# Patient Record
Sex: FEMALE | Race: BLACK OR AFRICAN AMERICAN | HISPANIC OR LATINO | Employment: OTHER | URBAN - METROPOLITAN AREA
[De-identification: names, ages, dates, MRNs, and addresses within clinical notes are randomized per-mention and may not be internally consistent; named-entity substitution may affect disease eponyms.]

---

## 2017-01-10 ENCOUNTER — TELEPHONE (OUTPATIENT)
Dept: OPHTHALMOLOGY | Facility: CLINIC | Age: 77
End: 2017-01-10

## 2017-01-10 ENCOUNTER — PATIENT OUTREACH (OUTPATIENT)
Dept: OTHER | Facility: OTHER | Age: 77
End: 2017-01-10
Payer: MEDICARE

## 2017-01-10 RX ORDER — PREDNISOLONE ACETATE 10 MG/ML
1 SUSPENSION/ DROPS OPHTHALMIC 3 TIMES DAILY
Qty: 5 ML | Refills: 1 | Status: SHIPPED | OUTPATIENT
Start: 2017-01-10 | End: 2017-02-09

## 2017-01-10 RX ORDER — OFLOXACIN 3 MG/ML
1 SOLUTION/ DROPS OPHTHALMIC 3 TIMES DAILY
Qty: 5 ML | Refills: 0 | Status: SHIPPED | OUTPATIENT
Start: 2017-01-10 | End: 2017-06-26 | Stop reason: SDUPTHER

## 2017-01-10 NOTE — PROGRESS NOTES
Last 5 Patient Entered Readings                                                               Current 30 Day Average: 127/73     Recent Readings 1/6/2017 12/30/2016 12/24/2016 12/23/2016 12/22/2016    Systolic BP (mmHg) 126 139 104 125 135    Diastolic BP (mmHg) 76 79 65 69 83        Spoke with Ms Breen, Ms Guerra's daughter, she is doing well. Will take more readings - wasn't sure if they were coming through to us or not. Going to visit her tonight and will take a reading tonight.     Follow up with Ms. Marga Guerra completed. Patient is maintaining a low sodium diet and continuing her exercise regime. Patient did not have any further questions or concerns. I will follow up in a few weeks to see how she is doing and progressing.

## 2017-01-10 NOTE — TELEPHONE ENCOUNTER
----- Message from Kari Ladd sent at 1/9/2017  1:04 PM CST -----  Contact: Linda(daughter)  Ms. Mckeon call because her mom needs more drops before her surgery. She can be reached at [phone]

## 2017-01-11 ENCOUNTER — TELEPHONE (OUTPATIENT)
Dept: OPHTHALMOLOGY | Facility: CLINIC | Age: 77
End: 2017-01-11

## 2017-01-12 ENCOUNTER — SURGERY (OUTPATIENT)
Age: 77
End: 2017-01-12

## 2017-01-12 ENCOUNTER — ANESTHESIA EVENT (OUTPATIENT)
Dept: SURGERY | Facility: HOSPITAL | Age: 77
End: 2017-01-12
Payer: MEDICARE

## 2017-01-12 ENCOUNTER — ANESTHESIA (OUTPATIENT)
Dept: SURGERY | Facility: HOSPITAL | Age: 77
End: 2017-01-12
Payer: MEDICARE

## 2017-01-12 PROCEDURE — D9220A PRA ANESTHESIA: Mod: ANES,,, | Performed by: ANESTHESIOLOGY

## 2017-01-12 PROCEDURE — 63600175 PHARM REV CODE 636 W HCPCS: Performed by: NURSE ANESTHETIST, CERTIFIED REGISTERED

## 2017-01-12 PROCEDURE — D9220A PRA ANESTHESIA: Mod: CRNA,,, | Performed by: NURSE ANESTHETIST, CERTIFIED REGISTERED

## 2017-01-12 PROCEDURE — 25000003 PHARM REV CODE 250: Performed by: NURSE ANESTHETIST, CERTIFIED REGISTERED

## 2017-01-12 RX ORDER — SODIUM CHLORIDE 9 MG/ML
INJECTION, SOLUTION INTRAVENOUS CONTINUOUS PRN
Status: DISCONTINUED | OUTPATIENT
Start: 2017-01-12 | End: 2017-01-12

## 2017-01-12 RX ORDER — MIDAZOLAM HYDROCHLORIDE 1 MG/ML
INJECTION, SOLUTION INTRAMUSCULAR; INTRAVENOUS
Status: DISCONTINUED | OUTPATIENT
Start: 2017-01-12 | End: 2017-01-12

## 2017-01-12 RX ADMIN — LIDOCAINE HYDROCHLORIDE 1 ML: 40 INJECTION, SOLUTION RETROBULBAR; TOPICAL at 08:01

## 2017-01-12 RX ADMIN — MIDAZOLAM HYDROCHLORIDE 1 MG: 1 INJECTION, SOLUTION INTRAMUSCULAR; INTRAVENOUS at 08:01

## 2017-01-12 RX ADMIN — MOXIFLOXACIN HYDROCHLORIDE 1 DROP: 5 SOLUTION/ DROPS OPHTHALMIC at 08:01

## 2017-01-12 RX ADMIN — PREDNISOLONE ACETATE 1 DROP: 10 SUSPENSION/ DROPS OPHTHALMIC at 08:01

## 2017-01-12 RX ADMIN — SODIUM CHONDROITIN SULFATE / SODIUM HYALURONATE 1.05 ML: 0.55-0.5 INJECTION INTRAOCULAR at 08:01

## 2017-01-12 RX ADMIN — BALANCED SALT SOLUTION 500 ML: 6.4; .75; .48; .3; 3.9; 1.7 SOLUTION OPHTHALMIC at 08:01

## 2017-01-12 RX ADMIN — SODIUM CHLORIDE: 0.9 INJECTION, SOLUTION INTRAVENOUS at 08:01

## 2017-01-12 RX ADMIN — PHENYLEPHRINE AND KETOROLAC 4 ML: 10.16; 2.88 INJECTION, SOLUTION, CONCENTRATE INTRAOCULAR at 08:01

## 2017-01-12 RX ADMIN — LIDOCAINE HYDROCHLORIDE 1 ML: 10 INJECTION, SOLUTION EPIDURAL; INFILTRATION; INTRACAUDAL; PERINEURAL at 08:01

## 2017-01-12 NOTE — ANESTHESIA PREPROCEDURE EVALUATION
01/12/2017  Marga Guerra is a 76 y.o., female.    [facility] Anesthesia Evaluation    I have reviewed the Patient Summary Reports.    I have reviewed the Nursing Notes.   I have reviewed the Medications.     Review of Systems  Anesthesia Hx:  No problems with previous Anesthesia    Social:  No Alcohol Use, Non-Smoker    Hematology/Oncology:  Hematology Normal   Oncology Normal     EENT/Dental:EENT/Dental Normal   Cardiovascular:   Hypertension Denies MI.  Denies CAD.    ROCK    Pulmonary:   COPD, moderate Hx lung CA   Renal/:   Chronic Renal Disease    Hepatic/GI:  Hepatic/GI Normal    Musculoskeletal:   Arthritis     Neurological:   CVA, no residual symptoms    Endocrine:   Diabetes, type 2 Hypothyroidism    Psych:   Psychiatric History          Physical Exam  General:  Well nourished    Airway/Jaw/Neck:  Airway Findings: Mouth Opening: Normal General Airway Assessment: Adult  Mallampati: II      Dental:  Dental Findings: Edentulous   Chest/Lungs:  Chest/Lungs Clear    Heart/Vascular:  Heart Findings: Normal            Anesthesia Plan  Type of Anesthesia, risks & benefits discussed:  Anesthesia Type:  general  Patient's Preference: GA  Intra-op Monitoring Plan:   Intra-op Monitoring Plan Comments:   Post Op Pain Control Plan:   Post Op Pain Control Plan Comments:   Induction:   IV  Beta Blocker:  Patient is not currently on a Beta-Blocker (No further documentation required).       Informed Consent: Patient understands risks and agrees with Anesthesia plan.  Questions answered. Anesthesia consent signed with patient.  ASA Score: 3     Day of Surgery Review of History & Physical:    H&P update referred to the surgeon.         Ready For Surgery From Anesthesia Perspective.

## 2017-01-12 NOTE — TRANSFER OF CARE
"Anesthesia Transfer of Care Note    Patient: aMrga Guerra    Procedure(s) Performed: Procedure(s) (LRB):  PHACOEMULSIFICATION-ASPIRATION-CATARACT (Right)  INSERTION-INTRAOCULAR LENS (IOL) (Right)    Patient location: PACU    Anesthesia Type: MAC    Transport from OR: Transported from OR on 2-3 L/min O2 by NC with adequate spontaneous ventilation    Post pain: adequate analgesia    Post assessment: no apparent anesthetic complications    Post vital signs: stable    Level of consciousness: awake    Nausea/Vomiting: no nausea/vomiting    Complications: none          Last vitals:   Visit Vitals    BP (!) 158/83 (BP Location: Left arm, Patient Position: Lying, BP Method: Automatic)    Pulse 78    Temp 36.9 °C (98.4 °F) (Oral)    Resp 18    Ht 5' 2" (1.575 m)    Wt 54.4 kg (120 lb)    SpO2 99%    Breastfeeding No    BMI 21.95 kg/m2     "

## 2017-01-12 NOTE — ANESTHESIA RELEASE NOTE
"Anesthesia Release from PACU Note    Patient: Marga Guerra    Procedure(s) Performed: Procedure(s) (LRB):  PHACOEMULSIFICATION-ASPIRATION-CATARACT (Right)  INSERTION-INTRAOCULAR LENS (IOL) (Right)    Anesthesia type: general    Post pain: Adequate analgesia    Post assessment: no apparent anesthetic complications    Last Vitals:   Visit Vitals    BP (!) 153/80    Pulse 75    Temp 36.9 °C (98.4 °F) (Oral)    Resp 18    Ht 5' 2" (1.575 m)    Wt 54.4 kg (120 lb)    SpO2 99%    Breastfeeding No    BMI 21.95 kg/m2       Post vital signs: stable    Level of consciousness: awake    Nausea/Vomiting: no nausea/no vomiting    Complications: none    Airway Patency: patent    Respiratory: unassisted    Cardiovascular: stable and blood pressure at baseline    Hydration: euvolemic  "

## 2017-01-12 NOTE — ANESTHESIA POSTPROCEDURE EVALUATION
"Anesthesia Post Evaluation    Patient: Marga Guerra    Procedure(s) Performed: Procedure(s) (LRB):  PHACOEMULSIFICATION-ASPIRATION-CATARACT (Right)  INSERTION-INTRAOCULAR LENS (IOL) (Right)    Final Anesthesia Type: general  Patient location during evaluation: PACU  Patient participation: Yes- Able to Participate  Level of consciousness: awake and alert and oriented  Post-procedure vital signs: reviewed and stable  Pain management: adequate  Airway patency: patent  PONV status at discharge: No PONV  Anesthetic complications: no      Cardiovascular status: hemodynamically stable  Respiratory status: unassisted and spontaneous ventilation  Hydration status: euvolemic  Follow-up not needed.        Visit Vitals    BP (!) 153/80    Pulse 75    Temp 36.9 °C (98.4 °F) (Oral)    Resp 18    Ht 5' 2" (1.575 m)    Wt 54.4 kg (120 lb)    SpO2 99%    Breastfeeding No    BMI 21.95 kg/m2       Pain/Desirae Score: Pain Assessment Performed: Yes (1/12/2017  9:12 AM)  Presence of Pain: denies (1/12/2017  9:12 AM)  Desirae Score: 10 (1/12/2017  9:12 AM)      "

## 2017-01-13 ENCOUNTER — OFFICE VISIT (OUTPATIENT)
Dept: OPHTHALMOLOGY | Facility: CLINIC | Age: 77
End: 2017-01-13
Payer: MEDICARE

## 2017-01-13 DIAGNOSIS — Z96.1 PSEUDOPHAKIA OF RIGHT EYE: Primary | ICD-10-CM

## 2017-01-13 PROCEDURE — 99999 PR PBB SHADOW E&M-EST. PATIENT-LVL II: CPT | Mod: PBBFAC,,, | Performed by: OPHTHALMOLOGY

## 2017-01-13 PROCEDURE — 99024 POSTOP FOLLOW-UP VISIT: CPT | Mod: S$GLB,,, | Performed by: OPHTHALMOLOGY

## 2017-01-13 NOTE — PROGRESS NOTES
HPI     Patient here for 1 day post op OD Phaco/IoL  s/p phaco w/IOL OS 10/6/16    Pt states OD feels fine. No pain. No discomfort.    Eye Drops:Pred BID OD                    Ketoralac BID OD           Last edited by Arin Shukla MA on 1/13/2017  1:40 PM.         Assessment /Plan     For exam results, see Encounter Report.    Pseudophakia of right eye    POD #1 s/p phaco/IOL OD  - doing well, no issues  - continue the following drops:    Vigamox TID x 1 wk then stop  Pred forte TID x  4 wks  Ketorolac TID until runs out    -f/up 1wk, sooner PRN. -- dilate OU at that time    1 mo with dr. ingram

## 2017-01-23 ENCOUNTER — OFFICE VISIT (OUTPATIENT)
Dept: OPHTHALMOLOGY | Facility: CLINIC | Age: 77
End: 2017-01-23
Payer: MEDICARE

## 2017-01-23 ENCOUNTER — PATIENT OUTREACH (OUTPATIENT)
Dept: OTHER | Facility: OTHER | Age: 77
End: 2017-01-23
Payer: MEDICARE

## 2017-01-23 DIAGNOSIS — H35.89 RETINAL MACULAR ATROPHY: ICD-10-CM

## 2017-01-23 DIAGNOSIS — E11.22 CONTROLLED TYPE 2 DIABETES MELLITUS WITH STAGE 4 CHRONIC KIDNEY DISEASE, WITHOUT LONG-TERM CURRENT USE OF INSULIN: ICD-10-CM

## 2017-01-23 DIAGNOSIS — Z98.41 STATUS POST CATARACT EXTRACTION AND INSERTION OF INTRAOCULAR LENS, RIGHT: Primary | ICD-10-CM

## 2017-01-23 DIAGNOSIS — N18.4 CONTROLLED TYPE 2 DIABETES MELLITUS WITH STAGE 4 CHRONIC KIDNEY DISEASE, WITHOUT LONG-TERM CURRENT USE OF INSULIN: ICD-10-CM

## 2017-01-23 DIAGNOSIS — Z96.1 STATUS POST CATARACT EXTRACTION AND INSERTION OF INTRAOCULAR LENS, RIGHT: Primary | ICD-10-CM

## 2017-01-23 DIAGNOSIS — Z98.42 STATUS POST CATARACT EXTRACTION AND INSERTION OF INTRAOCULAR LENS, LEFT: ICD-10-CM

## 2017-01-23 DIAGNOSIS — E03.9 HYPOTHYROIDISM, UNSPECIFIED TYPE: Primary | ICD-10-CM

## 2017-01-23 DIAGNOSIS — Z96.1 STATUS POST CATARACT EXTRACTION AND INSERTION OF INTRAOCULAR LENS, LEFT: ICD-10-CM

## 2017-01-23 PROCEDURE — 99499 UNLISTED E&M SERVICE: CPT | Mod: S$GLB,,, | Performed by: OPHTHALMOLOGY

## 2017-01-23 PROCEDURE — 99024 POSTOP FOLLOW-UP VISIT: CPT | Mod: S$GLB,,, | Performed by: OPHTHALMOLOGY

## 2017-01-23 PROCEDURE — 99999 PR PBB SHADOW E&M-EST. PATIENT-LVL III: CPT | Mod: PBBFAC,,, | Performed by: OPHTHALMOLOGY

## 2017-01-23 RX ORDER — LEVOTHYROXINE SODIUM 75 UG/1
75 TABLET ORAL
Qty: 30 TABLET | Refills: 11 | Status: SHIPPED | OUTPATIENT
Start: 2017-01-23 | End: 2018-01-10 | Stop reason: SDUPTHER

## 2017-01-23 RX ORDER — LEVOTHYROXINE SODIUM 50 UG/1
TABLET ORAL
Refills: 0 | COMMUNITY
Start: 2016-12-21 | End: 2017-01-24 | Stop reason: DRUGHIGH

## 2017-01-23 NOTE — PROGRESS NOTES
HPI     Referred by Dr Ingram     S/p phaco IOL OD 1/12/17  s/p phaco w/IOL OS 10/6/16   AMD     Pt states OD feels fine. Intermittent jose pain. No flashes or floaters.    Eye Drops:Pred BID OS                    Ketoralac BID OD           Last edited by Nataliia Meadows MD on 1/23/2017 11:12 AM.         Assessment /Plan     For exam results, see Encounter Report.    Status post cataract extraction and insertion of intraocular lens, right    Status post cataract extraction and insertion of intraocular lens, left    Controlled type 2 diabetes mellitus with stage 4 chronic kidney disease, without long-term current use of insulin    Retinal macular atrophy          S/p phaco IOL OD 1/12/17  s/p phaco w/IOL OS 10/6/16     PO week #1 s/p phaco/IOL OS -    - doing well, no issues  - okay to d/c abx gtt  - continue PF/ketoroloc TID for total of 1 month    Prior to cat sx - VA CV / HM with white CAT OU, now VA CF range, with clear view to retina and Diffuse retinal atrophy OU    Pt's caregiver interested in retinal eval -- discussed poor prognosis / limited options for Va rehab    Monocular precautions - full time polycarb lenses to be worn at all times.    F/up 3 mo with dr. ingram - re-establish care

## 2017-01-23 NOTE — PROGRESS NOTES
Per daughter, patient is doing well. She is recovering from cataract surgery.     Last 5 Patient Entered Readings                                                               Current 30 Day Average: 121/75     Recent Readings 1/18/2017 1/13/2017 1/12/2017 1/11/2017 1/6/2017    Systolic BP (mmHg) 122 131 140 91 126    Diastolic BP (mmHg) 83 74 85 64 76    Pulse 100 95 81 96 87      BP at goal  Continue monitoring

## 2017-01-24 ENCOUNTER — TELEPHONE (OUTPATIENT)
Dept: ENDOCRINOLOGY | Facility: CLINIC | Age: 77
End: 2017-01-24

## 2017-02-07 ENCOUNTER — PATIENT OUTREACH (OUTPATIENT)
Dept: OTHER | Facility: OTHER | Age: 77
End: 2017-02-07
Payer: MEDICARE

## 2017-02-07 NOTE — PROGRESS NOTES
Last 5 Patient Entered Readings                                                               Current 30 Day Average: 121/78     Recent Readings 2/6/2017 2/6/2017 1/30/2017 1/18/2017 1/13/2017    Systolic BP (mmHg) 108 118 131 122 131    Diastolic BP (mmHg) 61 104 84 83 74    Pulse 82 98 94 100 95        I spoke with Ms Mckeon, Ms Guerra's daughter. She states Ms Guerra is doing well and had no questions or concerns.  Will continue to monitor regularly.    Follow up with Ms. Cameronlazaro Guerra completed. Patient is maintaining a low sodium diet and continuing her exercise regime. Patient did not have any further questions or concerns. I will follow up in a few weeks to see how she is doing and progressing.

## 2017-02-15 ENCOUNTER — INITIAL CONSULT (OUTPATIENT)
Dept: OPHTHALMOLOGY | Facility: CLINIC | Age: 77
End: 2017-02-15
Payer: MEDICARE

## 2017-02-15 DIAGNOSIS — H35.3134 NONEXUDATIVE AGE-RELATED MACULAR DEGENERATION, BILATERAL, ADVANCED ATROPHIC WITH SUBFOVEAL INVOLVEMENT: ICD-10-CM

## 2017-02-15 DIAGNOSIS — H35.50 RETINAL DYSTROPHY: Primary | ICD-10-CM

## 2017-02-15 PROCEDURE — 99999 PR PBB SHADOW E&M-EST. PATIENT-LVL II: CPT | Mod: PBBFAC,,, | Performed by: OPHTHALMOLOGY

## 2017-02-15 PROCEDURE — 92225 PR SPECIAL EYE EXAM, INITIAL: CPT | Mod: 50,S$GLB,, | Performed by: OPHTHALMOLOGY

## 2017-02-15 PROCEDURE — 92134 CPTRZ OPH DX IMG PST SGM RTA: CPT | Mod: S$GLB,,, | Performed by: OPHTHALMOLOGY

## 2017-02-15 PROCEDURE — 92014 COMPRE OPH EXAM EST PT 1/>: CPT | Mod: 24,S$GLB,, | Performed by: OPHTHALMOLOGY

## 2017-02-15 NOTE — LETTER
February 15, 2017      Nataliia Meadows MD  [address] [place] [facility] [address]           [facility] - Ophthalmology  [address]  [place] [address]  Phone: [phone]  Fax: [phone]          Patient: Marga Guerra   MR Number: [id number]   YOB: 1940   Date of Visit: 2/15/2017       Dear Dr. Nataliia Meadows:    Thank you for referring Marga Guerra to me for evaluation. Attached you will find relevant portions of my assessment and plan of care.    If you have questions, please do not hesitate to call me. I look forward to following Marga Guerra along with you.    Sincerely,    Sriram Robbins MD    Enclosure  CC:  No Recipients    If you would like to receive this communication electronically, please contact [email or web address] or [phone] to request more information on [facility] access.    For providers and/or their staff who would like to refer a patient to [facility], please contact us through our one-stop-shop provider referral line, [facility] at [phone].    If you feel you have received this communication in error or would no longer like to receive these types of communications, please e-mail [email or web address]

## 2017-02-15 NOTE — MR AVS SNAPSHOT
[facility] - Ophthalmology  [address]  [facility] [address]  Phone: [phone]  Fax: [phone]                  Marga Guerra   2/15/2017 9:45 AM   Initial consult    Description:  Female : 1940   Provider:  Sriram Robbins MD   Department:  [facility] - Ophthalmology           Reason for Visit     Macular Degeneration           Diagnoses this Visit        Comments    Retinal dystrophy    -  Primary            To Do List           Future Appointments        Provider Department Dept Phone    3/13/2017 9:30 AM Julian Gutierres MD [facility] - Ophthalmology [phone]      Goals (5 Years of Data)              17    Blood Pressure <= 140/90   148/80  135/77      Notes - Note created  8/15/2016  4:10 PM by Keturah Johnson    It is important to consistently maintain a controlled blood pressure.      Exercise at least 150 minutes per week.           Take at least one BP reading per week at various times of the day       On track      [facility]     [facility] On Call Nurse Care Line -  Assistance  Registered nurses in the [facility] On Call Center provide clinical advisement, health education, appointment booking, and other advisory services.  Call for this free service at [phone].             Medications           Message regarding Medications     Verify the changes and/or additions to your medication regime listed below are the same as discussed with your clinician today.  If any of these changes or additions are incorrect, please notify your healthcare provider.             Verify that the below list of medications is an accurate representation of the medications you are currently taking.  If none reported, the list may be blank. If incorrect, please contact your healthcare provider. Carry this list with you in case of emergency.           Current Medications     albuterol 90 mcg/actuation inhaler Inhale 2 puffs into the lungs every 6 (six) hours as  needed for Wheezing.    amlodipine (NORVASC) 5 MG tablet Take 1 tablet (5 mg total) by mouth once daily.    atorvastatin (LIPITOR) 40 MG tablet TAKE 1 TABLET BY MOUTH DAILY    calcitRIOL (ROCALTROL) 0.5 MCG Cap Take 1 capsule (0.5 mcg total) by mouth once daily.    CALCIUM 600 600 mg (1,500 mg) Tab TK 4 TS PO TID WITH MEALS.    levothyroxine (SYNTHROID) 75 MCG tablet Take 1 tablet (75 mcg total) by mouth before breakfast.    ramipril (ALTACE) 10 MG capsule Take 1 capsule (10 mg total) by mouth every morning.    ranitidine (ZANTAC) 150 MG tablet TAKE 1 TABLET BY MOUTH TWICE DAILY           Clinical Reference Information           Allergies as of 2/15/2017     Iodine And Iodide Containing Products    Prolia [Denosumab]      Immunizations Administered on Date of Encounter - 2/15/2017     None      Orders Placed During Today's Visit      Normal Orders This Visit    Posterior Segment OCT Retina-Both eyes     Future Labs/Procedures Expected by Expires    FTA ANTIBODIES, IGG AND IGM  2/15/2017 4/16/2018    RPR  2/15/2017 4/16/2018      Language Assistance Services     ATTENTION: Language assistance services are available, free of charge. Please call [phone].      ATENCIÓN: Si tomi strong, tiene a del rosario disposición servicios gratuitos de asistencia lingüística. Llame al [phone].     EILEEN Ý: N?u b?n nói Ti?ng Vi?t, có các d?ch v? h? tr? ngôn ng? mi?n phí dành cho b?n. G?i s? [phone].         [facility] complies with applicable Federal civil rights laws and does not discriminate on the basis of race, color, national origin, age, disability, or sex.

## 2017-02-15 NOTE — PROGRESS NOTES
HPI     DLS 1/23/17---Dr Meadows     Pt is here today with daughter, Linda.  Pt stating decrease VA OU for   about 10 years ---had phaco surgery in last few months but vision is the   same ------denies flashes, floaters or eye pain   Hx AMD  Pt told about 10 yrs ago that she has AMD.  Took eye vitamins for a while   but not now.  No change Va OU after CE.  Sees better at night.       S/p phaco IOL OD 1/12/17  S/p phaco w/IOL OS 10/6/16   No FH blindness      Eye Drops:Pred BID OS                    Ketoralac BID OD           Last edited by Sriram Robbins MD on 2/15/2017  1:41 PM.     [place] SDOCT:   OD: good quality, atrophic NS thinning   OS: good quality, atrophic NS thinning      Assessment /Plan     For exam results, see Encounter Report.    Retinal dystrophy  -     FTA ANTIBODIES, IGG AND IGM; Future; Expected date: 2/15/17  -     RPR; Future; Expected date: 2/15/17  -     Posterior Segment OCT Retina-Both eyes    Nonexudative age-related macular degeneration, bilateral, advanced atrophic with subfoveal involvement      Large area of geographic atrophy OU with areas pigment hypertophy  Optic disc cupping ou.  Glaucoma suspect    Nerves also with pallor and retinal vessels attenuated  Suspect a ridge/cone dystrophy or toxicity or autoimmunity could be possible  Check ERG    RTC with me after above

## 2017-03-07 ENCOUNTER — PATIENT OUTREACH (OUTPATIENT)
Dept: OTHER | Facility: OTHER | Age: 77
End: 2017-03-07
Payer: MEDICARE

## 2017-03-07 NOTE — PROGRESS NOTES
Last 5 Patient Entered Readings                                                               Current 30 Day Average: 137/79     Recent Readings 3/3/2017 2/27/2017 2/9/2017 2/9/2017 2/6/2017    Systolic BP (mmHg) 150 144 148 135 108    Diastolic BP (mmHg) 78 81 80 77 61    Pulse 96 79 80 79 82        Spoke with Ms Mckeon about her mother. States the cuff had stopped working - brought it to the [facility] and got a new one. Just started taking readings again. Ms Guerra is feeling well. No questions or concerns.    Follow up with Ms. Marga Guerra completed. Patient is maintaining a low sodium diet and continuing her exercise regime. Patient did not have any further questions or concerns. I will follow up in a few weeks to see how she is doing and progressing.

## 2017-03-10 RX ORDER — RAMIPRIL 10 MG/1
CAPSULE ORAL
Qty: 30 CAPSULE | Refills: 6 | Status: SHIPPED | OUTPATIENT
Start: 2017-03-10 | End: 2017-09-25 | Stop reason: SDUPTHER

## 2017-03-13 ENCOUNTER — INITIAL CONSULT (OUTPATIENT)
Dept: OPHTHALMOLOGY | Facility: CLINIC | Age: 77
End: 2017-03-13
Payer: MEDICARE

## 2017-03-13 DIAGNOSIS — Z98.49 STATUS POST CATARACT EXTRACTION AND INSERTION OF INTRAOCULAR LENS, UNSPECIFIED LATERALITY: ICD-10-CM

## 2017-03-13 DIAGNOSIS — H40.10X0 OPEN-ANGLE GLAUCOMA, UNSPECIFIED GLAUCOMA STAGE, UNSPECIFIED LATERALITY, UNSPECIFIED OPEN-ANGLE GLAUCOMA TYPE: Primary | ICD-10-CM

## 2017-03-13 DIAGNOSIS — Z96.1 STATUS POST CATARACT EXTRACTION AND INSERTION OF INTRAOCULAR LENS, UNSPECIFIED LATERALITY: ICD-10-CM

## 2017-03-13 DIAGNOSIS — H40.003 GLAUCOMA SUSPECT, BILATERAL: Primary | ICD-10-CM

## 2017-03-13 DIAGNOSIS — H35.50 RETINAL DYSTROPHY: ICD-10-CM

## 2017-03-13 DIAGNOSIS — H35.3190 AGE-RELATED MACULAR DEGENERATION WITH CENTRAL GEOGRAPHIC ATROPHY: ICD-10-CM

## 2017-03-13 DIAGNOSIS — H54.10 BLINDNESS AND LOW VISION: ICD-10-CM

## 2017-03-13 PROCEDURE — 92020 GONIOSCOPY: CPT | Mod: S$GLB,,, | Performed by: OPHTHALMOLOGY

## 2017-03-13 PROCEDURE — 99999 PR PBB SHADOW E&M-EST. PATIENT-LVL II: CPT | Mod: PBBFAC,,, | Performed by: OPHTHALMOLOGY

## 2017-03-13 PROCEDURE — 92014 COMPRE OPH EXAM EST PT 1/>: CPT | Mod: 24,S$GLB,, | Performed by: OPHTHALMOLOGY

## 2017-03-13 PROCEDURE — 92250 FUNDUS PHOTOGRAPHY W/I&R: CPT | Mod: S$GLB,,, | Performed by: OPHTHALMOLOGY

## 2017-03-13 PROCEDURE — 76514 ECHO EXAM OF EYE THICKNESS: CPT | Mod: S$GLB,,, | Performed by: OPHTHALMOLOGY

## 2017-03-13 NOTE — PROGRESS NOTES
HPI     Glaucoma    Additional comments: oag domenica- Dr. 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 (History)            Assessment /Plan     For exam results, see Encounter Report.    Glaucoma suspect, bilateral  -     Color Fundus Photography - OU - Both Eyes  -     Posterior Segment OCT Optic Nerve- Both eyes; Future    Status post cataract extraction and insertion of intraocular lens, unspecified laterality    Blindness and low vision    Age-related macular degeneration with central geographic atrophy    Retinal dystrophy  -     Color Fundus Photography - OU - Both Eyes  -     Posterior Segment OCT Optic Nerve- Both eyes; Future  -     Electroretinography (ERG) - OU - Both Eyes; Future        Patient with Daughter  Vinny immigrant      Small Cell Lung CA  Thyroid CA    POAG suspect  Low    CCT  498 // 467    Low teens    Both eyes  Observe    PC IOL  Quiet    Retina dystrophy  GARPE Large  Blindness        Plan  RTC 1-2 weeks with ERG and OCT RNFL / Autofluorescence   RTC sooner prn with good understanding

## 2017-03-13 NOTE — LETTER
March 13, 2017      Sriram Robbins MD  [address]  [facility] [address]           [facility] - Ophthalmology  [address]  [facility] [address]  Phone: [phone]  Fax: [phone]          Patient: Marga Guerra   MR Number: [id number]   YOB: 1940   Date of Visit: 3/13/2017       Dear Dr. Sriram Robbins:    Thank you for referring Marga Guerra to me for evaluation. Attached you will find relevant portions of my assessment and plan of care.    If you have questions, please do not hesitate to call me. I look forward to following Marga Guerra along with you.    Sincerely,    Julian Gutierres MD    Enclosure  CC:  No Recipients    If you would like to receive this communication electronically, please contact [email or web address] or [phone] to request more information on [facility] access.    For providers and/or their staff who would like to refer a patient to [facility], please contact us through our one-stop-shop provider referral line, [facility], at [phone].    If you feel you have received this communication in error or would no longer like to receive these types of communications, please e-mail [email or web address]

## 2017-03-13 NOTE — MR AVS SNAPSHOT
[facility] - Ophthalmology  [address]  [facility] [address]  Phone: [phone]  Fax: [phone]                  Marga Guerra   3/13/2017 9:30 AM   Initial consult    Description:  Female : 1940   Provider:  Julian Gutierres MD   Department:  [facility] - Ophthalmology           Reason for Visit     Glaucoma           Diagnoses this Visit        Comments    Glaucoma suspect, bilateral    -  Primary     Status post cataract extraction and insertion of intraocular lens, unspecified laterality         Blindness and low vision         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      Exercise at least 150 minutes per week.           Take at least one BP reading per week at various times of the day   On track          Follow-Up and Disposition     Return in about 2 weeks (around 3/27/2017), or if symptoms worsen or fail to improve, for OCT RNFL / Autoflourence & ERG.      [facility] On [facility]     [facility] On Call Nurse Care Line -  Assistance  Registered nurses in the [facility] On Call Center provide clinical advisement, health education, appointment booking, and other advisory services.  Call for this free service at [phone].             Medications           Message regarding Medications     Verify the changes and/or additions to your medication regime listed below are the same as discussed with your clinician today.  If any of these changes or additions are incorrect, please notify your healthcare provider.              Verify that the below list of medications is an accurate representation of the medications you are currently taking.  If none reported, the list may be blank. If incorrect, please contact your healthcare provider. Carry this list with you in case of emergency.           Current Medications     albuterol 90 mcg/actuation inhaler Inhale 2 puffs into the lungs every 6 (six) hours as needed for Wheezing.    amlodipine (NORVASC) 5 MG tablet Take 1 tablet (5 mg total) by mouth once daily.    atorvastatin (LIPITOR) 40 MG tablet TAKE 1 TABLET BY MOUTH DAILY    calcitRIOL (ROCALTROL) 0.5 MCG Cap Take 1 capsule (0.5 mcg total) by mouth once daily.    CALCIUM 600 600 mg (1,500 mg) Tab TK 4 TS PO TID WITH MEALS.    levothyroxine (SYNTHROID) 75 MCG tablet Take 1 tablet (75 mcg total) by mouth before breakfast.    ramipril (ALTACE) 10 MG capsule TAKE ONE CAPSULE BY MOUTH EVERY MORNING    ranitidine (ZANTAC) 150 MG tablet TAKE 1 TABLET BY MOUTH TWICE DAILY           Clinical Reference Information           Allergies as of 3/13/2017     Iodine And Iodide Containing Products    Prolia [Denosumab]      Immunizations Administered on Date of Encounter - 3/13/2017     None      Orders Placed During Today's Visit      Normal Orders This Visit    Color Fundus Photography - OU - Both Eyes     Future Labs/Procedures Expected by Expires    Electroretinography (ERG) - OU - Both Eyes  As directed 3/13/2018    Posterior Segment OCT Optic Nerve- Both eyes  As directed 3/13/2018      Language Assistance Services     ATTENTION: Language assistance services are available, free of charge. Please call [phone].      ATENCIÓN: Si habla español, tiene a del rosario disposición servicios gratuitos de asistencia lingüística. Llame al [phone].     CHÚ Ý: N?u b?n nói Ti?ng Vi?t, có các d?ch v? h? tr? ngôn ng? mi?n phí dành cho b?n. G?i s? [phone].         [facility] complies with applicable Federal civil rights laws and does not  discriminate on the basis of race, color, national origin, age, disability, or sex.

## 2017-03-20 ENCOUNTER — CLINICAL SUPPORT (OUTPATIENT)
Dept: OPHTHALMOLOGY | Facility: CLINIC | Age: 77
End: 2017-03-20
Payer: MEDICARE

## 2017-03-20 DIAGNOSIS — H35.50 RETINAL DYSTROPHY: ICD-10-CM

## 2017-03-20 DIAGNOSIS — H40.003 GLAUCOMA SUSPECT, BILATERAL: ICD-10-CM

## 2017-03-20 PROCEDURE — 92275 ELECTRORETINOGRAPHY (ERG) - OU - BOTH EYES: CPT | Mod: S$GLB,,, | Performed by: OPHTHALMOLOGY

## 2017-03-20 PROCEDURE — 92133 CPTRZD OPH DX IMG PST SGM ON: CPT | Mod: S$GLB,,, | Performed by: OPHTHALMOLOGY

## 2017-03-20 PROCEDURE — 99211 OFF/OP EST MAY X REQ PHY/QHP: CPT | Mod: 24,25,S$GLB, | Performed by: OPHTHALMOLOGY

## 2017-03-20 PROCEDURE — 99999 PR PBB SHADOW E&M-EST. PATIENT-LVL II: CPT | Mod: PBBFAC,,,

## 2017-03-21 NOTE — PROGRESS NOTES
HPI     76 yr old presents for an ERG per the request of Dr. Robbins. Pt   stating decrease [facility] OU for   about 10 years ---had phaco surgery in last few months but vision is the   Same.  Dr. Robbins suspects a ridge/cone dystrophy or toxicity or   autoimmunity could be possible  Ordered  ERG for Diagnosis confirmation.          Last edited by Ana Juarez on 3/20/2017  9:30 AM.         Assessment /Plan     For exam results, see Encounter Report.    Retinal dystrophy  -     Electroretinography (ERG) - OU - Both Eyes  -     Posterior Segment OCT Optic Nerve- Both eyes    Glaucoma suspect, bilateral  -     Posterior Segment OCT Optic Nerve- Both eyes        2017    Re: Marga Guerra   [facility] #:  [id number]   :  1940    Dear Dr. Robbins:  We had the pleasure performing an ERG on Marga Guerra on 2017.    The ERG test was performed using standard ISCEV protocol and DTL electrodes for dark and light adaptation ERG recording conditions.  The test was of good quality with motion artifact with excellent electrode impedance.  The box plots provide for normal controls comparison.  Please note that the dark adapted 10.0 test condition is a new addition to the ISCEV protocol and we do not have normal controls for comparison. For the ERG, each eye was dilated to 7 mm.    ERG Results:  The scotopic ridge only condition (0.01) demonstrates normal a & b-wave amplitudes and latency in Right eye and depressed b-wave amplitudes in the Left eye.    Under dark adapted bright flash test conditions (3.0 & 10.0) the ERG demonstrates the presence of normal a- and b-waveform amplitudes and latency in the Right eye and depressed waveform amplitudes in the Left eye.  Oscillatory potentials are present under dark adapted test conditions in the Right eye.    Under light adapted test conditions (3.0), the ERG demonstrates depressed a- & b-wave amplitudes and delayed latency more so for the Left  than the Right eye.   The 30 Hz flicker testing demonstrates the presence light driven waveforms with depressed amplitudes and delayed latency more so in the Left than Right eye.    The ERG demonstrates the presence of scotopic & photopic waveforms.   Cone mediated activity is depressed.    Fundus photos & OCT were obtained.  There are bilateral RPE abnormalities in macula with optic cupping suggestive of glaucoma in each eye.  The OCT demonstrates disrupted photoreceptor integrity & symmetric, homogeneous hypo-autofluorescence of the macula in each eye.     These recordings should be used and correlated with your clinical findings.    It has been a pleasure providing this service for your patient. I am happy to go over these ERG findings with you.    Sincerely,    Julian Gutierres

## 2017-04-04 ENCOUNTER — PATIENT OUTREACH (OUTPATIENT)
Dept: OTHER | Facility: OTHER | Age: 77
End: 2017-04-04
Payer: MEDICARE

## 2017-04-04 NOTE — PROGRESS NOTES
Last 5 Patient Entered Readings                                                               Current 30 Day Average: 117/77     Recent Readings 4/3/2017 4/3/2017 3/27/2017 3/27/2017 3/16/2017    Systolic BP (mmHg) 167 131 85 90 115    Diastolic BP (mmHg) 84 105 66 63 75    Pulse 72 78 94 96 100        Spoke with Ms Mckeon, Ms Guerra's daughter. She states Ms Guerra is feeling well. No questions or concerns.     Follow up with MsJame Marga Guerra completed. Patient is maintaining a low sodium diet and continuing her exercise regime. Patient did not have any further questions or concerns. I will follow up in a few weeks to see how she is doing and progressing.

## 2017-04-17 ENCOUNTER — OFFICE VISIT (OUTPATIENT)
Dept: INTERNAL MEDICINE | Facility: CLINIC | Age: 77
End: 2017-04-17
Payer: MEDICARE

## 2017-04-17 VITALS
DIASTOLIC BLOOD PRESSURE: 72 MMHG | WEIGHT: 117.5 LBS | HEART RATE: 82 BPM | SYSTOLIC BLOOD PRESSURE: 106 MMHG | HEIGHT: 62 IN | BODY MASS INDEX: 21.62 KG/M2

## 2017-04-17 DIAGNOSIS — E55.9 VITAMIN D DEFICIENCY: ICD-10-CM

## 2017-04-17 DIAGNOSIS — J43.9 PULMONARY EMPHYSEMA, UNSPECIFIED EMPHYSEMA TYPE: ICD-10-CM

## 2017-04-17 DIAGNOSIS — Z85.118 HISTORY OF LUNG CANCER: ICD-10-CM

## 2017-04-17 DIAGNOSIS — C73 THYROID CANCER: ICD-10-CM

## 2017-04-17 DIAGNOSIS — E89.0 POST-SURGICAL HYPOTHYROIDISM: ICD-10-CM

## 2017-04-17 DIAGNOSIS — I77.9 BILATERAL CAROTID ARTERY DISEASE: ICD-10-CM

## 2017-04-17 DIAGNOSIS — E11.21 CONTROLLED TYPE 2 DIABETES MELLITUS WITH DIABETIC NEPHROPATHY, WITHOUT LONG-TERM CURRENT USE OF INSULIN: ICD-10-CM

## 2017-04-17 DIAGNOSIS — I10 ESSENTIAL HYPERTENSION: ICD-10-CM

## 2017-04-17 DIAGNOSIS — M81.0 SENILE OSTEOPOROSIS: ICD-10-CM

## 2017-04-17 DIAGNOSIS — Z23 IMMUNIZATION DUE: Primary | ICD-10-CM

## 2017-04-17 DIAGNOSIS — I70.0 AORTIC ATHEROSCLEROSIS: ICD-10-CM

## 2017-04-17 DIAGNOSIS — E89.2 POSTSURGICAL HYPOPARATHYROIDISM: ICD-10-CM

## 2017-04-17 DIAGNOSIS — H35.3190 AGE-RELATED MACULAR DEGENERATION WITH CENTRAL GEOGRAPHIC ATROPHY: ICD-10-CM

## 2017-04-17 DIAGNOSIS — E11.51 TYPE 2 DIABETES MELLITUS WITH DIABETIC PERIPHERAL ANGIOPATHY WITHOUT GANGRENE, WITHOUT LONG-TERM CURRENT USE OF INSULIN: ICD-10-CM

## 2017-04-17 DIAGNOSIS — E78.2 MIXED HYPERLIPIDEMIA: ICD-10-CM

## 2017-04-17 DIAGNOSIS — N18.4 CKD (CHRONIC KIDNEY DISEASE), STAGE IV: ICD-10-CM

## 2017-04-17 DIAGNOSIS — Z86.73 HISTORY OF STROKE: ICD-10-CM

## 2017-04-17 DIAGNOSIS — I35.0 AORTIC VALVE STENOSIS, UNSPECIFIED ETIOLOGY: ICD-10-CM

## 2017-04-17 DIAGNOSIS — Z00.00 ENCOUNTER FOR PREVENTIVE HEALTH EXAMINATION: ICD-10-CM

## 2017-04-17 PROCEDURE — 99499 UNLISTED E&M SERVICE: CPT | Mod: S$GLB,,, | Performed by: NURSE PRACTITIONER

## 2017-04-17 PROCEDURE — 99999 PR PBB SHADOW E&M-EST. PATIENT-LVL III: CPT | Mod: PBBFAC,,, | Performed by: NURSE PRACTITIONER

## 2017-04-17 PROCEDURE — 3074F SYST BP LT 130 MM HG: CPT | Mod: S$GLB,,, | Performed by: NURSE PRACTITIONER

## 2017-04-17 PROCEDURE — G0439 PPPS, SUBSEQ VISIT: HCPCS | Mod: 25,S$GLB,, | Performed by: NURSE PRACTITIONER

## 2017-04-17 PROCEDURE — 3078F DIAST BP <80 MM HG: CPT | Mod: S$GLB,,, | Performed by: NURSE PRACTITIONER

## 2017-04-17 PROCEDURE — G0009 ADMIN PNEUMOCOCCAL VACCINE: HCPCS | Mod: S$GLB,,, | Performed by: NURSE PRACTITIONER

## 2017-04-17 PROCEDURE — 90732 PPSV23 VACC 2 YRS+ SUBQ/IM: CPT | Mod: S$GLB,,, | Performed by: NURSE PRACTITIONER

## 2017-04-17 RX ORDER — ALBUTEROL SULFATE 90 UG/1
2 AEROSOL, METERED RESPIRATORY (INHALATION) EVERY 6 HOURS PRN
Qty: 18 G | Refills: 6 | Status: SHIPPED | OUTPATIENT
Start: 2017-04-17 | End: 2019-12-06 | Stop reason: SDUPTHER

## 2017-04-17 NOTE — PROGRESS NOTES
"Marga Guerra presented for a  Medicare AWV and comprehensive Health Risk Assessment today. The following components were reviewed and updated:    · Medical history  · Family History  · Social history  · Allergies and Current Medications  · Health Risk Assessment  · Health Maintenance  · Care Team     ** See Completed Assessments for Annual Wellness Visit within the encounter summary.**       The following assessments were completed:  · Living Situation  · CAGE  · Depression Screening  · Timed Get Up and Go  · Whisper Test  · Cognitive Function Screening  · Nutrition Screening  · ADL Screening  · PAQ Screening    Vitals:    04/17/17 1058 04/17/17 1059   BP:  106/72   BP Location:  Left arm   Pulse:  82   Weight: 53.3 kg (117 lb 8.1 oz)    Height: 5' 2" (1.575 m)      Body mass index is 21.49 kg/(m^2).  Physical Exam   Constitutional: She is oriented to person, place, and time. She appears well-developed and well-nourished.   HENT:   Head: Normocephalic.   Cardiovascular: Normal rate and regular rhythm.    Murmur heard.  Pulses:       Dorsalis pedis pulses are 1+ on the right side, and 1+ on the left side.        Posterior tibial pulses are 0 on the right side, and 0 on the left side.   Pulmonary/Chest: Effort normal and breath sounds normal.   Abdominal: Soft. Bowel sounds are normal. She exhibits no mass.   Musculoskeletal: She exhibits no edema.        Right foot: There is decreased range of motion. There is no deformity.        Left foot: There is decreased range of motion. There is no deformity.   Feet:   Right Foot:   Protective Sensation: 7 sites tested. 7 sites sensed.   Skin Integrity: Negative for ulcer, blister, skin breakdown, erythema, warmth, callus or dry skin.   Left Foot:   Protective Sensation: 7 sites tested. 7 sites sensed.   Skin Integrity: Negative for ulcer, blister, skin breakdown, erythema, warmth, callus or dry skin.   Neurological: She is alert and oriented to person, place, and time. She " exhibits normal muscle tone.   Skin: Skin is warm and dry.   Psychiatric: She has a normal mood and affect.   Nursing note and vitals reviewed.        Diagnoses and health risks identified today and associated recommendations/orders:      1. Encounter for preventive health examiniation  Here for Health Risk Assessment. Follow up in one year.    2. Immunization due  - Pneumococcal Polysaccharide Vaccine (23 Valent) (SQ/IM)    3. Essential hypertension  Chronic, stable on current medications. Followed by PCP.    4. Controlled type 2 diabetes mellitus with diabetic nephropathy, without long-term current use of insulin  Chronic, stable with diet control. Followed by Endocrinology, PCP.    5. CKD (chronic kidney disease), stage IV  Chronic, stable on current medications. Followed by Nephrology, PCP    6. Type 2 diabetes mellitus with diabetic peripheral angiopathy without gangrene, without long-term current use of insulin  Chronic, stable with diet control. Followed by Endocrinology, PCP.    7. Bilateral carotid artery disease  Chronic, stable on current medications. Noted ultrasound 11/30/12. Followed by PCP.    8. Aortic sclerosis  Chronic, stable. Followed by Cardiology.    9. Aortic atherosclerosis  Chronic, stable on current medications. Noted CT abdomen/pelvis 12/18/13. Followed by PCP.    10. Aortic valve stenosis, unspecified etiology  Chronic, stable.  Followed by Cardiology    11. Pulmonary emphysema, unspecified emphysema type  Chronic, stable on current medications. Followed by PCP.    12. Mixed hyperlipidemia  Chronic, stable on current medications. Followed by PCP.    13. History of stroke  Stable on current medications. Followed by PCP.    14. History of lung cancer  Stable.  Followed by Oncology.    15. Post-surgical hypothyroidism  Chronic, stable on current medication. Followed by Endocrinology.    16. Postsurgical hypoparathyroidism  Stable on current medication. Followed by Endocrinology.    17.  Thyroid cancer  Chronic, stable. Followed by Endocrinology.    18. Senile osteoporosis  Chronic, stable on current medications. Followed by Endocrinology, PCP.    19. Vitamin D deficiency  Chronic, stable on current medication. Followed by Endocrinology, PCP.    20. 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 Followed by Ophthalmology.      Provided Marga with a 5-10 year written screening schedule and personal prevention plan. Recommendations were developed using the USPSTF age appropriate recommendations. Education, counseling, and referrals were provided as needed. After Visit Summary printed and given to patient which includes a list of additional screenings\tests needed.    Return in 8 weeks (on 6/12/2017).with PCP.    Nataliia Lay NP

## 2017-04-17 NOTE — MR AVS SNAPSHOT
[facility]  [address]  [place] [address]  Phone: [phone]  Fax: [phone]                  Marga Guerra   2017 11:00 AM   Office Visit    Description:  Female : 1940   Provider:  HRA, NOM 3   Department:  [facility]           Diagnoses this Visit        Comments    Immunization due    -  Primary     Encounter for preventive health examination                To Do List           Future Appointments        Provider Department Dept Phone    2017 8:30 AM Stephanie Prescott MD [facility] [phone]      Goals (5 Years of Data)              Today    17    Blood Pressure <= 140/90   106/72  106/72      Notes - Note created  8/15/2016  4:10 PM by Keturah Johnson    It is important to consistently maintain a controlled blood pressure.      Exercise at least 150 minutes per week.           Take at least one BP reading per week at various times of the day       On track      [facility]     [facility] On [facility] Nurse Care Line -  Assistance  Unless otherwise directed by your provider, please contact [facility] On-[facility], our nurse care line that is available for  assistance.     Registered nurses in the [facility] On Call Center provide: appointment scheduling, clinical advisement, health education, and other advisory services.  Call: [phone] (toll free)               Medications           Message regarding Medications     Verify the changes and/or additions to your medication regime listed below are the same as discussed with your clinician today.  If any of these changes or additions are incorrect, please notify your healthcare provider.             Verify that the below list of medications is an accurate representation of the medications you are currently taking.  If none reported, the list may be blank. If incorrect, please contact your healthcare provider. Carry this list with you in case of  "emergency.           Current Medications     albuterol 90 mcg/actuation inhaler Inhale 2 puffs into the lungs every 6 (six) hours as needed for Wheezing.    amlodipine (NORVASC) 5 MG tablet Take 1 tablet (5 mg total) by mouth once daily.    atorvastatin (LIPITOR) 40 MG tablet TAKE 1 TABLET BY MOUTH DAILY    calcitRIOL (ROCALTROL) 0.5 MCG Cap Take 1 capsule (0.5 mcg total) by mouth once daily.    CALCIUM 600 600 mg (1,500 mg) Tab TK 4 TS PO TID WITH MEALS.    levothyroxine (SYNTHROID) 75 MCG tablet Take 1 tablet (75 mcg total) by mouth before breakfast.    ramipril (ALTACE) 10 MG capsule TAKE ONE CAPSULE BY MOUTH EVERY MORNING    ranitidine (ZANTAC) 150 MG tablet TAKE 1 TABLET BY MOUTH TWICE DAILY           Clinical Reference Information           Your Vitals Were     BP Pulse Height Weight BMI    106/72 (BP Location: Left arm) 82 5' 2" (1.575 m) 53.3 kg (117 lb 8.1 oz) 21.49 kg/m2      Blood Pressure          Most Recent Value    BP  106/72      Allergies as of 4/17/2017     Iodine And Iodide Containing Products    Prolia [Denosumab]      Immunizations Administered on Date of Encounter - 4/17/2017     Name Date Dose VIS Date Route    Pneumococcal Polysaccharide - 23 Valent 4/17/2017 0.5 mL 4/24/2015 Intramuscular      Orders Placed During Today's Visit      Normal Orders This Visit    Pneumococcal Polysaccharide Vaccine (23 Valent) (SQ/IM)       Instructions      Counseling and Referral of Other Preventative  (Italic type indicates deductible and co-insurance are waived)    Patient Name: Marga Guerra  Today's Date: 4/17/2017      SERVICE LIMITATIONS RECOMMENDATION    Vaccines    · Pneumococcal (once after 65)    · Influenza (annually)    · Hepatitis B (if medium/high risk)    · Prevnar 13      Hepatitis B medium/high risk factors:       - End-stage renal disease       - Hemophiliacs who received Factor VII or         IX concentrates       - Clients of institutions for the mentally             retarded       - " Persons who live in the same house as          a HepB carrier       - Homosexual men       - Illicit injectable drug abusers     Pneumococcal: Done, today     Influenza: due fall, 2017     Hepatitis B: N/A     Prevnar 13: Done, no repeat necessary    Mammogram (biennial age 50-74)  Annually (age 40 or over)  Last done 2016, recommend to repeat every 1  years    Pap (up to age 70 and after 70 if unknown history or abnormal study last 10 years)    N/A     The USPSTF recommends against screening for cervical cancer in women older than age 65 years who have had adequate prior screening and are not otherwise at high risk for cervical cancer.      Colorectal cancer screening (to age 75)    · Fecal occult blood test (annual)  · Flexible sigmoidoscopy (5y)  · Screening colonoscopy (10y)  · Barium enema   N/A    Diabetes self-management training (no USPSTF recommendations)  Requires referral by treating physician for patient with diabetes or renal disease. 10 hours of initial DSMT sessions of no less than 30 minutes each in a continuous 12-month period. 2 hours of follow-up DSMT in subsequent years.  N/A    Bone mass measurements (age 65 & older, biennial)  Requires diagnosis related to osteoporosis or estrogen deficiency. Biennial benefit unless patient has history of long-term glucocorticoid  Per Endocrinology    Glaucoma screening (no USPSTF recommendation)  Diabetes mellitus, family history   , age 50 or over    American, age 65 or over  Done this year, repeat every year    Medical nutrition therapy for diabetes or renal disease (no recommended schedule)  Requires referral by treating physician for patient with diabetes or renal disease or kidney transplant within the past 3 years.  Can be provided in same year as diabetes self-management training (DSMT), and CMS recommends medical nutrition therapy take place after DSMT. Up to 3 hours for initial year and 2 hours in subsequent years.  N/A     Cardiovascular screening blood tests (every 5 years)  · Fasting lipid panel  Order as a panel if possible  Last done 3/2016, recommend to repeat every 1-5  years    Diabetes screening tests (at least every 3 years, Medicare covers annually or at 6-month intervals for prediabetic patients)  · Fasting blood sugar (FBS) or glucose tolerance test (GTT)  Patient must be diagnosed with one of the following:       - Hypertension       - Dyslipidemia       - Obesity (BMI 30kg/m2)       - Previous elevated impaired FBS or GTT       ... or any two of the following:       - Overweight (BMI 25 but <30)       - Family history of diabetes       - Age 65 or older       - History of gestational diabetes or birth of baby weighing more than 9 pounds  Last done 8/2016, recommend to repeat every 1-3  years    Abdominal aortic aneurysm screening (once)  · Sonogram   Limited to patients who meet one of the following criteria:       - Men who are 65-75 years old and have smoked more than 100 cigarette in their lifetime       - Anyone with a family history of abdominal aortic aneurysm       - Anyone recommended for screening by the USPSTF  N/A    HIV screening (annually for increased risk patients)  · HIV-1 and HIV-2 by EIA, or KAUSHIK, rapid antibody test or oral mucosa transudate  Patients must be at increased risk for HIV infection per USPSTF guidelines or pregnant. Tests covered annually for patient at increased risk or as requested by the patient. Pregnant patients may receive up to 3 tests during pregnancy.  Risks discussed, screening is not recommended    Smoking cessation counseling (up to 8 sessions per year)  Patients must be asymptomatic of tobacco-related conditions to receive as a preventative service.  Non-smoker    Subsequent annual wellness visit  At least 12 months since last AWV  Return in one year     The following information is provided to all patients.  This information is to help you find resources for any of the  problems found today that may be affecting your health:                Living healthy guide: www.[facility].[place].[facility]      Understanding Diabetes: www.diabetes.org      Eating healthy: www.cdc.gov/healthyweight      CDC home safety checklist: www.cdc.gov/steadi/patient.html      Agency on Aging: www.goea.[place].[facility]      Alcoholics anonymous (AA): www.aa.org      Physical Activity: www.mike.nih.gov/ot3izin      Tobacco use: www.quitwithusla.org          Language Assistance Services     ATTENTION: Language assistance services are available, free of charge. Please call [phone].      ATENCIÓN: Si habla español, tiene a del rosario disposición servicios gratuitos de asistencia lingüística. Llame al [phone].     CHÚ Ý: N?u b?n nói Ti?ng Vi?t, có các d?ch v? h? tr? ngôn ng? mi?n phí dành cho b?n. G?i s? [phone].         [facility] complies with applicable Federal civil rights laws and does not discriminate on the basis of race, color, national origin, age, disability, or sex.

## 2017-04-17 NOTE — PATIENT INSTRUCTIONS
Counseling and Referral of Other Preventative  (Italic type indicates deductible and co-insurance are waived)    Patient Name: Marga Guerra  Today's Date: 4/17/2017      SERVICE LIMITATIONS RECOMMENDATION    Vaccines    · Pneumococcal (once after 65)    · Influenza (annually)    · Hepatitis B (if medium/high risk)    · Prevnar 13      Hepatitis B medium/high risk factors:       - End-stage renal disease       - Hemophiliacs who received Factor VII or         IX concentrates       - Clients of institutions for the mentally             retarded       - Persons who live in the same house as          a HepB carrier       - Homosexual men       - Illicit injectable drug abusers     Pneumococcal: Done, today     Influenza: due fall, 2017     Hepatitis B: N/A     Prevnar 13: Done, no repeat necessary    Mammogram (biennial age 50-74)  Annually (age 40 or over)  Last done 2016, recommend to repeat every 1  years    Pap (up to age 70 and after 70 if unknown history or abnormal study last 10 years)    N/A     The USPSTF recommends against screening for cervical cancer in women older than age 65 years who have had adequate prior screening and are not otherwise at high risk for cervical cancer.      Colorectal cancer screening (to age 75)    · Fecal occult blood test (annual)  · Flexible sigmoidoscopy (5y)  · Screening colonoscopy (10y)  · Barium enema   N/A    Diabetes self-management training (no USPSTF recommendations)  Requires referral by treating physician for patient with diabetes or renal disease. 10 hours of initial DSMT sessions of no less than 30 minutes each in a continuous 12-month period. 2 hours of follow-up DSMT in subsequent years.  N/A    Bone mass measurements (age 65 & older, biennial)  Requires diagnosis related to osteoporosis or estrogen deficiency. Biennial benefit unless patient has history of long-term glucocorticoid  Per Endocrinology    Glaucoma screening (no USPSTF recommendation)  Diabetes  mellitus, family history   , age 50 or over    American, age 65 or over  Done this year, repeat every year    Medical nutrition therapy for diabetes or renal disease (no recommended schedule)  Requires referral by treating physician for patient with diabetes or renal disease or kidney transplant within the past 3 years.  Can be provided in same year as diabetes self-management training (DSMT), and CMS recommends medical nutrition therapy take place after DSMT. Up to 3 hours for initial year and 2 hours in subsequent years.  N/A    Cardiovascular screening blood tests (every 5 years)  · Fasting lipid panel  Order as a panel if possible  Last done 3/2016, recommend to repeat every 1-5  years    Diabetes screening tests (at least every 3 years, Medicare covers annually or at 6-month intervals for prediabetic patients)  · Fasting blood sugar (FBS) or glucose tolerance test (GTT)  Patient must be diagnosed with one of the following:       - Hypertension       - Dyslipidemia       - Obesity (BMI 30kg/m2)       - Previous elevated impaired FBS or GTT       ... or any two of the following:       - Overweight (BMI 25 but <30)       - Family history of diabetes       - Age 65 or older       - History of gestational diabetes or birth of baby weighing more than 9 pounds  Last done 8/2016, recommend to repeat every 1-3  years    Abdominal aortic aneurysm screening (once)  · Sonogram   Limited to patients who meet one of the following criteria:       - Men who are 65-75 years old and have smoked more than 100 cigarette in their lifetime       - Anyone with a family history of abdominal aortic aneurysm       - Anyone recommended for screening by the USPSTF  N/A    HIV screening (annually for increased risk patients)  · HIV-1 and HIV-2 by EIA, or KAUSHIK, rapid antibody test or oral mucosa transudate  Patients must be at increased risk for HIV infection per USPSTF guidelines or pregnant. Tests covered annually  for patient at increased risk or as requested by the patient. Pregnant patients may receive up to 3 tests during pregnancy.  Risks discussed, screening is not recommended    Smoking cessation counseling (up to 8 sessions per year)  Patients must be asymptomatic of tobacco-related conditions to receive as a preventative service.  Non-smoker    Subsequent annual wellness visit  At least 12 months since last AWV  Return in one year     The following information is provided to all patients.  This information is to help you find resources for any of the problems found today that may be affecting your health:                Living healthy guide: www.[facility].[place].[facility]      Understanding Diabetes: www.diabetes.org      Eating healthy: www.cdc.gov/healthyweight      [facility] home safety checklist: www.cdc.gov/steadi/patient.html      Agency on Aging: www.goea.[place].gov      Alcoholics anonymous (AA): www.aa.org      Physical Activity: www.mike.nih.gov/uz0kvdn      Tobacco use: www.quitwithusla.org

## 2017-04-21 ENCOUNTER — PATIENT OUTREACH (OUTPATIENT)
Dept: OTHER | Facility: OTHER | Age: 77
End: 2017-04-21
Payer: MEDICARE

## 2017-04-21 NOTE — PROGRESS NOTES
Marga Guerra is doing well, per her daughter. No questions or concerns.    Last 5 Patient Entered Readings                                                               Current 30 Day Average: 131/77     Recent Readings 4/17/2017 4/7/2017 4/3/2017 4/3/2017 3/27/2017    Systolic BP (mmHg) 145 143 167 131 85    Diastolic BP (mmHg) 69 82 84 105 66    Pulse 85 85 72 78 94          BP at goal  Continue monitoring    Hypertension Medications             amlodipine (NORVASC) 5 MG tablet Take 1 tablet (5 mg total) by mouth once daily.    ramipril (ALTACE) 10 MG capsule TAKE ONE CAPSULE BY MOUTH EVERY MORNING

## 2017-05-02 ENCOUNTER — PATIENT OUTREACH (OUTPATIENT)
Dept: OTHER | Facility: OTHER | Age: 77
End: 2017-05-02
Payer: MEDICARE

## 2017-05-02 NOTE — PROGRESS NOTES
Last 5 Patient Entered Readings                                                               Current 30 Day Average: 148/81     Recent Readings 5/1/2017 4/24/2017 4/17/2017 4/7/2017 4/3/2017    Systolic BP (mmHg) 167 139 145 143 167    Diastolic BP (mmHg) 91 71 69 82 84    Pulse 90 90 85 85 72        Per Ms Linda, Ms Guerra is doing well. Thinks higher reading on 5/1 was due to wine - they were celebrating her birthday and Ms Guerra doesn't usually drink.  No questions or concerns.    Follow up with MsJame Marga Guerra completed. Patient is maintaining a low sodium diet and continuing her exercise regime. Patient did not have any further questions or concerns. I will follow up in a few weeks to see how she is doing and progressing.

## 2017-05-30 ENCOUNTER — PATIENT OUTREACH (OUTPATIENT)
Dept: OTHER | Facility: OTHER | Age: 77
End: 2017-05-30
Payer: MEDICARE

## 2017-05-30 NOTE — PROGRESS NOTES
Last 5 Patient Entered Readings                                                               Current 30 Day Average: 142/84     Recent Readings 5/29/2017 5/22/2017 5/22/2017 5/15/2017 5/9/2017    Systolic BP (mmHg) 124 128 137 147 142    Diastolic BP (mmHg) 75 77 102 83 84    Pulse 88 80 85 85 83        Ms Mckeon, Ms Guerra's daughter, states she's feeling well. No new changes or updates. No questions or concerns.    Follow up with MsJame Marga Guerra completed. Patient is maintaining a low sodium diet and continuing her exercise regime. Patient did not have any further questions or concerns. I will follow up in a few weeks to see how she is doing and progressing.

## 2017-06-12 ENCOUNTER — OFFICE VISIT (OUTPATIENT)
Dept: INTERNAL MEDICINE | Facility: CLINIC | Age: 77
End: 2017-06-12
Payer: MEDICARE

## 2017-06-12 VITALS
BODY MASS INDEX: 19.21 KG/M2 | WEIGHT: 115.31 LBS | HEART RATE: 78 BPM | HEIGHT: 65 IN | DIASTOLIC BLOOD PRESSURE: 62 MMHG | SYSTOLIC BLOOD PRESSURE: 124 MMHG

## 2017-06-12 DIAGNOSIS — E78.2 MIXED HYPERLIPIDEMIA: ICD-10-CM

## 2017-06-12 DIAGNOSIS — N18.9 CHRONIC KIDNEY DISEASE, UNSPECIFIED STAGE: ICD-10-CM

## 2017-06-12 DIAGNOSIS — Z85.850 HX OF THYROID CANCER: ICD-10-CM

## 2017-06-12 DIAGNOSIS — C34.90 NON-SMALL CELL LUNG CANCER, UNSPECIFIED LATERALITY: ICD-10-CM

## 2017-06-12 DIAGNOSIS — I10 ESSENTIAL HYPERTENSION: Primary | ICD-10-CM

## 2017-06-12 DIAGNOSIS — E11.21 CONTROLLED TYPE 2 DIABETES MELLITUS WITH DIABETIC NEPHROPATHY, WITHOUT LONG-TERM CURRENT USE OF INSULIN: ICD-10-CM

## 2017-06-12 DIAGNOSIS — C79.51 SECONDARY CANCER OF BONE: ICD-10-CM

## 2017-06-12 DIAGNOSIS — I35.0 AORTIC VALVE STENOSIS, UNSPECIFIED ETIOLOGY: ICD-10-CM

## 2017-06-12 DIAGNOSIS — Z12.31 OTHER SCREENING MAMMOGRAM: ICD-10-CM

## 2017-06-12 DIAGNOSIS — R73.02 GLUCOSE INTOLERANCE (IMPAIRED GLUCOSE TOLERANCE): ICD-10-CM

## 2017-06-12 DIAGNOSIS — E20.9 HYPOPARATHYROIDISM, UNSPECIFIED HYPOPARATHYROIDISM TYPE: ICD-10-CM

## 2017-06-12 PROCEDURE — 1159F MED LIST DOCD IN RCRD: CPT | Mod: S$GLB,,, | Performed by: INTERNAL MEDICINE

## 2017-06-12 PROCEDURE — 99999 PR PBB SHADOW E&M-EST. PATIENT-LVL V: CPT | Mod: PBBFAC,,, | Performed by: INTERNAL MEDICINE

## 2017-06-12 PROCEDURE — 99499 UNLISTED E&M SERVICE: CPT | Mod: S$GLB,,, | Performed by: INTERNAL MEDICINE

## 2017-06-12 PROCEDURE — 99214 OFFICE O/P EST MOD 30 MIN: CPT | Mod: S$GLB,,, | Performed by: INTERNAL MEDICINE

## 2017-06-12 PROCEDURE — 1126F AMNT PAIN NOTED NONE PRSNT: CPT | Mod: S$GLB,,, | Performed by: INTERNAL MEDICINE

## 2017-06-12 RX ORDER — ATORVASTATIN CALCIUM 40 MG/1
40 TABLET, FILM COATED ORAL DAILY
Qty: 30 TABLET | Refills: 6 | Status: SHIPPED | OUTPATIENT
Start: 2017-06-12 | End: 2018-05-27 | Stop reason: SDUPTHER

## 2017-06-12 NOTE — PROGRESS NOTES
"Subjective:       Patient ID: Marga Guerra is a 77 y.o. female.    Chief Complaint: Follow-up   this is a 77-year-old who presents today for follow-up patient is brought in by her daughter she reports that patient has been doing fairly well she continues to stay active around the house she continues to follow with hematology oncology and endocrinology for her thyroid and lung cancer and will schedule her follow-ups in the near future..  She has had stable chronic kidney disease but has not followed up with nephrology in a while she would like to do some blood work and update her labs .     HPI  Review of Systems   Constitutional: Negative for fever.   Respiratory: Negative for cough, shortness of breath and wheezing.    Cardiovascular: Negative for chest pain and palpitations.   Gastrointestinal: Negative for abdominal pain and constipation.   Neurological: Negative for dizziness.       Objective:     Blood pressure 124/62, pulse 78, height 5' 5" (1.651 m), weight 52.3 kg (115 lb 4.8 oz).    Physical Exam   Constitutional: No distress.   HENT:   Head: Normocephalic.   Mouth/Throat: Oropharynx is clear and moist.   Surgical scar    Eyes: No scleral icterus.   Neck: Neck supple.   Cardiovascular: Normal rate and regular rhythm.  Exam reveals no gallop and no friction rub.    Murmur heard.  Pulmonary/Chest: Effort normal and breath sounds normal. No respiratory distress.   Breast : normal no masses or tenderness    Abdominal: Soft. Bowel sounds are normal. She exhibits no mass. There is no tenderness.   Musculoskeletal: She exhibits no edema.   Neurological: She is alert.   Skin: No erythema.   Psychiatric: She has a normal mood and affect.   Vitals reviewed.      Assessment:       1. Essential hypertension    2. Glucose intolerance (impaired glucose tolerance)    3. Chronic kidney disease, unspecified stage    4. Hypoparathyroidism, unspecified hypoparathyroidism type    5. Hx of thyroid cancer    6. Non-small " cell lung cancer, unspecified laterality    7. Secondary cancer of bone    8. Other screening mammogram        Plan:       Marga was seen today for follow-up.    Diagnoses and all orders for this visit:    Essential hypertension  Blood pressure has been acceptable she is on the digital hypertension program  -     Basic metabolic panel; Future  -     Hemoglobin A1c; Future  -     Lipid panel; Future  -     Hepatic function panel; Future  -     CBC auto differential; Future    Glucose intolerance (impaired glucose tolerance)  Continue dietary measures-     Hemoglobin A1c; Future    Chronic kidney disease, unspecified stage  Chronic kidney disease well and has been stable recommend nephrology follow-up  -     Ambulatory consult to Nephrology    Hypoparathyroidism, unspecified hypoparathyroidism type  Hx of thyroid cancer  History of she remains on calcitriol and levothyroxin they will schedule her six-month endocrine appointment for reevaluation  -     Ambulatory referral to Endocrinology    Non-small cell lung cancer, unspecified laterality  Secondary cancer of bone  History of she has had stability over time will follow up with hematology oncology as planned  -     Ambulatory consult to Hematology / Oncology    Other screening mammogram  They decline mammogram at this time will place order a can schedule if she changes her mind    Hyperlipidemia continue   -     atorvastatin (LIPITOR) 40 MG tablet; Take 1 tablet (40 mg total) by mouth once daily.  gerd refill   -     ranitidine (ZANTAC) 150 MG tablet; Take 1 tablet (150 mg total) by mouth 2 (two) times daily.    Follow-up 6 months sooner if concern

## 2017-06-13 ENCOUNTER — TELEPHONE (OUTPATIENT)
Dept: INTERNAL MEDICINE | Facility: CLINIC | Age: 77
End: 2017-06-13

## 2017-06-13 NOTE — TELEPHONE ENCOUNTER
----- Message from Stephanie Prescott MD sent at 6/13/2017  7:53 AM CDT -----  Call and notify daughter her bloood sugar average remains normal range  Kidney function abnormal  but overall labs stable   supspecialty follows ups as planned

## 2017-06-26 RX ORDER — OFLOXACIN 3 MG/ML
SOLUTION/ DROPS OPHTHALMIC
Qty: 5 ML | Refills: 0 | Status: SHIPPED | OUTPATIENT
Start: 2017-06-26 | End: 2018-02-01

## 2017-06-27 ENCOUNTER — PATIENT OUTREACH (OUTPATIENT)
Dept: OTHER | Facility: OTHER | Age: 77
End: 2017-06-27

## 2017-06-27 NOTE — PROGRESS NOTES
Last 5 Patient Entered Readings                                                               Current 30 Day Average: 131/78     Recent Readings 6/26/2017 6/26/2017 6/5/2017 5/29/2017 5/22/2017    Systolic BP (mmHg) 132 152 114 124 128    Diastolic BP (mmHg) 83 112 77 75 77    Pulse 91 85 90 88 80        Ms Mckeon states that Ms Guerra is doing well. No questions or concerns.    Follow up with Ms. Marga Guerra completed. Patient is maintaining a low sodium diet and continuing her exercise regime. Patient did not have any further questions or concerns. I will follow up in a few weeks to see how she is doing and progressing.

## 2017-07-21 ENCOUNTER — PATIENT MESSAGE (OUTPATIENT)
Dept: ENDOCRINOLOGY | Facility: CLINIC | Age: 77
End: 2017-07-21

## 2017-07-28 ENCOUNTER — PATIENT OUTREACH (OUTPATIENT)
Dept: OTHER | Facility: OTHER | Age: 77
End: 2017-07-28

## 2017-07-28 NOTE — PROGRESS NOTES
Last 5 Patient Entered Redings Current 30 Day Average: 127/71     Recent Readings 7/27/2017 7/27/2017 7/24/2017 7/24/2017 7/17/2017    Systolic BP (mmHg) 176 165 138 186 122    Diastolic BP (mmHg) 97 110 80 108 59    Pulse 82 91 101 71 83        At work, unable to speak. Will send [employer] message to check in.

## 2017-08-22 ENCOUNTER — PATIENT OUTREACH (OUTPATIENT)
Dept: OTHER | Facility: OTHER | Age: 77
End: 2017-08-22

## 2017-08-22 NOTE — PROGRESS NOTES
Marga Guerra continues to monitor BP readings. Daughter Linda attributes higher reading on 8/21 to dietary indiscretions. Tolerating medications without problems. No questions or concerns.    Last 5 Patient Entered Redings Current 30 Day Average: 137/80     Recent Readings 8/21/2017 8/14/2017 7/31/2017 7/27/2017 7/27/2017    Systolic BP (mmHg) 153 113 107 176 165    Diastolic BP (mmHg) 85 74 64 97 110    Pulse 79 81 101 82 91          BP at goal  Continue monitoring    Hypertension Medications             amlodipine (NORVASC) 5 MG tablet Take 1 tablet (5 mg total) by mouth once daily.    ramipril (ALTACE) 10 MG capsule TAKE ONE CAPSULE BY MOUTH EVERY MORNING

## 2017-08-31 NOTE — PROGRESS NOTES
Last 5 Patient Entered Redings Current 30 Day Average: 135/79     Recent Readings 8/28/2017 8/21/2017 8/14/2017 7/31/2017 7/27/2017    Systolic BP (mmHg) 140 153 113 107 176    Diastolic BP (mmHg) 77 85 74 64 97    Pulse 82 79 81 101 82          Hypertension Digital Medicine Program (HDMP): Health  Follow Up    Spoke with Ms. Mckeon, Ms Guerra's daughter. She reports Ms Guerra is doing well and they are pleased with her BP readings.    Lifestyle Modifications:    1. Low sodium diet: yes    Follow up with Ms. Marga Guerra completed. No further questions or concerns. I will follow up in a few weeks to assess progress.

## 2017-09-25 RX ORDER — RAMIPRIL 10 MG/1
CAPSULE ORAL
Qty: 90 CAPSULE | Refills: 3 | Status: SHIPPED | OUTPATIENT
Start: 2017-09-25 | End: 2018-02-01

## 2017-09-28 ENCOUNTER — PATIENT OUTREACH (OUTPATIENT)
Dept: OTHER | Facility: OTHER | Age: 77
End: 2017-09-28

## 2017-09-28 NOTE — PROGRESS NOTES
Last 5 Patient Entered Redings Current 30 Day Average: 132/77     Recent Readings 9/25/2017 9/19/2017 8/28/2017 8/21/2017 8/14/2017    Systolic BP (mmHg) 143 120 140 153 113    Diastolic BP (mmHg) 78 76 77 85 74    Pulse 85 101 82 79 81        Hypertension Digital Medicine Program (HDMP): Health  Follow Up    Spoke with Ms Mckeon, Ms Gresham's daughter. She states that Ms Gresham is doing well and feeling good.    Lifestyle Modifications:    1.Low sodium diet: yes - continuing to monitor sodium intake.     2.Physical activity: no     Follow up with Ms. Marga Guerra completed. No further questions or concerns. I will follow up in a few weeks to assess progress.

## 2017-10-26 ENCOUNTER — PATIENT OUTREACH (OUTPATIENT)
Dept: OTHER | Facility: OTHER | Age: 77
End: 2017-10-26

## 2017-10-26 NOTE — PROGRESS NOTES
Last 5 Patient Entered Readings Current 30 Day Average: 123/73     Recent Readings 10/23/2017 10/9/2017 10/2/2017 9/25/2017 9/19/2017    Systolic BP (mmHg) 102 112 154 143 120    Diastolic BP (mmHg) 62 75 82 78 76    Pulse 86 91 81 85 101        Hypertension Digital Medicine Program (HDMP): Health  Follow Up    Ms Mckeon reports that her mother is doing very well.     Lifestyle Modifications:    1.Low sodium diet: yes    2.Physical activity: no     3.Hypotension/Hypertension symptoms: no   Frequency/Alleviating factors/Precipitating factors, etc.     4.Patient has been compliant with the medication regimen.     Follow up with Ms. Marga Guerra completed. No further questions or concerns. I will follow up in a few weeks to assess progress.

## 2017-11-08 ENCOUNTER — OFFICE VISIT (OUTPATIENT)
Dept: ENDOCRINOLOGY | Facility: CLINIC | Age: 77
End: 2017-11-08
Payer: MEDICARE

## 2017-11-08 VITALS
BODY MASS INDEX: 22.85 KG/M2 | DIASTOLIC BLOOD PRESSURE: 84 MMHG | SYSTOLIC BLOOD PRESSURE: 140 MMHG | HEART RATE: 72 BPM | HEIGHT: 60 IN | RESPIRATION RATE: 16 BRPM | WEIGHT: 116.38 LBS

## 2017-11-08 DIAGNOSIS — C73 THYROID CANCER: Primary | ICD-10-CM

## 2017-11-08 DIAGNOSIS — E11.21 CONTROLLED TYPE 2 DIABETES MELLITUS WITH DIABETIC NEPHROPATHY, WITHOUT LONG-TERM CURRENT USE OF INSULIN: ICD-10-CM

## 2017-11-08 DIAGNOSIS — E89.0 POST-SURGICAL HYPOTHYROIDISM: ICD-10-CM

## 2017-11-08 DIAGNOSIS — E55.9 VITAMIN D DEFICIENCY: ICD-10-CM

## 2017-11-08 DIAGNOSIS — M81.0 SENILE OSTEOPOROSIS: ICD-10-CM

## 2017-11-08 DIAGNOSIS — E89.2 POSTSURGICAL HYPOPARATHYROIDISM: ICD-10-CM

## 2017-11-08 PROCEDURE — 99214 OFFICE O/P EST MOD 30 MIN: CPT | Mod: S$GLB,,, | Performed by: INTERNAL MEDICINE

## 2017-11-08 PROCEDURE — 99999 PR PBB SHADOW E&M-EST. PATIENT-LVL IV: CPT | Mod: PBBFAC,,, | Performed by: INTERNAL MEDICINE

## 2017-11-08 RX ORDER — VIT C/E/ZN/COPPR/LUTEIN/ZEAXAN 250MG-90MG
2000 CAPSULE ORAL DAILY
Qty: 180 CAPSULE | Refills: 3 | Status: SHIPPED | OUTPATIENT
Start: 2017-11-08 | End: 2021-01-01

## 2017-11-08 NOTE — PROGRESS NOTES
Subjective:      Chief Complaint: No chief complaint on file.      HPI: Marga Guerra is a 77 y.o. female who is here for a follow-up evaluation for Thyroid cancer surveillance, post-surgical hypothyroidism, osteoporosis, diet-controlled type 2 diabetes    Being following by oncology for non-small cell lung cancer - on surveillance. Finished 2 cycles of chemotherapy as well as radiation.  PET 9/2016: Posterior right rib activity unchanged. No evidence of local recurrence or distant metastatic disease.     With regards to her thyroid cancer:  H/o Total thyroidectomy with bilateral paratracheal dissection and left modified neck dissection of levels II-V, 3/12/2012  There was gross extrathyroidal tumor with infiltration of the left RLN requiring sacrifice    Thyroid Synoptic Report:  Procedure: total thyroidectomy, left neck dissections, central compartment dissection  Specimen integrity: single intact thyroid specimen  Specimen size: r lobe: 5 x 4 x 3 cm; isthmus: 4 x 3 x 3 cm ; l lobe: 6 x 4 x 3 cm  Tumor focality: multifocal; l lobe: 5 cm; isthmus: 2 nodule: 4.5 and 1.2 cm; r lobe: 3 nodules: 2.0 cm, 1.2 cm, 0.7 cm  Tumor laterality: right lobe, left lobe, and isthmus  Tumor size: 5 cm let lobe, greatest dimension  Histological type: papillary, classical and follicular variants  Margins: tumor focally extends to margin at isthmus  Tumor capsule: partially encapsulated  Tumor capsule invasion: present, widely invasive  Lymph-vascular invasion: present, focal  Lymph nodes: 9+/47  Extrathyroid extension: present  Stage: CON (pT4 pN1b MX)    S/P 100 mCi I-131- 9/2012  10/2012 WBS   1. Thyroid tissue remnant in the neck.    2. No evidence of distant metastases.      2/4/15 U/S:   Status post thyroidectomy.  No sonographic evidence of malignancy in this patient with detectable thyroglobulinemia        Ref. Range 1/6/2015 14:42 5/13/2015 09:07 6/17/2015 09:50 8/9/2016 12:04 11/2/2016 10:19   Thyroglobulin Antibody  Screen Latest Ref Range: <4.0 IU/mL <1.8 <1.8 <1.8 <1.8 2.2   Thyroglobulin, Tumor Marker Latest Units: ng/mL 7.0 (H) 8.5 (H) 11 (H) 32 (H) 13 (H)        Post-surgical hypothyroidism  on lt4 75 mcg qd     Ref. Range 7/18/2016 14:04 8/9/2016 12:04 9/23/2016 07:10 11/2/2016 10:19 12/19/2016 09:28   TSH Latest Ref Range: 0.400 - 4.000 uIU/mL 11.787 (H) 12.225 (H) 0.258 (L) 0.043 (L) 0.053 (L)   Free T4 Latest Ref Range: 0.71 - 1.51 ng/dL 1.28 1.10 1.60 (H) 1.69 (H) 1.51     U/S Head and Neck, 2015: No evidence of residual structural disease, no pathological LNs    Reports palpitations after exercise. Has gained back some weight after completing chemotherapy. Remains active around the house. No constipation, agitation, tremors, jitters, anxiety.    Post-surgical hypoparathyroidism  calcitriol 0.5 mcg qd  calcium - 4 pills 3 x day   Not currently supplementing with D3.     No numbness and tingling lips finger or legs   No cramping    no difficulty or breathing  has not started smoking again     BMD1 2/4/15 -   Osteoporosis of the spine and hip. Total hip BMD unchanged since 2012 and lumbar spine BMD increased 7%.  +L-spine compression fracture s/p surgery - years ago  H/o wrist fracture - many years ago     prolia started 9/2/16 - After treatment, patient developed severe lip swelling, which lasted the entire day. No shortness of breath, rash, wheezing, low blood pressure.     No recent falls  Overall, feels well.    Reviewed past medical, family, social history and updated as appropriate.    Review of Systems   Respiratory: Negative for shortness of breath.    Cardiovascular: Positive for palpitations. Negative for chest pain.   Gastrointestinal: Negative for abdominal pain.   Endocrine: Negative for cold intolerance, heat intolerance, polydipsia and polyuria.     Objective:     Vitals:    11/08/17 0907   BP: (!) 140/84   Pulse: 72   Resp: 16       Physical Exam   Constitutional: She appears well-developed.   Neck: No  tracheal deviation present. No thyromegaly (No palpable residual thyroid tissue) present.   Cardiovascular: Normal rate.    Pulmonary/Chest: Effort normal. No respiratory distress.   Abdominal: Soft.   Musculoskeletal: She exhibits no edema.   No digital clubbing or extremity cyanosis   Lymphadenopathy:     She has no cervical adenopathy.   Skin: Skin is warm and dry.   Psychiatric: She has a normal mood and affect. Her behavior is normal. Judgment and thought content normal.   Nursing note and vitals reviewed.      Wt Readings from Last 10 Encounters:   06/12/17 0835 52.3 kg (115 lb 4.8 oz)   04/17/17 1058 53.3 kg (117 lb 8.1 oz)   01/12/17 0814 54.4 kg (120 lb)   11/02/16 0851 53.3 kg (117 lb 8.1 oz)   10/06/16 0955 57.2 kg (126 lb)   09/20/16 0905 55.3 kg (122 lb)   09/20/16 0744 54.9 kg (121 lb 0.5 oz)   09/19/16 1310 55.7 kg (122 lb 12.7 oz)   09/15/16 1536 55.6 kg (122 lb 9.2 oz)   09/02/16 1606 54.9 kg (121 lb)   ]    Lab Results   Component Value Date    HGBA1C 6.0 (H) 06/12/2017     Lab Results   Component Value Date    CHOL 249 (H) 06/12/2017     (H) 06/12/2017    LDLCALC 115.6 06/12/2017    TRIG 112 06/12/2017    CHOLHDL 44.6 06/12/2017     Lab Results   Component Value Date     06/12/2017    K 4.8 06/12/2017     06/12/2017    CO2 27 06/12/2017    GLU 90 06/12/2017    BUN 39 (H) 06/12/2017    CREATININE 2.7 (H) 06/12/2017    CALCIUM 9.8 06/12/2017    PROT 7.4 06/12/2017    ALBUMIN 3.7 06/12/2017    BILITOT 1.0 06/12/2017    ALKPHOS 58 06/12/2017    AST 14 06/12/2017    ALT 6 (L) 06/12/2017    ANIONGAP 11 06/12/2017    ESTGFRAFRICA 18.6 (A) 06/12/2017    EGFRNONAA 16.2 (A) 06/12/2017    TSH 0.053 (L) 12/19/2016        Assessment/Plan:     Thyroid cancer  Reviewed general care    Will check TFTs, TG today. TG positivity with positive antibodies last time would suggest residual disease. However, repeat PET is negative.    Will get thyroid U/S to assess for structural disease. Last U/S in  2015    If TG level is trending up, will consider WBS to see if retreatment with I-131 would be indicated. Also need to consider concurrent lung cancer and the prognosis of that prior to considering treatment of thyroid cancer, which typically would carry a much better prognosis.    Taking into account her age and history of osteoporosis, will back off a bit on TSH suppression - goal 0.1-0.5 range. Recheck TSH today. If below goal, will decrease to 6.5 pills/week.    Post-surgical hypothyroidism  See above.    Goal TSH 0.1-0.5 taking into account RAHUL risk, her age, history of vascular disease, concurrent lung cancer and osteoporosis history.    Senile osteoporosis  Continue vitamin D (calcitriol) and calcium supplementation.     Had a reaction to prolia, so this has been discontinued. Poor candidate for bisphosphonates due to renal function. Not a candidate for forteo due to history of malignancy and radiation. Could consider SERM, but active malignancy with risk of VTE would have to be weighed against modest benefit on increasing BMD.    Vitamin D deficiency  On calcitriol for post-operative hypoparathyroidism.  Although it's unclear if it would be beneficial, will start D3 replacement as well at 2000 IU/day.    Type 2 diabetes mellitus with renal manifestations, controlled  Now diet controlled. Periodically monitor A1c    Postsurgical hypoparathyroidism  Continue calcitriol and calcium supplement.  Recheck renal function panel.    RTC in 6 months    I discussed the case with Dr. Higginbotham and he is in agreement with the plan.

## 2017-11-08 NOTE — ASSESSMENT & PLAN NOTE
Reviewed general care    Will check TFTs, TG today. TG positivity with positive antibodies last time would suggest residual disease. However, repeat PET is negative.    Will get thyroid U/S to assess for structural disease. Last U/S in 2015    If TG level is trending up, will consider WBS to see if retreatment with I-131 would be indicated. Also need to consider concurrent lung cancer and the prognosis of that prior to considering treatment of thyroid cancer, which typically would carry a much better prognosis.    Taking into account her age and history of osteoporosis, will back off a bit on TSH suppression - goal 0.1-0.5 range. Recheck TSH today. If below goal, will decrease to 6.5 pills/week.

## 2017-11-08 NOTE — ASSESSMENT & PLAN NOTE
See above.    Goal TSH 0.1-0.5 taking into account RAHUL risk, her age, history of vascular disease, concurrent lung cancer and osteoporosis history.

## 2017-11-08 NOTE — ASSESSMENT & PLAN NOTE
Continue vitamin D (calcitriol) and calcium supplementation.     Had a reaction to prolia, so this has been discontinued. Poor candidate for bisphosphonates due to renal function. Not a candidate for forteo due to history of malignancy and radiation. Could consider SERM, but active malignancy with risk of VTE would have to be weighed against modest benefit on increasing BMD.

## 2017-11-08 NOTE — ASSESSMENT & PLAN NOTE
On calcitriol for post-operative hypoparathyroidism.  Although it's unclear if it would be beneficial, will start D3 replacement as well at 2000 IU/day.

## 2017-11-10 ENCOUNTER — TELEPHONE (OUTPATIENT)
Dept: ENDOCRINOLOGY | Facility: CLINIC | Age: 77
End: 2017-11-10

## 2017-11-10 DIAGNOSIS — N18.4 CKD (CHRONIC KIDNEY DISEASE), STAGE IV: Primary | ICD-10-CM

## 2017-11-10 DIAGNOSIS — C73 THYROID CANCER: ICD-10-CM

## 2017-11-10 NOTE — TELEPHONE ENCOUNTER
Spoke with daughter, Linda, regarding lab results. TG elevation with +Ab indicates likelihood of residual disease. U/S scheduled in December. Will consider whole body scan if no obvious structural disease in the neck. Will decrease LT4 to 6.5 pills/week, recheck TFTs in 2 months.    Please schedule nephrology appointment (with Dr. Hou if possible).    Repeat lab appointment in 2 months for TSH, FT4, and TG.

## 2017-11-13 ENCOUNTER — TELEPHONE (OUTPATIENT)
Dept: NEPHROLOGY | Facility: CLINIC | Age: 77
End: 2017-11-13

## 2017-11-13 NOTE — TELEPHONE ENCOUNTER
----- Message from Kathia Aviles sent at 11/13/2017  8:50 AM CST -----  Contact: Harman Quesada  Referral in systems    Pt was seen in 2014  By Dr Hou    ckd3      NOw   ckd 4      Trying to schedule as new consult   Been a long for ep    What do you want to do?          Now     Spoke to pt's daughter and scheduled appt

## 2017-11-30 ENCOUNTER — PATIENT OUTREACH (OUTPATIENT)
Dept: OTHER | Facility: OTHER | Age: 77
End: 2017-11-30

## 2017-11-30 NOTE — PROGRESS NOTES
Last 5 Patient Entered Readings Current 30 Day Average: 143/77     Recent Readings 11/27/2017 11/20/2017 11/20/2017 11/13/2017 11/6/2017    Systolic BP (mmHg) 152 135 140 123 163    Diastolic BP (mmHg) 78 75 91 74 81    Pulse 86 81 84 87 81          Hypertension Digital Medicine (HDMP) Health  Follow Up    LVM to follow up with Ms. Marga Guerra.    Per 30 day average, blood pressure is not well controlled 143/77 mmHg. Encouraged adherence to low sodium diet and physical activity guidelines. Advised patient to call or message with questions or concerns. WCB in 2 weeks.

## 2017-12-04 RX ORDER — AMLODIPINE BESYLATE 5 MG/1
TABLET ORAL
Qty: 90 TABLET | Refills: 3 | Status: SHIPPED | OUTPATIENT
Start: 2017-12-04 | End: 2018-02-19 | Stop reason: SDUPTHER

## 2017-12-11 DIAGNOSIS — E83.52 HYPERCALCEMIA: Primary | ICD-10-CM

## 2017-12-11 RX ORDER — CALCITRIOL 0.5 UG/1
CAPSULE ORAL
Qty: 90 CAPSULE | Refills: 3 | Status: SHIPPED | OUTPATIENT
Start: 2017-12-11 | End: 2018-12-17 | Stop reason: SDUPTHER

## 2017-12-11 RX ORDER — CALCITRIOL 0.5 UG/1
CAPSULE ORAL
Qty: 90 CAPSULE | Refills: 0 | Status: SHIPPED | OUTPATIENT
Start: 2017-12-11 | End: 2017-12-11 | Stop reason: SDUPTHER

## 2017-12-14 NOTE — PROGRESS NOTES
Last 5 Patient Entered Readings Current 30 Day Average: 126/72       Units 12/11/2017 12/11/2017 11/27/2017 11/20/2017 11/20/2017    Time -  2:36 PM  2:35 PM  2:32 PM  2:10 PM  2:06 PM    Systolic Blood Pressure - 91 85 152 135 140    Diastolic Blood Pressure - 63 60 78 75 91    Pulse bpm 94 99 86 81 84        Hypertension Digital Medicine Program (HDMP): Health  Follow Up    Spoke with Ms. Mckeon, Mrs. Guerra's daughter. She states that Mrs. Guerra is feeling well. Low readings on 12/11 did not cause any symptoms. Thinks first reading was an error due to cuff placement.    Lifestyle Modifications:    1.Low sodium diet: yes - continuing to monitor sodium intake    2.Physical activity: no - not active    3.Hypotension/Hypertension symptoms: no   Frequency/Alleviating factors/Precipitating factors, etc.     4.Patient has been compliant with the medication regimen.     Follow up with Mrs. Marga Guerra completed. No further questions or concerns. I will follow up in a few weeks to assess progress.

## 2017-12-20 ENCOUNTER — HOSPITAL ENCOUNTER (OUTPATIENT)
Dept: ENDOCRINOLOGY | Facility: CLINIC | Age: 77
Discharge: HOME | End: 2017-12-20
Attending: INTERNAL MEDICINE
Payer: MEDICARE

## 2017-12-20 ENCOUNTER — HOSPITAL ENCOUNTER (OUTPATIENT)
Dept: RADIOLOGY | Facility: CLINIC | Age: 77
Discharge: HOME | End: 2017-12-20
Attending: INTERNAL MEDICINE
Payer: MEDICARE

## 2017-12-20 DIAGNOSIS — C73 THYROID CANCER: ICD-10-CM

## 2017-12-20 DIAGNOSIS — E89.2 POSTSURGICAL HYPOPARATHYROIDISM: ICD-10-CM

## 2017-12-20 DIAGNOSIS — M81.0 SENILE OSTEOPOROSIS: ICD-10-CM

## 2017-12-20 DIAGNOSIS — E89.0 POST-SURGICAL HYPOTHYROIDISM: ICD-10-CM

## 2017-12-20 PROCEDURE — 76536 US EXAM OF HEAD AND NECK: CPT | Mod: S$GLB,,, | Performed by: INTERNAL MEDICINE

## 2017-12-20 PROCEDURE — 77080 DXA BONE DENSITY AXIAL: CPT | Mod: TC

## 2017-12-20 PROCEDURE — 77080 DXA BONE DENSITY AXIAL: CPT | Mod: 26,,, | Performed by: INTERNAL MEDICINE

## 2017-12-29 ENCOUNTER — TELEPHONE (OUTPATIENT)
Dept: ENDOCRINOLOGY | Facility: CLINIC | Age: 77
End: 2017-12-29

## 2017-12-29 NOTE — TELEPHONE ENCOUNTER
I attempted to speak to Linda (patient's daughter) regarding the results of the recent ultrasound and thyroid blood work. There is concern for recurrence of thyroid cancer in the neck, so we plan to biopsy the cervical lymph nodes and order a PET scan to assess for distant disease. Will try again on Monday.

## 2018-01-02 ENCOUNTER — PATIENT MESSAGE (OUTPATIENT)
Dept: ENDOCRINOLOGY | Facility: HOSPITAL | Age: 78
End: 2018-01-02

## 2018-01-02 ENCOUNTER — TELEPHONE (OUTPATIENT)
Dept: ENDOCRINOLOGY | Facility: HOSPITAL | Age: 78
End: 2018-01-02

## 2018-01-02 NOTE — TELEPHONE ENCOUNTER
Attempted to call daughter again to discuss thyroid studies, but got voicemail. I sent a message through [employer]. Will wait to speak with patient/daughter prior to scheduling biopsy/PET.

## 2018-01-08 ENCOUNTER — PATIENT OUTREACH (OUTPATIENT)
Dept: OTHER | Facility: OTHER | Age: 78
End: 2018-01-08

## 2018-01-08 NOTE — PROGRESS NOTES
Per daughter the patient is doing well. She noticed that cuff was curled inside sleeve after taking elevated reading on 1/1. She fixed it and subsequent measurement was WNL. Reviewed new HTN guidelines, including lower BP goal <130/80 mmHg. Patient's daughter verbalizes understanding. No questions or concerns.     Last 5 Patient Entered Readings Current 30 Day Average: 117/74     Recent Readings 1/3/2018 1/1/2018 1/1/2018 1/1/2018 12/20/2017    SBP (mmHg) 136 113 180 158 122    DBP (mmHg) 75 83 100 97 75    Pulse 88 84 86 79 96          BP at goal  Continue monitoring    Hypertension Medications             amLODIPine (NORVASC) 5 MG tablet TAKE 1 TABLET(5 MG) BY MOUTH EVERY DAY    ramipril (ALTACE) 10 MG capsule TAKE ONE CAPSULE BY MOUTH EVERY MORNING

## 2018-01-09 ENCOUNTER — LAB VISIT (OUTPATIENT)
Dept: LAB | Facility: HOSPITAL | Age: 78
End: 2018-01-09
Payer: MEDICARE

## 2018-01-09 ENCOUNTER — TELEPHONE (OUTPATIENT)
Dept: ENDOCRINOLOGY | Facility: CLINIC | Age: 78
End: 2018-01-09

## 2018-01-09 DIAGNOSIS — C73 THYROID CANCER: ICD-10-CM

## 2018-01-09 LAB
T4 FREE SERPL-MCNC: 1.57 NG/DL
TSH SERPL DL<=0.005 MIU/L-ACNC: 0.03 UIU/ML

## 2018-01-09 PROCEDURE — 84443 ASSAY THYROID STIM HORMONE: CPT

## 2018-01-09 PROCEDURE — 84439 ASSAY OF FREE THYROXINE: CPT

## 2018-01-09 PROCEDURE — 86800 THYROGLOBULIN ANTIBODY: CPT

## 2018-01-09 NOTE — TELEPHONE ENCOUNTER
Called again to discuss plan of care regarding her labs and thyroid ultrasound. I left a voicemail with a call back number. Will wait to discuss the plan before ordering the PET and biopsy.

## 2018-01-10 ENCOUNTER — TELEPHONE (OUTPATIENT)
Dept: ENDOCRINOLOGY | Facility: CLINIC | Age: 78
End: 2018-01-10

## 2018-01-10 ENCOUNTER — PES CALL (OUTPATIENT)
Dept: ADMINISTRATIVE | Facility: CLINIC | Age: 78
End: 2018-01-10

## 2018-01-10 DIAGNOSIS — C34.90 MALIGNANT NEOPLASM OF LUNG, UNSPECIFIED LATERALITY, UNSPECIFIED PART OF LUNG: ICD-10-CM

## 2018-01-10 DIAGNOSIS — E89.0 POST-SURGICAL HYPOTHYROIDISM: ICD-10-CM

## 2018-01-10 DIAGNOSIS — E03.9 HYPOTHYROIDISM, UNSPECIFIED TYPE: ICD-10-CM

## 2018-01-10 DIAGNOSIS — C73 THYROID CANCER: Primary | ICD-10-CM

## 2018-01-10 DIAGNOSIS — R59.0 CERVICAL LYMPHADENOPATHY: ICD-10-CM

## 2018-01-10 LAB
THRYOGLOBULIN INTERPRETATION: ABNORMAL
THYROGLOB AB SERPL-ACNC: 13 IU/ML
THYROGLOB SERPL-MCNC: 2.7 NG/ML

## 2018-01-10 RX ORDER — LEVOTHYROXINE SODIUM 75 UG/1
TABLET ORAL
Qty: 30 TABLET | Refills: 11 | Status: SHIPPED | OUTPATIENT
Start: 2018-01-10 | End: 2019-04-15 | Stop reason: SDUPTHER

## 2018-01-10 NOTE — TELEPHONE ENCOUNTER
I spoke with Ms. Mckeon (patient's daughter) regarding the lab results, recent thyroid ultrasound, and DEXA scan.    Her TSH is still oversuppressed on the current dose of 6.5 pills/week (75mcg). Will reduce to 6 tablets/week, which will be a half tablet on two days out of the week, and recheck TFTs in 6 weeks. Goal TSH 0.1-0.5    For the suspicious lymph nodes in the setting of +TG antibodies, will schedule for ultrasound guided FNA. Will get PET CT to look for distant sites of disease.    DEXA again showed severe osteoporosis. She had a bad reaction to prolia in the past, and cannot get reclast due to CKD. She has cancer, so cannot get forteo. Would also want to avoid SERMs due to risk of active malignancy and thromboembolic risk.     For now, will continue with vitamin D and calcium supplements.

## 2018-01-16 DIAGNOSIS — Z85.118 HX OF CANCER OF LUNG: Primary | ICD-10-CM

## 2018-01-16 DIAGNOSIS — Z85.850 HX OF THYROID CANCER: ICD-10-CM

## 2018-01-19 ENCOUNTER — OFFICE VISIT (OUTPATIENT)
Dept: NEPHROLOGY | Facility: CLINIC | Age: 78
End: 2018-01-19
Payer: MEDICARE

## 2018-01-19 ENCOUNTER — HOSPITAL ENCOUNTER (OUTPATIENT)
Dept: RADIOLOGY | Facility: HOSPITAL | Age: 78
Discharge: HOME | End: 2018-01-19
Attending: INTERNAL MEDICINE
Payer: MEDICARE

## 2018-01-19 ENCOUNTER — LAB VISIT (OUTPATIENT)
Dept: LAB | Facility: HOSPITAL | Age: 78
End: 2018-01-19
Attending: INTERNAL MEDICINE
Payer: MEDICARE

## 2018-01-19 VITALS
SYSTOLIC BLOOD PRESSURE: 120 MMHG | OXYGEN SATURATION: 95 % | DIASTOLIC BLOOD PRESSURE: 60 MMHG | HEIGHT: 60 IN | BODY MASS INDEX: 21.9 KG/M2 | HEART RATE: 84 BPM | WEIGHT: 111.56 LBS

## 2018-01-19 DIAGNOSIS — N17.9 ACUTE KIDNEY INJURY (NONTRAUMATIC): ICD-10-CM

## 2018-01-19 DIAGNOSIS — N18.4 CHRONIC KIDNEY DISEASE, STAGE IV (SEVERE): ICD-10-CM

## 2018-01-19 DIAGNOSIS — C34.90 MALIGNANT NEOPLASM OF LUNG, UNSPECIFIED LATERALITY, UNSPECIFIED PART OF LUNG: ICD-10-CM

## 2018-01-19 DIAGNOSIS — C73 THYROID CANCER: ICD-10-CM

## 2018-01-19 DIAGNOSIS — N17.9 ACUTE KIDNEY INJURY (NONTRAUMATIC): Primary | ICD-10-CM

## 2018-01-19 DIAGNOSIS — N39.0 UTI (URINARY TRACT INFECTION), UNCOMPLICATED: ICD-10-CM

## 2018-01-19 DIAGNOSIS — E83.52 HYPERCALCEMIA: ICD-10-CM

## 2018-01-19 DIAGNOSIS — D63.1 ANEMIA OF CHRONIC RENAL FAILURE, STAGE 4 (SEVERE): ICD-10-CM

## 2018-01-19 DIAGNOSIS — R59.0 CERVICAL LYMPHADENOPATHY: ICD-10-CM

## 2018-01-19 DIAGNOSIS — N18.4 ANEMIA OF CHRONIC RENAL FAILURE, STAGE 4 (SEVERE): ICD-10-CM

## 2018-01-19 LAB
AMORPH CRY UR QL COMP ASSIST: ABNORMAL
BACTERIA #/AREA URNS AUTO: ABNORMAL /HPF
BILIRUB UR QL STRIP: NEGATIVE
CLARITY UR REFRACT.AUTO: ABNORMAL
COLOR UR AUTO: YELLOW
CREAT UR-MCNC: 84 MG/DL
GLUCOSE UR QL STRIP: NEGATIVE
HGB UR QL STRIP: ABNORMAL
KETONES UR QL STRIP: NEGATIVE
LEUKOCYTE ESTERASE UR QL STRIP: ABNORMAL
MICROSCOPIC COMMENT: ABNORMAL
NITRITE UR QL STRIP: NEGATIVE
PH UR STRIP: 5 [PH] (ref 5–8)
POCT GLUCOSE: 89 MG/DL (ref 70–110)
PROT UR QL STRIP: NEGATIVE
PROT UR-MCNC: 21 MG/DL
PROT/CREAT RATIO, UR: 0.25
RBC #/AREA URNS AUTO: 12 /HPF (ref 0–4)
SP GR UR STRIP: 1.01 (ref 1–1.03)
SQUAMOUS #/AREA URNS AUTO: 22 /HPF
URN SPEC COLLECT METH UR: ABNORMAL
UROBILINOGEN UR STRIP-ACNC: NEGATIVE EU/DL
WBC #/AREA URNS AUTO: 32 /HPF (ref 0–5)

## 2018-01-19 PROCEDURE — 78815 PET IMAGE W/CT SKULL-THIGH: CPT | Mod: 26,PS,, | Performed by: RADIOLOGY

## 2018-01-19 PROCEDURE — 99499 UNLISTED E&M SERVICE: CPT | Mod: S$GLB,,, | Performed by: INTERNAL MEDICINE

## 2018-01-19 PROCEDURE — 82570 ASSAY OF URINE CREATININE: CPT

## 2018-01-19 PROCEDURE — A9552 F18 FDG: HCPCS

## 2018-01-19 PROCEDURE — 81001 URINALYSIS AUTO W/SCOPE: CPT

## 2018-01-19 PROCEDURE — 99204 OFFICE O/P NEW MOD 45 MIN: CPT | Mod: S$GLB,,, | Performed by: INTERNAL MEDICINE

## 2018-01-19 PROCEDURE — 87086 URINE CULTURE/COLONY COUNT: CPT

## 2018-01-19 PROCEDURE — 99999 PR PBB SHADOW E&M-EST. PATIENT-LVL III: CPT | Mod: PBBFAC,,, | Performed by: INTERNAL MEDICINE

## 2018-01-19 PROCEDURE — 78815 PET IMAGE W/CT SKULL-THIGH: CPT | Mod: TC

## 2018-01-20 LAB — BACTERIA UR CULT: NO GROWTH

## 2018-01-22 RX ORDER — CALCITRIOL 0.5 UG/1
CAPSULE ORAL
Qty: 90 CAPSULE | Refills: 2 | Status: SHIPPED | OUTPATIENT
Start: 2018-01-22 | End: 2018-02-01 | Stop reason: SDUPTHER

## 2018-01-23 ENCOUNTER — TELEPHONE (OUTPATIENT)
Dept: HEMATOLOGY/ONCOLOGY | Facility: CLINIC | Age: 78
End: 2018-01-23

## 2018-01-23 DIAGNOSIS — Z85.118 HISTORY OF LUNG CANCER: Primary | ICD-10-CM

## 2018-01-23 NOTE — TELEPHONE ENCOUNTER
----- Message from Renée Laughlin RN sent at 1/23/2018  2:16 PM CST -----  Hi all,    Pt needs to be scheduled as f/u; consult was placed. But she was last seen in clinic 09/2016.    Thanks,  Renée IGNACIO RN

## 2018-01-23 NOTE — TELEPHONE ENCOUNTER
Please schedule patient for cbc/cmp, appt with dr vyas first available. Please mail to patient.      ~ojse

## 2018-01-24 ENCOUNTER — TELEPHONE (OUTPATIENT)
Dept: ENDOCRINOLOGY | Facility: CLINIC | Age: 78
End: 2018-01-24

## 2018-01-24 NOTE — TELEPHONE ENCOUNTER
Sent a message through patient portal to Linda (daughter) regarding PET-CT results. She has several new FDG-avid spots in the neck and upper mediastinum, as well as a left posterior 9th rib lesion. She is scheduled for a biopsy on 2/9 with us. She is also scheduled to see heme-onc on 2/1. If the neck nodes look thyroid in origin, we will need to decide if she is willing to undergo repeat surgery and possibly post-operative BARGER treatment.

## 2018-01-25 ENCOUNTER — PATIENT OUTREACH (OUTPATIENT)
Dept: OTHER | Facility: OTHER | Age: 78
End: 2018-01-25

## 2018-01-25 NOTE — PROGRESS NOTES
Last 5 Patient Entered Readings                                      Current 30 Day Average: 128/79     Recent Readings 1/22/2018 1/22/2018 1/8/2018 1/8/2018 1/3/2018    SBP (mmHg) 120 147 141 143 136    DBP (mmHg) 79 89 80 82 75    Pulse 92 94 88 88 88        No answer. WCB in 1 week.

## 2018-01-26 NOTE — PROGRESS NOTES
Subjective:       Patient ID: Marga Guerra is a 77 y.o. Black or  female who presents for follow-up evaluation of Chronic Kidney Disease    HPI    Ms. Guerra is a 77 year old woman with past medical history of hypertension, thyroid/lung cancer (squamous cell) presenting for follow up of chronic kidney disease and acute kidney injury.  Patient last seen in 2014, lost to follow up.  She is accompanied by her granddaughter who assisted with history/translation.  Patient reports inadequate daily fluid intake.  She does not check her blood pressure regularly.  She denies any NSAID use.  She otherwise denies any anorexia, fatigue, fever, chest pain, shortness of breath, abdominal pain, diarrhea, dysuria/hematuria.        Review of Systems   Constitutional: Negative for appetite change, fatigue and fever.   Respiratory: Negative for chest tightness and shortness of breath.    Cardiovascular: Negative for chest pain and leg swelling.   Gastrointestinal: Negative for abdominal pain, constipation, diarrhea, nausea and vomiting.   Genitourinary: Negative for difficulty urinating, dysuria, flank pain, frequency, hematuria and urgency.   Musculoskeletal: Negative for arthralgias, joint swelling and myalgias.   Skin: Negative for rash and wound.   Neurological: Negative for dizziness, weakness and light-headedness.   All other systems reviewed and are negative.      Objective:      Physical Exam   Constitutional: She appears well-developed and well-nourished.   Cardiovascular: Normal rate, regular rhythm and normal heart sounds.  Exam reveals no gallop and no friction rub.    No murmur heard.  Pulmonary/Chest: Effort normal and breath sounds normal. No respiratory distress. She has no wheezes. She has no rales.   Abdominal: Soft. Bowel sounds are normal. There is no tenderness.   Musculoskeletal: She exhibits no edema.   Neurological: She is alert.   Skin: Skin is warm and dry. No rash noted. No erythema.    Psychiatric: She has a normal mood and affect.       Assessment:       1. Acute kidney injury (nontraumatic)    2. Chronic kidney disease, stage IV (severe)    3. Anemia of chronic renal failure, stage 4 (severe)    4. UTI (urinary tract infection), uncomplicated        Plan:      Ms. Guerra is a 77 year old woman with past medical history of hypertension, recently diagnosed lung cancer (squamous cell) presenting for follow up of chronic kidney disease and acute kidney injury.  Patient creatinine previously ~1.2-1.4mg/dL, then 1.7.-1.8mg/dL, consistent with CKD stage III, likely mainly age-related renal loss, but also with hypertensive nephrosclerosis.  However, creatinine has been elevated since last visit in 2014, 2.1-2.4mg/dL, now continuing to trend up since last year, now 3.0mg/dL.  Suspect patient with acute kidney injury due to inadequate renal perfusion from inadequate daily fluid intake, v. possibly progressive chronic kidney disease (must consider toxic injury due to prior dosing of carboplatin).  Encouraged fluid intake, will obtained urine studies to rule out infectious/glomerular etiology, will obtain renal US with doppler to rule out obstructive/vascular etiology.  Patient otherwise asymptomatic, will continue to monitor closely; discussed signs/symptoms of uremia, along with indications for renal replacement therapy.  Patient/granddaughter voiced understanding of above, agreeable to plan as noted.  Further recommendations pending results of above.        Return to clinic in 4-6 weeks pending renal panel, then with renal/heme panel, iron/TIBC/ferritin, urinalysis/culture, urine protein/creatinine ratio prior to next visit

## 2018-01-29 NOTE — PROGRESS NOTES
Last 5 Patient Entered Readings                                      Current 30 Day Average: 131/81     Recent Readings 1/29/2018 1/22/2018 1/22/2018 1/8/2018 1/8/2018    SBP (mmHg) 144 120 147 141 143    DBP (mmHg) 86 79 89 80 82    Pulse 83 92 94 88 88        Hypertension Digital Medicine Program (HDMP): Health  Follow Up    I spoke with Ms. Mckeon, Ms. Guerra's daughter. She reports Ms. Guerra is feeling well. Not sure why she's having some elevated readings.  Has to get a thyroid biopsy.    Lifestyle Modifications:    1.Low sodium diet: yes    2.Physical activity: no     3.Hypotension/Hypertension symptoms: no   Frequency/Alleviating factors/Precipitating factors, etc.     4.Patient has been compliant with the medication regimen.     Follow up with Ms. Marga Guerra completed. No further questions or concerns. I will follow up in a few weeks to assess progress.

## 2018-02-01 ENCOUNTER — LAB VISIT (OUTPATIENT)
Dept: LAB | Facility: HOSPITAL | Age: 78
End: 2018-02-01
Attending: INTERNAL MEDICINE
Payer: MEDICARE

## 2018-02-01 ENCOUNTER — TELEPHONE (OUTPATIENT)
Dept: HEMATOLOGY/ONCOLOGY | Facility: CLINIC | Age: 78
End: 2018-02-01

## 2018-02-01 ENCOUNTER — OFFICE VISIT (OUTPATIENT)
Dept: HEMATOLOGY/ONCOLOGY | Facility: CLINIC | Age: 78
End: 2018-02-01
Payer: MEDICARE

## 2018-02-01 VITALS
HEART RATE: 76 BPM | SYSTOLIC BLOOD PRESSURE: 166 MMHG | RESPIRATION RATE: 19 BRPM | OXYGEN SATURATION: 97 % | BODY MASS INDEX: 21.31 KG/M2 | HEIGHT: 61 IN | DIASTOLIC BLOOD PRESSURE: 80 MMHG | WEIGHT: 112.88 LBS | TEMPERATURE: 99 F

## 2018-02-01 DIAGNOSIS — Z85.118 HISTORY OF LUNG CANCER: ICD-10-CM

## 2018-02-01 DIAGNOSIS — N18.4 CKD (CHRONIC KIDNEY DISEASE), STAGE IV: ICD-10-CM

## 2018-02-01 DIAGNOSIS — C79.51 SECONDARY CANCER OF BONE: ICD-10-CM

## 2018-02-01 DIAGNOSIS — C73 THYROID CANCER: Primary | ICD-10-CM

## 2018-02-01 DIAGNOSIS — C34.91 NON-SMALL CELL CANCER OF RIGHT LUNG: ICD-10-CM

## 2018-02-01 DIAGNOSIS — J43.9 PULMONARY EMPHYSEMA, UNSPECIFIED EMPHYSEMA TYPE: ICD-10-CM

## 2018-02-01 DIAGNOSIS — E11.21 CONTROLLED TYPE 2 DIABETES MELLITUS WITH DIABETIC NEPHROPATHY, WITHOUT LONG-TERM CURRENT USE OF INSULIN: ICD-10-CM

## 2018-02-01 LAB
ALBUMIN SERPL BCP-MCNC: 3.5 G/DL
ALP SERPL-CCNC: 52 U/L
ALT SERPL W/O P-5'-P-CCNC: 7 U/L
ANION GAP SERPL CALC-SCNC: 8 MMOL/L
AST SERPL-CCNC: 16 U/L
BILIRUB SERPL-MCNC: 0.8 MG/DL
BUN SERPL-MCNC: 28 MG/DL
CALCIUM SERPL-MCNC: 9.5 MG/DL
CHLORIDE SERPL-SCNC: 106 MMOL/L
CO2 SERPL-SCNC: 28 MMOL/L
CREAT SERPL-MCNC: 3.3 MG/DL
ERYTHROCYTE [DISTWIDTH] IN BLOOD BY AUTOMATED COUNT: 15 %
EST. GFR  (AFRICAN AMERICAN): 14.6 ML/MIN/1.73 M^2
EST. GFR  (NON AFRICAN AMERICAN): 12.7 ML/MIN/1.73 M^2
GLUCOSE SERPL-MCNC: 116 MG/DL
HCT VFR BLD AUTO: 33.8 %
HGB BLD-MCNC: 10.9 G/DL
IMM GRANULOCYTES # BLD AUTO: 0.01 K/UL
MCH RBC QN AUTO: 29.5 PG
MCHC RBC AUTO-ENTMCNC: 32.2 G/DL
MCV RBC AUTO: 91 FL
NEUTROPHILS # BLD AUTO: 2.8 K/UL
PLATELET # BLD AUTO: 207 K/UL
PMV BLD AUTO: 10.3 FL
POTASSIUM SERPL-SCNC: 4.3 MMOL/L
PROT SERPL-MCNC: 7 G/DL
RBC # BLD AUTO: 3.7 M/UL
SODIUM SERPL-SCNC: 142 MMOL/L
WBC # BLD AUTO: 4.67 K/UL

## 2018-02-01 PROCEDURE — 1159F MED LIST DOCD IN RCRD: CPT | Mod: S$GLB,,, | Performed by: INTERNAL MEDICINE

## 2018-02-01 PROCEDURE — 36415 COLL VENOUS BLD VENIPUNCTURE: CPT

## 2018-02-01 PROCEDURE — 99499 UNLISTED E&M SERVICE: CPT | Mod: S$GLB,,, | Performed by: INTERNAL MEDICINE

## 2018-02-01 PROCEDURE — 99215 OFFICE O/P EST HI 40 MIN: CPT | Mod: S$GLB,,, | Performed by: INTERNAL MEDICINE

## 2018-02-01 PROCEDURE — 80053 COMPREHEN METABOLIC PANEL: CPT

## 2018-02-01 PROCEDURE — 3008F BODY MASS INDEX DOCD: CPT | Mod: S$GLB,,, | Performed by: INTERNAL MEDICINE

## 2018-02-01 PROCEDURE — 1126F AMNT PAIN NOTED NONE PRSNT: CPT | Mod: S$GLB,,, | Performed by: INTERNAL MEDICINE

## 2018-02-01 PROCEDURE — 85027 COMPLETE CBC AUTOMATED: CPT

## 2018-02-01 PROCEDURE — 99999 PR PBB SHADOW E&M-EST. PATIENT-LVL III: CPT | Mod: PBBFAC,,, | Performed by: INTERNAL MEDICINE

## 2018-02-01 NOTE — PROGRESS NOTES
"Subjective:       Patient ID: Marga Guerra is a 77 y.o. female.    Chief Complaint: Lung Cancer and interm upper back pain 0/10  Oncologic History (From Chart):   Ms. Guerra is a 77-year-old female with a diagnosis of squamous cell lung cancer. She initially was referred to see Dr. Martinez on 08/22/2013 for an abnormal chest x-ray and a CT scan. She underwent a CT scan on 08/14/2013 and that revealed a large cavitary lesion in the right lower lobe measuring 3.7 x 3.2, second cavitary lesion with an anterior segment of the right upper lobe measuring 2.4 x 1, not present on the exam in 03/2012. Also, multiple bilateral subcentimeter noncalcified pulmonary nodules identified ranging from 3 to 5 mm in size, but no other evidence of disease elsewhere. She saw Dr. Martinez and subsequently underwent a bronchoscopy on 08/29/2013. Pathology revealed squamous cell cancer. She has subsequently undergone a PET scan on 09/10/2013 that revealed hypermetabolic activity consistent with lung malignancy, one in the anterior segment of the right upper lobe with a maximum SUV of 4.6. The second is a pleural based lesion just posterior to the primary mass with invasion and destruction of the posterior aspect of the eighth rib demonstrating a maximum SUV of 8.4. There is also increased activity at the spinous process of L4, which represents an inflammatory process. No other metastatic disease has been noted. She also underwent MRI brain with no evidence of metastatic disease. She has an ECOG performance status of 2.   She completed in 6 (week 5 of chemo but week 6 of RT) of chemo/RT with weekly carboplatin and Taxol on 11/22/13. She was then treated with 2 cycles of consolidation chemotherapy and completed that on 1/9/14      Her PET scan from July 2014 revealed "Interval development of a new focal area of hypermetabolism at the level of the right eighth rib posteriorly corresponding to the new lytic lesion and representing recurrence " "metastatic disease. Right lung base including right lower lobe and right middle lobe inflammatory or post radiation therapy changes".  She was referred to palliative focal radiation of the right eighth rib (completed in August 2014) and this relieved her pain.  She has been on surveillance.    Angeles has been lost to follow-up in my clinic for 2 years and comes in today to review her recent PEt scan from 1/19/18 which reveals "Three new areas of hypermetabolic lesions within the neck and superior mediastinum with SUV measurements as above. New pathologic fracture of the right posterior 9th rib with hypermetabolic activity, as above.  Left lower lobe nodule slightly larger since prior PET/CT 09/12/2016 that is below the size threshold for PET scan, although neoplastic process cannot be excluded.  Remote rib fracture of the 9th and 8th right posterior ribs, similar to scan from 09/12/2016.  Stable pulmonary findings of patchy groundglass opacities and compressive atelectasis and consolidation adjacent to the rib fractures.      Review of Systems   Constitutional: Negative for appetite change, fatigue and unexpected weight change.   HENT: Negative for mouth sores.    Eyes: Negative for visual disturbance.   Respiratory: Negative for cough and shortness of breath.    Cardiovascular: Negative for chest pain.   Gastrointestinal: Negative for abdominal pain and diarrhea.   Genitourinary: Negative for frequency.   Musculoskeletal: Negative for back pain.   Skin: Negative for rash.   Neurological: Negative for headaches.   Hematological: Negative for adenopathy.   Psychiatric/Behavioral: The patient is not nervous/anxious.    All other systems reviewed and are negative.      Objective:      Physical Exam   Constitutional: She is oriented to person, place, and time. She appears well-developed and well-nourished.   HENT:   Mouth/Throat: No oropharyngeal exudate.   Cardiovascular: Normal rate and normal heart sounds.  "   Pulmonary/Chest: Effort normal and breath sounds normal. She has no wheezes.   Abdominal: Soft. Bowel sounds are normal. There is no tenderness.   Musculoskeletal: She exhibits no edema or tenderness.   Lymphadenopathy:     She has no cervical adenopathy.   Neurological: She is alert and oriented to person, place, and time. Coordination normal.   Skin: Skin is warm and dry. No rash noted.   Psychiatric: She has a normal mood and affect. Judgment and thought content normal.   Vitals reviewed.      LABS:  WBC   Date Value Ref Range Status   02/01/2018 4.67 3.90 - 12.70 K/uL Final     Hemoglobin   Date Value Ref Range Status   02/01/2018 10.9 (L) 12.0 - 16.0 g/dL Final     Hematocrit   Date Value Ref Range Status   02/01/2018 33.8 (L) 37.0 - 48.5 % Final     Platelets   Date Value Ref Range Status   02/01/2018 207 150 - 350 K/uL Final     Gran # (ANC)   Date Value Ref Range Status   02/01/2018 2.8 1.8 - 7.7 K/uL Final     Comment:     The ANC is based on a white cell differential from an   automated cell counter. It has not been microscopically   reviewed for the presence of abnormal cells. Clinical   correlation is required.         Chemistry        Component Value Date/Time     02/01/2018 1232    K 4.3 02/01/2018 1232     02/01/2018 1232    CO2 28 02/01/2018 1232    BUN 28 (H) 02/01/2018 1232    CREATININE 3.3 (H) 02/01/2018 1232     (H) 02/01/2018 1232        Component Value Date/Time    CALCIUM 9.5 02/01/2018 1232    ALKPHOS 52 (L) 02/01/2018 1232    AST 16 02/01/2018 1232    ALT 7 (L) 02/01/2018 1232    BILITOT 0.8 02/01/2018 1232    ESTGFRAFRICA 14.6 (A) 02/01/2018 1232    EGFRNONAA 12.7 (A) 02/01/2018 1232      \  Assessment:       1. Thyroid cancer    2. Non-small cell cancer of right lung    3. Secondary cancer of bone    4. CKD (chronic kidney disease), stage IV    5. Pulmonary emphysema, unspecified emphysema type    6. Controlled type 2 diabetes mellitus with diabetic nephropathy,  without long-term current use of insulin        Plan:        1,2,3. Reviewed her recent scans. They reveal progression of disease. She has been lost to follo-up for 2 years now. She is here with her son-in-law and they are not fluent in English. They would like me to discusse her scans with Linda (Daughter). I called Linda and left her a message to call me back. Basically she seems to ether have a recurrence of her prior malignancy or a new cancer. She needs a biopsy to figure that out. Hoever she is very frail, elderly lady with multiple comorbidities who may not be able to tlerate several treatments. I will discuss with her daughter to decide on if even biopsy is appropriate.    Above care plan was discussed with patient and accompanying son-in-law and all questions were addressed to their satisfaction     ADDENDUM added at 4:00 PM  Called Linda (patient's daughter). SHe is scheduled for a biopsy on 2/9. I will see her back at the end of February to evaluate the results. Linda understands her poor prognosis

## 2018-02-01 NOTE — TELEPHONE ENCOUNTER
Called Linda (patient's daughter). SHe is scheduled for a biopsy on 2/9. I will see her back at the end of February to evaluate the results. Linda understands her poor prognosis

## 2018-02-01 NOTE — TELEPHONE ENCOUNTER
----- Message from Jessi Dickerson sent at 2/1/2018  3:05 PM CST -----  Contact: Pt daughter Linda  Linda calling stating that she was told to call office to speak with Dr.Satti Mckeon call back number [phone]

## 2018-02-09 ENCOUNTER — HOSPITAL ENCOUNTER (OUTPATIENT)
Dept: ENDOCRINOLOGY | Facility: CLINIC | Age: 78
Discharge: HOME | End: 2018-02-09
Attending: INTERNAL MEDICINE
Payer: MEDICARE

## 2018-02-09 DIAGNOSIS — C73 THYROID CANCER: ICD-10-CM

## 2018-02-09 DIAGNOSIS — R59.0 CERVICAL LYMPHADENOPATHY: ICD-10-CM

## 2018-02-09 PROCEDURE — 84432 ASSAY OF THYROGLOBULIN: CPT

## 2018-02-09 PROCEDURE — 88173 CYTOPATH EVAL FNA REPORT: CPT | Mod: 26,,, | Performed by: PATHOLOGY

## 2018-02-09 PROCEDURE — 10022 US FINE NEEDLE ASPIRATION THYROID MULTI SITE (XPD): CPT | Mod: S$GLB,,, | Performed by: INTERNAL MEDICINE

## 2018-02-09 PROCEDURE — 30000890 MAYO MISCELLANEOUS TEST (REFLEX)

## 2018-02-09 PROCEDURE — 88173 CYTOPATH EVAL FNA REPORT: CPT | Performed by: PATHOLOGY

## 2018-02-09 PROCEDURE — 76942 ECHO GUIDE FOR BIOPSY: CPT | Mod: S$GLB,,, | Performed by: INTERNAL MEDICINE

## 2018-02-10 LAB
MAYO MISCELLANEOUS RESULT (REF): NORMAL
MAYO MISCELLANEOUS RESULT (REF): NORMAL

## 2018-02-14 ENCOUNTER — TELEPHONE (OUTPATIENT)
Dept: ENDOCRINOLOGY | Facility: CLINIC | Age: 78
End: 2018-02-14

## 2018-02-14 ENCOUNTER — TELEPHONE (OUTPATIENT)
Dept: NEPHROLOGY | Facility: CLINIC | Age: 78
End: 2018-02-14

## 2018-02-14 DIAGNOSIS — N18.4 CHRONIC KIDNEY DISEASE, STAGE IV (SEVERE): Primary | ICD-10-CM

## 2018-02-14 NOTE — TELEPHONE ENCOUNTER
I spoke to Ms. Mckeon (daughter) regarding the biopsy results. She has a recurrence of papillary thyroid cancer in the lymph nodes in the neck. The lesion in the rib is most likely the same process (although presumably it could be from the lung, but less likely). We will discuss her case in our weekly case conference, but more than likely we will send her for repeat surgery followed by I-131 treatment, with 1 week whole body scan. She is also scheduled to see oncology in early March.

## 2018-02-19 RX ORDER — AMLODIPINE BESYLATE 5 MG/1
5 TABLET ORAL DAILY
Qty: 90 TABLET | Refills: 1 | Status: SHIPPED | OUTPATIENT
Start: 2018-02-19 | End: 2018-10-22 | Stop reason: SDUPTHER

## 2018-02-21 ENCOUNTER — HOSPITAL ENCOUNTER (OUTPATIENT)
Dept: RADIOLOGY | Facility: HOSPITAL | Age: 78
Discharge: HOME | End: 2018-02-21
Attending: INTERNAL MEDICINE
Payer: MEDICARE

## 2018-02-21 ENCOUNTER — TELEPHONE (OUTPATIENT)
Dept: ENDOCRINOLOGY | Facility: CLINIC | Age: 78
End: 2018-02-21

## 2018-02-21 DIAGNOSIS — N17.9 ACUTE KIDNEY INJURY (NONTRAUMATIC): ICD-10-CM

## 2018-02-21 DIAGNOSIS — C73 THYROID CANCER: Primary | ICD-10-CM

## 2018-02-21 DIAGNOSIS — N18.4 CHRONIC KIDNEY DISEASE, STAGE IV (SEVERE): ICD-10-CM

## 2018-02-21 PROCEDURE — 93975 VASCULAR STUDY: CPT | Mod: 26,,, | Performed by: RADIOLOGY

## 2018-02-21 PROCEDURE — 76770 US EXAM ABDO BACK WALL COMP: CPT | Mod: 26,59,, | Performed by: RADIOLOGY

## 2018-02-21 PROCEDURE — 76770 US EXAM ABDO BACK WALL COMP: CPT | Mod: TC,59

## 2018-02-22 ENCOUNTER — PATIENT MESSAGE (OUTPATIENT)
Dept: ENDOCRINOLOGY | Facility: CLINIC | Age: 78
End: 2018-02-22

## 2018-03-01 ENCOUNTER — OFFICE VISIT (OUTPATIENT)
Dept: HEMATOLOGY/ONCOLOGY | Facility: CLINIC | Age: 78
End: 2018-03-01
Payer: MEDICARE

## 2018-03-01 VITALS
DIASTOLIC BLOOD PRESSURE: 75 MMHG | RESPIRATION RATE: 16 BRPM | TEMPERATURE: 99 F | WEIGHT: 112.63 LBS | BODY MASS INDEX: 21.27 KG/M2 | SYSTOLIC BLOOD PRESSURE: 163 MMHG | HEART RATE: 84 BPM | HEIGHT: 61 IN | OXYGEN SATURATION: 100 %

## 2018-03-01 DIAGNOSIS — C73 THYROID CANCER: Primary | ICD-10-CM

## 2018-03-01 DIAGNOSIS — C79.51 SECONDARY CANCER OF BONE: ICD-10-CM

## 2018-03-01 PROCEDURE — 99999 PR PBB SHADOW E&M-EST. PATIENT-LVL III: CPT | Mod: PBBFAC,,, | Performed by: INTERNAL MEDICINE

## 2018-03-01 PROCEDURE — 99499 UNLISTED E&M SERVICE: CPT | Mod: S$GLB,,, | Performed by: INTERNAL MEDICINE

## 2018-03-01 PROCEDURE — 3077F SYST BP >= 140 MM HG: CPT | Mod: S$GLB,,, | Performed by: INTERNAL MEDICINE

## 2018-03-01 PROCEDURE — 3078F DIAST BP <80 MM HG: CPT | Mod: S$GLB,,, | Performed by: INTERNAL MEDICINE

## 2018-03-01 PROCEDURE — 99214 OFFICE O/P EST MOD 30 MIN: CPT | Mod: S$GLB,,, | Performed by: INTERNAL MEDICINE

## 2018-03-01 NOTE — PROGRESS NOTES
"Subjective:       Patient ID: Marga Guerra is a 77 y.o. female.    Chief Complaint: Thyroid Cancer  Oncologic History (From Chart):   Ms. Guerra is a 77-year-old female with a diagnosis of squamous cell lung cancer. She initially was referred to see Dr. Martinez on 08/22/2013 for an abnormal chest x-ray and a CT scan. She underwent a CT scan on 08/14/2013 and that revealed a large cavitary lesion in the right lower lobe measuring 3.7 x 3.2, second cavitary lesion with an anterior segment of the right upper lobe measuring 2.4 x 1, not present on the exam in 03/2012. Also, multiple bilateral subcentimeter noncalcified pulmonary nodules identified ranging from 3 to 5 mm in size, but no other evidence of disease elsewhere. She saw Dr. Martinez and subsequently underwent a bronchoscopy on 08/29/2013. Pathology revealed squamous cell cancer. She has subsequently undergone a PET scan on 09/10/2013 that revealed hypermetabolic activity consistent with lung malignancy, one in the anterior segment of the right upper lobe with a maximum SUV of 4.6. The second is a pleural based lesion just posterior to the primary mass with invasion and destruction of the posterior aspect of the eighth rib demonstrating a maximum SUV of 8.4. There is also increased activity at the spinous process of L4, which represents an inflammatory process. No other metastatic disease has been noted. She also underwent MRI brain with no evidence of metastatic disease. She has an ECOG performance status of 2.   She completed in 6 (week 5 of chemo but week 6 of RT) of chemo/RT with weekly carboplatin and Taxol on 11/22/13. She was then treated with 2 cycles of consolidation chemotherapy and completed that on 1/9/14      Her PET scan from July 2014 revealed "Interval development of a new focal area of hypermetabolism at the level of the right eighth rib posteriorly corresponding to the new lytic lesion and representing recurrence metastatic disease. Right lung " "base including right lower lobe and right middle lobe inflammatory or post radiation therapy changes".  She was referred to palliative focal radiation of the right eighth rib (completed in August 2014) and this relieved her pain.  She has been on surveillance.     She has been lost to follow-up in my clinic for 2 years and comes in today to review her recent PEt scan from 1/19/18 which reveals "Three new areas of hypermetabolic lesions within the neck and superior mediastinum with SUV measurements as above. New pathologic fracture of the right posterior 9th rib with hypermetabolic activity, as above.  Left lower lobe nodule slightly larger since prior PET/CT 09/12/2016 that is below the size threshold for PET scan, although neoplastic process cannot be excluded.  Remote rib fracture of the 9th and 8th right posterior ribs, similar to scan from 09/12/2016.  Stable pulmonary findings of patchy groundglass opacities and compressive atelectasis and consolidation adjacent to the rib fractures.    HPIShe underwent biopsy which reveals "Right upper lymph node, fine-needle aspiration: Positive for malignant cells, consistent with papillary thyroid carcinoma. Right lower lymph node, fine-needle aspiration: Positive for malignant cells, consistent with papillary thyroid carcinoma"  She is going to see Endocrinology for management    Review of Systems   Constitutional: Negative for appetite change, fatigue and unexpected weight change.   HENT: Negative for mouth sores.    Eyes: Negative for visual disturbance.   Respiratory: Negative for cough and shortness of breath.    Cardiovascular: Negative for chest pain.   Gastrointestinal: Negative for abdominal pain and diarrhea.   Genitourinary: Negative for frequency.   Musculoskeletal: Negative for back pain.   Skin: Negative for rash.   Neurological: Negative for headaches.   Hematological: Negative for adenopathy.   Psychiatric/Behavioral: The patient is not nervous/anxious.  "   All other systems reviewed and are negative.      PMFSH: all information reviewed and updated as relevant to today's visit  Objective:      Physical Exam   Constitutional: She is oriented to person, place, and time. She appears well-developed and well-nourished.   HENT:   Mouth/Throat: No oropharyngeal exudate.   Cardiovascular: Normal rate and normal heart sounds.    Pulmonary/Chest: Effort normal and breath sounds normal. She has no wheezes.   Abdominal: Soft. Bowel sounds are normal. There is no tenderness.   Musculoskeletal: She exhibits no edema or tenderness.   Lymphadenopathy:     She has no cervical adenopathy.   Neurological: She is alert and oriented to person, place, and time. Coordination normal.   Skin: Skin is warm and dry. No rash noted.   Psychiatric: She has a normal mood and affect. Judgment and thought content normal.   Vitals reviewed.      LABS:  WBC   Date Value Ref Range Status   02/01/2018 4.67 3.90 - 12.70 K/uL Final     Hemoglobin   Date Value Ref Range Status   02/01/2018 10.9 (L) 12.0 - 16.0 g/dL Final     Hematocrit   Date Value Ref Range Status   02/01/2018 33.8 (L) 37.0 - 48.5 % Final     Platelets   Date Value Ref Range Status   02/01/2018 207 150 - 350 K/uL Final     Gran # (ANC)   Date Value Ref Range Status   02/01/2018 2.8 1.8 - 7.7 K/uL Final     Comment:     The ANC is based on a white cell differential from an   automated cell counter. It has not been microscopically   reviewed for the presence of abnormal cells. Clinical   correlation is required.         Chemistry        Component Value Date/Time     02/01/2018 1232    K 4.3 02/01/2018 1232     02/01/2018 1232    CO2 28 02/01/2018 1232    BUN 28 (H) 02/01/2018 1232    CREATININE 3.3 (H) 02/01/2018 1232     (H) 02/01/2018 1232        Component Value Date/Time    CALCIUM 9.5 02/01/2018 1232    ALKPHOS 52 (L) 02/01/2018 1232    AST 16 02/01/2018 1232    ALT 7 (L) 02/01/2018 1232    BILITOT 0.8 02/01/2018  1232    ESTGFRAFRICA 14.6 (A) 02/01/2018 1232    EGFRNONAA 12.7 (A) 02/01/2018 1232          Assessment:       1. Thyroid cancer    2. Secondary cancer of bone        Plan:        1. She will follow-up with Endocrinology for treatment,  Will see her back after treatment of papillary thyroid cancer is completed.    Above care plan was discussed with patient and accompanying daughter and all questions were addressed to their satisfaction

## 2018-03-05 ENCOUNTER — PATIENT OUTREACH (OUTPATIENT)
Dept: OTHER | Facility: OTHER | Age: 78
End: 2018-03-05

## 2018-03-05 NOTE — PROGRESS NOTES
Last 5 Patient Entered Readings                                      Current 30 Day Average: 124/78     Recent Readings 3/1/2018 3/1/2018 2/27/2018 2/27/2018 2/20/2018    SBP (mmHg) 139 96 135 140 103    DBP (mmHg) 83 42 87 88 70    Pulse 85 88 94 94 95        Hypertension Digital Medicine Program (HDMP): Health  Follow Up    I spoke with Ms. Mckeon.   Going to have thyroid nodules removed because they are pre-cancerous. Surgery date is still TBD.    Lifestyle Modifications:    1.Low sodium diet: yes - continuing to be mindful of sodium intake.    2.Physical activity: would benefit from more activity - light walking, stretching    3.Hypotension/Hypertension symptoms: no   Frequency/Alleviating factors/Precipitating factors, etc.     4.Patient has been compliant with the medication regimen.     Follow up with Ms. Marga Guerra completed. No further questions or concerns. I will follow up in a few weeks to assess progress.

## 2018-03-06 ENCOUNTER — TELEPHONE (OUTPATIENT)
Dept: ENDOCRINOLOGY | Facility: CLINIC | Age: 78
End: 2018-03-06

## 2018-03-06 DIAGNOSIS — C34.91 NON-SMALL CELL CANCER OF RIGHT LUNG: ICD-10-CM

## 2018-03-06 DIAGNOSIS — C79.51 SECONDARY CANCER OF BONE: ICD-10-CM

## 2018-03-06 DIAGNOSIS — C73 THYROID CANCER: Primary | ICD-10-CM

## 2018-03-06 NOTE — TELEPHONE ENCOUNTER
"I spoke with Ms. Linda regarding the thyroid tracer scan. We will get that as the next step. If the rib lesion does not "light up" on the tracer scan, will arrange IR biopsy to determine if the lesion is thyroid, lung, or other, which will help make decisions on the best course of treatment.    She requested we call her to schedule the tracer scan, because she works two jobs and she needs to make sure she can be off to transport her mom to the appointments.  "

## 2018-03-06 NOTE — TELEPHONE ENCOUNTER
Spoke to Mrs. Mckeon and she said any week in May would work for her. I told her we will contact them to see if they have anything in May and get back in touch with her.

## 2018-03-16 ENCOUNTER — OFFICE VISIT (OUTPATIENT)
Dept: NEPHROLOGY | Facility: CLINIC | Age: 78
End: 2018-03-16
Payer: MEDICARE

## 2018-03-16 VITALS
HEART RATE: 82 BPM | DIASTOLIC BLOOD PRESSURE: 70 MMHG | HEIGHT: 61 IN | SYSTOLIC BLOOD PRESSURE: 130 MMHG | OXYGEN SATURATION: 96 % | BODY MASS INDEX: 21.36 KG/M2 | WEIGHT: 113.13 LBS

## 2018-03-16 DIAGNOSIS — N18.4 CHRONIC KIDNEY DISEASE, STAGE IV (SEVERE): Primary | ICD-10-CM

## 2018-03-16 DIAGNOSIS — N17.9 ACUTE KIDNEY INJURY (NONTRAUMATIC): ICD-10-CM

## 2018-03-16 DIAGNOSIS — D63.1 ANEMIA OF CHRONIC RENAL FAILURE, STAGE 4 (SEVERE): ICD-10-CM

## 2018-03-16 DIAGNOSIS — N18.4 ANEMIA OF CHRONIC RENAL FAILURE, STAGE 4 (SEVERE): ICD-10-CM

## 2018-03-16 PROCEDURE — 99214 OFFICE O/P EST MOD 30 MIN: CPT | Mod: S$GLB,,, | Performed by: INTERNAL MEDICINE

## 2018-03-16 PROCEDURE — 3078F DIAST BP <80 MM HG: CPT | Mod: CPTII,S$GLB,, | Performed by: INTERNAL MEDICINE

## 2018-03-16 PROCEDURE — 3075F SYST BP GE 130 - 139MM HG: CPT | Mod: CPTII,S$GLB,, | Performed by: INTERNAL MEDICINE

## 2018-03-16 PROCEDURE — 99999 PR PBB SHADOW E&M-EST. PATIENT-LVL III: CPT | Mod: PBBFAC,,, | Performed by: INTERNAL MEDICINE

## 2018-03-16 PROCEDURE — 99499 UNLISTED E&M SERVICE: CPT | Mod: S$GLB,,, | Performed by: INTERNAL MEDICINE

## 2018-03-16 NOTE — PROGRESS NOTES
Subjective:       Patient ID: Marga Guerra is a 77 y.o. Black or  female who presents for follow-up evaluation of Chronic Kidney Disease    HPI    Ms. Guerra is a 77 year old woman with past medical history of hypertension, thyroid/lung cancer (squamous cell) presenting for follow up of chronic kidney disease and acute kidney injury.  Patient accompanied by her granddaughter who assisted with history/translation.  Patient reports continued inadequate daily fluid intake.  She does not check her blood pressure regularly.  She denies any NSAID use.  She otherwise denies any anorexia, fatigue, fever, chest pain, shortness of breath, abdominal pain, diarrhea, dysuria/hematuria.        Review of Systems   Constitutional: Negative for appetite change, fatigue and fever.   Respiratory: Negative for chest tightness and shortness of breath.    Cardiovascular: Negative for chest pain and leg swelling.   Gastrointestinal: Negative for abdominal pain, constipation, diarrhea, nausea and vomiting.   Genitourinary: Negative for difficulty urinating, dysuria, flank pain, frequency, hematuria and urgency.   Musculoskeletal: Negative for arthralgias, joint swelling and myalgias.   Skin: Negative for rash and wound.   Neurological: Negative for dizziness, weakness and light-headedness.   All other systems reviewed and are negative.      Objective:      Physical Exam   Constitutional: She appears well-developed and well-nourished.   Cardiovascular: Normal rate, regular rhythm and normal heart sounds.  Exam reveals no gallop and no friction rub.    No murmur heard.  Pulmonary/Chest: Effort normal and breath sounds normal. No respiratory distress. She has no wheezes. She has no rales.   Abdominal: Soft. Bowel sounds are normal. There is no tenderness.   Musculoskeletal: She exhibits no edema.   Neurological: She is alert.   Skin: Skin is warm and dry. No rash noted. No erythema.   Psychiatric: She has a normal mood  and affect.       Assessment:       1. Chronic kidney disease, stage IV (severe)    2. Acute kidney injury (nontraumatic)    3. Anemia of chronic renal failure, stage 4 (severe)        Plan:      Ms. Guerra is a 77 year old woman with past medical history of hypertension, recently diagnosed lung cancer (squamous cell) presenting for follow up of chronic kidney disease and acute kidney injury.  Patient creatinine previously ~1.2-1.4mg/dL, then 1.7.-1.8mg/dL, consistent with CKD stage III, likely mainly age-related renal loss, but also with hypertensive nephrosclerosis.  However, creatinine has been elevated since 2014, 2.1-2.4mg/dL, now continuing to trend up since last year, now 3.3mg/dL.  Suspect patient with acute kidney injury due to inadequate renal perfusion from inadequate daily fluid intake, v. possibly progressive chronic kidney disease (must consider toxic injury due to prior dosing of carboplatin).  Encouraged fluid intake, will repeat renal panel to trend; obtained urine studies to rule out infectious/glomerular etiology, reviewed renal US with doppler 2.21.18 to rule out obstructive/vascular etiology.  Patient otherwise asymptomatic, will continue to monitor closely; discussed signs/symptoms of uremia, along with indications for renal replacement therapy.  Patient/granddaughter voiced understanding of above, agreeable to plan as noted.  Further recommendations pending results of above.        Return to clinic in 6-8 weeks pending renal panel within 2 weeks, then with renal/heme panel, iron/TIBC/ferritin, urinalysis/culture, urine protein/creatinine ratio prior to next visit

## 2018-04-16 ENCOUNTER — PATIENT OUTREACH (OUTPATIENT)
Dept: OTHER | Facility: OTHER | Age: 78
End: 2018-04-16

## 2018-04-16 NOTE — PROGRESS NOTES
Last 5 Patient Entered Readings                                      Current 30 Day Average: 112/77     Recent Readings 4/9/2018 4/9/2018 3/30/2018 3/26/2018 3/23/2018    SBP (mmHg) 93 73 123 121 115    DBP (mmHg) 67 49 88 83 71    Pulse 104 105 87 81 88        Hypertension Digital Medicine Program (HDMP): Health  Follow Up    Spoke with Ms. Mckeon, Ms. Guerra's daughter. She reports Ms. Guerra is doing well. No lifestyle changes. No symptoms with low readings.    Follow up with Ms. Cameronlazaro Guerra completed. No further questions or concerns. I will follow up in a few weeks to assess progress.

## 2018-04-20 ENCOUNTER — LAB VISIT (OUTPATIENT)
Dept: LAB | Facility: HOSPITAL | Age: 78
End: 2018-04-20
Attending: INTERNAL MEDICINE
Payer: MEDICARE

## 2018-04-20 DIAGNOSIS — N17.9 ACUTE KIDNEY INJURY (NONTRAUMATIC): ICD-10-CM

## 2018-04-20 DIAGNOSIS — N18.4 CHRONIC KIDNEY DISEASE, STAGE IV (SEVERE): ICD-10-CM

## 2018-04-20 LAB
ALBUMIN SERPL BCP-MCNC: 3.8 G/DL
ANION GAP SERPL CALC-SCNC: 11 MMOL/L
BUN SERPL-MCNC: 37 MG/DL
CALCIUM SERPL-MCNC: 9.7 MG/DL
CHLORIDE SERPL-SCNC: 103 MMOL/L
CO2 SERPL-SCNC: 27 MMOL/L
CREAT SERPL-MCNC: 3.8 MG/DL
EST. GFR  (AFRICAN AMERICAN): 12.3 ML/MIN/1.73 M^2
EST. GFR  (NON AFRICAN AMERICAN): 10.7 ML/MIN/1.73 M^2
GLUCOSE SERPL-MCNC: 95 MG/DL
PHOSPHATE SERPL-MCNC: 4.5 MG/DL
POTASSIUM SERPL-SCNC: 5 MMOL/L
PTH-INTACT SERPL-MCNC: 6 PG/ML
SODIUM SERPL-SCNC: 141 MMOL/L

## 2018-04-20 PROCEDURE — 36415 COLL VENOUS BLD VENIPUNCTURE: CPT

## 2018-04-20 PROCEDURE — 83970 ASSAY OF PARATHORMONE: CPT

## 2018-04-20 PROCEDURE — 80069 RENAL FUNCTION PANEL: CPT

## 2018-04-27 ENCOUNTER — PATIENT OUTREACH (OUTPATIENT)
Dept: OTHER | Facility: OTHER | Age: 78
End: 2018-04-27

## 2018-04-27 NOTE — PROGRESS NOTES
Spoke to patient's daughter. She denies changes to diet or lifestyle. Has not noticed any JENNIFER or other fluid accumulation. Diastolic BP average up from 74mmHg in January.     Last 5 Patient Entered Readings                                      Current 30 Day Average: 121/81     Recent Readings 4/24/2018 4/17/2018 4/17/2018 4/9/2018 4/9/2018    SBP (mmHg) 134 134 143 93 73    DBP (mmHg) 84 83 96 67 49    Pulse 94 88 88 104 105          BP above goal, <130/80 mmHg  Monitor closely- refer to HC for more education  Encouraged sodium reduction    Hypertension Medications             amLODIPine (NORVASC) 5 MG tablet Take 1 tablet (5 mg total) by mouth once daily.

## 2018-04-30 ENCOUNTER — CLINICAL SUPPORT (OUTPATIENT)
Dept: ENDOCRINOLOGY | Facility: CLINIC | Age: 78
End: 2018-04-30
Payer: MEDICARE

## 2018-04-30 VITALS — BODY MASS INDEX: 24.2 KG/M2 | WEIGHT: 112.19 LBS | HEIGHT: 57 IN

## 2018-04-30 DIAGNOSIS — C73 THYROID CANCER: Primary | ICD-10-CM

## 2018-04-30 PROCEDURE — 99999 PR PBB SHADOW E&M-EST. PATIENT-LVL I: CPT | Mod: PBBFAC,,,

## 2018-04-30 PROCEDURE — 96372 THER/PROPH/DIAG INJ SC/IM: CPT | Mod: S$GLB,,, | Performed by: INTERNAL MEDICINE

## 2018-04-30 NOTE — PROGRESS NOTES
1022:  Thyrogen IM given to right buttocks.  Tolerating well.  NDC [address]-7.  Lot # [id number].Continue daily medications, including levothyroxine (Synthroid).    Treat common side effects of headache, fatigue and body aches with w/ OTC pain relievers and rest as needed.

## 2018-05-01 ENCOUNTER — CLINICAL SUPPORT (OUTPATIENT)
Dept: ENDOCRINOLOGY | Facility: CLINIC | Age: 78
End: 2018-05-01
Payer: MEDICARE

## 2018-05-01 PROCEDURE — 99999 PR PBB SHADOW E&M-EST. PATIENT-LVL I: CPT | Mod: PBBFAC,,,

## 2018-05-01 PROCEDURE — 96372 THER/PROPH/DIAG INJ SC/IM: CPT | Mod: S$GLB,,, | Performed by: INTERNAL MEDICINE

## 2018-05-01 NOTE — PROGRESS NOTES
Patient arrived for IM injection.in procedure room verified patient x2, explain IM injection and possible side effect to patient.answer all patient question. aftab up patient medication, prepared patient  left dorsogulteal  Gave IM injection I advise patient to sit in lobby for 10-15 min after given injection patient verbalized understanding

## 2018-05-03 ENCOUNTER — HOSPITAL ENCOUNTER (OUTPATIENT)
Dept: RADIOLOGY | Facility: HOSPITAL | Age: 78
Discharge: HOME | End: 2018-05-03
Attending: INTERNAL MEDICINE
Payer: MEDICARE

## 2018-05-03 DIAGNOSIS — C73 THYROID CANCER: ICD-10-CM

## 2018-05-03 DIAGNOSIS — C79.51 SECONDARY CANCER OF BONE: ICD-10-CM

## 2018-05-03 PROCEDURE — 78018 THYROID MET IMAGING BODY: CPT | Mod: 26,,, | Performed by: RADIOLOGY

## 2018-05-04 ENCOUNTER — HOSPITAL ENCOUNTER (OUTPATIENT)
Dept: RADIOLOGY | Facility: HOSPITAL | Age: 78
Discharge: HOME | End: 2018-05-04
Attending: INTERNAL MEDICINE
Payer: MEDICARE

## 2018-05-04 PROCEDURE — A9516 IODINE I-123 SOD IODIDE MIC: HCPCS | Mod: 59

## 2018-05-08 ENCOUNTER — TELEPHONE (OUTPATIENT)
Dept: ENDOCRINOLOGY | Facility: CLINIC | Age: 78
End: 2018-05-08

## 2018-05-08 DIAGNOSIS — M84.48XA PATHOLOGICAL FRACTURE OF RIB OF RIGHT SIDE: ICD-10-CM

## 2018-05-08 DIAGNOSIS — C73 THYROID CANCER: ICD-10-CM

## 2018-05-08 DIAGNOSIS — C34.91 NON-SMALL CELL CANCER OF RIGHT LUNG: Primary | ICD-10-CM

## 2018-05-08 NOTE — TELEPHONE ENCOUNTER
I spoke with Ms. Mckeon (daughter) regarding the thyroid scan. It did not show any abnormal uptake in the rib lesion (neck lesions did light up). As per the previous plan, we will refer to IR for biopsy of the posterior right 9th rib lesion to determine the primary source (lung vs. thyroid vs. other) of metastasis.

## 2018-05-09 ENCOUNTER — TELEPHONE (OUTPATIENT)
Dept: NEPHROLOGY | Facility: CLINIC | Age: 78
End: 2018-05-09

## 2018-05-09 DIAGNOSIS — N18.4 CHRONIC KIDNEY DISEASE, STAGE IV (SEVERE): Primary | ICD-10-CM

## 2018-05-18 ENCOUNTER — TELEPHONE (OUTPATIENT)
Dept: ENDOCRINOLOGY | Facility: CLINIC | Age: 78
End: 2018-05-18

## 2018-05-18 DIAGNOSIS — C34.91 NON-SMALL CELL CANCER OF RIGHT LUNG: ICD-10-CM

## 2018-05-18 DIAGNOSIS — C79.51 SECONDARY CANCER OF BONE: ICD-10-CM

## 2018-05-18 DIAGNOSIS — C73 THYROID CANCER: Primary | ICD-10-CM

## 2018-05-18 NOTE — TELEPHONE ENCOUNTER
Please schedule patient for non-contrast CT of the chest.    Please call her daughter (Linda) to let her know the radiologist who will perform the biopsy requested an updated CT scan prior to the procedure.    Thanks!

## 2018-05-27 RX ORDER — ATORVASTATIN CALCIUM 40 MG/1
TABLET, FILM COATED ORAL
Qty: 30 TABLET | Refills: 5 | Status: SHIPPED | OUTPATIENT
Start: 2018-05-27 | End: 2018-12-17 | Stop reason: SDUPTHER

## 2018-05-28 ENCOUNTER — PATIENT OUTREACH (OUTPATIENT)
Dept: OTHER | Facility: OTHER | Age: 78
End: 2018-05-28

## 2018-05-28 ENCOUNTER — LAB VISIT (OUTPATIENT)
Dept: LAB | Facility: HOSPITAL | Age: 78
End: 2018-05-28
Attending: INTERNAL MEDICINE
Payer: MEDICARE

## 2018-05-28 DIAGNOSIS — N18.4 CHRONIC KIDNEY DISEASE, STAGE IV (SEVERE): ICD-10-CM

## 2018-05-28 LAB
ALBUMIN SERPL BCP-MCNC: 3.6 G/DL
ANION GAP SERPL CALC-SCNC: 10 MMOL/L
BUN SERPL-MCNC: 34 MG/DL
CALCIUM SERPL-MCNC: 10 MG/DL
CHLORIDE SERPL-SCNC: 104 MMOL/L
CO2 SERPL-SCNC: 27 MMOL/L
CREAT SERPL-MCNC: 3.2 MG/DL
EST. GFR  (AFRICAN AMERICAN): 15.1 ML/MIN/1.73 M^2
EST. GFR  (NON AFRICAN AMERICAN): 13.1 ML/MIN/1.73 M^2
GLUCOSE SERPL-MCNC: 94 MG/DL
PHOSPHATE SERPL-MCNC: 4.4 MG/DL
POTASSIUM SERPL-SCNC: 4.1 MMOL/L
SODIUM SERPL-SCNC: 141 MMOL/L

## 2018-05-28 PROCEDURE — 80069 RENAL FUNCTION PANEL: CPT

## 2018-05-28 PROCEDURE — 36415 COLL VENOUS BLD VENIPUNCTURE: CPT

## 2018-05-28 NOTE — PROGRESS NOTES
Last 5 Patient Entered Readings                                      Current 30 Day Average: 125/78     Recent Readings 5/28/2018 5/28/2018 5/21/2018 5/14/2018 5/14/2018    SBP (mmHg) 144 155 123 111 108    DBP (mmHg) 81 87 82 72 68    Pulse 80 86 90 95 98        Digital Medicine: Health  Follow Up    Spoke with Ms. Mckeon, she reports she is feeling well and there are no changes to her mom's lifestyle. Not sure why readings are a little more elevated.  Had lab work done recently.    Lifestyle Modifications:    1.Dietary Modifications: no new changes, following same diet    2.Physical Activity: light activity    Follow up with Ms. Marga Guerra completed. No further questions or concerns. Will continue follow up to achieve health goals.

## 2018-05-30 ENCOUNTER — HOSPITAL ENCOUNTER (OUTPATIENT)
Dept: RADIOLOGY | Facility: HOSPITAL | Age: 78
Discharge: HOME | End: 2018-05-30
Attending: INTERNAL MEDICINE
Payer: MEDICARE

## 2018-05-30 DIAGNOSIS — C73 THYROID CANCER: ICD-10-CM

## 2018-05-30 DIAGNOSIS — C34.91 NON-SMALL CELL CANCER OF RIGHT LUNG: ICD-10-CM

## 2018-05-30 DIAGNOSIS — C79.51 SECONDARY CANCER OF BONE: ICD-10-CM

## 2018-05-30 PROCEDURE — 71250 CT THORAX DX C-: CPT | Mod: 26,,, | Performed by: RADIOLOGY

## 2018-05-30 PROCEDURE — 71250 CT THORAX DX C-: CPT | Mod: TC

## 2018-06-01 ENCOUNTER — OFFICE VISIT (OUTPATIENT)
Dept: NEPHROLOGY | Facility: CLINIC | Age: 78
End: 2018-06-01
Payer: MEDICARE

## 2018-06-01 VITALS
OXYGEN SATURATION: 98 % | WEIGHT: 109.56 LBS | BODY MASS INDEX: 23.64 KG/M2 | SYSTOLIC BLOOD PRESSURE: 120 MMHG | DIASTOLIC BLOOD PRESSURE: 80 MMHG | HEIGHT: 57 IN | HEART RATE: 81 BPM

## 2018-06-01 DIAGNOSIS — N17.9 ACUTE KIDNEY INJURY (NONTRAUMATIC): ICD-10-CM

## 2018-06-01 DIAGNOSIS — N18.4 ANEMIA OF CHRONIC RENAL FAILURE, STAGE 4 (SEVERE): ICD-10-CM

## 2018-06-01 DIAGNOSIS — N18.4 CHRONIC KIDNEY DISEASE, STAGE IV (SEVERE): Primary | ICD-10-CM

## 2018-06-01 DIAGNOSIS — D63.1 ANEMIA OF CHRONIC RENAL FAILURE, STAGE 4 (SEVERE): ICD-10-CM

## 2018-06-01 PROCEDURE — 99999 PR PBB SHADOW E&M-EST. PATIENT-LVL III: CPT | Mod: PBBFAC,,, | Performed by: INTERNAL MEDICINE

## 2018-06-01 PROCEDURE — 3074F SYST BP LT 130 MM HG: CPT | Mod: CPTII,S$GLB,, | Performed by: INTERNAL MEDICINE

## 2018-06-01 PROCEDURE — 3079F DIAST BP 80-89 MM HG: CPT | Mod: CPTII,S$GLB,, | Performed by: INTERNAL MEDICINE

## 2018-06-01 PROCEDURE — 99214 OFFICE O/P EST MOD 30 MIN: CPT | Mod: S$GLB,,, | Performed by: INTERNAL MEDICINE

## 2018-06-01 PROCEDURE — 99499 UNLISTED E&M SERVICE: CPT | Mod: S$PBB,,, | Performed by: INTERNAL MEDICINE

## 2018-06-01 NOTE — PROGRESS NOTES
Subjective:       Patient ID: Marga Guerra is a 78 y.o. Black or  female who presents for follow-up evaluation of Chronic Kidney Disease    HPI    Ms. Guerra is an 78 year old woman with past medical history of hypertension, thyroid/lung cancer (squamous cell) presenting for follow up of chronic kidney disease and acute kidney injury.  Patient accompanied by her grandson who assisted with history (interpretor present for translation).  Patient reports more adequate daily fluid intake.  She does not check her blood pressure regularly.  She denies any NSAID use.  She otherwise denies any anorexia, fatigue, fever, chest pain, shortness of breath, abdominal pain, diarrhea, dysuria/hematuria.        Review of Systems   Constitutional: Negative for appetite change, fatigue and fever.   Respiratory: Negative for chest tightness and shortness of breath.    Cardiovascular: Negative for chest pain and leg swelling.   Gastrointestinal: Negative for abdominal pain, constipation, diarrhea, nausea and vomiting.   Genitourinary: Negative for difficulty urinating, dysuria, flank pain, frequency, hematuria and urgency.   Musculoskeletal: Negative for arthralgias, joint swelling and myalgias.   Skin: Negative for rash and wound.   Neurological: Negative for dizziness, weakness and light-headedness.   All other systems reviewed and are negative.      Objective:      Physical Exam   Constitutional: She appears well-developed and well-nourished.   Cardiovascular: Normal rate, regular rhythm and normal heart sounds.  Exam reveals no gallop and no friction rub.    No murmur heard.  Pulmonary/Chest: Effort normal and breath sounds normal. No respiratory distress. She has no wheezes. She has no rales.   Abdominal: Soft. Bowel sounds are normal. There is no tenderness.   Musculoskeletal: She exhibits no edema.   Neurological: She is alert.   Skin: Skin is warm and dry. No rash noted. No erythema.   Psychiatric: She has a  normal mood and affect.       Assessment:       1. Chronic kidney disease, stage IV (severe)    2. Acute kidney injury (nontraumatic)    3. Anemia of chronic renal failure, stage 4 (severe)        Plan:      Ms. Guerra is an 78 year old woman with past medical history of hypertension, recently diagnosed lung cancer (squamous cell) presenting for follow up of chronic kidney disease and acute kidney injury.  Patient creatinine previously ~1.2-1.4mg/dL, then 1.7.-1.8mg/dL, consistent with CKD stage III, likely mainly age-related renal loss, but also with hypertensive nephrosclerosis.  However, creatinine has been elevated since 2014, 2.1-2.4mg/dL, now continuing to trend up since last year, up to 3.8mg/dL.  Suspect patient with acute kidney injury due to inadequate renal perfusion from inadequate daily fluid intake, v. possibly progressive chronic kidney disease (must consider toxic injury due to prior dosing of carboplatin).  Creatinine improved since last visit, encouraged fluid intake, will repeat renal panel to trend; obtained urine studies to rule out infectious/glomerular etiology, reviewed renal US with doppler 2.21.18 to rule out obstructive/vascular etiology.  Patient otherwise asymptomatic, will continue to monitor closely; discussed signs/symptoms of uremia, along with indications for renal replacement therapy.  Patient/grandson voiced understanding of above, agreeable to plan as noted.  Further recommendations pending results of above.        Return to clinic in 8-12 weeks pending renal panel within a month, then with renal/heme panel, iron/TIBC/ferritin, urinalysis/culture, urine protein/creatinine ratio prior to next visit

## 2018-06-05 NOTE — PROGRESS NOTES
Reviewed BP readings. BP actively monitored, goal <130/80 mmHg  Continue monitoring      Last 5 Patient Entered Readings                                      Current 30 Day Average: 124/77     Recent Readings 6/4/2018 5/28/2018 5/28/2018 5/21/2018 5/14/2018    SBP (mmHg) 119 144 155 123 111    DBP (mmHg) 77 81 87 82 72    Pulse 88 80 86 90 95

## 2018-06-13 ENCOUNTER — DOCUMENTATION ONLY (OUTPATIENT)
Dept: CARDIOTHORACIC SURGERY | Facility: CLINIC | Age: 78
End: 2018-06-13

## 2018-06-13 NOTE — PATIENT CARE CONFERENCE
[facility]      THORACIC MULTIDISCIPLINARY TUMOR BOARD  PATIENT REVIEW FORM  ________________________________________________________________________    CLINIC #: [id number]  DATE: 06/13/2018    TUMOR SITE:   Papillary Thyroid; NSCLC    PRESENTER:   Dr. Qureshi     PATIENT SUMMARY:   78 year old female with a history of papillary thyroid cancer (2012) and squamous cell lung cancer diagnosed in 2013. Underwent total thyroidectomy with bilateral paratracheal dissection and left modifired neck dissection of levels II-V on 3/12/12 followed by 100mCi I-131 in 9/21012. Surgical pathology stage CON ([id number]). The following year she was diagnosed with squamous cell lung cancer after work-up for an abnormal CXR. Completed chemo/RT with weekly carboplatin and Taxol on 11/22/13. Followed by 2 cycles of consolidation chemo completed Jan 2014. Palliative focal radiation of right 8th rib Aug 2014. In Dec 17 based on surveillance thyroid US and lab work there was concern for recurrence. Subsequent PET 1/18 which showed new area of hypermetabolism within neck and superior mediastinum, new pathologic fracture in right posterior 9th rib, enlarging left lower lobe nodule. Lymph nodes were biopsied and revealed papillary thyroid carcinoma. Thyroid scan without evidence of distant mets. Rib lesion did not show uptake on thyroid scan.     BOARD RECOMMENDATIONS:   IR biopsy PET avid rib lesion with associated increasing soft tissue component to differentiate recurrent NSCLC and progressing papillary thyroid     CONSULT NEEDED:     [] Surgery    [] Hem/Onc    [] Rad/Onc    [] Dietary                 [] Social Service    [] Psychology       [] Pulmonology   [x] Interventional Radiology     Clinical Stage: Tumor 4 Node(s) 1 Metastasis 0    GROUP STAGE:     [] O    [] 1A    [] IB    [] IIA    [] IIB     [] IIIA     [] IIIB     [] IIIC    [x] IV                               [] Local recurrence     [] Regional recurrence     []  Distant recurrence                   [] NSCLC     [] SCLC     Tumor type: Squamous Cell Carcinoma     Unstageable:      [] Yes     [] No  Metastatic site(s): n/a         [x] [facility] Treatment Guidelines reviewed and care planned is consistent with guidelines.         (i.e., NCCN, NCI, PD, ACO, AUA, etc.)    PRESENTATION AT CANCER CONFERENCE:         [] Prospective    [] Retrospective     [] Follow-Up          [] Eligible for clinical trial

## 2018-06-20 ENCOUNTER — TELEPHONE (OUTPATIENT)
Dept: ENDOCRINOLOGY | Facility: CLINIC | Age: 78
End: 2018-06-20

## 2018-06-20 ENCOUNTER — PATIENT MESSAGE (OUTPATIENT)
Dept: ENDOCRINOLOGY | Facility: CLINIC | Age: 78
End: 2018-06-20

## 2018-06-20 DIAGNOSIS — M84.48XA PATHOLOGICAL FRACTURE OF RIB OF RIGHT SIDE: Primary | ICD-10-CM

## 2018-06-25 ENCOUNTER — PATIENT OUTREACH (OUTPATIENT)
Dept: OTHER | Facility: OTHER | Age: 78
End: 2018-06-25

## 2018-06-25 NOTE — PROGRESS NOTES
Last 5 Patient Entered Readings                                      Current 30 Day Average: 120/73     Recent Readings 6/25/2018 6/25/2018 6/25/2018 6/18/2018 6/11/2018    SBP (mmHg) 96 87 91 142 97    DBP (mmHg) 62 55 54 78 66    Pulse 88 95 92 85 94        Digital Medicine: Health  Follow Up    Spoke with Ms. Mckeon, Ms. Guerra's daughter. She reports Ms. Guerra is doing well but needs to see her Oncologist.    Lifestyle Modifications:    1.Dietary Modifications (Sodium intake <2,000mg/day, food labels, dining out): Ms Mckeon reports no changes to her mother's routine or dietary habits.    2.Physical Activity:     3.Medication Therapy: Patient has been compliant with the medication regimen.    4.Patient has the following medication side effects/concerns: no symptoms  (Frequency/Alleviating factors/Precipitating factors, etc.)     Follow up with Ms. Marga Guerra completed. No further questions or concerns. Will continue follow up to achieve health goals.

## 2018-07-13 ENCOUNTER — LAB VISIT (OUTPATIENT)
Dept: LAB | Facility: HOSPITAL | Age: 78
End: 2018-07-13
Attending: INTERNAL MEDICINE
Payer: MEDICARE

## 2018-07-13 DIAGNOSIS — N18.4 CHRONIC KIDNEY DISEASE, STAGE IV (SEVERE): ICD-10-CM

## 2018-07-13 DIAGNOSIS — N17.9 ACUTE KIDNEY INJURY (NONTRAUMATIC): ICD-10-CM

## 2018-07-13 LAB
ALBUMIN SERPL BCP-MCNC: 3.7 G/DL
ANION GAP SERPL CALC-SCNC: 9 MMOL/L
BUN SERPL-MCNC: 36 MG/DL
CALCIUM SERPL-MCNC: 9.5 MG/DL
CHLORIDE SERPL-SCNC: 103 MMOL/L
CO2 SERPL-SCNC: 28 MMOL/L
CREAT SERPL-MCNC: 3.3 MG/DL
EST. GFR  (AFRICAN AMERICAN): 14.5 ML/MIN/1.73 M^2
EST. GFR  (NON AFRICAN AMERICAN): 12.6 ML/MIN/1.73 M^2
GLUCOSE SERPL-MCNC: 90 MG/DL
PHOSPHATE SERPL-MCNC: 4.3 MG/DL
POTASSIUM SERPL-SCNC: 4.7 MMOL/L
SODIUM SERPL-SCNC: 140 MMOL/L

## 2018-07-13 PROCEDURE — 80069 RENAL FUNCTION PANEL: CPT

## 2018-07-13 PROCEDURE — 36415 COLL VENOUS BLD VENIPUNCTURE: CPT

## 2018-07-16 RX ORDER — RAMIPRIL 10 MG/1
CAPSULE ORAL
Qty: 90 CAPSULE | Refills: 0 | OUTPATIENT
Start: 2018-07-16

## 2018-07-17 ENCOUNTER — PATIENT MESSAGE (OUTPATIENT)
Dept: ENDOCRINOLOGY | Facility: CLINIC | Age: 78
End: 2018-07-17

## 2018-07-19 ENCOUNTER — TELEPHONE (OUTPATIENT)
Dept: ENDOCRINOLOGY | Facility: CLINIC | Age: 78
End: 2018-07-19

## 2018-07-19 ENCOUNTER — TELEPHONE (OUTPATIENT)
Dept: INTERVENTIONAL RADIOLOGY/VASCULAR | Facility: HOSPITAL | Age: 78
End: 2018-07-19

## 2018-07-19 DIAGNOSIS — M84.48XD: Primary | ICD-10-CM

## 2018-07-19 DIAGNOSIS — C34.91 NON-SMALL CELL CANCER OF RIGHT LUNG: Primary | ICD-10-CM

## 2018-07-19 DIAGNOSIS — C73 THYROID CANCER: ICD-10-CM

## 2018-07-19 DIAGNOSIS — C34.91 NON-SMALL CELL CANCER OF RIGHT LUNG: ICD-10-CM

## 2018-07-19 NOTE — TELEPHONE ENCOUNTER
Called to schedule lung biopsy.  Spoke with daughter; states she does the scheduling. Requests Wed appt only, and after 9am.  The first appointment that works for her is Aug 8.  Pt scheduled

## 2018-07-19 NOTE — TELEPHONE ENCOUNTER
----- Message from Renée Lua RN sent at 7/19/2018 10:49 AM CDT -----      ----- Message -----  From: Marcela Qurehsi MD  Sent: 7/19/2018  10:48 AM  To: Renée Lua RN    Either way is fine. The Andrew [facility]LV[facility] is doing it so they should know exactly what order to piut in  ----- Message -----  From: Renée Lua RN  Sent: 7/19/2018  10:01 AM  To: Marcela Qureshi MD    IR called asking you to enter a lung/rib biopsy order for this patient.  It appears her andrew ÁLVAREZ is taking care of this for her---  We haven't seen her since march 2018...      I am more than happy to enter the order, if you are OK with that. Or, I can send message to her andrew ÁLVAREZ.      ~renée

## 2018-07-23 ENCOUNTER — PATIENT OUTREACH (OUTPATIENT)
Dept: OTHER | Facility: OTHER | Age: 78
End: 2018-07-23

## 2018-07-23 NOTE — PROGRESS NOTES
Last 5 Patient Entered Readings                                      Current 30 Day Average: 113/71     Recent Readings 7/23/2018 7/16/2018 7/9/2018 7/9/2018 7/9/2018    SBP (mmHg) 148 115 92 86 100    DBP (mmHg) 88 68 63 59 63    Pulse 87 97 91 94 94        Digital Medicine: Health  Follow Up    Spoke with Ms. Mckeon, Ms. Guerra's daughter. She reports that her mother is feeling well but had a recent dx of lung cancer. She attributed higher reading today to Ms. Guerra not having her 2nd dose of BP medication. I will discuss with PharmSYEDA Hewitt because nothing is in her chart for a 2nd medication. Ms. Mckeon stated she didn't know what it was dropped from her list.    Lifestyle Modifications:    1.Dietary Modifications: Ms. Mckeon stated her mother's diet is healthful and she's eating well, watching sodium.    2.Physical Activity: deferred    3.Medication Therapy: Patient has been compliant with the medication regimen.    4.Patient has the following medication side effects/concerns:   (Frequency/Alleviating factors/Precipitating factors, etc.)     Follow up with Ms. Marga Guerra completed. No further questions or concerns. Will continue follow up to achieve health goals.

## 2018-07-25 ENCOUNTER — PATIENT OUTREACH (OUTPATIENT)
Dept: OTHER | Facility: OTHER | Age: 78
End: 2018-07-25

## 2018-07-25 DIAGNOSIS — I10 ESSENTIAL HYPERTENSION: Primary | ICD-10-CM

## 2018-07-25 RX ORDER — RAMIPRIL 10 MG/1
10 CAPSULE ORAL DAILY
Qty: 90 CAPSULE | Refills: 3 | Status: SHIPPED | OUTPATIENT
Start: 2018-07-25 | End: 2019-02-01 | Stop reason: SDUPTHER

## 2018-07-25 NOTE — PROGRESS NOTES
Patient's daughter reports she was unable to refill ramipril x 1 week. She noticed patient BP trending up. Unclear why ramipril was removed from the med list. Renal function stable (SCr ~3.3)    Last 5 Patient Entered Readings                                      Current 30 Day Average: 113/71     Recent Readings 7/23/2018 7/16/2018 7/9/2018 7/9/2018 7/9/2018    SBP (mmHg) 148 115 92 86 100    DBP (mmHg) 88 68 63 59 63    Pulse 87 97 91 94 94          BP at goal, <130/80 mmHg  Resume ramipril 10mg daily  Continue monitoring    Hypertension Medications             amLODIPine (NORVASC) 5 MG tablet Take 1 tablet (5 mg total) by mouth once daily.    ramipril (ALTACE) 10 MG capsule Take 1 capsule (10 mg total) by mouth once daily.

## 2018-08-07 DIAGNOSIS — C34.91 NON-SMALL CELL CANCER OF RIGHT LUNG: Primary | ICD-10-CM

## 2018-08-07 DIAGNOSIS — D49.9 NEOPLASM: ICD-10-CM

## 2018-08-07 RX ORDER — MIDAZOLAM HYDROCHLORIDE 1 MG/ML
1 INJECTION INTRAMUSCULAR; INTRAVENOUS
Status: CANCELLED | OUTPATIENT
Start: 2018-08-07

## 2018-08-07 RX ORDER — FENTANYL CITRATE 50 UG/ML
50 INJECTION, SOLUTION INTRAMUSCULAR; INTRAVENOUS
Status: CANCELLED | OUTPATIENT
Start: 2018-08-07

## 2018-08-08 ENCOUNTER — HOSPITAL ENCOUNTER (OUTPATIENT)
Facility: HOSPITAL | Age: 78
Discharge: HOME | End: 2018-08-08
Attending: RADIOLOGY | Admitting: RADIOLOGY
Payer: MEDICARE

## 2018-08-08 ENCOUNTER — HOSPITAL ENCOUNTER (OUTPATIENT)
Dept: INTERVENTIONAL RADIOLOGY/VASCULAR | Facility: HOSPITAL | Age: 78
Discharge: HOME | End: 2018-08-08
Attending: INTERNAL MEDICINE
Payer: MEDICARE

## 2018-08-08 VITALS
OXYGEN SATURATION: 100 % | DIASTOLIC BLOOD PRESSURE: 71 MMHG | RESPIRATION RATE: 19 BRPM | HEART RATE: 94 BPM | SYSTOLIC BLOOD PRESSURE: 153 MMHG

## 2018-08-08 VITALS
SYSTOLIC BLOOD PRESSURE: 140 MMHG | HEART RATE: 84 BPM | TEMPERATURE: 99 F | HEIGHT: 60 IN | WEIGHT: 120 LBS | BODY MASS INDEX: 23.56 KG/M2 | RESPIRATION RATE: 16 BRPM | DIASTOLIC BLOOD PRESSURE: 64 MMHG | OXYGEN SATURATION: 100 %

## 2018-08-08 DIAGNOSIS — M84.48XD: ICD-10-CM

## 2018-08-08 DIAGNOSIS — C34.91 NON-SMALL CELL CANCER OF RIGHT LUNG: ICD-10-CM

## 2018-08-08 DIAGNOSIS — C73 THYROID CANCER: ICD-10-CM

## 2018-08-08 PROCEDURE — 76380 CAT SCAN FOLLOW-UP STUDY: CPT | Mod: TC

## 2018-08-08 PROCEDURE — 76380 CAT SCAN FOLLOW-UP STUDY: CPT | Mod: 26,,, | Performed by: RADIOLOGY

## 2018-08-08 RX ORDER — SODIUM CHLORIDE 9 MG/ML
500 INJECTION, SOLUTION INTRAVENOUS ONCE
Status: DISCONTINUED | OUTPATIENT
Start: 2018-08-08 | End: 2018-08-08 | Stop reason: HOSPADM

## 2018-08-08 NOTE — PROGRESS NOTES
Patient IV discontinued with catheter intact. Patient refused wheelchair and ambulated to garage with family by side.

## 2018-08-08 NOTE — H&P
Radiology History & Physical      SUBJECTIVE:     Chief Complaint: right rib/lung lesion with history of NSCLC    History of Present Illness:  Marga Guerra is a 78 y.o. female who presents for right rib/lung biopsy.  Past Medical History:   Diagnosis Date    CKD (chronic kidney disease), stage IV 2015    Degenerative disc disease     cervical spine     Depression     Hyperlipidemia     Hypertension     IGT (impaired glucose tolerance)     diet controlled/hyperglycemia fasting    Lung cancer     Macular degeneration     Osteoporosis, unspecified     Post-surgical hypothyroidism     Postsurgical hypoparathyroidism     Renal manifestation of secondary diabetes mellitus     Stroke     right frontoparietal     Thyroid cancer     Type 2 diabetes mellitus, controlled 2015     Past Surgical History:   Procedure Laterality Date    BRAIN SURGERY      meningoma removed    CATARACT EXTRACTION       SECTION      EYE SURGERY      bilateral cataracts    FRACTURE SURGERY Right     wrist fracture with surgical repair    mengioma  resection  2011    left front/temporal craniotomy    right wrist fracture and repair      SPINE SURGERY      compressin fracture    THYROIDECTOMY      cancer       Home Meds:   Prior to Admission medications    Medication Sig Start Date End Date Taking? Authorizing Provider   albuterol 90 mcg/actuation inhaler Inhale 2 puffs into the lungs every 6 (six) hours as needed for Wheezing. 17   Stepahnie Prescott MD   amLODIPine (NORVASC) 5 MG tablet Take 1 tablet (5 mg total) by mouth once daily. 18   Stephanie Prescott MD   atorvastatin (LIPITOR) 40 MG tablet TAKE 1 TABLET(40 MG) BY MOUTH EVERY DAY 18   Stephanie Prescott MD   calcitRIOL (ROCALTROL) 0.5 MCG Cap TAKE 1 CAPSULE(0.5 MCG) BY MOUTH EVERY DAY 17   Kilo Beckett MD   CALCIUM 600 600 mg (1,500 mg) Tab TK 4 TS PO TID WITH MEALS. 16   Historical  MD Luis   cholecalciferol, vitamin D3, 1,000 unit capsule Take 2 capsules (2,000 Units total) by mouth once daily. 11/8/17   Kilo Beckett MD   levothyroxine (SYNTHROID) 75 MCG tablet Mon and Thurs - 1/2 tablet daily; Other 5 days - full tablet daily 1/10/18   Kilo Beckett MD   ramipril (ALTACE) 10 MG capsule Take 1 capsule (10 mg total) by mouth once daily. 7/25/18 7/25/19  Stephanie Prescott DO     Anticoagulants/Antiplatelets: no anticoagulation    Allergies:   Review of patient's allergies indicates:   Allergen Reactions    Iodine and iodide containing products Anaphylaxis     Swelling/anyphylaxis   seafood    Prolia [denosumab]      Lip and leg swelling    Shellfish containing products      Sedation History:  no adverse reactions    Review of Systems:   Hematological: no known coagulopathies  Respiratory: no shortness of breath  Cardiovascular: no chest pain  Gastrointestinal: no abdominal pain  Genito-Urinary: no dysuria  Musculoskeletal: negative  Neurological: no TIA or stroke symptoms         OBJECTIVE:     Vital Signs (Most Recent)       Physical Exam:  ASA: 2  Mallampati: 2    General: no acute distress  Mental Status: alert and oriented to person, place and time  HEENT: normocephalic, atraumatic  Chest: unlabored breathing  Heart: regular heart rate  Abdomen: nondistended  Extremity: moves all extremities    Laboratory  Lab Results   Component Value Date    INR 0.9 08/08/2018       Lab Results   Component Value Date    WBC 4.71 08/08/2018    HGB 11.0 (L) 08/08/2018    HCT 34.2 (L) 08/08/2018    MCV 93 08/08/2018     08/08/2018      Lab Results   Component Value Date    GLU 90 07/13/2018     07/13/2018    K 4.7 07/13/2018     07/13/2018    CO2 28 07/13/2018    BUN 36 (H) 07/13/2018    CREATININE 3.3 (H) 07/13/2018    CALCIUM 9.5 07/13/2018    MG 1.2 (L) 01/09/2014    ALT 7 (L) 02/01/2018    AST 16 02/01/2018    ALBUMIN 3.7 07/13/2018    BILITOT 0.8 02/01/2018     BILIDIR 0.4 (H) 06/12/2017       ASSESSMENT/PLAN:     Sedation Plan: moderate  Patient will undergo right rib/lung lesion biopsy.    Fredy Nolan MD  Resident  Department of Radiology  Pager: [phone]

## 2018-08-10 NOTE — DISCHARGE SUMMARY
Radiology Discharge Summary      Hospital Course: No complications    Admit Date: 8/8/2018  Discharge Date: 08/10/2018     Instructions Given to Patient: Yes  Diet: Resume prior diet  Activity: activity as tolerated    Description of Condition on Discharge: Stable  Vital Signs (Most Recent): Temp: 98.6 °F (37 °C) (08/08/18 0919)  Pulse: 84 (08/08/18 0919)  Resp: 16 (08/08/18 0919)  BP: (!) 140/64 (08/08/18 0919)  SpO2: 100 % (08/08/18 0919)    Discharge Disposition: Home    Discharge Diagnosis: Rib fractures. No biopsy performed due to stability of 8 months.     Follow-up: per referring    @SIG@

## 2018-08-17 ENCOUNTER — PATIENT MESSAGE (OUTPATIENT)
Dept: ENDOCRINOLOGY | Facility: CLINIC | Age: 78
End: 2018-08-17

## 2018-08-24 ENCOUNTER — TELEPHONE (OUTPATIENT)
Dept: ENDOCRINOLOGY | Facility: HOSPITAL | Age: 78
End: 2018-08-24

## 2018-08-27 ENCOUNTER — PATIENT OUTREACH (OUTPATIENT)
Dept: OTHER | Facility: OTHER | Age: 78
End: 2018-08-27

## 2018-08-27 NOTE — PROGRESS NOTES
Last 5 Patient Entered Readings                                      Current 30 Day Average: 128/78     Recent Readings 8/20/2018 8/13/2018 8/6/2018 7/30/2018 7/23/2018    SBP (mmHg) 115 102 145 148 148    DBP (mmHg) 88 69 68 85 88    Pulse 88 86 80 92 87        Digital Medicine: Health  Follow Up    Spoke with Ms. Mckeon, she reports her mother Ms. Guerra is doing well.  Back on ramipril and not having any side effects.    Lifestyle Modifications:    1.Dietary Modifications: continuing to be mindful of sodium intake     2.Physical Activity: deferred    3.Medication Therapy: Patient has been compliant with the medication regimen.    4.Patient has the following medication side effects/concerns:   (Frequency/Alleviating factors/Precipitating factors, etc.)     Follow up with Ms. Marga Guerra completed. No further questions or concerns. Will continue follow up to achieve health goals.

## 2018-10-09 ENCOUNTER — PATIENT OUTREACH (OUTPATIENT)
Dept: OTHER | Facility: OTHER | Age: 78
End: 2018-10-09

## 2018-10-09 NOTE — PROGRESS NOTES
Last 5 Patient Entered Readings                                      Current 30 Day Average: 115/70     Recent Readings 10/8/2018 10/1/2018 9/18/2018 9/10/2018 9/10/2018    SBP (mmHg) 127 110 129 95 91    DBP (mmHg) 82 66 75 58 51    Pulse 91 83 92 95 94        Digital Medicine: Health  Follow Up    Spoke with Ms. Linda, Ms. Guerra's daughter. She reports Ms. Guerra is doing very well and pleased with her BP readings.    Did not want any resources today.    Lifestyle Modifications:    1.Dietary Modifications: Continuing to monitor sodium intake    2.Physical Activity:     3.Medication Therapy: Patient has been compliant with the medication regimen.    4.Patient has the following medication side effects/concerns:   (Frequency/Alleviating factors/Precipitating factors, etc.)     Follow up with Ms. Marga Guerra completed. No further questions or concerns. Will continue to follow up to achieve health goals.

## 2018-10-11 ENCOUNTER — PES CALL (OUTPATIENT)
Dept: ADMINISTRATIVE | Facility: CLINIC | Age: 78
End: 2018-10-11

## 2018-10-22 RX ORDER — AMLODIPINE BESYLATE 5 MG/1
TABLET ORAL
Qty: 90 TABLET | Refills: 0 | Status: SHIPPED | OUTPATIENT
Start: 2018-10-22 | End: 2018-12-17 | Stop reason: SDUPTHER

## 2018-11-21 ENCOUNTER — IMMUNIZATION (OUTPATIENT)
Dept: INTERNAL MEDICINE | Facility: CLINIC | Age: 78
End: 2018-11-21
Payer: MEDICARE

## 2018-11-21 ENCOUNTER — OFFICE VISIT (OUTPATIENT)
Dept: INTERNAL MEDICINE | Facility: CLINIC | Age: 78
End: 2018-11-21
Payer: MEDICARE

## 2018-11-21 VITALS
BODY MASS INDEX: 21.73 KG/M2 | DIASTOLIC BLOOD PRESSURE: 72 MMHG | SYSTOLIC BLOOD PRESSURE: 118 MMHG | WEIGHT: 110.69 LBS | HEART RATE: 96 BPM | OXYGEN SATURATION: 97 % | HEIGHT: 60 IN

## 2018-11-21 DIAGNOSIS — Z00.00 ENCOUNTER FOR PREVENTIVE HEALTH EXAMINATION: Primary | ICD-10-CM

## 2018-11-21 DIAGNOSIS — I70.0 AORTIC ATHEROSCLEROSIS: ICD-10-CM

## 2018-11-21 DIAGNOSIS — I77.1 TORTUOUS AORTA: ICD-10-CM

## 2018-11-21 DIAGNOSIS — I77.819 ECTATIC AORTA: ICD-10-CM

## 2018-11-21 DIAGNOSIS — I51.89 LEFT VENTRICULAR DIASTOLIC DYSFUNCTION WITH PRESERVED SYSTOLIC FUNCTION: ICD-10-CM

## 2018-11-21 DIAGNOSIS — N18.5 CKD (CHRONIC KIDNEY DISEASE), STAGE V: ICD-10-CM

## 2018-11-21 DIAGNOSIS — M81.0 SENILE OSTEOPOROSIS: ICD-10-CM

## 2018-11-21 DIAGNOSIS — E89.0 POST-SURGICAL HYPOTHYROIDISM: ICD-10-CM

## 2018-11-21 DIAGNOSIS — I65.23 BILATERAL CAROTID ARTERY STENOSIS: ICD-10-CM

## 2018-11-21 DIAGNOSIS — J84.10 CALCIFIED GRANULOMA OF LUNG: ICD-10-CM

## 2018-11-21 DIAGNOSIS — Z98.49 STATUS POST CATARACT EXTRACTION AND INSERTION OF INTRAOCULAR LENS, UNSPECIFIED LATERALITY: ICD-10-CM

## 2018-11-21 DIAGNOSIS — E89.2 POSTSURGICAL HYPOPARATHYROIDISM: ICD-10-CM

## 2018-11-21 DIAGNOSIS — C34.91 NON-SMALL CELL CANCER OF RIGHT LUNG: ICD-10-CM

## 2018-11-21 DIAGNOSIS — E78.2 MIXED HYPERLIPIDEMIA: ICD-10-CM

## 2018-11-21 DIAGNOSIS — E55.9 VITAMIN D DEFICIENCY: ICD-10-CM

## 2018-11-21 DIAGNOSIS — H54.10 BLINDNESS AND LOW VISION: ICD-10-CM

## 2018-11-21 DIAGNOSIS — H35.3190 AGE-RELATED MACULAR DEGENERATION WITH CENTRAL GEOGRAPHIC ATROPHY: ICD-10-CM

## 2018-11-21 DIAGNOSIS — C79.51 SECONDARY CANCER OF BONE: ICD-10-CM

## 2018-11-21 DIAGNOSIS — I10 ESSENTIAL HYPERTENSION: ICD-10-CM

## 2018-11-21 DIAGNOSIS — C73 THYROID CANCER: ICD-10-CM

## 2018-11-21 DIAGNOSIS — H40.003 GLAUCOMA SUSPECT OF BOTH EYES: ICD-10-CM

## 2018-11-21 DIAGNOSIS — Z96.1 STATUS POST CATARACT EXTRACTION AND INSERTION OF INTRAOCULAR LENS, UNSPECIFIED LATERALITY: ICD-10-CM

## 2018-11-21 DIAGNOSIS — E11.51 TYPE 2 DIABETES MELLITUS WITH DIABETIC PERIPHERAL ANGIOPATHY WITHOUT GANGRENE, WITHOUT LONG-TERM CURRENT USE OF INSULIN: ICD-10-CM

## 2018-11-21 DIAGNOSIS — J43.9 PULMONARY EMPHYSEMA, UNSPECIFIED EMPHYSEMA TYPE: ICD-10-CM

## 2018-11-21 DIAGNOSIS — M84.48XD: ICD-10-CM

## 2018-11-21 DIAGNOSIS — E11.21 CONTROLLED TYPE 2 DIABETES MELLITUS WITH DIABETIC NEPHROPATHY, WITHOUT LONG-TERM CURRENT USE OF INSULIN: ICD-10-CM

## 2018-11-21 PROCEDURE — 90662 IIV NO PRSV INCREASED AG IM: CPT | Mod: HCNC,S$GLB,, | Performed by: INTERNAL MEDICINE

## 2018-11-21 PROCEDURE — 99999 PR PBB SHADOW E&M-EST. PATIENT-LVL IV: CPT | Mod: PBBFAC,HCNC,, | Performed by: NURSE PRACTITIONER

## 2018-11-21 PROCEDURE — G0008 ADMIN INFLUENZA VIRUS VAC: HCPCS | Mod: HCNC,S$GLB,, | Performed by: INTERNAL MEDICINE

## 2018-11-21 PROCEDURE — 3074F SYST BP LT 130 MM HG: CPT | Mod: CPTII,HCNC,S$GLB, | Performed by: NURSE PRACTITIONER

## 2018-11-21 PROCEDURE — 3078F DIAST BP <80 MM HG: CPT | Mod: CPTII,HCNC,S$GLB, | Performed by: NURSE PRACTITIONER

## 2018-11-21 PROCEDURE — G0439 PPPS, SUBSEQ VISIT: HCPCS | Mod: HCNC,S$GLB,, | Performed by: NURSE PRACTITIONER

## 2018-11-21 NOTE — PROGRESS NOTES
Marga Guerra presented for a  Medicare AWV and comprehensive Health Risk Assessment today. The following components were reviewed and updated:    · Medical history  · Family History  · Social history  · Allergies and Current Medications  · Health Risk Assessment  · Health Maintenance  · Care Team     ** See Completed Assessments for Annual Wellness Visit within the encounter summary.**       The following assessments were completed:  · Living Situation  · CAGE  · Depression Screening  · Timed Get Up and Go  · Whisper Test  · Cognitive Function Screening*2 word recall, poor eyesight  · Nutrition Screening  · ADL Screening  · PAQ Screening    Vitals:    11/21/18 0908   BP: 118/72   Pulse: 96   SpO2: 97%   Weight: 50.2 kg (110 lb 10.7 oz)   Height: 5' (1.524 m)     Body mass index is 21.61 kg/m².  Physical Exam   Constitutional: She is oriented to person, place, and time. She appears well-developed.   frail   HENT:   Head: Normocephalic and atraumatic.   Nose: Nose normal.   Eyes: Conjunctivae and EOM are normal.   Cardiovascular: Normal rate, regular rhythm and intact distal pulses.   Murmur heard.  Pulses:       Dorsalis pedis pulses are 1+ on the right side, and 1+ on the left side.   Pulmonary/Chest: Effort normal. She has wheezes.   Musculoskeletal: Normal range of motion.   Feet:   Right Foot:   Protective Sensation: 5 sites tested. 7 sites sensed.   Skin Integrity: Positive for dry skin. Negative for ulcer or skin breakdown.   Left Foot:   Protective Sensation: 7 sites tested. 7 sites sensed.   Skin Integrity: Positive for dry skin. Negative for ulcer or skin breakdown.   Neurological: She is alert and oriented to person, place, and time.   Skin: Skin is warm and dry.   Psychiatric: She has a normal mood and affect. Her behavior is normal. Judgment and thought content normal.   Nursing note and vitals reviewed.        Diagnoses and health risks identified today and associated recommendations/orders:    1.  Encounter for preventive health examination  Assessment performed. Health maintenance updated. Chart review completed.  Flu shot today. Foot exam today. Eye exam scheduled. Labs at next visit, not fasting. PCP visit scheduled.    2. Tortuous aorta  Noted on imaging 2/9/2011. Chronic. Stable. Followed by PCP.    3. Ectatic aorta  Noted on imaging 2/9/2011. Chronic. Stable. Followed by PCP.    4. Calcified granuloma of lung  Noted on imaging 1/19/2018. Chronic. Stable. Followed by PCP.    5. Aortic atherosclerosis  Noted on imaging. Stable with blood pressure control and statin therapy. Followed by PCP.    6. Bilateral carotid artery stenosis  Noted on imaging. Stable with blood pressure control and statin therapy. Followed by PCP.    7. Pulmonary emphysema, unspecified emphysema type  Chronic.Stable with current regimen. Followed by PCP.    8. CKD (chronic kidney disease), stage V  Chronic. Follow up due with labs per Nephrology note.   Will notify patient.    Followed by Nephrology.  Component      Latest Ref Rng & Units 7/13/2018 5/28/2018 4/20/2018 2/1/2018   Creatinine      0.5 - 1.4 mg/dL 3.3 (H) 3.2 (H) 3.8 (H) 3.3 (H)     Component      Latest Ref Rng & Units 1/19/2018 11/8/2017   Creatinine      0.5 - 1.4 mg/dL 3.0 (H) 3.0 (H)       9. Non-small cell cancer of right lung  Followed by Hematology Oncology. Chronic.  Note from endocrinology physician:  I spoke with Ms. Mckeon (daughter) regarding the plan of care. I told her we discussed the case and we are not sure that putting her through another surgery or giving another dose of radioactive iodine is going to be of any benefit given her age and multiple comorbid conditions (namely stage 4 CKD and lung malignancy). She is asymptomatic at the moment and feeling well per Ms. Linda. She is in agreement with the plan to hold off on treatment for the thyroid cancer recurrence at this time.    10. Secondary cancer of bone  Followed by Hematology Oncology.  Chronic.  Note from endocrinology physician:  I spoke with Ms. Mckeon (daughter) regarding the plan of care. I told her we discussed the case and we are not sure that putting her through another surgery or giving another dose of radioactive iodine is going to be of any benefit given her age and multiple comorbid conditions (namely stage 4 CKD and lung malignancy). She is asymptomatic at the moment and feeling well per Ms. Mckeon. She is in agreement with the plan to hold off on treatment for the thyroid cancer recurrence at this time.    11. Thyroid cancer  Chronic. Stable on current regimen. Followed by Endocrinology.  Written by Kilo Beckett MD on 2/22/2018 11:31 AM   The TSH is up a little from before. Given the active thyroid cancer, I'm going to let her TSH stay suppressed for now. Continue the same dose of the synthroid that you've been doing.     12. Post-surgical hypothyroidism  Chronic. Stable on current regimen. Followed by Endocrinology.  Written by Kilo Beckett MD on 2/22/2018 11:31 AM   The TSH is up a little from before. Given the active thyroid cancer, I'm going to let her TSH stay suppressed for now. Continue the same dose of the synthroid that you've been doing.     13. Postsurgical hypoparathyroidism  Chronic. Stable on current regimen. Followed by Endocrinology.    14. Type 2 diabetes mellitus with diabetic peripheral angiopathy without gangrene, without long-term current use of insulin  Chronic. Stable on current regimen. Followed by Endocrinology.    15. Controlled type 2 diabetes mellitus with diabetic nephropathy, without long-term current use of insulin  Chronic. Stable on current regimen. Followed by Endocrinology.    16. Pathological fracture of rib with routine healing  Chronic. Stable on current regimen. Followed by Endocrinology.    17. Senile osteoporosis  Chronic. Stable on current regimen. Followed by Endocrinology.    18. Vitamin D deficiency  Chronic. Stable on current  regimen. Followed by Endocrinology.    19. Essential hypertension  Chronic. Stable on current regimen. Followed by Digital Medicine.  20. Mixed hyperlipidemia  Chronic. Stable on current regimen. Followed by PCP.    21. Left ventricular diastolic dysfunction with preserved systolic function  Chronic. Stable. Followed by PCP.    22. Status post cataract extraction and insertion of intraocular lens, unspecified laterality  Chronic. Stable. Eye exam scheduled.  Followed by Optometry.    23. Glaucoma suspect of both eyes  Chronic. Stable. Eye exam scheduled.  Followed by Optometry.    24. Blindness and low vision  Chronic. Stable. Eye exam scheduled.  Followed by Optometry.    25. 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 Stable. Eye exam scheduled.  Followed by Optometry.    Provided Marga with a 5-10 year written screening schedule and personal prevention plan. Recommendations were developed using the USPSTF age appropriate recommendations. Education, counseling, and referrals were provided as needed. After Visit Summary printed and given to patient which includes a list of additional screenings\tests needed.    Follow-up for Annual Wellness Visit in 1 year, follow up with Primary Care Provider as instructed, ;sooner if prob.    LEWIS Montes

## 2018-12-17 DIAGNOSIS — E83.52 HYPERCALCEMIA: ICD-10-CM

## 2018-12-17 RX ORDER — CALCITRIOL 0.5 UG/1
CAPSULE ORAL
Qty: 90 CAPSULE | Refills: 1 | Status: SHIPPED | OUTPATIENT
Start: 2018-12-17 | End: 2019-08-09 | Stop reason: SDUPTHER

## 2018-12-17 RX ORDER — ATORVASTATIN CALCIUM 40 MG/1
TABLET, FILM COATED ORAL
Qty: 90 TABLET | Refills: 0 | Status: SHIPPED | OUTPATIENT
Start: 2018-12-17 | End: 2019-02-01 | Stop reason: SDUPTHER

## 2018-12-17 RX ORDER — AMLODIPINE BESYLATE 5 MG/1
5 TABLET ORAL DAILY
Qty: 90 TABLET | Refills: 0 | Status: SHIPPED | OUTPATIENT
Start: 2018-12-17 | End: 2019-02-01 | Stop reason: SDUPTHER

## 2018-12-18 ENCOUNTER — OFFICE VISIT (OUTPATIENT)
Dept: OPTOMETRY | Facility: CLINIC | Age: 78
End: 2018-12-18
Payer: COMMERCIAL

## 2018-12-18 DIAGNOSIS — E11.9 TYPE 2 DIABETES MELLITUS WITHOUT RETINOPATHY: ICD-10-CM

## 2018-12-18 DIAGNOSIS — Z01.00 ROUTINE EYE EXAM: Primary | ICD-10-CM

## 2018-12-18 DIAGNOSIS — Z96.1 PSEUDOPHAKIA OF BOTH EYES: ICD-10-CM

## 2018-12-18 DIAGNOSIS — H35.50 RETINAL DYSTROPHY: ICD-10-CM

## 2018-12-18 DIAGNOSIS — H40.003 GLAUCOMA SUSPECT OF BOTH EYES: ICD-10-CM

## 2018-12-18 PROCEDURE — 99499 UNLISTED E&M SERVICE: CPT | Mod: HCNC,S$GLB,, | Performed by: OPTOMETRIST

## 2018-12-18 PROCEDURE — 99999 PR PBB SHADOW E&M-EST. PATIENT-LVL II: CPT | Mod: PBBFAC,HCNC,, | Performed by: OPTOMETRIST

## 2018-12-18 PROCEDURE — 92014 COMPRE OPH EXAM EST PT 1/>: CPT | Mod: HCNC,S$GLB,, | Performed by: OPTOMETRIST

## 2018-12-18 NOTE — PROGRESS NOTES
HPI     Ms. Marga Guerar is here for a routine eye exam.   She is [facility] by [facility]  ([place]).    She reports unable to see in central vision, has to use peripheral vision.   This has been a problem for a long time, is unchanged, and activities of   daily living unaffected.   Would patient like a refraction today? No    (-)drops  (-)flashes  (+)floaters  (-)diplopia    Diabetic: yes  Hemoglobin A1C       Date                     Value               Ref Range             Status           06/12/2017               6.0 (H)             4.0 - 5.6 %         Final      OCULAR HISTORY  Last Eye Exam: 03/13/17 with Dr. Gutierres  1. Glaucoma suspect, bilateral  2. Status post cataract extraction and insertion of intraocular lens,   bilateral  3. Blindness and low vision  4. 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 Retinal dystrophy        Last edited by Lucy Estrada, OD on 12/18/2018  2:47 PM. (History)            Assessment /Plan     For exam results, see Encounter Report.    Routine eye exam   [facility].    Retinal dystrophy   Cause of central scotoma and low vision OU. Longstanding, stable, and activities of daily living unaffected. Offered low vision referral, pt declines.    Pseudophakia of both eyes   Doing well OU. Monitor.     Glaucoma suspect of both eyes   Normal IOP and stable c/d ratio OU. Consider starting IOP-lowering drops in the future to preserve peripheral vision.     Type 2 diabetes mellitus without retinopathy   No retinopathy noted OU. Monitor with yearly DFE.            RTC 1 year

## 2018-12-20 ENCOUNTER — PATIENT OUTREACH (OUTPATIENT)
Dept: OTHER | Facility: OTHER | Age: 78
End: 2018-12-20

## 2018-12-20 NOTE — PROGRESS NOTES
Last 5 Patient Entered Readings                                      Current 30 Day Average: 142/77     Recent Readings 12/17/2018 12/11/2018 12/11/2018 12/4/2018 12/4/2018    SBP (mmHg) 107 152 156 158 145    DBP (mmHg) 59 85 61 89 88    Pulse 97 87 86 85 87        Digital Medicine: Health  Follow Up    Spoke with Ms. Guerra's daughter. She reports Ms. Guerra is feeling well. Might need a refill of med but not sure, she will check with Pharmacy and let us know.    Lifestyle Modifications:    1.Dietary Modifications: continuing to follow low sodium diet    2.Physical Activity: deferred    3.Medication Therapy: Patient has been compliant with the medication regimen.    4.Patient has the following medication side effects/concerns:   (Frequency/Alleviating factors/Precipitating factors, etc.)     Follow up with Ms. Marga Guerra completed. No further questions or concerns. Will continue to follow up to achieve health goals.

## 2019-01-21 ENCOUNTER — TELEPHONE (OUTPATIENT)
Dept: ADMINISTRATIVE | Facility: HOSPITAL | Age: 79
End: 2019-01-21

## 2019-01-21 DIAGNOSIS — C73 THYROID CANCER: ICD-10-CM

## 2019-01-21 DIAGNOSIS — Z00.00 ANNUAL PHYSICAL EXAM: Primary | ICD-10-CM

## 2019-01-21 DIAGNOSIS — I70.0 AORTIC ATHEROSCLEROSIS: ICD-10-CM

## 2019-01-21 DIAGNOSIS — J84.10 CALCIFIED GRANULOMA OF LUNG: ICD-10-CM

## 2019-01-21 DIAGNOSIS — Z00.00 ANNUAL PHYSICAL EXAM: ICD-10-CM

## 2019-01-21 DIAGNOSIS — E78.2 MIXED HYPERLIPIDEMIA: ICD-10-CM

## 2019-01-21 DIAGNOSIS — Z86.73 HISTORY OF STROKE: Primary | ICD-10-CM

## 2019-01-21 DIAGNOSIS — I10 ESSENTIAL HYPERTENSION: ICD-10-CM

## 2019-01-21 DIAGNOSIS — E89.2 POSTSURGICAL HYPOPARATHYROIDISM: ICD-10-CM

## 2019-01-21 DIAGNOSIS — E74.39 GLUCOSE INTOLERANCE: ICD-10-CM

## 2019-01-21 DIAGNOSIS — R73.09 OTHER ABNORMAL GLUCOSE: ICD-10-CM

## 2019-01-21 DIAGNOSIS — I35.0 AORTIC VALVE STENOSIS, ETIOLOGY OF CARDIAC VALVE DISEASE UNSPECIFIED: ICD-10-CM

## 2019-01-21 DIAGNOSIS — Z86.73 HISTORY OF STROKE: ICD-10-CM

## 2019-01-21 DIAGNOSIS — N18.5 CKD (CHRONIC KIDNEY DISEASE), STAGE V: ICD-10-CM

## 2019-01-21 DIAGNOSIS — J43.9 PULMONARY EMPHYSEMA, UNSPECIFIED EMPHYSEMA TYPE: ICD-10-CM

## 2019-01-21 DIAGNOSIS — I65.23 BILATERAL CAROTID ARTERY STENOSIS: ICD-10-CM

## 2019-01-22 ENCOUNTER — PATIENT OUTREACH (OUTPATIENT)
Dept: ADMINISTRATIVE | Facility: HOSPITAL | Age: 79
End: 2019-01-22

## 2019-01-22 NOTE — PROGRESS NOTES
VM left of upcoming PCP visit 2-1-19 and of need to call our office at [phone] to schedule her Fasting Lab appointment.

## 2019-01-31 ENCOUNTER — PATIENT OUTREACH (OUTPATIENT)
Dept: OTHER | Facility: OTHER | Age: 79
End: 2019-01-31

## 2019-01-31 NOTE — PROGRESS NOTES
Last 5 Patient Entered Readings                                      Current 30 Day Average: 132/80     Recent Readings 1/31/2019 1/28/2019 1/21/2019 1/21/2019 1/18/2019    SBP (mmHg) 117 141 149 152 123    DBP (mmHg) 73 83 87 86 74    Pulse 93 83 84 83 94        Digital Medicine: Health  Follow Up    Left voicemail to follow up with Ms. Marga Guerra.  Current BP average 132/80 mmHg is at goal.

## 2019-02-01 ENCOUNTER — OFFICE VISIT (OUTPATIENT)
Dept: INTERNAL MEDICINE | Facility: CLINIC | Age: 79
End: 2019-02-01
Payer: MEDICARE

## 2019-02-01 ENCOUNTER — LAB VISIT (OUTPATIENT)
Dept: LAB | Facility: HOSPITAL | Age: 79
End: 2019-02-01
Attending: INTERNAL MEDICINE
Payer: MEDICARE

## 2019-02-01 VITALS
WEIGHT: 110.88 LBS | HEART RATE: 83 BPM | DIASTOLIC BLOOD PRESSURE: 78 MMHG | SYSTOLIC BLOOD PRESSURE: 122 MMHG | HEIGHT: 60 IN | BODY MASS INDEX: 21.77 KG/M2

## 2019-02-01 DIAGNOSIS — I70.0 AORTIC ATHEROSCLEROSIS: ICD-10-CM

## 2019-02-01 DIAGNOSIS — C79.51 SECONDARY CANCER OF BONE: ICD-10-CM

## 2019-02-01 DIAGNOSIS — C34.91 NON-SMALL CELL CANCER OF RIGHT LUNG: ICD-10-CM

## 2019-02-01 DIAGNOSIS — I35.0 AORTIC VALVE STENOSIS, ETIOLOGY OF CARDIAC VALVE DISEASE UNSPECIFIED: ICD-10-CM

## 2019-02-01 DIAGNOSIS — C73 THYROID CANCER: ICD-10-CM

## 2019-02-01 DIAGNOSIS — E74.39 GLUCOSE INTOLERANCE: ICD-10-CM

## 2019-02-01 DIAGNOSIS — J84.10 CALCIFIED GRANULOMA OF LUNG: ICD-10-CM

## 2019-02-01 DIAGNOSIS — E78.2 MIXED HYPERLIPIDEMIA: ICD-10-CM

## 2019-02-01 DIAGNOSIS — E11.21 CONTROLLED TYPE 2 DIABETES MELLITUS WITH DIABETIC NEPHROPATHY, WITHOUT LONG-TERM CURRENT USE OF INSULIN: ICD-10-CM

## 2019-02-01 DIAGNOSIS — N18.5 CKD (CHRONIC KIDNEY DISEASE), STAGE V: ICD-10-CM

## 2019-02-01 DIAGNOSIS — J43.9 PULMONARY EMPHYSEMA, UNSPECIFIED EMPHYSEMA TYPE: ICD-10-CM

## 2019-02-01 DIAGNOSIS — Z12.31 ENCOUNTER FOR SCREENING MAMMOGRAM FOR BREAST CANCER: ICD-10-CM

## 2019-02-01 DIAGNOSIS — Z00.00 ANNUAL PHYSICAL EXAM: ICD-10-CM

## 2019-02-01 DIAGNOSIS — M81.0 SENILE OSTEOPOROSIS: ICD-10-CM

## 2019-02-01 DIAGNOSIS — R73.09 OTHER ABNORMAL GLUCOSE: ICD-10-CM

## 2019-02-01 DIAGNOSIS — Z85.118 HISTORY OF LUNG CANCER: ICD-10-CM

## 2019-02-01 DIAGNOSIS — I10 ESSENTIAL HYPERTENSION: ICD-10-CM

## 2019-02-01 DIAGNOSIS — Z86.73 HISTORY OF STROKE: ICD-10-CM

## 2019-02-01 DIAGNOSIS — Z85.118 HX OF CANCER OF LUNG: ICD-10-CM

## 2019-02-01 DIAGNOSIS — I65.23 BILATERAL CAROTID ARTERY STENOSIS: ICD-10-CM

## 2019-02-01 DIAGNOSIS — I10 ESSENTIAL HYPERTENSION: Primary | ICD-10-CM

## 2019-02-01 DIAGNOSIS — E89.2 POSTSURGICAL HYPOPARATHYROIDISM: ICD-10-CM

## 2019-02-01 LAB
ALBUMIN SERPL BCP-MCNC: 3.8 G/DL
ALP SERPL-CCNC: 50 U/L
ALT SERPL W/O P-5'-P-CCNC: 9 U/L
ANION GAP SERPL CALC-SCNC: 10 MMOL/L
AST SERPL-CCNC: 14 U/L
BASOPHILS # BLD AUTO: 0.02 K/UL
BASOPHILS NFR BLD: 0.4 %
BILIRUB SERPL-MCNC: 0.8 MG/DL
BUN SERPL-MCNC: 35 MG/DL
CALCIUM SERPL-MCNC: 10.1 MG/DL
CHLORIDE SERPL-SCNC: 106 MMOL/L
CHOLEST SERPL-MCNC: 280 MG/DL
CHOLEST/HDLC SERPL: 2.3 {RATIO}
CO2 SERPL-SCNC: 25 MMOL/L
CREAT SERPL-MCNC: 3.6 MG/DL
DIFFERENTIAL METHOD: ABNORMAL
EOSINOPHIL # BLD AUTO: 0.1 K/UL
EOSINOPHIL NFR BLD: 1.6 %
ERYTHROCYTE [DISTWIDTH] IN BLOOD BY AUTOMATED COUNT: 14.7 %
EST. GFR  (AFRICAN AMERICAN): 13 ML/MIN/1.73 M^2
EST. GFR  (NON AFRICAN AMERICAN): 11 ML/MIN/1.73 M^2
ESTIMATED AVG GLUCOSE: 117 MG/DL
GLUCOSE SERPL-MCNC: 85 MG/DL
HBA1C MFR BLD HPLC: 5.7 %
HCT VFR BLD AUTO: 35.4 %
HDLC SERPL-MCNC: 124 MG/DL
HDLC SERPL: 44.3 %
HGB BLD-MCNC: 11.4 G/DL
LDLC SERPL CALC-MCNC: 138 MG/DL
LYMPHOCYTES # BLD AUTO: 1.2 K/UL
LYMPHOCYTES NFR BLD: 25.2 %
MCH RBC QN AUTO: 29.8 PG
MCHC RBC AUTO-ENTMCNC: 32.2 G/DL
MCV RBC AUTO: 92 FL
MONOCYTES # BLD AUTO: 0.4 K/UL
MONOCYTES NFR BLD: 7.4 %
NEUTROPHILS # BLD AUTO: 3.2 K/UL
NEUTROPHILS NFR BLD: 65.2 %
NONHDLC SERPL-MCNC: 156 MG/DL
PLATELET # BLD AUTO: 168 K/UL
PMV BLD AUTO: 10.7 FL
POTASSIUM SERPL-SCNC: 4.2 MMOL/L
PROT SERPL-MCNC: 7.2 G/DL
RBC # BLD AUTO: 3.83 M/UL
SODIUM SERPL-SCNC: 141 MMOL/L
T4 FREE SERPL-MCNC: 1.45 NG/DL
TRIGL SERPL-MCNC: 90 MG/DL
TSH SERPL DL<=0.005 MIU/L-ACNC: 0.4 UIU/ML
WBC # BLD AUTO: 4.88 K/UL

## 2019-02-01 PROCEDURE — 99214 PR OFFICE/OUTPT VISIT, EST, LEVL IV, 30-39 MIN: ICD-10-PCS | Mod: S$GLB,,, | Performed by: INTERNAL MEDICINE

## 2019-02-01 PROCEDURE — 84443 ASSAY THYROID STIM HORMONE: CPT

## 2019-02-01 PROCEDURE — 99213 OFFICE O/P EST LOW 20 MIN: CPT | Mod: PBBFAC | Performed by: INTERNAL MEDICINE

## 2019-02-01 PROCEDURE — 99499 UNLISTED E&M SERVICE: CPT | Mod: S$PBB,HCNC,, | Performed by: INTERNAL MEDICINE

## 2019-02-01 PROCEDURE — 83036 HEMOGLOBIN GLYCOSYLATED A1C: CPT

## 2019-02-01 PROCEDURE — 80053 COMPREHEN METABOLIC PANEL: CPT

## 2019-02-01 PROCEDURE — 99999 PR PBB SHADOW E&M-EST. PATIENT-LVL III: CPT | Mod: PBBFAC,,, | Performed by: INTERNAL MEDICINE

## 2019-02-01 PROCEDURE — 85025 COMPLETE CBC W/AUTO DIFF WBC: CPT

## 2019-02-01 PROCEDURE — 36415 COLL VENOUS BLD VENIPUNCTURE: CPT

## 2019-02-01 PROCEDURE — 99499 RISK ADDL DX/OHS AUDIT: ICD-10-PCS | Mod: S$PBB,HCNC,, | Performed by: INTERNAL MEDICINE

## 2019-02-01 PROCEDURE — 80061 LIPID PANEL: CPT

## 2019-02-01 PROCEDURE — 84439 ASSAY OF FREE THYROXINE: CPT

## 2019-02-01 PROCEDURE — 99214 OFFICE O/P EST MOD 30 MIN: CPT | Mod: S$GLB,,, | Performed by: INTERNAL MEDICINE

## 2019-02-01 PROCEDURE — 99999 PR PBB SHADOW E&M-EST. PATIENT-LVL III: ICD-10-PCS | Mod: PBBFAC,,, | Performed by: INTERNAL MEDICINE

## 2019-02-01 RX ORDER — RAMIPRIL 10 MG/1
10 CAPSULE ORAL DAILY
Qty: 90 CAPSULE | Refills: 3 | Status: SHIPPED | OUTPATIENT
Start: 2019-02-01 | End: 2019-12-06 | Stop reason: SDUPTHER

## 2019-02-01 RX ORDER — AMLODIPINE BESYLATE 5 MG/1
5 TABLET ORAL DAILY
Qty: 90 TABLET | Refills: 3 | Status: SHIPPED | OUTPATIENT
Start: 2019-02-01 | End: 2019-05-03 | Stop reason: DRUGHIGH

## 2019-02-01 RX ORDER — ATORVASTATIN CALCIUM 40 MG/1
TABLET, FILM COATED ORAL
Qty: 90 TABLET | Refills: 3 | Status: SHIPPED | OUTPATIENT
Start: 2019-02-01 | End: 2019-12-06 | Stop reason: SDUPTHER

## 2019-02-02 NOTE — PROGRESS NOTES
Subjective:       Patient ID: Marga Guerra is a 78 y.o. female.    Chief Complaint: Annual Exam  78 year old presents for check up today. She has been feeling well ovrall and brought in by her daughter  She is translanting. She reports pt has had good appetite and overall weight relatively   Stable. She has had ckd and due for some bloodwork as well. Her bp has been controled and she remains on cholesterol  Medication but needs a refill. She denies incrased sob or cough  Follows with hematology/oncology for her lung cancer survillance      HPI  Review of Systems   Constitutional: Negative for fatigue and fever.   Respiratory: Negative for cough, shortness of breath and wheezing.    Cardiovascular: Negative for chest pain, palpitations and leg swelling.   Gastrointestinal: Negative for abdominal pain and constipation.   Neurological: Negative for dizziness.       Objective:     Blood pressure 122/78, pulse 83, height 5' (1.524 m), weight 50.3 kg (110 lb 14.3 oz).    Physical Exam   Constitutional: No distress.   HENT:   Head: Normocephalic.   Mouth/Throat: Oropharynx is clear and moist.   Eyes: No scleral icterus.   Neck: Neck supple.   Surgical scar    Cardiovascular: Normal rate and regular rhythm. Exam reveals no gallop and no friction rub.   Murmur heard.  Pulmonary/Chest: Effort normal and breath sounds normal. No respiratory distress.   Breast : normal no masses or tenderness    Abdominal: Soft. Bowel sounds are normal. She exhibits no mass. There is no tenderness.   Musculoskeletal: She exhibits no edema.   Neurological: She is alert.   Skin: No erythema.   Psychiatric: She has a normal mood and affect.   Vitals reviewed.      Assessment:       1. Essential hypertension    2. Thyroid cancer    3. Encounter for screening mammogram for breast cancer    4. Aortic atherosclerosis    5. Hx of cancer of lung    6. Aortic valve stenosis, etiology of cardiac valve disease unspecified    7. Mixed hyperlipidemia    8.  Senile osteoporosis    9. Pulmonary emphysema, unspecified emphysema type    10. History of lung cancer    11. CKD (chronic kidney disease), stage V    12. Secondary cancer of bone    13. Controlled type 2 diabetes mellitus with diabetic nephropathy, without long-term current use of insulin    14. Calcified granuloma of lung    15. Non-small cell cancer of right lung    16. Postsurgical hypoparathyroidism        Plan:       Marga was seen today for annual exam.    Diagnoses and all orders for this visit:    Essential hypertension  bp controlled refill provided   -     ramipril (ALTACE) 10 MG capsule; Take 1 capsule (10 mg total) by mouth once daily.    Thyroid cancer  Hx of she is undergoing surveillance with endcrinology  Remains on thyorid medication  No active treatment at this time  She has upcoming endocrinology follow up scheduled     Encounter for screening mammogram for breast cancer  -     Mammo Digital Screening Bilat w/ Richard; Future    Aortic valve stenosis, etiology of cardiac valve disease unspecified  Discussed update echo   -     Transthoracic echo (TTE) complete (Cupid Only); Future    Mixed hyperlipidemia  Aortic atheroscleorsi   Continue atorvastain   Refill provided     CKD (chronic kidney disease), stage V  She has been stable over time  Recheck labs and she will follow up with her neprhologist as recommended     Controlled type 2 diabetes mellitus with diabetic nephropathy, without long-term current use of insulin    Non-small cell cancer of right lung  Hx lung cancer   Hx calcified granuloma of the lung  Pulmonary emphysema   Pt has been treated by heme/onc continues to follow with her  Oncologist she will scheudule her follow up    Postsurgical hypoparathyroidism  Hx osteoporosis was intolerant of medication   Stable remains on calcitrol       -     amLODIPine (NORVASC) 5 MG tablet; Take 1 tablet (5 mg total) by mouth once daily.  -     atorvastatin (LIPITOR) 40 MG tablet; TAKE 1 TABLET(40  MG) BY MOUTH EVERY DAY

## 2019-02-04 ENCOUNTER — TELEPHONE (OUTPATIENT)
Dept: INTERNAL MEDICINE | Facility: CLINIC | Age: 79
End: 2019-02-04

## 2019-02-22 NOTE — PROGRESS NOTES
Last 5 Patient Entered Readings                                      Current 30 Day Average: 129/79     Recent Readings 2/18/2019 2/15/2019 2/12/2019 2/4/2019 1/31/2019    SBP (mmHg) 133 126 113 144 117    DBP (mmHg) 80 84 70 81 73    Pulse 91 81 81 96 93        Digital Medicine: Health  Follow Up    Left voicemail to follow up with Ms. Marga Guerra.  Current BP average 129/79 mmHg is at goal.

## 2019-03-18 ENCOUNTER — OFFICE VISIT (OUTPATIENT)
Dept: URGENT CARE | Facility: CLINIC | Age: 79
End: 2019-03-18
Payer: MEDICARE

## 2019-03-18 VITALS
DIASTOLIC BLOOD PRESSURE: 77 MMHG | RESPIRATION RATE: 18 BRPM | WEIGHT: 110 LBS | HEIGHT: 60 IN | SYSTOLIC BLOOD PRESSURE: 144 MMHG | OXYGEN SATURATION: 96 % | BODY MASS INDEX: 21.6 KG/M2 | TEMPERATURE: 99 F | HEART RATE: 97 BPM

## 2019-03-18 DIAGNOSIS — M79.671 RIGHT FOOT PAIN: Primary | ICD-10-CM

## 2019-03-18 PROCEDURE — 1101F PT FALLS ASSESS-DOCD LE1/YR: CPT | Mod: CPTII,S$GLB,, | Performed by: NURSE PRACTITIONER

## 2019-03-18 PROCEDURE — 3078F DIAST BP <80 MM HG: CPT | Mod: CPTII,S$GLB,, | Performed by: NURSE PRACTITIONER

## 2019-03-18 PROCEDURE — 3078F PR MOST RECENT DIASTOLIC BLOOD PRESSURE < 80 MM HG: ICD-10-PCS | Mod: CPTII,S$GLB,, | Performed by: NURSE PRACTITIONER

## 2019-03-18 PROCEDURE — 3077F PR MOST RECENT SYSTOLIC BLOOD PRESSURE >= 140 MM HG: ICD-10-PCS | Mod: CPTII,S$GLB,, | Performed by: NURSE PRACTITIONER

## 2019-03-18 PROCEDURE — 99214 OFFICE O/P EST MOD 30 MIN: CPT | Mod: S$GLB,,, | Performed by: NURSE PRACTITIONER

## 2019-03-18 PROCEDURE — 73630 XR FOOT COMPLETE 3 VIEW RIGHT: ICD-10-PCS | Mod: RT,S$GLB,, | Performed by: RADIOLOGY

## 2019-03-18 PROCEDURE — 99214 PR OFFICE/OUTPT VISIT, EST, LEVL IV, 30-39 MIN: ICD-10-PCS | Mod: S$GLB,,, | Performed by: NURSE PRACTITIONER

## 2019-03-18 PROCEDURE — 3077F SYST BP >= 140 MM HG: CPT | Mod: CPTII,S$GLB,, | Performed by: NURSE PRACTITIONER

## 2019-03-18 PROCEDURE — 1101F PR PT FALLS ASSESS DOC 0-1 FALLS W/OUT INJ PAST YR: ICD-10-PCS | Mod: CPTII,S$GLB,, | Performed by: NURSE PRACTITIONER

## 2019-03-18 PROCEDURE — 73630 X-RAY EXAM OF FOOT: CPT | Mod: RT,S$GLB,, | Performed by: RADIOLOGY

## 2019-03-18 NOTE — PROGRESS NOTES
Subjective:       Patient ID: Marga Guerra is a 78 y.o. female.    Vitals:  height is 5' (1.524 m) and weight is 49.9 kg (110 lb). Her temperature is 98.9 °F (37.2 °C). Her blood pressure is 144/77 (abnormal) and her pulse is 97. Her respiration is 18 and oxygen saturation is 96%.     Chief Complaint: Toe Pain (right 1st toe 3 days ago )    Patient says she dropped a bucket on her right 1st toe 3 days ago. Her toe is swollen and red       Toe Pain    The incident occurred 3 to 5 days ago. The incident occurred at home. The injury mechanism was a direct blow. The pain is present in the right foot. The quality of the pain is described as aching. The pain is at a severity of 4/10. The pain is moderate. She reports no foreign bodies present. The symptoms are aggravated by movement and weight bearing. She has tried nothing for the symptoms.       Constitution: Negative for fatigue.   HENT: Negative for facial swelling and facial trauma.    Neck: Negative for neck stiffness.   Cardiovascular: Negative for chest trauma.   Eyes: Negative for eye trauma, double vision and blurred vision.   Gastrointestinal: Negative for abdominal trauma, abdominal pain and rectal bleeding.   Genitourinary: Negative for hematuria, missed menses, genital trauma and pelvic pain.   Musculoskeletal: Positive for trauma, joint pain and joint swelling. Negative for pain and abnormal ROM of joint.   Skin: Negative for color change, wound, abrasion, laceration and bruising.   Neurological: Negative for dizziness, history of vertigo, light-headedness, coordination disturbances, altered mental status and loss of consciousness.   Hematologic/Lymphatic: Negative for history of bleeding disorder.   Psychiatric/Behavioral: Negative for altered mental status.       Objective:      Physical Exam   Constitutional: She is oriented to person, place, and time. Vital signs are normal. She appears well-developed and well-nourished. She is active and cooperative.   Non-toxic appearance. She does not have a sickly appearance. She does not appear ill. No distress.   HENT:   Head: Normocephalic and atraumatic.   Nose: Nose normal.   Mouth/Throat: Oropharynx is clear and moist and mucous membranes are normal.   Eyes: Conjunctivae and lids are normal.   Neck: Trachea normal, normal range of motion, full passive range of motion without pain and phonation normal. Neck supple.   Cardiovascular: Normal rate, regular rhythm, normal heart sounds, intact distal pulses and normal pulses.   Pulses:       Radial pulses are 2+ on the right side, and 2+ on the left side.        Dorsalis pedis pulses are 2+ on the right side, and 2+ on the left side.        Posterior tibial pulses are 2+ on the right side, and 2+ on the left side.   Pulmonary/Chest: Effort normal and breath sounds normal.   Musculoskeletal: Normal range of motion. She exhibits no edema or deformity.        Left foot: There is tenderness (noted to lateral aspect og left great toe), bony tenderness and swelling. There is normal range of motion, normal capillary refill, no crepitus, no deformity and no laceration.        Feet:    Able to bear weight and ambulate with mild pain noted to left great toe.  Erythema and swelling noted to left great toe.  Normal plantar and dorsiflexion, inversion and eversion noted.  Distal motor neurovascular status are intact.   Neurological: She is alert and oriented to person, place, and time. She has normal strength and normal reflexes. No sensory deficit. Gait normal.   Skin: Skin is warm, dry and intact. Capillary refill takes less than 2 seconds. No rash noted. She is not diaphoretic.   Psychiatric: She has a normal mood and affect. Her speech is normal and behavior is normal. Judgment and thought content normal. Cognition and memory are normal.   Nursing note and vitals reviewed.      Assessment:       1. Right foot pain      No acute fracture noted on X ray  Plan:         Right foot pain  -      XR FOOT COMPLETE 3 VIEW RIGHT; Future; Expected date: 03/18/2019  -     BANDAGE ELASTIC 2IN ACE NS LF      Patient Instructions     Please follow up with your primary care provider within 2-5 days if your signs and symptoms have not resolved or worsen.     If your condition worsens or fails to improve we recommend that you receive another evaluation at the emergency room immediately or contact your primary medical clinic to discuss your concerns.   You must understand that you have received an Urgent Care treatment only and that you may be released before all of your medical problems are known or treated. You, the patient, will arrange for follow up care as instructed.           R.I.C.E.    R.I.C.E. stands for Rest, Ice, Compression, and Elevation. Doing these things helps limit pain and swelling after an injury. R.I.C.E. also helps injuries heal faster. Use R.I.C.E. for sprains, strains, and severe bruises or bumps. Follow the tips on this handout and begin R.I.C.E. as soon as possible after an injury.  ? Rest  Pain is your bodys way of telling you to rest an injured area. Whether you have hurt an elbow, hand, foot, or knee, limiting its use will prevent further injury and help you heal.  ? Ice  Applying ice right after an injury helps prevent swelling and reduce pain. Dont place ice directly on your skin.  · Wrap a cold pack or bag of ice in a thin cloth. Place it over the injured area.  · Ice for 10 minutes every 3 hours. Dont ice for more than 20 minutes at a time.  ? Compression  Putting pressure (compression) on an injury helps prevent swelling and provides support.  · Wrap the injured area firmly with an elastic bandage. If your hand or foot tingles, becomes discolored, or feels cold to the touch, the bandage may be too tight. Rewrap it more loosely.  · If your bandage becomes too loose, rewrap it.  · Do not wear an elastic bandage overnight.  ? Elevation  Keeping an injury elevated helps reduce  swelling, pain, and throbbing. Elevation is most effective when the injury is kept elevated higher than the heart.     Call your healthcare provider if you notice any of the following:  · Fingers or toes feel numb, are cold to the touch, or change color  · Skin looks shiny or tight  · Pain, swelling, or bruising worsens and is not improved with elevation   Date Last Reviewed: 9/3/2015  © 5154-9501 [employer]. [address] [place] [address], [place] [address]. All rights reserved. This information is not intended as a substitute for professional medical care. Always follow your healthcare professional's instructions.        Lower Extremity Contusion  You have a contusion (bruise) of a lower extremity (leg, knee, ankle, foot, or toe). Symptoms include pain, swelling, and skin discoloration. No bones are broken. This injury may take from a few days to a few weeks to heal.  During that time, the bruise may change from reddish in color, to purple-blue, to green-yellow, to yellow-brown.  Home care  · Unless another medicine was prescribed, you can take acetaminophen, ibuprofen, or naproxen to control pain. (If you have chronic liver or kidney disease or ever had a stomach ulcer or gastrointestinal bleeding, talk with your doctor before using these medicines.)  · Elevate the injured area to reduce pain and swelling. As much as possible, sit or lie down with the injured area raised about the level of your heart. This is especially important during the first 48 hours.  · Ice the injured area to help reduce pain and swelling. Wrap a cold source (ice pack or ice cubes in a plastic bag) in a thin towel. Apply to the bruised area for 20 minutes every 1 to 2 hours the first day. Continue this 3 to 4 times a day until the pain and swelling goes away.  · If crutches have been advised, do not bear full weight on the injured leg until you can do so without pain. You may return to sports when you are able to put full weight and  impact on the injured leg without pain.  Follow up  Follow up with your healthcare provider or our staff as advised. Call if you are not improving within the next 1 to 2 weeks.  When to seek medical advice   Call your healthcare provider right away if any of these occur:  · Increased pain or swelling  · Foot or toes become cold, blue, numb or tingly  · Signs of infection: Warmth, drainage, or increased redness or pain around the injury  · Inability to move the injured area   · Frequent bruising for unknown reasons  Date Last Reviewed: 2/1/2017  © 9831-8626 [employer]. [address] [place] [address]. All rights reserved. This information is not intended as a substitute for professional medical care. Always follow your healthcare professional's instructions.

## 2019-03-18 NOTE — PATIENT INSTRUCTIONS
Please follow up with your primary care provider within 2-5 days if your signs and symptoms have not resolved or worsen.     If your condition worsens or fails to improve we recommend that you receive another evaluation at the emergency room immediately or contact your primary medical clinic to discuss your concerns.   You must understand that you have received an Urgent Care treatment only and that you may be released before all of your medical problems are known or treated. You, the patient, will arrange for follow up care as instructed.           R.I.C.E.    R.I.C.E. stands for Rest, Ice, Compression, and Elevation. Doing these things helps limit pain and swelling after an injury. R.I.C.E. also helps injuries heal faster. Use R.I.C.E. for sprains, strains, and severe bruises or bumps. Follow the tips on this handout and begin R.I.C.E. as soon as possible after an injury.  ? Rest  Pain is your bodys way of telling you to rest an injured area. Whether you have hurt an elbow, hand, foot, or knee, limiting its use will prevent further injury and help you heal.  ? Ice  Applying ice right after an injury helps prevent swelling and reduce pain. Dont place ice directly on your skin.  · Wrap a cold pack or bag of ice in a thin cloth. Place it over the injured area.  · Ice for 10 minutes every 3 hours. Dont ice for more than 20 minutes at a time.  ? Compression  Putting pressure (compression) on an injury helps prevent swelling and provides support.  · Wrap the injured area firmly with an elastic bandage. If your hand or foot tingles, becomes discolored, or feels cold to the touch, the bandage may be too tight. Rewrap it more loosely.  · If your bandage becomes too loose, rewrap it.  · Do not wear an elastic bandage overnight.  ? Elevation  Keeping an injury elevated helps reduce swelling, pain, and throbbing. Elevation is most effective when the injury is kept elevated higher than the heart.     Call your healthcare  provider if you notice any of the following:  · Fingers or toes feel numb, are cold to the touch, or change color  · Skin looks shiny or tight  · Pain, swelling, or bruising worsens and is not improved with elevation   Date Last Reviewed: 9/3/2015  © 0026-6302 [employer]. [address] [place] [address], [place] [address]. All rights reserved. This information is not intended as a substitute for professional medical care. Always follow your healthcare professional's instructions.        Lower Extremity Contusion  You have a contusion (bruise) of a lower extremity (leg, knee, ankle, foot, or toe). Symptoms include pain, swelling, and skin discoloration. No bones are broken. This injury may take from a few days to a few weeks to heal.  During that time, the bruise may change from reddish in color, to purple-blue, to green-yellow, to yellow-brown.  Home care  · Unless another medicine was prescribed, you can take acetaminophen, ibuprofen, or naproxen to control pain. (If you have chronic liver or kidney disease or ever had a stomach ulcer or gastrointestinal bleeding, talk with your doctor before using these medicines.)  · Elevate the injured area to reduce pain and swelling. As much as possible, sit or lie down with the injured area raised about the level of your heart. This is especially important during the first 48 hours.  · Ice the injured area to help reduce pain and swelling. Wrap a cold source (ice pack or ice cubes in a plastic bag) in a thin towel. Apply to the bruised area for 20 minutes every 1 to 2 hours the first day. Continue this 3 to 4 times a day until the pain and swelling goes away.  · If crutches have been advised, do not bear full weight on the injured leg until you can do so without pain. You may return to sports when you are able to put full weight and impact on the injured leg without pain.  Follow up  Follow up with your healthcare provider or our staff as advised. Call if you are not  improving within the next 1 to 2 weeks.  When to seek medical advice   Call your healthcare provider right away if any of these occur:  · Increased pain or swelling  · Foot or toes become cold, blue, numb or tingly  · Signs of infection: Warmth, drainage, or increased redness or pain around the injury  · Inability to move the injured area   · Frequent bruising for unknown reasons  Date Last Reviewed: 2/1/2017  © 6740-9588 [employer]. [address] [place] [address]. All rights reserved. This information is not intended as a substitute for professional medical care. Always follow your healthcare professional's instructions.

## 2019-03-22 NOTE — PROGRESS NOTES
Last 5 Patient Entered Readings                                      Current 30 Day Average: 108/69     Recent Readings 3/11/2019 3/11/2019 3/4/2019 3/1/2019 3/1/2019    SBP (mmHg) 101 98 110 114 89    DBP (mmHg) 66 70 76 72 60    Pulse 105 108 88 95 99        Digital [facility]: Health  Follow Up    Spoke with MsJame Linda, Ms. Guerra's daughter. She reports Ms. Guerra dropped a bucket on her toe but is doing well otherwise.    Lifestyle Modifications:    1.Dietary Modifications (Sodium intake <2,000mg/day, food labels, dining out): continuing to be mindful of sodium    2.Physical Activity: deferred    3.Medication Therapy: Patient has been compliant with the medication regimen.    4.Patient has the following medication side effects/concerns:   (Frequency/Alleviating factors/Precipitating factors, etc.)     Follow up with Ms. Marga Guerra completed. No further questions or concerns. Will continue to follow up to achieve health goals.

## 2019-04-15 DIAGNOSIS — E03.9 HYPOTHYROIDISM, UNSPECIFIED TYPE: ICD-10-CM

## 2019-04-15 RX ORDER — LEVOTHYROXINE SODIUM 75 UG/1
TABLET ORAL
Qty: 30 TABLET | Refills: 11 | Status: SHIPPED | OUTPATIENT
Start: 2019-04-15 | End: 2019-12-09 | Stop reason: SDUPTHER

## 2019-04-15 NOTE — TELEPHONE ENCOUNTER
----- Message from Kathia Aviles sent at 4/15/2019 11:09 AM CDT -----  Contact: pt's daughter  OUT OF MED     PHarmacy  Has tried 3 times    NO Reponse     -     Pt exhausted emergency supply      REFILL   90 day supply     levothyroxine (SYNTHROID) 75 MCG tablet    [facility] [id number] [address]  Thanks

## 2019-04-18 ENCOUNTER — LAB VISIT (OUTPATIENT)
Dept: LAB | Facility: HOSPITAL | Age: 79
End: 2019-04-18
Attending: HOSPITALIST
Payer: MEDICARE

## 2019-04-18 ENCOUNTER — OFFICE VISIT (OUTPATIENT)
Dept: ENDOCRINOLOGY | Facility: CLINIC | Age: 79
End: 2019-04-18
Payer: MEDICARE

## 2019-04-18 VITALS
BODY MASS INDEX: 20.94 KG/M2 | HEIGHT: 60 IN | DIASTOLIC BLOOD PRESSURE: 66 MMHG | SYSTOLIC BLOOD PRESSURE: 120 MMHG | WEIGHT: 106.69 LBS | HEART RATE: 76 BPM | RESPIRATION RATE: 18 BRPM

## 2019-04-18 DIAGNOSIS — E89.0 POST-SURGICAL HYPOTHYROIDISM: ICD-10-CM

## 2019-04-18 DIAGNOSIS — E55.9 VITAMIN D DEFICIENCY: ICD-10-CM

## 2019-04-18 DIAGNOSIS — E89.2 POSTSURGICAL HYPOPARATHYROIDISM: ICD-10-CM

## 2019-04-18 DIAGNOSIS — C73 THYROID CANCER: ICD-10-CM

## 2019-04-18 DIAGNOSIS — E11.21 CONTROLLED TYPE 2 DIABETES MELLITUS WITH DIABETIC NEPHROPATHY, WITHOUT LONG-TERM CURRENT USE OF INSULIN: ICD-10-CM

## 2019-04-18 DIAGNOSIS — M84.48XD: ICD-10-CM

## 2019-04-18 DIAGNOSIS — N18.5 CKD (CHRONIC KIDNEY DISEASE), STAGE V: ICD-10-CM

## 2019-04-18 DIAGNOSIS — C73 THYROID CANCER: Primary | ICD-10-CM

## 2019-04-18 DIAGNOSIS — M81.0 SENILE OSTEOPOROSIS: ICD-10-CM

## 2019-04-18 DIAGNOSIS — C34.91 NON-SMALL CELL CANCER OF RIGHT LUNG: ICD-10-CM

## 2019-04-18 LAB
T4 FREE SERPL-MCNC: 1.76 NG/DL (ref 0.71–1.51)
TSH SERPL DL<=0.005 MIU/L-ACNC: 0.46 UIU/ML (ref 0.4–4)

## 2019-04-18 PROCEDURE — 99999 PR PBB SHADOW E&M-EST. PATIENT-LVL IV: CPT | Mod: PBBFAC,HCNC,, | Performed by: HOSPITALIST

## 2019-04-18 PROCEDURE — 3078F PR MOST RECENT DIASTOLIC BLOOD PRESSURE < 80 MM HG: ICD-10-PCS | Mod: HCNC,CPTII,S$GLB, | Performed by: INTERNAL MEDICINE

## 2019-04-18 PROCEDURE — 86800 THYROGLOBULIN ANTIBODY: CPT | Mod: HCNC

## 2019-04-18 PROCEDURE — 99214 PR OFFICE/OUTPT VISIT, EST, LEVL IV, 30-39 MIN: ICD-10-PCS | Mod: HCNC,S$GLB,, | Performed by: INTERNAL MEDICINE

## 2019-04-18 PROCEDURE — 3078F DIAST BP <80 MM HG: CPT | Mod: HCNC,CPTII,S$GLB, | Performed by: INTERNAL MEDICINE

## 2019-04-18 PROCEDURE — 1101F PT FALLS ASSESS-DOCD LE1/YR: CPT | Mod: HCNC,CPTII,S$GLB, | Performed by: INTERNAL MEDICINE

## 2019-04-18 PROCEDURE — 1101F PR PT FALLS ASSESS DOC 0-1 FALLS W/OUT INJ PAST YR: ICD-10-PCS | Mod: HCNC,CPTII,S$GLB, | Performed by: INTERNAL MEDICINE

## 2019-04-18 PROCEDURE — 3074F SYST BP LT 130 MM HG: CPT | Mod: HCNC,CPTII,S$GLB, | Performed by: INTERNAL MEDICINE

## 2019-04-18 PROCEDURE — 99999 PR PBB SHADOW E&M-EST. PATIENT-LVL IV: ICD-10-PCS | Mod: PBBFAC,HCNC,, | Performed by: HOSPITALIST

## 2019-04-18 PROCEDURE — 84439 ASSAY OF FREE THYROXINE: CPT | Mod: HCNC

## 2019-04-18 PROCEDURE — 36415 COLL VENOUS BLD VENIPUNCTURE: CPT | Mod: HCNC

## 2019-04-18 PROCEDURE — 3074F PR MOST RECENT SYSTOLIC BLOOD PRESSURE < 130 MM HG: ICD-10-PCS | Mod: HCNC,CPTII,S$GLB, | Performed by: INTERNAL MEDICINE

## 2019-04-18 PROCEDURE — 84443 ASSAY THYROID STIM HORMONE: CPT | Mod: HCNC

## 2019-04-18 PROCEDURE — 99214 OFFICE O/P EST MOD 30 MIN: CPT | Mod: HCNC,S$GLB,, | Performed by: INTERNAL MEDICINE

## 2019-04-18 NOTE — PROGRESS NOTES
Subjective:      Chief Complaint: Thyroid Problem      HPI: Marga Guerra is a 78 y.o. female who is here for a follow-up evaluation for Thyroid cancer surveillance, post-surgical hypothyroidism, osteoporosis, diet-controlled type 2 diabetes    This visit,  was present. Conversation was translated via  to patient and son. She  Is doing well over the last year. Some back pain, middle of her back that resolved with rest. Pain not bad, not taking pain medication for this pain. No falls, Balance is stable. Is taking medication as directed but did not bring updated list of medications. She is taking Vit D. Still seeing kidney doctor.  She feels well overall. Family is aware and comfortable with the plan to monitor thyroid level and they wish to avoid evasive procedures like surgery/biopsy.  Has not seen Oncology in >1 year.       Post-surgical hypothyroidism  on lt4 75 mcg qd    Post-surgical hypoparathyroidism  calcitriol 0.5 mcg qd  calcium - 4 pills 3 x day      BMD1 2/4/15 -   Osteoporosis of the spine and hip. Total hip BMD unchanged since 2012 and lumbar spine BMD increased 7%.  +L-spine compression fracture s/p surgery - years ago  H/o wrist fracture - many years ago  prolia started 9/2/16 - After treatment, patient developed severe lip swelling, which lasted the entire day. No shortness of breath, rash, wheezing, low blood pressure.  Cannot get reclast due to CKD. She has cancer, so cannot get forteo. Would also want to avoid SERMs due to risk of active malignancy and thromboembolic risk      Oncology Hx:  Diagnosis of squamous cell lung cancer in 2013, CT scan on 08/14/2013 and that revealed a large cavitary lesion in the right lower lobe measuring 3.7 x 3.2, second cavitary lesion with an anterior segment of the right upper lobe measuring 2.4 x 1. Underwent a bronchoscopy on 08/29/2013. Pathology revealed squamous cell cancer. She has subsequently undergone a PET scan on 09/10/2013 that  "revealed hypermetabolic activity consistent with lung malignancy. There is also increased activity at the spinous process of L4, which represents an inflammatory process. No other metastatic disease has been noted. She also underwent MRI brain with no evidence of metastatic disease. She completed in 6 (week 5 of chemo but week 6 of RT) of chemo/RT with weekly carboplatin and Taxol on 11/22/13. She was then treated with 2 cycles of consolidation chemotherapy and completed that on 1/9/14. Her PET scan from July 2014 revealed "Interval development of a new focal area of hypermetabolism at the level of the right eighth rib posteriorly corresponding to the new lytic lesion and representing recurrence metastatic disease. Right lung base including right lower lobe and right middle lobe inflammatory or post radiation therapy changes". She was referred to palliative focal radiation of the right eighth rib (completed in August 2014) and this relieved her pain. PEt scan from 1/19/18 which reveals "Three new areas of hypermetabolic lesions within the neck and superior mediastinum with SUV measurements as above. New pathologic fracture of the right posterior 9th rib with hypermetabolic activity, as above.  Left lower lobe nodule slightly larger since prior PET/CT 09/12/2016 that is below the size threshold for PET scan, although neoplastic process cannot be excluded.  Remote rib fracture of the 9th and 8th right posterior ribs, similar to scan from 09/12/2016.     Being following by oncology for non-small cell lung cancer - on surveillance. Last visit with Dr Qureshi 3/1/18     With regards to her thyroid cancer: PTC  H/o Total thyroidectomy with bilateral paratracheal dissection and left modified neck dissection of levels II-V, 3/12/2012  There was gross extrathyroidal tumor with infiltration of the left RLN requiring sacrifice    Thyroid Synoptic Report:  Procedure: total thyroidectomy, left neck dissections, central compartment " dissection  Specimen integrity: single intact thyroid specimen  Specimen size: r lobe: 5 x 4 x 3 cm; isthmus: 4 x 3 x 3 cm ; l lobe: 6 x 4 x 3 cm  Tumor focality: multifocal; l lobe: 5 cm; isthmus: 2 nodule: 4.5 and 1.2 cm; r lobe: 3 nodules: 2.0 cm, 1.2 cm, 0.7 cm  Tumor laterality: right lobe, left lobe, and isthmus  Tumor size: 5 cm let lobe, greatest dimension  Histological type: papillary, classical and follicular variants  Margins: tumor focally extends to margin at isthmus  Tumor capsule: partially encapsulated  Tumor capsule invasion: present, widely invasive  Lymph-vascular invasion: present, focal  Lymph nodes: 9+/47  Extrathyroid extension: present  Stage: CON (pT4 pN1b MX)    S/P 100 mCi I-131- 9/2012  10/2012 WBS   1. Thyroid tissue remnant in the neck.  2. No evidence of distant metastases.      2/4/15 U/S:   Status post thyroidectomy.  No sonographic evidence of malignancy in this patient with detectable thyroglobulinemia     Right upper lymph node, fine-needle aspiration: Positive for malignant cells, consistent with papillary thyroid carcinoma. Right lower lymph node, fine-needle aspiration: Positive for malignant cells, consistent with papillary thyroid carcinoma. Last seen by Dr. Beckett 11/2017.     Per their discussion, not sure that putting her through another surgery or giving another dose of radioactive iodine is going to be of any benefit given her age and multiple comorbid conditions (namely stage 4 CKD and lung malignancy). She is asymptomatic at the moment and feeling well per Ms. Mckeon. She is in agreement with the plan to hold off on treatment for the thyroid cancer recurrence at this time.      Reviewed past medical, family, social history and updated as appropriate.    Review of Systems   Constitutional: Negative for activity change and unexpected weight change.   HENT: Negative for sore throat and voice change.    Eyes: Negative for visual disturbance.   Respiratory: Negative for  shortness of breath.    Cardiovascular: Negative for chest pain.   Gastrointestinal: Negative for abdominal pain.   Genitourinary: Negative for urgency.   Musculoskeletal: Positive for back pain. Negative for arthralgias.   Skin: Negative for wound.   Neurological: Negative for headaches.   Psychiatric/Behavioral: Negative for confusion and sleep disturbance.     Objective:     Vitals:    04/18/19 1051   BP: 120/66   Pulse: 76   Resp: 18       Physical Exam   Constitutional: She is oriented to person, place, and time. She appears well-developed and well-nourished. No distress.   Eyes: Conjunctivae are normal. No scleral icterus.   Neck: Normal range of motion. No tracheal deviation present. No thyromegaly (No palpable residual thyroid tissue) present.   Cardiovascular: Normal rate and normal heart sounds.   Pulmonary/Chest: Effort normal and breath sounds normal. No respiratory distress.   Abdominal: Soft. There is no tenderness. No hernia.   Musculoskeletal: Normal range of motion. She exhibits no edema or tenderness.   No digital clubbing or extremity cyanosis   Lymphadenopathy:     She has no cervical adenopathy.   Neurological: She is alert and oriented to person, place, and time.   Skin: Skin is warm and dry. No erythema.   Psychiatric: She has a normal mood and affect. Her behavior is normal. Judgment and thought content normal.   Nursing note and vitals reviewed.      Wt Readings from Last 10 Encounters:   04/18/19 1051 48.4 kg (106 lb 11.2 oz)   03/18/19 1613 49.9 kg (110 lb)   02/01/19 1120 50.3 kg (110 lb 14.3 oz)   11/21/18 0908 50.2 kg (110 lb 10.7 oz)   08/08/18 0919 54.4 kg (120 lb)   06/01/18 0953 49.7 kg (109 lb 9.1 oz)   04/30/18 1129 50.9 kg (112 lb 3.4 oz)   03/16/18 0936 51.3 kg (113 lb 1.5 oz)   03/01/18 1520 51.1 kg (112 lb 10.5 oz)   02/01/18 1318 51.2 kg (112 lb 14 oz)   ]    Lab Results   Component Value Date    HGBA1C 5.7 (H) 02/01/2019     Lab Results   Component Value Date    CHOL 280 (H)  02/01/2019     (H) 02/01/2019    LDLCALC 138.0 02/01/2019    TRIG 90 02/01/2019    CHOLHDL 44.3 02/01/2019     Lab Results   Component Value Date     02/01/2019    K 4.2 02/01/2019     02/01/2019    CO2 25 02/01/2019    GLU 85 02/01/2019    BUN 35 (H) 02/01/2019    CREATININE 3.6 (H) 02/01/2019    CALCIUM 10.1 02/01/2019    PROT 7.2 02/01/2019    ALBUMIN 3.8 02/01/2019    BILITOT 0.8 02/01/2019    ALKPHOS 50 (L) 02/01/2019    AST 14 02/01/2019    ALT 9 (L) 02/01/2019    ANIONGAP 10 02/01/2019    ESTGFRAFRICA 13 (A) 02/01/2019    EGFRNONAA 11 (A) 02/01/2019    TSH 0.458 04/18/2019        Assessment/Plan:       Thyroid cancer  Reviewed general care TG level has been elevated, but trend is down?  No plan per Dr Beckett discussion with family for BARGER, biopsy, surgery at this point, just active survelance, family was updated on this plan via , they were in agreement (pt and son). No plans for biopsy of the posterior right 9th rib lesion to determine the primary source (lung vs. thyroid vs. other) of metastasis.  Taking into account her age and history of osteoporosis, will back off a bit on TSH suppression - goal 0.1-0.5 range.   recheck TSH, FT4 and TG today      Post-surgical hypothyroidism  on lt4 75 mcg qd    Post-surgical hypoparathyroidism  calcitriol 0.5 mcg qd  calcium - 4 pills 3 x day     Vitamin D deficiency  - Vit D replacement    Controlled type 2 diabetes mellitus with diabetic nephropathy, without long-term current use of insulin  - stable A1C    Pathological fracture of rib with routine healing  - discuss with patient and son, recommend OTC tylenol if needed for pain  - recommend discussion with PCP if pain is not managable  - she is to avoid NSAIDs    CKD (chronic kidney disease), stage V  - stable, follow up with nephro    Non-small cell cancer of right lung  - she needs to follow up with Dr Kiera Ceja MA to help set up appt  - family is aware that she need to follow up      Senile osteoporosis  Continue vitamin D (calcitriol) and calcium supplementation.   Had a reaction to prolia, so this has been discontinued. Poor candidate for bisphosphonates due to renal function. Not a candidate for forteo due to history of malignancy and radiation. Could consider SERM, but active malignancy with risk of VTE would have to be weighed against modest benefit on increasing BMD.    RTC in 6 months    I discussed the case with Dr. Jemma Chew MD  Endocrinology Fellow  4/18/2019 1:51 PM

## 2019-04-18 NOTE — PROGRESS NOTES
I have reviewed and concur with Dr. Chew's history, physical, assessment, and plan.  I have personally interviewed and examined the patient.    Recurrent thyroid cancer which is being monitored as given age, comorbidities it was felt she would not have significant benefit from treatment with either repeat surgery or BARGER  Will repeat TSH, TG now  Encouraged to return to oncology for follow-up of lung cancer as last visit >1 year ago  Pending lab results will determine need for further imaging and will discuss at multi-D conference    Yamilet Easton MD

## 2019-04-19 LAB
THRYOGLOBULIN INTERPRETATION: ABNORMAL
THYROGLOB AB SERPL-ACNC: 21 IU/ML
THYROGLOB SERPL-MCNC: 3.4 NG/ML

## 2019-05-01 ENCOUNTER — PATIENT MESSAGE (OUTPATIENT)
Dept: ENDOCRINOLOGY | Facility: CLINIC | Age: 79
End: 2019-05-01

## 2019-05-01 DIAGNOSIS — C73 THYROID CANCER: Primary | ICD-10-CM

## 2019-05-03 ENCOUNTER — PATIENT OUTREACH (OUTPATIENT)
Dept: OTHER | Facility: OTHER | Age: 79
End: 2019-05-03

## 2019-05-03 RX ORDER — AMLODIPINE BESYLATE 5 MG/1
2.5 TABLET ORAL DAILY
COMMUNITY
End: 2020-06-20

## 2019-05-03 NOTE — PROGRESS NOTES
Reviewed patient's readings with daughter Linda. She reports patient has not complained of any hypotension symptoms, but she did notice numbers were trending down. Patient takes ramipril in the morning and amlodipine in the evening.       Last 5 Patient Entered Readings                                      Current 30 Day Average: 117/72     Recent Readings 4/29/2019 4/22/2019 4/15/2019 4/12/2019 4/8/2019    SBP (mmHg) 95 123 119 151 95    DBP (mmHg) 70 71 75 78 65    Pulse 93 86 87 91 101          BP at goal, LLN  Decrease amlodipine to 2.5mg given lower BP numbers  Continue monitoring    Hypertension Medications             amLODIPine (NORVASC) 5 MG tablet Take 2.5 mg by mouth once daily.    ramipril (ALTACE) 10 MG capsule Take 1 capsule (10 mg total) by mouth once daily.

## 2019-06-20 ENCOUNTER — PATIENT OUTREACH (OUTPATIENT)
Dept: OTHER | Facility: OTHER | Age: 79
End: 2019-06-20

## 2019-06-20 NOTE — PROGRESS NOTES
Last 5 Patient Entered Readings                                      Current 30 Day Average: 117/81     Recent Readings 6/10/2019 6/3/2019 5/27/2019 5/22/2019 5/22/2019    SBP (mmHg) 122 115 124 108 144    DBP (mmHg) 74 74 87 76 93    Pulse 89 93 86 90 93        Digital Medicine: Health  Follow Up    Spoke with Ms. Mckeon, Mrs. Guerra's daughter. She reports Mrs. Guerra is doing well. BP medication was recently adjusted to take 1/2 amlodipine dose. Pleased readings seem to be stable with change.    Lifestyle Modifications:    1.Dietary Modifications: continuing to be mindful of sodium intake    2.Physical Activity: taking walks with     3.Medication Therapy: Patient has been compliant with the medication regimen.    4.Patient has the following medication side effects/concerns:   (Frequency/Alleviating factors/Precipitating factors, etc.)     Follow up with Mrs. Marga Guerra completed. No further questions or concerns. Will continue to follow up to achieve health goals.

## 2019-06-26 ENCOUNTER — HOSPITAL ENCOUNTER (OUTPATIENT)
Dept: ENDOCRINOLOGY | Facility: CLINIC | Age: 79
Discharge: HOME | End: 2019-06-26
Attending: HOSPITALIST
Payer: MEDICARE

## 2019-06-26 DIAGNOSIS — C73 THYROID CANCER: ICD-10-CM

## 2019-06-26 PROCEDURE — 76536 US SOFT TISSUE HEAD NECK THYROID: ICD-10-PCS | Mod: HCNC,S$GLB,, | Performed by: INTERNAL MEDICINE

## 2019-06-26 PROCEDURE — 76536 US EXAM OF HEAD AND NECK: CPT | Mod: HCNC,S$GLB,, | Performed by: INTERNAL MEDICINE

## 2019-08-09 DIAGNOSIS — E83.52 HYPERCALCEMIA: ICD-10-CM

## 2019-08-09 RX ORDER — CALCITRIOL 0.5 UG/1
CAPSULE ORAL
Qty: 90 CAPSULE | Refills: 3 | Status: SHIPPED | OUTPATIENT
Start: 2019-08-09 | End: 2020-07-01

## 2019-08-15 ENCOUNTER — PATIENT OUTREACH (OUTPATIENT)
Dept: OTHER | Facility: OTHER | Age: 79
End: 2019-08-15

## 2019-08-15 NOTE — PROGRESS NOTES
Last 5 Patient Entered Readings                                      Current 30 Day Average: 114/69     Recent Readings 8/12/2019 8/12/2019 8/5/2019 8/5/2019 8/2/2019    SBP (mmHg) 89 89 125 135 107    DBP (mmHg) 60 57 79 68 66    Pulse 102 106 85 68 103        Digital Medicine: Health  Follow Up    I spoke with MsJame Linda, Mrs. Guerra's daughter. She reports Mrs. Guerra is doing well. She has been holding the evening dose of medication due to low BP readings.    Lifestyle Modifications:    1.Dietary Modifications: continuing to be mindful of sodium intake     2.Physical Activity: taking walks with     3.Medication Therapy: Patient has been compliant with the medication regimen.    4.Patient has the following medication side effects/concerns:   (Frequency/Alleviating factors/Precipitating factors, etc.)     Follow up with Mrs. Marga Guerra completed. No further questions or concerns. Will continue to follow up to achieve health goals.

## 2019-10-03 ENCOUNTER — PATIENT OUTREACH (OUTPATIENT)
Dept: OTHER | Facility: OTHER | Age: 79
End: 2019-10-03

## 2019-10-03 NOTE — PROGRESS NOTES
Digital Medicine: Health  Follow-Up    Spoke with daughter, Ms. Mckeon. She reports Mrs. Guerra is doing well and pleased with BP readings. Sometimes when BP reading is elevated, will wait a few minutes and it will be back in range.    The history is provided by a relative.     Follow Up  Follow-up reason(s): goal follow-up              Diet:   She has the following dietary restrictions: low sodium diet    Intervention(s): increasing water intake    Assigning the following patient goals: meal plan and maintain low sodium diet    Physical Activity:   When asked if exercising, patient responded: yesHer level of intensity when exercising is low.    Patient participates in the following activities: walking and yard/housework    Assigning the following patient goal(s): 150 minutes of exercise per week and participate in exercise weekly    INTERVENTION(S)  encouragement/support    Did not want any resources today.          Topic    Urine Protein Check     Hemoglobin A1C        Last 5 Patient Entered Readings                                      Current 30 Day Average: 110/76     Recent Readings 9/28/2019 9/28/2019 9/23/2019 9/20/2019 9/20/2019    SBP (mmHg) 113 124 100 114 159    DBP (mmHg) 61 106 59 59 82    Pulse 99 85 103 101 77

## 2019-12-06 ENCOUNTER — OFFICE VISIT (OUTPATIENT)
Dept: INTERNAL MEDICINE | Facility: CLINIC | Age: 79
End: 2019-12-06
Payer: MEDICARE

## 2019-12-06 ENCOUNTER — PATIENT OUTREACH (OUTPATIENT)
Dept: OTHER | Facility: OTHER | Age: 79
End: 2019-12-06

## 2019-12-06 ENCOUNTER — HOSPITAL ENCOUNTER (OUTPATIENT)
Dept: RADIOLOGY | Facility: HOSPITAL | Age: 79
Discharge: HOME | End: 2019-12-06
Attending: INTERNAL MEDICINE
Payer: MEDICARE

## 2019-12-06 VITALS
DIASTOLIC BLOOD PRESSURE: 52 MMHG | BODY MASS INDEX: 20.26 KG/M2 | HEART RATE: 58 BPM | SYSTOLIC BLOOD PRESSURE: 97 MMHG | WEIGHT: 103.19 LBS | HEIGHT: 60 IN | TEMPERATURE: 98 F | OXYGEN SATURATION: 97 %

## 2019-12-06 VITALS
BODY MASS INDEX: 20.15 KG/M2 | WEIGHT: 103.19 LBS | SYSTOLIC BLOOD PRESSURE: 106 MMHG | DIASTOLIC BLOOD PRESSURE: 66 MMHG | TEMPERATURE: 98 F | HEART RATE: 58 BPM | OXYGEN SATURATION: 100 %

## 2019-12-06 DIAGNOSIS — E89.0 POST-SURGICAL HYPOTHYROIDISM: ICD-10-CM

## 2019-12-06 DIAGNOSIS — R05.9 COUGH: ICD-10-CM

## 2019-12-06 DIAGNOSIS — C34.91 NON-SMALL CELL CANCER OF RIGHT LUNG: ICD-10-CM

## 2019-12-06 DIAGNOSIS — R05.9 COUGH: Primary | ICD-10-CM

## 2019-12-06 DIAGNOSIS — I10 ESSENTIAL HYPERTENSION: ICD-10-CM

## 2019-12-06 DIAGNOSIS — J84.10 CALCIFIED GRANULOMA OF LUNG: ICD-10-CM

## 2019-12-06 DIAGNOSIS — E11.51 TYPE 2 DIABETES MELLITUS WITH DIABETIC PERIPHERAL ANGIOPATHY WITHOUT GANGRENE, WITHOUT LONG-TERM CURRENT USE OF INSULIN: ICD-10-CM

## 2019-12-06 DIAGNOSIS — H54.10 BLINDNESS AND LOW VISION: ICD-10-CM

## 2019-12-06 DIAGNOSIS — C73 THYROID CANCER: ICD-10-CM

## 2019-12-06 DIAGNOSIS — Z00.00 ENCOUNTER FOR PREVENTIVE HEALTH EXAMINATION: Primary | ICD-10-CM

## 2019-12-06 DIAGNOSIS — N18.5 CKD (CHRONIC KIDNEY DISEASE), STAGE V: ICD-10-CM

## 2019-12-06 DIAGNOSIS — E78.2 MIXED HYPERLIPIDEMIA: ICD-10-CM

## 2019-12-06 DIAGNOSIS — E89.2 POSTSURGICAL HYPOPARATHYROIDISM: ICD-10-CM

## 2019-12-06 DIAGNOSIS — E11.22 CONTROLLED TYPE 2 DIABETES MELLITUS WITH CHRONIC KIDNEY DISEASE, UNSPECIFIED CKD STAGE, UNSPECIFIED WHETHER LONG TERM INSULIN USE: ICD-10-CM

## 2019-12-06 DIAGNOSIS — E11.9 ENCOUNTER FOR DIABETIC FOOT EXAM: ICD-10-CM

## 2019-12-06 DIAGNOSIS — J43.9 PULMONARY EMPHYSEMA, UNSPECIFIED EMPHYSEMA TYPE: ICD-10-CM

## 2019-12-06 DIAGNOSIS — I77.819 ECTATIC AORTA: ICD-10-CM

## 2019-12-06 DIAGNOSIS — J44.9 CHRONIC OBSTRUCTIVE PULMONARY DISEASE, UNSPECIFIED COPD TYPE: ICD-10-CM

## 2019-12-06 DIAGNOSIS — I35.0 AORTIC VALVE STENOSIS, ETIOLOGY OF CARDIAC VALVE DISEASE UNSPECIFIED: ICD-10-CM

## 2019-12-06 DIAGNOSIS — R73.03 PRE-DIABETES: ICD-10-CM

## 2019-12-06 DIAGNOSIS — H35.3190 AGE-RELATED MACULAR DEGENERATION WITH CENTRAL GEOGRAPHIC ATROPHY: ICD-10-CM

## 2019-12-06 DIAGNOSIS — I70.0 AORTIC ATHEROSCLEROSIS: ICD-10-CM

## 2019-12-06 DIAGNOSIS — I77.9 BILATERAL CAROTID ARTERY DISEASE, UNSPECIFIED TYPE: ICD-10-CM

## 2019-12-06 DIAGNOSIS — Z85.118 HISTORY OF LUNG CANCER: ICD-10-CM

## 2019-12-06 DIAGNOSIS — J40 BRONCHITIS: ICD-10-CM

## 2019-12-06 DIAGNOSIS — Z23 NEED FOR INFLUENZA VACCINATION: ICD-10-CM

## 2019-12-06 DIAGNOSIS — Z85.118 HX OF CANCER OF LUNG: ICD-10-CM

## 2019-12-06 DIAGNOSIS — Z86.73 HISTORY OF STROKE: ICD-10-CM

## 2019-12-06 DIAGNOSIS — C79.51 SECONDARY CANCER OF BONE: ICD-10-CM

## 2019-12-06 DIAGNOSIS — I77.1 TORTUOUS AORTA: ICD-10-CM

## 2019-12-06 PROCEDURE — 71046 X-RAY EXAM CHEST 2 VIEWS: CPT | Mod: 26,HCNC,, | Performed by: RADIOLOGY

## 2019-12-06 PROCEDURE — 99999 PR PBB SHADOW E&M-EST. PATIENT-LVL V: ICD-10-PCS | Mod: PBBFAC,HCNC,, | Performed by: INTERNAL MEDICINE

## 2019-12-06 PROCEDURE — 1126F PR PAIN SEVERITY QUANTIFIED, NO PAIN PRESENT: ICD-10-PCS | Mod: HCNC,S$GLB,, | Performed by: INTERNAL MEDICINE

## 2019-12-06 PROCEDURE — 99999 PR PBB SHADOW E&M-EST. PATIENT-LVL V: CPT | Mod: PBBFAC,HCNC,, | Performed by: INTERNAL MEDICINE

## 2019-12-06 PROCEDURE — 99999 PR PBB SHADOW E&M-EST. PATIENT-LVL IV: CPT | Mod: PBBFAC,HCNC,, | Performed by: NURSE PRACTITIONER

## 2019-12-06 PROCEDURE — 99499 RISK ADDL DX/OHS AUDIT: ICD-10-PCS | Mod: HCNC,S$GLB,, | Performed by: NURSE PRACTITIONER

## 2019-12-06 PROCEDURE — 3074F PR MOST RECENT SYSTOLIC BLOOD PRESSURE < 130 MM HG: ICD-10-PCS | Mod: HCNC,CPTII,S$GLB, | Performed by: NURSE PRACTITIONER

## 2019-12-06 PROCEDURE — 1159F MED LIST DOCD IN RCRD: CPT | Mod: HCNC,S$GLB,, | Performed by: INTERNAL MEDICINE

## 2019-12-06 PROCEDURE — 99999 PR PBB SHADOW E&M-EST. PATIENT-LVL IV: ICD-10-PCS | Mod: PBBFAC,HCNC,, | Performed by: NURSE PRACTITIONER

## 2019-12-06 PROCEDURE — 3074F SYST BP LT 130 MM HG: CPT | Mod: HCNC,CPTII,S$GLB, | Performed by: NURSE PRACTITIONER

## 2019-12-06 PROCEDURE — 3078F DIAST BP <80 MM HG: CPT | Mod: HCNC,CPTII,S$GLB, | Performed by: INTERNAL MEDICINE

## 2019-12-06 PROCEDURE — 1101F PR PT FALLS ASSESS DOC 0-1 FALLS W/OUT INJ PAST YR: ICD-10-PCS | Mod: HCNC,CPTII,S$GLB, | Performed by: INTERNAL MEDICINE

## 2019-12-06 PROCEDURE — 1126F AMNT PAIN NOTED NONE PRSNT: CPT | Mod: HCNC,S$GLB,, | Performed by: INTERNAL MEDICINE

## 2019-12-06 PROCEDURE — G0439 PR MEDICARE ANNUAL WELLNESS SUBSEQUENT VISIT: ICD-10-PCS | Mod: HCNC,S$GLB,, | Performed by: NURSE PRACTITIONER

## 2019-12-06 PROCEDURE — 3078F DIAST BP <80 MM HG: CPT | Mod: HCNC,CPTII,S$GLB, | Performed by: NURSE PRACTITIONER

## 2019-12-06 PROCEDURE — 1101F PT FALLS ASSESS-DOCD LE1/YR: CPT | Mod: HCNC,CPTII,S$GLB, | Performed by: INTERNAL MEDICINE

## 2019-12-06 PROCEDURE — 99499 UNLISTED E&M SERVICE: CPT | Mod: HCNC,S$GLB,, | Performed by: NURSE PRACTITIONER

## 2019-12-06 PROCEDURE — G0439 PPPS, SUBSEQ VISIT: HCPCS | Mod: HCNC,S$GLB,, | Performed by: NURSE PRACTITIONER

## 2019-12-06 PROCEDURE — 3074F PR MOST RECENT SYSTOLIC BLOOD PRESSURE < 130 MM HG: ICD-10-PCS | Mod: HCNC,CPTII,S$GLB, | Performed by: INTERNAL MEDICINE

## 2019-12-06 PROCEDURE — 3078F PR MOST RECENT DIASTOLIC BLOOD PRESSURE < 80 MM HG: ICD-10-PCS | Mod: HCNC,CPTII,S$GLB, | Performed by: INTERNAL MEDICINE

## 2019-12-06 PROCEDURE — 99214 OFFICE O/P EST MOD 30 MIN: CPT | Mod: HCNC,S$GLB,, | Performed by: INTERNAL MEDICINE

## 2019-12-06 PROCEDURE — 99214 PR OFFICE/OUTPT VISIT, EST, LEVL IV, 30-39 MIN: ICD-10-PCS | Mod: HCNC,S$GLB,, | Performed by: INTERNAL MEDICINE

## 2019-12-06 PROCEDURE — 1159F PR MEDICATION LIST DOCUMENTED IN MEDICAL RECORD: ICD-10-PCS | Mod: HCNC,S$GLB,, | Performed by: INTERNAL MEDICINE

## 2019-12-06 PROCEDURE — 71046 XR CHEST PA AND LATERAL: ICD-10-PCS | Mod: 26,HCNC,, | Performed by: RADIOLOGY

## 2019-12-06 PROCEDURE — 3074F SYST BP LT 130 MM HG: CPT | Mod: HCNC,CPTII,S$GLB, | Performed by: INTERNAL MEDICINE

## 2019-12-06 PROCEDURE — 71046 X-RAY EXAM CHEST 2 VIEWS: CPT | Mod: TC,HCNC

## 2019-12-06 PROCEDURE — 3078F PR MOST RECENT DIASTOLIC BLOOD PRESSURE < 80 MM HG: ICD-10-PCS | Mod: HCNC,CPTII,S$GLB, | Performed by: NURSE PRACTITIONER

## 2019-12-06 RX ORDER — RAMIPRIL 10 MG/1
10 CAPSULE ORAL DAILY
Qty: 90 CAPSULE | Refills: 3 | Status: SHIPPED | OUTPATIENT
Start: 2019-12-06 | End: 2021-01-01

## 2019-12-06 RX ORDER — DOXYCYCLINE HYCLATE 100 MG
100 TABLET ORAL EVERY 12 HOURS
Qty: 20 TABLET | Refills: 0 | Status: SHIPPED | OUTPATIENT
Start: 2019-12-06 | End: 2019-12-16

## 2019-12-06 RX ORDER — ATORVASTATIN CALCIUM 40 MG/1
TABLET, FILM COATED ORAL
Qty: 90 TABLET | Refills: 3 | Status: SHIPPED | OUTPATIENT
Start: 2019-12-06 | End: 2021-01-01

## 2019-12-06 RX ORDER — ALBUTEROL SULFATE 90 UG/1
2 AEROSOL, METERED RESPIRATORY (INHALATION) EVERY 6 HOURS PRN
Qty: 18 G | Refills: 6 | Status: SHIPPED | OUTPATIENT
Start: 2019-12-06

## 2019-12-06 NOTE — PROGRESS NOTES
Patient Marga Guerra, MRN [id number], was dependent on dialysis (ICD10 Z99.2) at the time of this visit on 12/6/19. This addendum is made to the medical record on 12/06/2019.

## 2019-12-06 NOTE — PROGRESS NOTES
Subjective:       Patient ID: Marga Guerra is a 79 y.o. female.    Chief Complaint: Cough and Wheezing   This is 79 year who presents today follow-up . She was having upper respiratory infection with cough and wheezing  Daughter reports she started with symptoms about 2 days ago.  In general she has been fairly stable she has had some trend down in her weight over time she continues to follow with Endocrinology for her thyroid cancer is due for a follow-up daughter does plan to schedule along with Hematology Oncology follow-up for history lung cancer.   She is followed by the digital hypertension program and her blood pressure has been good she reports some her medications were reduced because she was running a bit lower.  Daughter reports they never set up her echo was we previously discussed but will plan to do so    HPI  Review of Systems   Respiratory: Positive for cough, shortness of breath and wheezing.    Cardiovascular: Negative for chest pain.       Objective:     Blood pressure 106/66, pulse (!) 58, temperature 98 °F (36.7 °C), weight 46.8 kg (103 lb 2.8 oz), SpO2 100 %.    Physical Exam   Constitutional: No distress.   HENT:   Head: Normocephalic.   Mouth/Throat: Oropharynx is clear and moist.   Eyes: No scleral icterus.   Neck: Neck supple.   Cardiovascular: Normal rate and regular rhythm. Exam reveals no gallop and no friction rub.   Murmur heard.  Pulmonary/Chest: Effort normal and breath sounds normal. No respiratory distress.   expir wheeze    Abdominal: Soft. Bowel sounds are normal. She exhibits no mass. There is no tenderness.   Musculoskeletal: She exhibits no edema.   Neurological: She is alert.   Skin: No erythema.   Psychiatric: She has a normal mood and affect.   Vitals reviewed.      Assessment:       1. Cough    2. Pulmonary emphysema, unspecified emphysema type    3. Aortic valve stenosis, etiology of cardiac valve disease unspecified    4. Essential hypertension    5. Post-surgical  hypothyroidism    6. Pre-diabetes    7. Bronchitis    8. Non-small cell cancer of right lung    9. Hx of cancer of lung    10. Thyroid cancer    11. CKD (chronic kidney disease), stage V        Plan:       Marga was seen today for cough and wheezing.    Diagnoses and all orders for this visit:    Cough  Bronchitis check xray will treat   -     X-Ray Chest PA And Lateral; Future    Pulmonary emphysema, unspecified emphysema type  -     albuterol (PROVENTIL/VENTOLIN HFA) 90 mcg/actuation inhaler; Inhale 2 puffs into the lungs every 6 (six) hours as needed for Wheezing.    Aortic valve stenosis, etiology of cardiac valve disease unspecified  Discussed update   -     Echo; Future    Essential hypertension  -     ramipril (ALTACE) 10 MG capsule; Take 1 capsule (10 mg total) by mouth once daily.  -     Basic metabolic panel; Future  -     CBC auto differential; Future  -     Hepatic function panel; Future  -     Hemoglobin A1c; Future  -     Lipid panel; Future  -     T4, free; Future  -     TSH; Future  -     Thyroglobulin; Future    Post-surgical hypothyroidism  -     T4, free; Future  -     TSH; Future  -     Thyroglobulin; Future  -     Ambulatory referral to Endocrinology; Future    Pre-diabetes  -     Hemoglobin A1c; Future      Non-small cell cancer of right lung  Hx of cancer of lung  She will schedule Hematology follow-up  -     Ambulatory consult to Hematology / Oncology    Thyroid cancer  Follow-up planned  Post sugical hypoparathyrosim   -     Ambulatory referral to Endocrinology; Future    CKD (chronic kidney disease), stage V  She follows with Nephrology has been relatively stable    Refill provided  -     atorvastatin (LIPITOR) 40 MG tablet; TAKE 1 TABLET(40 MG) BY MOUTH EVERY DAY  -     doxycycline (VIBRA-TABS) 100 MG tablet; Take 1 tablet (100 mg total) by mouth every 12 (twelve) hours. Taking with food and water for 10 days    Call if no improvement     Follow up 6 months

## 2019-12-06 NOTE — PROGRESS NOTES
Marga Guerra presented for a  Medicare AWV and comprehensive Health Risk Assessment today. The following components were reviewed and updated:    · Medical history  · Family History  · Social history  · Allergies and Current Medications  · Health Risk Assessment  · Health Maintenance  · Care Team     ** See Completed Assessments for Annual Wellness Visit within the encounter summary.**       The following assessments were completed:  · Living Situation  · CAGE  · Depression Screening  · Timed Get Up and Go  · Whisper Test  · Cognitive Function Screening--not done due to eyesight issues  · Nutrition Screening  · ADL Screening  · PAQ Screening    Vitals:    12/06/19 1102   BP: (!) 97/52   BP Location: Left arm   Patient Position: Sitting   BP Method: Medium (Manual)   Pulse: (!) 58   Temp: 98.4 °F (36.9 °C)   SpO2: 97%   Weight: 46.8 kg (103 lb 2.8 oz)   Height: 5' (1.524 m)     Body mass index is 20.15 kg/m².  Physical Exam   Constitutional: She is oriented to person, place, and time. Vital signs are normal. She appears well-developed and well-nourished.   HENT:   Head: Normocephalic.   Eyes: Pupils are equal, round, and reactive to light. Conjunctivae, EOM and lids are normal. Lids are everted and swept, no foreign bodies found.   Neck: Trachea normal, normal range of motion and full passive range of motion without pain. Neck supple. No JVD present. Carotid bruit is not present.   Cardiovascular: Regular rhythm, S1 normal, S2 normal, normal heart sounds, intact distal pulses and normal pulses. Bradycardia present.   Pulmonary/Chest: Effort normal and breath sounds normal.   Abdominal: Soft. Normal appearance and bowel sounds are normal. There is no hepatosplenomegaly.   Musculoskeletal: Normal range of motion.   Neurological: She is alert and oriented to person, place, and time. She has normal strength and normal reflexes.   Skin: Skin is warm, dry and intact. Capillary refill takes less than 2 seconds.    Psychiatric: She has a normal mood and affect. Her speech is normal and behavior is normal. Judgment and thought content normal. Cognition and memory are normal.   Nursing note and vitals reviewed.        Diagnoses and health risks identified today and associated recommendations/orders:    1. Encounter for preventive health examination  Exam done    Health Maintenance updated    Records reviewed    2. History of stroke  Chronic, followed by PCP    3. Blindness and low vision  Chronic, followed by Opthalmology    4. 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 Chronic obstructive pulmonary disease, unspecified COPD type  Chronic, followed by PCP    6. Calcified granuloma of lung  Chronic, followed by PCP    7. Aortic atherosclerosis  Chronic, followed by PCP    8. Bilateral carotid artery disease, unspecified type  Chronic, followed by PCP    9. Tortuous aorta  Chronic, followed by PCP    10. Ectatic aorta  Chronic, followed by PCP    11. Essential hypertension  Chronic, followed by PCP, elevated today    Take medications as prescribed.    Monitor BP at home, goal BP < or = 140/80, call office if consistently above this range.    Follow low salt DASH diet and exercise.    BMI reviewed.    Go to ED if Headaches, blurred vision, chest pain, or SOB occurs along with elevated readings > or = 160/90.    12. Mixed hyperlipidemia  Chronic, followed by PCP    Continue cholesterol med, low fat diet, and exercise.     13. CKD (chronic kidney disease), stage V  Chronic, followed by PCP and Nephrology    14. History of lung cancer  Chronic, followed by PCP and Hem/Onc    15. Non-small cell cancer of right lung  Chronic, followed by PCP and Hem/Onc    16. Secondary cancer of bone  Chronic, followed by PCP and Hem/Onc    17. Thyroid cancer  Chronic, followed by PCP and Hem/Onc    18. Postsurgical hypoparathyroidism  Chronic, followed by PCP    19. Post-surgical  hypothyroidism  Chronic, followed by PCP    20. Controlled type 2 diabetes mellitus with chronic kidney disease, unspecified CKD stage, unspecified whether long term insulin use  Chronic, followed by PCP    A1C goal < 7.0, BP goal < 140/80, LDL goal < 100.    Adhere to ADA diet.    Take meds as prescribed, check BS daily. Notify if sugars are out of range discussed during visit.    Yearly eye exam discussed.    Yearly foot exam discussed.    21. Type 2 diabetes mellitus with diabetic peripheral angiopathy without gangrene, without long-term current use of insulin  Chronic, followed by PCP    A1C goal < 7.0, BP goal < 140/80, LDL goal < 100.    Adhere to ADA diet.    Take meds as prescribed, check BS daily. Notify if sugars are out of range discussed during visit.    Yearly eye exam discussed.    Yearly foot exam discussed.    - Microalbumin/creatinine urine ratio; Future  - Hemoglobin A1c; Future    22. BMI 20.0-20.9, adult  BMI reviewed    23. Need for influenza vaccination  Postponed due to URI, will be seen today    24. Encounter for diabetic foot exam  - Ambulatory Referral to Podiatry      Provided Marga with a 5-10 year written screening schedule and personal prevention plan. Recommendations were developed using the USPSTF age appropriate recommendations. Education, counseling, and referrals were provided as needed. After Visit Summary printed and given to patient which includes a list of additional screenings\tests needed.    Follow up in about 2 months (around 2/6/2020), or with PCP Dr. Prescott for f/u.    Kristyn Story, FRANCISCA  I offered to discuss end of life issues, including information on how to make advance directives that the patient could use to name someone who would make medical decisions on their behalf if they became too ill to make themselves.    ___Patient declined  _X_Patient is interested, I provided paper work and offered to discuss.

## 2019-12-09 ENCOUNTER — PATIENT MESSAGE (OUTPATIENT)
Dept: ENDOCRINOLOGY | Facility: CLINIC | Age: 79
End: 2019-12-09

## 2019-12-09 ENCOUNTER — OFFICE VISIT (OUTPATIENT)
Dept: ENDOCRINOLOGY | Facility: CLINIC | Age: 79
End: 2019-12-09
Payer: MEDICARE

## 2019-12-09 ENCOUNTER — HOSPITAL ENCOUNTER (OUTPATIENT)
Dept: CARDIOLOGY | Facility: CLINIC | Age: 79
Discharge: HOME | End: 2019-12-09
Attending: INTERNAL MEDICINE
Payer: MEDICARE

## 2019-12-09 VITALS
HEIGHT: 60 IN | HEART RATE: 68 BPM | WEIGHT: 98 LBS | DIASTOLIC BLOOD PRESSURE: 62 MMHG | BODY MASS INDEX: 19.24 KG/M2 | SYSTOLIC BLOOD PRESSURE: 104 MMHG

## 2019-12-09 VITALS
SYSTOLIC BLOOD PRESSURE: 104 MMHG | RESPIRATION RATE: 18 BRPM | WEIGHT: 98.75 LBS | DIASTOLIC BLOOD PRESSURE: 62 MMHG | HEART RATE: 68 BPM | HEIGHT: 60 IN | BODY MASS INDEX: 19.39 KG/M2

## 2019-12-09 DIAGNOSIS — I35.0 AORTIC VALVE STENOSIS, ETIOLOGY OF CARDIAC VALVE DISEASE UNSPECIFIED: Primary | ICD-10-CM

## 2019-12-09 DIAGNOSIS — M81.0 SENILE OSTEOPOROSIS: ICD-10-CM

## 2019-12-09 DIAGNOSIS — E55.9 VITAMIN D DEFICIENCY: ICD-10-CM

## 2019-12-09 DIAGNOSIS — C73 THYROID CANCER: Primary | ICD-10-CM

## 2019-12-09 DIAGNOSIS — E03.9 HYPOTHYROIDISM, UNSPECIFIED TYPE: ICD-10-CM

## 2019-12-09 DIAGNOSIS — I05.0 MITRAL VALVE STENOSIS, UNSPECIFIED ETIOLOGY: ICD-10-CM

## 2019-12-09 DIAGNOSIS — R05.9 COUGH: Primary | ICD-10-CM

## 2019-12-09 DIAGNOSIS — I35.0 AORTIC VALVE STENOSIS, ETIOLOGY OF CARDIAC VALVE DISEASE UNSPECIFIED: ICD-10-CM

## 2019-12-09 DIAGNOSIS — E89.0 POST-SURGICAL HYPOTHYROIDISM: ICD-10-CM

## 2019-12-09 DIAGNOSIS — E89.2 POSTSURGICAL HYPOPARATHYROIDISM: ICD-10-CM

## 2019-12-09 DIAGNOSIS — N18.5 CKD (CHRONIC KIDNEY DISEASE), STAGE V: ICD-10-CM

## 2019-12-09 LAB
ASCENDING AORTA: 2.59 CM
AV INDEX (PROSTH): 0.24
AV MEAN GRADIENT: 35 MMHG
AV PEAK GRADIENT: 58 MMHG
AV VALVE AREA: 0.69 CM2
AV VELOCITY RATIO: 0.29
BSA FOR ECHO PROCEDURE: 1.37 M2
CV ECHO LV RWT: 0.23 CM
DOP CALC AO PEAK VEL: 3.8 M/S
DOP CALC AO VTI: 69.5 CM
DOP CALC LVOT AREA: 2.8 CM2
DOP CALC LVOT DIAMETER: 1.9 CM
DOP CALC LVOT PEAK VEL: 1.12 M/S
DOP CALC LVOT STROKE VOLUME: 47.78 CM3
DOP CALCLVOT PEAK VEL VTI: 16.86 CM
ECHO LV POSTERIOR WALL: 0.48 CM (ref 0.6–1.1)
FRACTIONAL SHORTENING: 45 % (ref 28–44)
HR TR ECHO: 85 BPM
INTERVENTRICULAR SEPTUM: 0.57 CM (ref 0.6–1.1)
LA MAJOR: 5.01 CM
LA MINOR: 5.21 CM
LA WIDTH: 3.48 CM
LEFT ATRIUM SIZE: 3.7 CM
LEFT ATRIUM VOLUME INDEX: 40.6 ML/M2
LEFT ATRIUM VOLUME: 55.91 CM3
LEFT INTERNAL DIMENSION IN SYSTOLE: 2.26 CM (ref 2.1–4)
LEFT VENTRICLE DIASTOLIC VOLUME INDEX: 53.6 ML/M2
LEFT VENTRICLE DIASTOLIC VOLUME: 73.88 ML
LEFT VENTRICLE MASS INDEX: 41 G/M2
LEFT VENTRICLE SYSTOLIC VOLUME INDEX: 12.6 ML/M2
LEFT VENTRICLE SYSTOLIC VOLUME: 17.43 ML
LEFT VENTRICULAR INTERNAL DIMENSION IN DIASTOLE: 4.09 CM (ref 3.5–6)
LEFT VENTRICULAR MASS: 56.66 G
MV MEAN GRADIENT: 6 MMHG
MV PEAK GRADIENT: 21 MMHG
PISA TR MAX VEL: 2.67 M/S
RA MAJOR: 4.05 CM
RA PRESSURE: 3 MMHG
RA WIDTH: 2.94 CM
RIGHT VENTRICULAR END-DIASTOLIC DIMENSION: 3.05 CM
RV TISSUE DOPPLER FREE WALL SYSTOLIC VELOCITY 1 (APICAL 4 CHAMBER VIEW): 13.82 CM/S
SINUS: 2.77 CM
STJ: 2.54 CM
TDI SEPTAL: 0.06 M/S
TR MAX PG: 29 MMHG
TRICUSPID ANNULAR PLANE SYSTOLIC EXCURSION: 1.32 CM
TV REST PULMONARY ARTERY PRESSURE: 32 MMHG

## 2019-12-09 PROCEDURE — 3078F DIAST BP <80 MM HG: CPT | Mod: HCNC,CPTII,S$GLB, | Performed by: INTERNAL MEDICINE

## 2019-12-09 PROCEDURE — 1159F PR MEDICATION LIST DOCUMENTED IN MEDICAL RECORD: ICD-10-PCS | Mod: HCNC,S$GLB,, | Performed by: INTERNAL MEDICINE

## 2019-12-09 PROCEDURE — 1126F AMNT PAIN NOTED NONE PRSNT: CPT | Mod: HCNC,S$GLB,, | Performed by: INTERNAL MEDICINE

## 2019-12-09 PROCEDURE — 3074F SYST BP LT 130 MM HG: CPT | Mod: HCNC,CPTII,S$GLB, | Performed by: INTERNAL MEDICINE

## 2019-12-09 PROCEDURE — 99999 PR PBB SHADOW E&M-EST. PATIENT-LVL IV: ICD-10-PCS | Mod: PBBFAC,HCNC,, | Performed by: INTERNAL MEDICINE

## 2019-12-09 PROCEDURE — 93306 TTE W/DOPPLER COMPLETE: CPT | Mod: HCNC,S$GLB,, | Performed by: INTERNAL MEDICINE

## 2019-12-09 PROCEDURE — 99214 OFFICE O/P EST MOD 30 MIN: CPT | Mod: HCNC,S$GLB,, | Performed by: INTERNAL MEDICINE

## 2019-12-09 PROCEDURE — 1126F PR PAIN SEVERITY QUANTIFIED, NO PAIN PRESENT: ICD-10-PCS | Mod: HCNC,S$GLB,, | Performed by: INTERNAL MEDICINE

## 2019-12-09 PROCEDURE — 99214 PR OFFICE/OUTPT VISIT, EST, LEVL IV, 30-39 MIN: ICD-10-PCS | Mod: HCNC,S$GLB,, | Performed by: INTERNAL MEDICINE

## 2019-12-09 PROCEDURE — 93306 ECHO (CUPID ONLY): ICD-10-PCS | Mod: HCNC,S$GLB,, | Performed by: INTERNAL MEDICINE

## 2019-12-09 PROCEDURE — 3078F PR MOST RECENT DIASTOLIC BLOOD PRESSURE < 80 MM HG: ICD-10-PCS | Mod: HCNC,CPTII,S$GLB, | Performed by: INTERNAL MEDICINE

## 2019-12-09 PROCEDURE — 1101F PT FALLS ASSESS-DOCD LE1/YR: CPT | Mod: HCNC,CPTII,S$GLB, | Performed by: INTERNAL MEDICINE

## 2019-12-09 PROCEDURE — 1159F MED LIST DOCD IN RCRD: CPT | Mod: HCNC,S$GLB,, | Performed by: INTERNAL MEDICINE

## 2019-12-09 PROCEDURE — 1101F PR PT FALLS ASSESS DOC 0-1 FALLS W/OUT INJ PAST YR: ICD-10-PCS | Mod: HCNC,CPTII,S$GLB, | Performed by: INTERNAL MEDICINE

## 2019-12-09 PROCEDURE — 99999 PR PBB SHADOW E&M-EST. PATIENT-LVL IV: CPT | Mod: PBBFAC,HCNC,, | Performed by: INTERNAL MEDICINE

## 2019-12-09 PROCEDURE — 3074F PR MOST RECENT SYSTOLIC BLOOD PRESSURE < 130 MM HG: ICD-10-PCS | Mod: HCNC,CPTII,S$GLB, | Performed by: INTERNAL MEDICINE

## 2019-12-09 RX ORDER — LEVOTHYROXINE SODIUM 75 UG/1
TABLET ORAL
Qty: 90 TABLET | Refills: 4 | Status: SHIPPED | OUTPATIENT
Start: 2019-12-09 | End: 2020-01-01

## 2019-12-09 NOTE — LETTER
December 9, 2019      Stephanie Prescott MD  [address]  [facility] [address]           [facility] [place]  [address]  [facility] [address]  Phone: [phone]  Fax: [phone]          Patient: Marga Guerra   MR Number: [id number]   YOB: 1940   Date of Visit: 12/9/2019       Dear Dr. Stephanie Prescott:    Thank you for referring Marga Guerra to me for evaluation. Attached you will find relevant portions of my assessment and plan of care.    If you have questions, please do not hesitate to call me. I look forward to following Marga Guerra along with you.    Sincerely,    Kilo Beckett MD    Enclosure  CC:  No Recipients    If you would like to receive this communication electronically, please contact [email or web address] or [phone] to request more information on [facility] access.    For providers and/or their staff who would like to refer a patient to [facility], please contact us through our one-stop-shop provider referral line, [facility], at [phone].    If you feel you have received this communication in error or would no longer like to receive these types of communications, please e-mail [email or web address]

## 2019-12-09 NOTE — ASSESSMENT & PLAN NOTE
Most recent calcium level is elevated, so she is on too much calcium and is at risk for hypercalciuria. Will reduce dose.    Continue calcitriol at current dose for now.    Check PTH, Vitamin D and serum calcium in 1 month.

## 2019-12-09 NOTE — ASSESSMENT & PLAN NOTE
Known disease in the neck. Unsure if numerous lung nodules are related to recurrence of lung cancer or thyroid in origin. Will see medical oncology in the upcoming months.    Risks of TSH suppression outweigh benefits in this situation (already has cardiac issues and osteoporosis).    Unsure benefit of surgery to remove lymph nodes and I-131 at this point. I will present her case at the next endocrine tumor conference to get more opinions before proceeding.    Recheck TSH, TG and thyroid ultrasound in 6 months.

## 2019-12-09 NOTE — ASSESSMENT & PLAN NOTE
Treatment options are limited due to severe renal dysfunction, active malignancy, and allergic reaction to Prolia. 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 Alk phos is low and PTH is low also, so this anti-resorptives would likely be counter productive. On calcium supplement (see below). Check vitamin D.     No recent falls.

## 2019-12-09 NOTE — PROGRESS NOTES
Subjective:      Chief Complaint: Follow-up for thyroid cancer, post-surgical Hypothyroidism    HPI: Marga Guerra is a 79 y.o. female who is here for a follow-up evaluation for Thyroid cancer surveillance, post-surgical hypothyroidism, and osteoporosis    Last seen by me in 11/2017 and seen by Dr. Chew and Candy in 4/2019    Being following by oncology for non-small cell lung cancer - on surveillance. Finished 2 cycles of chemotherapy as well as radiation. Has not seen them in a while, but her daughter reports they recently requested a follow-up appointment and will be seen soon.    PET 9/2016: Posterior right rib activity unchanged. No evidence of local recurrence or distant metastatic disease.    PET 1/2018:   There are multiple new areas of hypermetabolic activity within the neck.    - Right neck lesion lymph node ([id number], image69) SUV max 8.44  - Right paratracheal lymph node ([id number], image75) SUV max 6.52   - Right superior mediastinal lymph node ([id number], image81) SUV max 4.55  - New pathologic fracture of the right posterior 9th rib ([id number], image17) SUV max 4.49    A left lower lobe nodule is slightly larger at 6 mm from 4 mm on PET/CT 09/12/2016.  This lesion is relatively stable and below the size threshold for PET hypermetabolic activity, although neoplastic process cannot be excluded given interval enlargement.    Remote 9th and 8th rib fracture that was present on 09/12/2016. These do not demonstrate significant hypermetabolic activity. In the adjacent pulmonary parenchyma there is associated compressive atelectasis and consolidation and near-complete resolution of the previously demonstrated cavitation.    Incidental CT findings:  Postsurgical changes of total thyroidectomy.  Mildly ectatic ascending aorta to 3.7 cm.  There is significant calcific atherosclerosis throughout the aortic arch and multiple coronary arteries.  There are multiple areas of patchy groundglass  opacities throughout the right upper lobe, a mixed density linear lesion in the lingula of the left upper lobe and multiple subcentimeter nodules throughout the base of the left lower lobe, these findings are all stable since PET/CT 09/12/2016.      Impression         Three new areas of hypermetabolic lesions within the neck and superior mediastinum with SUV measurements as above.    New pathologic fracture of the right posterior 9th rib with hypermetabolic activity, as above.     Left lower lobe nodule slightly larger since prior PET/CT 09/12/2016 that is below the size threshold for PET scan, although neoplastic process cannot be excluded.     Remote rib fracture of the 9th and 8th right posterior ribs, similar to scan from 09/12/2016.  Stable pulmonary findings of patchy groundglass opacities and compressive atelectasis and consolidation adjacent to the rib fractures.     5/2018: I-123 scan did not show any iodine avid disease    CT Chest 5/2018:   In this patient with a history of lung cancer, soft tissue attenuation and volume loss in the right lower lobe with areas of calcification, similar to prior PET-CT on 01/19/2018 and suggestive of treated lung cancer.    Upper limits of normal superior mediastinal lymph node that previously demonstrated hypermetabolic activity on prior PET-CT on 01/19/2018    Innumerable scattered pulmonary nodules, most notable in the left lower lobe that appear to have progressively increased in number and worrisome for metastatic disease.  Clinical considerations will determine the role and interval for CT surveillance.     With regards to her thyroid cancer:  H/o Total thyroidectomy with bilateral paratracheal dissection and left modified neck dissection of levels II-V, 3/12/2012  There was gross extrathyroidal tumor with infiltration of the left RLN requiring sacrifice    Thyroid Synoptic Report:  Procedure: total thyroidectomy, left neck dissections, central compartment  dissection  Specimen integrity: single intact thyroid specimen  Specimen size: r lobe: 5 x 4 x 3 cm; isthmus: 4 x 3 x 3 cm ; l lobe: 6 x 4 x 3 cm  Tumor focality: multifocal; l lobe: 5 cm; isthmus: 2 nodule: 4.5 and 1.2 cm; r lobe: 3 nodules: 2.0 cm, 1.2 cm, 0.7 cm  Tumor laterality: right lobe, left lobe, and isthmus  Tumor size: 5 cm let lobe, greatest dimension  Histological type: papillary, classical and follicular variants  Margins: tumor focally extends to margin at isthmus  Tumor capsule: partially encapsulated  Tumor capsule invasion: present, widely invasive  Lymph-vascular invasion: present, focal  Lymph nodes: 9+/47  Extrathyroid extension: present  Stage: CON (pT4 pN1b MX)    S/P 100 mCi I-131- 9/2012  10/2012 WBS   1. Thyroid tissue remnant in the neck.    2. No evidence of distant metastases.      2/4/15 U/S:   Status post thyroidectomy.  No sonographic evidence of malignancy in this patient with detectable thyroglobulinemia     Neck U/S 12/2017: two suspicious lymph nodes (both biopsy proven PTC)  Neck U/S 6/2019: Increasing size of known malignant nodes; no new lymph nodes    Component      Latest Ref Rng & Units 12/6/2019 4/18/2019 2/1/2019   Thyroglobulin, Tumor Marker      ng/mL 19 (H) 3.4 (H)    Thyroglobulin Antibody Screen      <4.0 IU/mL 3.0 21 (H)    Thyroglobulin Interpretation       SEE BELOW SEE BELOW    TSH      0.400 - 4.000 uIU/mL 0.988 0.458 0.404   Free T4      0.71 - 1.51 ng/dL 1.50 1.76 (H) 1.45     Component      Latest Ref Rng & Units 2/21/2018 1/9/2018 11/8/2017   Thyroglobulin, Tumor Marker      ng/mL  2.7 (H) 5.7 (H)   Thyroglobulin Antibody Screen      <4.0 IU/mL  13 (H) 5.8 (H)   Thyroglobulin Interpretation        SEE BELOW SEE BELOW   TSH      0.400 - 4.000 uIU/mL 0.120 (L) 0.030 (L) 0.018 (L)   Free T4      0.71 - 1.51 ng/dL 1.59 (H) 1.57 (H) 2.01 (H)     Component      Latest Ref Rng & Units 12/19/2016 11/2/2016 9/23/2016   Thyroglobulin, Tumor Marker      ng/mL  13 (H)     Thyroglobulin Antibody Screen      <4.0 IU/mL  2.2    Thyroglobulin Interpretation        SEE BELOW    TSH      0.400 - 4.000 uIU/mL 0.053 (L) 0.043 (L) 0.258 (L)   Free T4      0.71 - 1.51 ng/dL 1.51 1.69 (H) 1.60 (H)     Component      Latest Ref Rng & Units 8/9/2016 7/18/2016 6/17/2015   Thyroglobulin, Tumor Marker      ng/mL 32 (H)  11 (H)   Thyroglobulin Antibody Screen      <4.0 IU/mL <1.8  <1.8   Thyroglobulin Interpretation       SEE BELOW  SEE BELOW   TSH      0.400 - 4.000 uIU/mL 12.225 (H) 11.787 (H) 5.039 (H)   Free T4      0.71 - 1.51 ng/dL 1.10 1.28 1.14     Component      Latest Ref Rng & Units 5/13/2015 4/9/2015 1/6/2015   Thyroglobulin, Tumor Marker      ng/mL 8.5 (H)  7.0 (H)   Thyroglobulin Antibody Screen      <4.0 IU/mL <1.8  <1.8   Thyroglobulin Interpretation       SEE BELOW  SEE BELOW   TSH      0.400 - 4.000 uIU/mL 0.154 (L) 0.179 (L) 0.045 (L)   Free T4      0.71 - 1.51 ng/dL 1.45 1.40 1.48       Post-surgical hypoparathyroidism and osteoporosis  calcitriol 0.5 mcg qd  calcium - 4 pills 3 x day   Not currently supplementing with D3.       Lumbar Spine: Lumbar bone mineral density L1-L4 is 0.773g/cm2, which is a t-score of -2.5. The z-score is 0.2.    Total Hip: The total hip bone mineral density is 0.565g/cm2.  The t-score is -3.1, and the z-score is -0.9.  Femoral neck BMD is 0.443g/cm2 and the t-score is -3.7.    COMPARISONS:  Date Location BMD T-score  02/04/15 L-spine 0.724 -2.9  Total Hip 0.580 -3.0      Impression       Severe osteoporosis on basis of vertebral fracture and BMD. Total hip stable since 2015 and lumbar spine BMD increased 7%.    Recommendations:  1) Adequate calcium and Vitamin D therapy  2) Appropriate exercise  3) Consider medical therapy including bisphosphonates, denosumab.  4) Consider repeat BMD in 2 years     prolia started 9/2/16 - After treatment, patient developed severe lip swelling, which lasted the entire day. No shortness of breath, rash, wheezing, low  blood pressure.      No recent falls    Has had a cold for the past few days, but previously was feeling well.    Reviewed past medical, family, social history and updated as appropriate.    Review of Systems   HENT: Positive for congestion.    Respiratory: Positive for cough. Negative for shortness of breath.    Cardiovascular: Negative for chest pain.   Gastrointestinal: Negative for abdominal pain.     Objective:     Vitals:    12/09/19 1443   BP: 104/62   Pulse: 68   Resp: 18       Physical Exam   Constitutional: She appears well-developed.   HENT:   Head: Normocephalic and atraumatic.   Neck: No tracheal deviation present. No thyromegaly (No palpable residual thyroid tissue) present.   Cardiovascular: Normal rate.   Murmur (3/6 barking early systolic murmur (AS most likely)) heard.  Pulmonary/Chest: Effort normal. No stridor. No respiratory distress.   Abdominal: Soft.   Musculoskeletal: She exhibits no edema.   No digital clubbing or extremity cyanosis   Lymphadenopathy:     She has cervical adenopathy (Bilateral lymph nodes palpable (one on each side)).   Skin: Skin is warm and dry.   Psychiatric: She has a normal mood and affect. Her behavior is normal. Judgment and thought content normal.   Nursing note and vitals reviewed.      Wt Readings from Last 10 Encounters:   12/09/19 1611 44.5 kg (98 lb)   12/09/19 1443 44.8 kg (98 lb 12.3 oz)   12/06/19 1144 46.8 kg (103 lb 2.8 oz)   12/06/19 1102 46.8 kg (103 lb 2.8 oz)   04/18/19 1051 48.4 kg (106 lb 11.2 oz)   03/18/19 1613 49.9 kg (110 lb)   02/01/19 1120 50.3 kg (110 lb 14.3 oz)   11/21/18 0908 50.2 kg (110 lb 10.7 oz)   08/08/18 0919 54.4 kg (120 lb)   06/01/18 0953 49.7 kg (109 lb 9.1 oz)   ]    Lab Results   Component Value Date    HGBA1C 5.5 12/06/2019     Lab Results   Component Value Date    CHOL 247 (H) 12/06/2019     (H) 12/06/2019    LDLCALC 105.0 12/06/2019    TRIG 100 12/06/2019    CHOLHDL 49.4 12/06/2019     Lab Results   Component Value  Date     12/06/2019    K 4.0 12/06/2019     12/06/2019    CO2 25 12/06/2019    GLU 87 12/06/2019    BUN 38 (H) 12/06/2019    CREATININE 4.1 (H) 12/06/2019    CALCIUM 10.5 12/06/2019    PROT 7.1 12/06/2019    ALBUMIN 3.9 12/06/2019    BILITOT 0.5 12/06/2019    ALKPHOS 47 (L) 12/06/2019    AST 17 12/06/2019    ALT 12 12/06/2019    ANIONGAP 12 12/06/2019    ESTGFRAFRICA 11 (A) 12/06/2019    EGFRNONAA 10 (A) 12/06/2019    TSH 0.988 12/06/2019        Assessment/Plan:     Thyroid cancer  Known disease in the neck. Unsure if numerous lung nodules are related to recurrence of lung cancer or thyroid in origin. Will see medical oncology in the upcoming months.    Risks of TSH suppression outweigh benefits in this situation (already has cardiac issues and osteoporosis).    Unsure benefit of surgery to remove lymph nodes and I-131 at this point. I will present her case at the next endocrine tumor conference to get more opinions before proceeding.    Recheck TSH, TG and thyroid ultrasound in 6 months.    Vitamin D deficiency  Check vitamin D with upcoming labs.    Senile osteoporosis  Treatment options are limited due to severe renal dysfunction, active malignancy, and allergic reaction to Prolia. 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 Alk phos is low and PTH is low also, so this anti-resorptives would likely be counter productive. On calcium supplement (see below). Check vitamin D.     No recent falls.    Post-surgical hypothyroidism  See above.    Postsurgical hypoparathyroidism  Most recent calcium level is elevated, so she is on too much calcium and is at risk for hypercalciuria. Will reduce dose.    Continue calcitriol at current dose for now.    Check PTH, Vitamin D and serum calcium in 1 month.    RTC in 6 months

## 2019-12-10 ENCOUNTER — TELEPHONE (OUTPATIENT)
Dept: INTERNAL MEDICINE | Facility: CLINIC | Age: 79
End: 2019-12-10

## 2019-12-10 ENCOUNTER — TELEPHONE (OUTPATIENT)
Dept: HEMATOLOGY/ONCOLOGY | Facility: CLINIC | Age: 79
End: 2019-12-10

## 2019-12-10 DIAGNOSIS — C73 THYROID CANCER: Primary | ICD-10-CM

## 2019-12-10 NOTE — TELEPHONE ENCOUNTER
Left voice message for patient to schedule appointment from referral to Cardiology.  Benjamin SIMMONS  [phone]

## 2019-12-10 NOTE — TELEPHONE ENCOUNTER
----- Message from Jose Lua RN sent at 12/10/2019  8:38 AM CST -----  Please contact patient to schedule her for cbc/cmp, petscan, and appt with dr vyas within the next few weeks.  ~jose      ----- Message -----  From: Marcela Vyas MD  Sent: 12/10/2019   8:31 AM CST  To: Jose Lua RN    She needs restaging CT scans  ----- Message -----  From: Jose Lua RN  Sent: 12/10/2019   7:40 AM CST  To: Marcela Vyas MD    What would you like scheduled for her?  ~jose    ----- Message -----  From: Kristan Almendarez RN  Sent: 12/9/2019   5:11 PM CST  To: Jose uLa RN    Hi,       Received a referral for this pt to see Dr. Vyas.  Pt last saw Dr. Vyas early 2018.  Can she be booked as a F/U?    Thanks  Kristan

## 2019-12-11 ENCOUNTER — TELEPHONE (OUTPATIENT)
Dept: HEMATOLOGY/ONCOLOGY | Facility: CLINIC | Age: 79
End: 2019-12-11

## 2019-12-11 ENCOUNTER — TELEPHONE (OUTPATIENT)
Dept: INTERNAL MEDICINE | Facility: CLINIC | Age: 79
End: 2019-12-11

## 2019-12-11 NOTE — TELEPHONE ENCOUNTER
Called patient to schedule the pet scan and follow up with Dr Vyas. Scheduled pet for Friday 12/20 and follow up for 12/23. Mailed appt slip.        ----- Message from Rafaela López sent at 12/10/2019  8:49 AM CST -----    Called PT. No answer, left VM --12/10 850 AM    Message   Received: Today   Message Contents   Jose Lua RN  P [facility]         Please contact patient to schedule her for cbc/cmp, petscan, and appt with dr vyas within the next few weeks.   ~jose

## 2019-12-13 ENCOUNTER — TELEPHONE (OUTPATIENT)
Dept: ENDOCRINOLOGY | Facility: CLINIC | Age: 79
End: 2019-12-13

## 2019-12-20 ENCOUNTER — HOSPITAL ENCOUNTER (OUTPATIENT)
Dept: RADIOLOGY | Facility: HOSPITAL | Age: 79
Discharge: HOME | End: 2019-12-20
Attending: INTERNAL MEDICINE
Payer: MEDICARE

## 2019-12-20 DIAGNOSIS — C73 THYROID CANCER: ICD-10-CM

## 2019-12-20 LAB — POCT GLUCOSE: 97 MG/DL (ref 70–110)

## 2019-12-20 PROCEDURE — 78815 NM PET CT ROUTINE: ICD-10-PCS | Mod: 26,HCNC,PS, | Performed by: RADIOLOGY

## 2019-12-20 PROCEDURE — 78815 PET IMAGE W/CT SKULL-THIGH: CPT | Mod: TC,HCNC,PS

## 2019-12-20 PROCEDURE — 78815 PET IMAGE W/CT SKULL-THIGH: CPT | Mod: 26,HCNC,PS, | Performed by: RADIOLOGY

## 2019-12-20 PROCEDURE — A9552 F18 FDG: HCPCS | Mod: HCNC

## 2019-12-23 ENCOUNTER — PATIENT OUTREACH (OUTPATIENT)
Dept: ADMINISTRATIVE | Facility: OTHER | Age: 79
End: 2019-12-23

## 2019-12-23 ENCOUNTER — OFFICE VISIT (OUTPATIENT)
Dept: HEMATOLOGY/ONCOLOGY | Facility: CLINIC | Age: 79
End: 2019-12-23
Payer: MEDICARE

## 2019-12-23 ENCOUNTER — LAB VISIT (OUTPATIENT)
Dept: LAB | Facility: HOSPITAL | Age: 79
End: 2019-12-23
Payer: MEDICARE

## 2019-12-23 VITALS
BODY MASS INDEX: 18.31 KG/M2 | SYSTOLIC BLOOD PRESSURE: 158 MMHG | HEART RATE: 88 BPM | WEIGHT: 97 LBS | TEMPERATURE: 98 F | DIASTOLIC BLOOD PRESSURE: 90 MMHG | HEIGHT: 61 IN | OXYGEN SATURATION: 97 % | RESPIRATION RATE: 18 BRPM

## 2019-12-23 DIAGNOSIS — C73 THYROID CANCER: Primary | ICD-10-CM

## 2019-12-23 DIAGNOSIS — C73 THYROID CANCER: ICD-10-CM

## 2019-12-23 DIAGNOSIS — K21.9 GASTROESOPHAGEAL REFLUX DISEASE, ESOPHAGITIS PRESENCE NOT SPECIFIED: ICD-10-CM

## 2019-12-23 DIAGNOSIS — Z85.118 HISTORY OF LUNG CANCER: ICD-10-CM

## 2019-12-23 LAB
ALBUMIN SERPL BCP-MCNC: 3.3 G/DL (ref 3.5–5.2)
ALP SERPL-CCNC: 72 U/L (ref 55–135)
ALT SERPL W/O P-5'-P-CCNC: 8 U/L (ref 10–44)
ANION GAP SERPL CALC-SCNC: 9 MMOL/L (ref 8–16)
AST SERPL-CCNC: 16 U/L (ref 10–40)
BILIRUB SERPL-MCNC: 0.6 MG/DL (ref 0.1–1)
BUN SERPL-MCNC: 37 MG/DL (ref 8–23)
CALCIUM SERPL-MCNC: 9.2 MG/DL (ref 8.7–10.5)
CHLORIDE SERPL-SCNC: 108 MMOL/L (ref 95–110)
CO2 SERPL-SCNC: 24 MMOL/L (ref 23–29)
CREAT SERPL-MCNC: 3.3 MG/DL (ref 0.5–1.4)
ERYTHROCYTE [DISTWIDTH] IN BLOOD BY AUTOMATED COUNT: 14.7 % (ref 11.5–14.5)
EST. GFR  (AFRICAN AMERICAN): 14.4 ML/MIN/1.73 M^2
EST. GFR  (NON AFRICAN AMERICAN): 12.5 ML/MIN/1.73 M^2
GLUCOSE SERPL-MCNC: 97 MG/DL (ref 70–110)
HCT VFR BLD AUTO: 35.7 % (ref 37–48.5)
HGB BLD-MCNC: 11.2 G/DL (ref 12–16)
IMM GRANULOCYTES # BLD AUTO: 0.02 K/UL (ref 0–0.04)
MCH RBC QN AUTO: 29 PG (ref 27–31)
MCHC RBC AUTO-ENTMCNC: 31.4 G/DL (ref 32–36)
MCV RBC AUTO: 93 FL (ref 82–98)
NEUTROPHILS # BLD AUTO: 5.3 K/UL (ref 1.8–7.7)
PLATELET # BLD AUTO: 185 K/UL (ref 150–350)
PMV BLD AUTO: 11.6 FL (ref 9.2–12.9)
POTASSIUM SERPL-SCNC: 4.5 MMOL/L (ref 3.5–5.1)
PROT SERPL-MCNC: 6.7 G/DL (ref 6–8.4)
RBC # BLD AUTO: 3.86 M/UL (ref 4–5.4)
SODIUM SERPL-SCNC: 141 MMOL/L (ref 136–145)
WBC # BLD AUTO: 7.35 K/UL (ref 3.9–12.7)

## 2019-12-23 PROCEDURE — 99215 PR OFFICE/OUTPT VISIT, EST, LEVL V, 40-54 MIN: ICD-10-PCS | Mod: HCNC,S$GLB,, | Performed by: INTERNAL MEDICINE

## 2019-12-23 PROCEDURE — 36415 COLL VENOUS BLD VENIPUNCTURE: CPT | Mod: HCNC

## 2019-12-23 PROCEDURE — 1159F PR MEDICATION LIST DOCUMENTED IN MEDICAL RECORD: ICD-10-PCS | Mod: HCNC,S$GLB,, | Performed by: INTERNAL MEDICINE

## 2019-12-23 PROCEDURE — 99499 UNLISTED E&M SERVICE: CPT | Mod: HCNC,S$GLB,, | Performed by: INTERNAL MEDICINE

## 2019-12-23 PROCEDURE — 3077F PR MOST RECENT SYSTOLIC BLOOD PRESSURE >= 140 MM HG: ICD-10-PCS | Mod: HCNC,CPTII,S$GLB, | Performed by: INTERNAL MEDICINE

## 2019-12-23 PROCEDURE — 99215 OFFICE O/P EST HI 40 MIN: CPT | Mod: HCNC,S$GLB,, | Performed by: INTERNAL MEDICINE

## 2019-12-23 PROCEDURE — 3080F DIAST BP >= 90 MM HG: CPT | Mod: HCNC,CPTII,S$GLB, | Performed by: INTERNAL MEDICINE

## 2019-12-23 PROCEDURE — 1101F PT FALLS ASSESS-DOCD LE1/YR: CPT | Mod: HCNC,CPTII,S$GLB, | Performed by: INTERNAL MEDICINE

## 2019-12-23 PROCEDURE — 85027 COMPLETE CBC AUTOMATED: CPT | Mod: HCNC

## 2019-12-23 PROCEDURE — 1101F PR PT FALLS ASSESS DOC 0-1 FALLS W/OUT INJ PAST YR: ICD-10-PCS | Mod: HCNC,CPTII,S$GLB, | Performed by: INTERNAL MEDICINE

## 2019-12-23 PROCEDURE — 1159F MED LIST DOCD IN RCRD: CPT | Mod: HCNC,S$GLB,, | Performed by: INTERNAL MEDICINE

## 2019-12-23 PROCEDURE — 1126F AMNT PAIN NOTED NONE PRSNT: CPT | Mod: HCNC,S$GLB,, | Performed by: INTERNAL MEDICINE

## 2019-12-23 PROCEDURE — 3080F PR MOST RECENT DIASTOLIC BLOOD PRESSURE >= 90 MM HG: ICD-10-PCS | Mod: HCNC,CPTII,S$GLB, | Performed by: INTERNAL MEDICINE

## 2019-12-23 PROCEDURE — 3077F SYST BP >= 140 MM HG: CPT | Mod: HCNC,CPTII,S$GLB, | Performed by: INTERNAL MEDICINE

## 2019-12-23 PROCEDURE — 99499 RISK ADDL DX/OHS AUDIT: ICD-10-PCS | Mod: HCNC,S$GLB,, | Performed by: INTERNAL MEDICINE

## 2019-12-23 PROCEDURE — 80053 COMPREHEN METABOLIC PANEL: CPT | Mod: HCNC

## 2019-12-23 PROCEDURE — 1126F PR PAIN SEVERITY QUANTIFIED, NO PAIN PRESENT: ICD-10-PCS | Mod: HCNC,S$GLB,, | Performed by: INTERNAL MEDICINE

## 2019-12-23 PROCEDURE — 99999 PR PBB SHADOW E&M-EST. PATIENT-LVL III: CPT | Mod: PBBFAC,HCNC,, | Performed by: INTERNAL MEDICINE

## 2019-12-23 PROCEDURE — 99999 PR PBB SHADOW E&M-EST. PATIENT-LVL III: ICD-10-PCS | Mod: PBBFAC,HCNC,, | Performed by: INTERNAL MEDICINE

## 2019-12-23 RX ORDER — OMEPRAZOLE 20 MG/1
20 TABLET, DELAYED RELEASE ORAL DAILY
Qty: 30 TABLET | Refills: 1 | Status: ON HOLD | OUTPATIENT
Start: 2019-12-23 | End: 2020-07-31 | Stop reason: HOSPADM

## 2019-12-23 NOTE — Clinical Note
Hi Dr. Beckett,Just saw her PET scan which looks concerning for progression of her cancer, I am thinking most likely thyroid because the last biopsy was from right neck, level III lymph node and there a re more lymph nodes there. The lung lesions are slightly bigger but they are all less than 1 cm so difficult to biopsy. What are your plans for treatment for BARGER?

## 2019-12-23 NOTE — PROGRESS NOTES
"Subjective:       Patient ID: Marga Guerra is a 79 y.o. female.    Chief Complaint: Thyroid Cancer  Oncologic History (From Chart):   Ms. Guerra is a 77-year-old female with a diagnosis of squamous cell lung cancer. She initially was referred to see Dr. Martinez on 08/22/2013 for an abnormal chest x-ray and a CT scan. She underwent a CT scan on 08/14/2013 and that revealed a large cavitary lesion in the right lower lobe measuring 3.7 x 3.2, second cavitary lesion with an anterior segment of the right upper lobe measuring 2.4 x 1, not present on the exam in 03/2012. Also, multiple bilateral subcentimeter noncalcified pulmonary nodules identified ranging from 3 to 5 mm in size, but no other evidence of disease elsewhere. She saw Dr. Martinez and subsequently underwent a bronchoscopy on 08/29/2013. Pathology revealed squamous cell cancer. She has subsequently undergone a PET scan on 09/10/2013 that revealed hypermetabolic activity consistent with lung malignancy, one in the anterior segment of the right upper lobe with a maximum SUV of 4.6. The second is a pleural based lesion just posterior to the primary mass with invasion and destruction of the posterior aspect of the eighth rib demonstrating a maximum SUV of 8.4. There is also increased activity at the spinous process of L4, which represents an inflammatory process. No other metastatic disease has been noted. She also underwent MRI brain with no evidence of metastatic disease. She has an ECOG performance status of 2.   She completed in 6 (week 5 of chemo but week 6 of RT) of chemo/RT with weekly carboplatin and Taxol on 11/22/13. She was then treated with 2 cycles of consolidation chemotherapy and completed that on 1/9/14      Her PET scan from July 2014 revealed "Interval development of a new focal area of hypermetabolism at the level of the right eighth rib posteriorly corresponding to the new lytic lesion and representing recurrence metastatic disease. Right lung " "base including right lower lobe and right middle lobe inflammatory or post radiation therapy changes".  She was referred to palliative focal radiation of the right eighth rib (completed in August 2014) and this relieved her pain.  She has been on surveillance.     She has been lost to follow-up in my clinic for 2 years and comes in today to review her recent PEt scan from 1/19/18 which reveals "Three new areas of hypermetabolic lesions within the neck and superior mediastinum with SUV measurements as above. New pathologic fracture of the right posterior 9th rib with hypermetabolic activity, as above.  Left lower lobe nodule slightly larger since prior PET/CT 09/12/2016 that is below the size threshold for PET scan, although neoplastic process cannot be excluded.  Remote rib fracture of the 9th and 8th right posterior ribs, similar to scan from 09/12/2016.  Stable pulmonary findings of patchy groundglass opacities and compressive atelectasis and consolidation adjacent to the rib fractures.     She underwent biopsy which reveals "Right upper lymph node, fine-needle aspiration: Positive for malignant cells, consistent with papillary thyroid carcinoma. Right lower lymph node, fine-needle aspiration: Positive for malignant cells, consistent with papillary thyroid carcinoma"    [facility] PET scan which reveals "Progression of several hypermetabolic right cervical lymph nodes compatible with FDG-avid metastatic disease.  Prior index lesions have increased in size and uptake in there is a new hypermetabolic right level 5 cervical node.  There is no indication that the patient received Thyrogen preparation for this examination.  Recommend consideration of Thyrogen stimulation in preparation for future FDG PET-CT scans to assess for FDG avid disease in coordination with thyroglobulin assays and radioactive iodine whole body scans as clinically indicated. Numerous subcentimeter bilateral pulmonary nodules, " "some of which have increased in size in comparison to prior exam.  Focal increased uptake within a subcentimeter left lower lobe nodule.  Given presence and relative stability of the numerous pulmonary nodules over the course of several years, favor a benign inflammatory etiology.  Attention on follow-up"    Review of Systems   Constitutional: Negative for appetite change, fatigue and unexpected weight change.   HENT: Negative for mouth sores.    Eyes: Negative for visual disturbance.   Respiratory: Negative for cough and shortness of breath.    Cardiovascular: Negative for chest pain.   Gastrointestinal: Negative for abdominal pain and diarrhea.   Genitourinary: Negative for frequency.   Musculoskeletal: Negative for back pain.   Skin: Negative for rash.   Neurological: Negative for headaches.   Hematological: Negative for adenopathy.   Psychiatric/Behavioral: The patient is not nervous/anxious.    All other systems reviewed and are negative.      Objective:      Physical Exam   Constitutional: She is oriented to person, place, and time. She appears well-developed and well-nourished.   HENT:   Mouth/Throat: No oropharyngeal exudate.   Cardiovascular: Normal rate and normal heart sounds.   Pulmonary/Chest: Effort normal and breath sounds normal. She has no wheezes.   Abdominal: Soft. Bowel sounds are normal. There is no tenderness.   Musculoskeletal: She exhibits no edema or tenderness.   Lymphadenopathy:     She has no cervical adenopathy.   Neurological: She is alert and oriented to person, place, and time. Coordination normal.   Skin: Skin is warm and dry. No rash noted.   Psychiatric: She has a normal mood and affect. Judgment and thought content normal.   Vitals reviewed.        LABS:  WBC   Date Value Ref Range Status   12/23/2019 7.35 3.90 - 12.70 K/uL Final     Hemoglobin   Date Value Ref Range Status   12/23/2019 11.2 (L) 12.0 - 16.0 g/dL Final     Hematocrit   Date Value Ref Range Status   12/23/2019 35.7 " (L) 37.0 - 48.5 % Final     Platelets   Date Value Ref Range Status   12/23/2019 185 150 - 350 K/uL Final     Gran # (ANC)   Date Value Ref Range Status   12/23/2019 5.3 1.8 - 7.7 K/uL Final     Comment:     The ANC is based on a white cell differential from an   automated cell counter. It has not been microscopically   reviewed for the presence of abnormal cells. Clinical   correlation is required.         Chemistry        Component Value Date/Time     12/23/2019 0829    K 4.5 12/23/2019 0829     12/23/2019 0829    CO2 24 12/23/2019 0829    BUN 37 (H) 12/23/2019 0829    CREATININE 3.3 (H) 12/23/2019 0829    GLU 97 12/23/2019 0829        Component Value Date/Time    CALCIUM 9.2 12/23/2019 0829    ALKPHOS 72 12/23/2019 0829    AST 16 12/23/2019 0829    ALT 8 (L) 12/23/2019 0829    BILITOT 0.6 12/23/2019 0829    ESTGFRAFRICA 14.4 (A) 12/23/2019 0829    EGFRNONAA 12.5 (A) 12/23/2019 0829           Assessment:       1. Thyroid cancer    2. History of lung cancer    3. Gastroesophageal reflux disease, esophagitis presence not specified        Plan:        1,2. Reviewed PET scans, she has progreesion of neck Lymph nodes related to thyroid cancer. The lung nodules have also slightly increased in size, but still too small to biopsy. The right neck level III was biopsied and reveals thyroid cancer. Sent message to Dr. Beckett in Endocrine for consideration of treatment of BARGER. No role for chemo unless she is BARGER refractory.    3. Escribed prilosec.    Above care plan was discussed with patient and accompanying daughter and all questions were addressed to their satisfaction

## 2019-12-27 ENCOUNTER — OFFICE VISIT (OUTPATIENT)
Dept: CARDIOLOGY | Facility: CLINIC | Age: 79
End: 2019-12-27
Payer: MEDICARE

## 2019-12-27 VITALS
SYSTOLIC BLOOD PRESSURE: 141 MMHG | WEIGHT: 97.44 LBS | BODY MASS INDEX: 19.64 KG/M2 | HEIGHT: 59 IN | HEART RATE: 91 BPM | DIASTOLIC BLOOD PRESSURE: 74 MMHG

## 2019-12-27 DIAGNOSIS — I35.0 NONRHEUMATIC AORTIC VALVE STENOSIS: Primary | ICD-10-CM

## 2019-12-27 DIAGNOSIS — E78.2 MIXED HYPERLIPIDEMIA: ICD-10-CM

## 2019-12-27 DIAGNOSIS — I51.89 LEFT VENTRICULAR DIASTOLIC DYSFUNCTION WITH PRESERVED SYSTOLIC FUNCTION: ICD-10-CM

## 2019-12-27 DIAGNOSIS — C73 THYROID CANCER: ICD-10-CM

## 2019-12-27 DIAGNOSIS — R07.9 CHEST PAIN ON EXERTION: ICD-10-CM

## 2019-12-27 DIAGNOSIS — I10 ESSENTIAL HYPERTENSION: ICD-10-CM

## 2019-12-27 DIAGNOSIS — I34.2 NONRHEUMATIC MITRAL VALVE STENOSIS: ICD-10-CM

## 2019-12-27 PROCEDURE — 99204 PR OFFICE/OUTPT VISIT, NEW, LEVL IV, 45-59 MIN: ICD-10-PCS | Mod: HCNC,S$GLB,, | Performed by: INTERNAL MEDICINE

## 2019-12-27 PROCEDURE — 3077F SYST BP >= 140 MM HG: CPT | Mod: HCNC,CPTII,S$GLB, | Performed by: INTERNAL MEDICINE

## 2019-12-27 PROCEDURE — 1126F AMNT PAIN NOTED NONE PRSNT: CPT | Mod: HCNC,S$GLB,, | Performed by: INTERNAL MEDICINE

## 2019-12-27 PROCEDURE — 99999 PR PBB SHADOW E&M-EST. PATIENT-LVL III: CPT | Mod: PBBFAC,HCNC,, | Performed by: INTERNAL MEDICINE

## 2019-12-27 PROCEDURE — 3077F PR MOST RECENT SYSTOLIC BLOOD PRESSURE >= 140 MM HG: ICD-10-PCS | Mod: HCNC,CPTII,S$GLB, | Performed by: INTERNAL MEDICINE

## 2019-12-27 PROCEDURE — 93000 ELECTROCARDIOGRAM COMPLETE: CPT | Mod: HCNC,S$GLB,, | Performed by: INTERNAL MEDICINE

## 2019-12-27 PROCEDURE — 99204 OFFICE O/P NEW MOD 45 MIN: CPT | Mod: HCNC,S$GLB,, | Performed by: INTERNAL MEDICINE

## 2019-12-27 PROCEDURE — 1126F PR PAIN SEVERITY QUANTIFIED, NO PAIN PRESENT: ICD-10-PCS | Mod: HCNC,S$GLB,, | Performed by: INTERNAL MEDICINE

## 2019-12-27 PROCEDURE — 1101F PT FALLS ASSESS-DOCD LE1/YR: CPT | Mod: HCNC,CPTII,S$GLB, | Performed by: INTERNAL MEDICINE

## 2019-12-27 PROCEDURE — 3078F PR MOST RECENT DIASTOLIC BLOOD PRESSURE < 80 MM HG: ICD-10-PCS | Mod: HCNC,CPTII,S$GLB, | Performed by: INTERNAL MEDICINE

## 2019-12-27 PROCEDURE — 99499 UNLISTED E&M SERVICE: CPT | Mod: HCNC,S$GLB,, | Performed by: INTERNAL MEDICINE

## 2019-12-27 PROCEDURE — 99999 PR PBB SHADOW E&M-EST. PATIENT-LVL III: ICD-10-PCS | Mod: PBBFAC,HCNC,, | Performed by: INTERNAL MEDICINE

## 2019-12-27 PROCEDURE — 1159F PR MEDICATION LIST DOCUMENTED IN MEDICAL RECORD: ICD-10-PCS | Mod: HCNC,S$GLB,, | Performed by: INTERNAL MEDICINE

## 2019-12-27 PROCEDURE — 99499 RISK ADDL DX/OHS AUDIT: ICD-10-PCS | Mod: HCNC,S$GLB,, | Performed by: INTERNAL MEDICINE

## 2019-12-27 PROCEDURE — 1159F MED LIST DOCD IN RCRD: CPT | Mod: HCNC,S$GLB,, | Performed by: INTERNAL MEDICINE

## 2019-12-27 PROCEDURE — 1101F PR PT FALLS ASSESS DOC 0-1 FALLS W/OUT INJ PAST YR: ICD-10-PCS | Mod: HCNC,CPTII,S$GLB, | Performed by: INTERNAL MEDICINE

## 2019-12-27 PROCEDURE — 93000 EKG 12-LEAD: ICD-10-PCS | Mod: HCNC,S$GLB,, | Performed by: INTERNAL MEDICINE

## 2019-12-27 PROCEDURE — 3078F DIAST BP <80 MM HG: CPT | Mod: HCNC,CPTII,S$GLB, | Performed by: INTERNAL MEDICINE

## 2019-12-27 NOTE — PROGRESS NOTES
Subjective:   Patient ID:  Marga Guerra is a 79 y.o. female who presents for follow-up of Aortic valve stenosis, etiology of cardiac valve disease uns and Mitral valve stenosis, unspecified etiology      HPI:   Marga Guerrapresents for follow up of valvular heart disease with now severe aortic stenosis with NATALIE 0.7 cm2,,peak velocity 3.8m/s and mean gradient 35 mmHg plus moderate MS. Through her daughter translating, states that when she is active with house work, she will develop burning substernal chest discomfort relieved by rest. Has some accompanying SOB.Will get dizzy if she gets up to quickly. Marga Guerra denies   palpitations,  or syncope. Marga Guerra has hypertension with borderline control this visit. Marga Guerra has dyslipidemia  on high intensity statin with LDL>100 but very elevated HDL...    Review of Systems   Constitution: Negative for malaise/fatigue, weight gain and weight loss.   Eyes: Negative for blurred vision.   Cardiovascular: Positive for chest pain. Negative for claudication, cyanosis, dyspnea on exertion, irregular heartbeat, leg swelling, near-syncope, orthopnea, palpitations, paroxysmal nocturnal dyspnea and syncope.   Respiratory: Negative for cough, shortness of breath and wheezing.         Hx of lung cancer   Endocrine:        Thyroid cancer that is metastatic to surrounding lymph nodes   Musculoskeletal: Negative for falls and myalgias.   Gastrointestinal: Positive for abdominal pain. Negative for heartburn, nausea and vomiting.   Genitourinary: Negative for nocturia.   Neurological: Positive for dizziness. Negative for brief paralysis, focal weakness, headaches, numbness, paresthesias and weakness.   Psychiatric/Behavioral: Negative for altered mental status.       Current Outpatient Medications   Medication Sig    albuterol (PROVENTIL/VENTOLIN HFA) 90 mcg/actuation inhaler Inhale 2 puffs into the lungs every 6 (six) hours as needed for Wheezing.    amLODIPine  "(NORVASC) 5 MG tablet Take 2.5 mg by mouth once daily.    atorvastatin (LIPITOR) 40 MG tablet TAKE 1 TABLET(40 MG) BY MOUTH EVERY DAY    calcitRIOL (ROCALTROL) 0.5 MCG Cap TAKE 1 CAPSULE(0.5 MCG) BY MOUTH EVERY DAY    levothyroxine (SYNTHROID) 75 MCG tablet Mon and Thurs - 1/2 tablet daily; Other 5 days - full tablet daily    omeprazole (PRILOSEC OTC) 20 MG tablet Take 1 tablet (20 mg total) by mouth once daily.    ramipril (ALTACE) 10 MG capsule Take 1 capsule (10 mg total) by mouth once daily.    CALCIUM 600 600 mg (1,500 mg) Tab TK 4 TS PO TID WITH MEALS.    cholecalciferol, vitamin D3, 1,000 unit capsule Take 2 capsules (2,000 Units total) by mouth once daily. (Patient not taking: Reported on 12/27/2019)     No current facility-administered medications for this visit.      Objective:   Physical Exam   Constitutional: She is oriented to person, place, and time. She appears well-developed. No distress.   BP (!) 141/74 (BP Location: Left arm, Patient Position: Sitting, BP Method: Large (Automatic))   Pulse 91   Ht 4' 10.75" (1.492 m)   Wt 44.2 kg (97 lb 7.1 oz)   BMI 19.85 kg/m²    HENT:   Head: Normocephalic.   Eyes: Pupils are equal, round, and reactive to light. Conjunctivae are normal. No scleral icterus.   Neck: Neck supple. No JVD present. No thyromegaly present.   Cardiovascular: Normal rate, regular rhythm and intact distal pulses. PMI is not displaced. Exam reveals no gallop and no friction rub.   Murmur heard.   Harsh midsystolic murmur is present with a grade of 2/6 at the upper right sternal border, upper left sternal border, lower left sternal border and apex radiating to the neck.  Pulmonary/Chest: Effort normal and breath sounds normal. No respiratory distress. She has no wheezes. She has no rales.   Abdominal: Soft. She exhibits no distension. There is no splenomegaly or hepatomegaly. There is no tenderness.   Musculoskeletal: She exhibits no edema or tenderness.   Gait normal "   Neurological: She is alert and oriented to person, place, and time.   Skin: Skin is warm and dry. She is not diaphoretic.   Psychiatric: She has a normal mood and affect. Her behavior is normal.       Lab Results   Component Value Date     12/23/2019    K 4.5 12/23/2019     12/23/2019    CO2 24 12/23/2019    BUN 37 (H) 12/23/2019    CREATININE 3.3 (H) 12/23/2019    GLU 97 12/23/2019    HGBA1C 5.5 12/06/2019    MG 1.2 (L) 01/09/2014    AST 16 12/23/2019    ALT 8 (L) 12/23/2019    ALBUMIN 3.3 (L) 12/23/2019    PROT 6.7 12/23/2019    BILITOT 0.6 12/23/2019    WBC 7.35 12/23/2019    HGB 11.2 (L) 12/23/2019    HCT 35.7 (L) 12/23/2019    MCV 93 12/23/2019     12/23/2019    TSH 0.988 12/06/2019    CHOL 247 (H) 12/06/2019     (H) 12/06/2019    LDLCALC 105.0 12/06/2019    TRIG 100 12/06/2019       Assessment:     1. Nonrheumatic aortic valve stenosis ; Now severe   2. Chest pain on exertion :sibly related to #1 but must exclude concomitant  CAD   3. Left ventricular diastolic dysfunction with preserved systolic function: Compensated    4. Essential hypertension  Borderline control   5. Mixed hyperlipidemia : , on high intensity statin   6. Thyroid cancer        Plan:     Marga was seen today for aortic valve stenosis, etiology of cardiac valve disease uns and mitral valve stenosis, unspecified etiology.    Diagnoses and all orders for this visit:    Nonrheumatic aortic valve stenosis  Parameters not quite at replacement values . Also AVR will depend on approach to Thyroid canceer  Chest pain on exertion  -     EKG 12-lead; Future  -  Repeat  Nuclear Stress - Cardiology Interpreted; Future    Left ventricular diastolic dysfunction with preserved systolic function    Essential hypertension  Continue current regimen for the time being     Mixed hyperlipidemia  Continue current regimen for the time being    Thyroid cancer  Per Heme-Onc

## 2019-12-27 NOTE — LETTER
December 27, 2019      Stephanie Prescott MD  [address]  [facility] [address]           [facility] - Cardiology  [address]  [facility] [address]  Phone: [phone]          Patient: Marga Guerra   MR Number: [id number]   YOB: 1940   Date of Visit: 12/27/2019       Dear Dr. Stephanie Prescott:    Thank you for referring Marga Guerra to me for evaluation. Attached you will find relevant portions of my assessment and plan of care.    If you have questions, please do not hesitate to call me. I look forward to following Marga Guerra along with you.    Sincerely,    Praful Baptiste MD    Enclosure  CC:  No Recipients    If you would like to receive this communication electronically, please contact [email or web address] or [phone] to request more information on [employer] access.    For providers and/or their staff who would like to refer a patient to [facility], please contact us through our one-stop-shop provider referral line, [facility], at [phone].    If you feel you have received this communication in error or would no longer like to receive these types of communications, please e-mail [email or web address]

## 2020-01-01 ENCOUNTER — PATIENT MESSAGE (OUTPATIENT)
Dept: ENDOCRINOLOGY | Facility: CLINIC | Age: 80
End: 2020-01-01

## 2020-01-01 ENCOUNTER — OFFICE VISIT (OUTPATIENT)
Dept: ENDOCRINOLOGY | Facility: CLINIC | Age: 80
End: 2020-01-01
Payer: MEDICARE

## 2020-01-01 ENCOUNTER — PATIENT OUTREACH (OUTPATIENT)
Dept: OTHER | Facility: OTHER | Age: 80
End: 2020-01-01

## 2020-01-01 ENCOUNTER — IMMUNIZATION (OUTPATIENT)
Dept: PHARMACY | Facility: CLINIC | Age: 80
End: 2020-01-01
Payer: MEDICARE

## 2020-01-01 ENCOUNTER — CLINICAL SUPPORT (OUTPATIENT)
Dept: REHABILITATION | Facility: HOSPITAL | Age: 80
End: 2020-01-01
Payer: MEDICARE

## 2020-01-01 ENCOUNTER — OFFICE VISIT (OUTPATIENT)
Dept: CARDIOLOGY | Facility: CLINIC | Age: 80
End: 2020-01-01
Payer: MEDICARE

## 2020-01-01 ENCOUNTER — PATIENT MESSAGE (OUTPATIENT)
Dept: OTHER | Facility: OTHER | Age: 80
End: 2020-01-01

## 2020-01-01 ENCOUNTER — PATIENT OUTREACH (OUTPATIENT)
Dept: ADMINISTRATIVE | Facility: OTHER | Age: 80
End: 2020-01-01

## 2020-01-01 ENCOUNTER — TELEPHONE (OUTPATIENT)
Dept: ENDOCRINOLOGY | Facility: CLINIC | Age: 80
End: 2020-01-01

## 2020-01-01 ENCOUNTER — LAB VISIT (OUTPATIENT)
Dept: LAB | Facility: HOSPITAL | Age: 80
End: 2020-01-01
Attending: INTERNAL MEDICINE
Payer: MEDICARE

## 2020-01-01 VITALS
HEIGHT: 59 IN | WEIGHT: 94.44 LBS | BODY MASS INDEX: 19.2 KG/M2 | HEIGHT: 64 IN | DIASTOLIC BLOOD PRESSURE: 79 MMHG | SYSTOLIC BLOOD PRESSURE: 158 MMHG | BODY MASS INDEX: 16.12 KG/M2 | WEIGHT: 95.25 LBS | DIASTOLIC BLOOD PRESSURE: 82 MMHG | HEART RATE: 80 BPM | HEART RATE: 85 BPM | SYSTOLIC BLOOD PRESSURE: 183 MMHG

## 2020-01-01 DIAGNOSIS — E89.0 POST-SURGICAL HYPOTHYROIDISM: ICD-10-CM

## 2020-01-01 DIAGNOSIS — I63.9 CEREBELLAR STROKE: ICD-10-CM

## 2020-01-01 DIAGNOSIS — E78.2 MIXED HYPERLIPIDEMIA: ICD-10-CM

## 2020-01-01 DIAGNOSIS — Z74.09 IMPAIRED FUNCTIONAL MOBILITY, BALANCE, GAIT, AND ENDURANCE: ICD-10-CM

## 2020-01-01 DIAGNOSIS — R27.8 COORDINATION IMPAIRMENT: ICD-10-CM

## 2020-01-01 DIAGNOSIS — I51.89 LEFT VENTRICULAR DIASTOLIC DYSFUNCTION WITH PRESERVED SYSTOLIC FUNCTION: ICD-10-CM

## 2020-01-01 DIAGNOSIS — I35.0 NONRHEUMATIC AORTIC VALVE STENOSIS: Primary | ICD-10-CM

## 2020-01-01 DIAGNOSIS — I34.2 NONRHEUMATIC MITRAL VALVE STENOSIS: ICD-10-CM

## 2020-01-01 DIAGNOSIS — R29.898 WEAKNESS OF BOTH LOWER EXTREMITIES: ICD-10-CM

## 2020-01-01 DIAGNOSIS — C73 THYROID CANCER: ICD-10-CM

## 2020-01-01 DIAGNOSIS — Z95.2 S/P TAVR (TRANSCATHETER AORTIC VALVE REPLACEMENT): ICD-10-CM

## 2020-01-01 DIAGNOSIS — S70.02XA: ICD-10-CM

## 2020-01-01 DIAGNOSIS — E89.2 POSTSURGICAL HYPOPARATHYROIDISM: ICD-10-CM

## 2020-01-01 DIAGNOSIS — N18.5 CKD (CHRONIC KIDNEY DISEASE), STAGE V: ICD-10-CM

## 2020-01-01 DIAGNOSIS — M81.0 SENILE OSTEOPOROSIS: ICD-10-CM

## 2020-01-01 DIAGNOSIS — E55.9 VITAMIN D DEFICIENCY: ICD-10-CM

## 2020-01-01 DIAGNOSIS — I10 ESSENTIAL HYPERTENSION: ICD-10-CM

## 2020-01-01 DIAGNOSIS — C73 THYROID CANCER: Primary | ICD-10-CM

## 2020-01-01 LAB
ALBUMIN SERPL BCP-MCNC: 3.5 G/DL (ref 3.5–5.2)
ALP SERPL-CCNC: 65 U/L (ref 55–135)
ALT SERPL W/O P-5'-P-CCNC: 8 U/L (ref 10–44)
ANION GAP SERPL CALC-SCNC: 9 MMOL/L (ref 8–16)
AST SERPL-CCNC: 17 U/L (ref 10–40)
BILIRUB SERPL-MCNC: 0.4 MG/DL (ref 0.1–1)
BUN SERPL-MCNC: 44 MG/DL (ref 8–23)
CALCIUM SERPL-MCNC: 9.2 MG/DL (ref 8.7–10.5)
CHLORIDE SERPL-SCNC: 103 MMOL/L (ref 95–110)
CO2 SERPL-SCNC: 24 MMOL/L (ref 23–29)
CREAT SERPL-MCNC: 4.5 MG/DL (ref 0.5–1.4)
EST. GFR  (AFRICAN AMERICAN): 9.8 ML/MIN/1.73 M^2
EST. GFR  (NON AFRICAN AMERICAN): 8.5 ML/MIN/1.73 M^2
GLUCOSE SERPL-MCNC: 89 MG/DL (ref 70–110)
POTASSIUM SERPL-SCNC: 4.8 MMOL/L (ref 3.5–5.1)
PROT SERPL-MCNC: 7 G/DL (ref 6–8.4)
PTH-INTACT SERPL-MCNC: 23 PG/ML (ref 9–77)
SODIUM SERPL-SCNC: 136 MMOL/L (ref 136–145)
T4 FREE SERPL-MCNC: 1.48 NG/DL (ref 0.71–1.51)
TSH SERPL DL<=0.005 MIU/L-ACNC: 0.38 UIU/ML (ref 0.4–4)

## 2020-01-01 PROCEDURE — 80053 COMPREHEN METABOLIC PANEL: CPT | Mod: HCNC

## 2020-01-01 PROCEDURE — 83970 ASSAY OF PARATHORMONE: CPT | Mod: HCNC

## 2020-01-01 PROCEDURE — 1159F PR MEDICATION LIST DOCUMENTED IN MEDICAL RECORD: ICD-10-PCS | Mod: HCNC,S$GLB,, | Performed by: INTERNAL MEDICINE

## 2020-01-01 PROCEDURE — 84439 ASSAY OF FREE THYROXINE: CPT | Mod: HCNC

## 2020-01-01 PROCEDURE — 1101F PT FALLS ASSESS-DOCD LE1/YR: CPT | Mod: HCNC,CPTII,S$GLB, | Performed by: INTERNAL MEDICINE

## 2020-01-01 PROCEDURE — 99499 UNLISTED E&M SERVICE: CPT | Mod: HCNC,S$GLB,, | Performed by: INTERNAL MEDICINE

## 2020-01-01 PROCEDURE — 99214 OFFICE O/P EST MOD 30 MIN: CPT | Mod: HCNC,S$GLB,, | Performed by: INTERNAL MEDICINE

## 2020-01-01 PROCEDURE — 99499 RISK ADDL DX/OHS AUDIT: ICD-10-PCS | Mod: HCNC,S$GLB,, | Performed by: INTERNAL MEDICINE

## 2020-01-01 PROCEDURE — 84443 ASSAY THYROID STIM HORMONE: CPT | Mod: HCNC

## 2020-01-01 PROCEDURE — 3077F SYST BP >= 140 MM HG: CPT | Mod: HCNC,CPTII,S$GLB, | Performed by: INTERNAL MEDICINE

## 2020-01-01 PROCEDURE — 3078F PR MOST RECENT DIASTOLIC BLOOD PRESSURE < 80 MM HG: ICD-10-PCS | Mod: HCNC,CPTII,S$GLB, | Performed by: INTERNAL MEDICINE

## 2020-01-01 PROCEDURE — 99999 PR PBB SHADOW E&M-EST. PATIENT-LVL III: ICD-10-PCS | Mod: PBBFAC,HCNC,, | Performed by: INTERNAL MEDICINE

## 2020-01-01 PROCEDURE — 1125F PR PAIN SEVERITY QUANTIFIED, PAIN PRESENT: ICD-10-PCS | Mod: HCNC,S$GLB,, | Performed by: INTERNAL MEDICINE

## 2020-01-01 PROCEDURE — 99999 PR PBB SHADOW E&M-EST. PATIENT-LVL IV: CPT | Mod: PBBFAC,HCNC,, | Performed by: INTERNAL MEDICINE

## 2020-01-01 PROCEDURE — 97110 THERAPEUTIC EXERCISES: CPT | Mod: HCNC,PN

## 2020-01-01 PROCEDURE — 99214 PR OFFICE/OUTPT VISIT, EST, LEVL IV, 30-39 MIN: ICD-10-PCS | Mod: HCNC,S$GLB,, | Performed by: INTERNAL MEDICINE

## 2020-01-01 PROCEDURE — 3079F DIAST BP 80-89 MM HG: CPT | Mod: HCNC,CPTII,S$GLB, | Performed by: INTERNAL MEDICINE

## 2020-01-01 PROCEDURE — 3077F PR MOST RECENT SYSTOLIC BLOOD PRESSURE >= 140 MM HG: ICD-10-PCS | Mod: HCNC,CPTII,S$GLB, | Performed by: INTERNAL MEDICINE

## 2020-01-01 PROCEDURE — 99999 PR PBB SHADOW E&M-EST. PATIENT-LVL III: CPT | Mod: PBBFAC,HCNC,, | Performed by: INTERNAL MEDICINE

## 2020-01-01 PROCEDURE — 97112 NEUROMUSCULAR REEDUCATION: CPT | Mod: HCNC,PN

## 2020-01-01 PROCEDURE — 1101F PR PT FALLS ASSESS DOC 0-1 FALLS W/OUT INJ PAST YR: ICD-10-PCS | Mod: HCNC,CPTII,S$GLB, | Performed by: INTERNAL MEDICINE

## 2020-01-01 PROCEDURE — 3078F DIAST BP <80 MM HG: CPT | Mod: HCNC,CPTII,S$GLB, | Performed by: INTERNAL MEDICINE

## 2020-01-01 PROCEDURE — 36415 COLL VENOUS BLD VENIPUNCTURE: CPT | Mod: HCNC

## 2020-01-01 PROCEDURE — 1126F PR PAIN SEVERITY QUANTIFIED, NO PAIN PRESENT: ICD-10-PCS | Mod: HCNC,S$GLB,, | Performed by: INTERNAL MEDICINE

## 2020-01-01 PROCEDURE — 1159F MED LIST DOCD IN RCRD: CPT | Mod: HCNC,S$GLB,, | Performed by: INTERNAL MEDICINE

## 2020-01-01 PROCEDURE — 99999 PR PBB SHADOW E&M-EST. PATIENT-LVL IV: ICD-10-PCS | Mod: PBBFAC,HCNC,, | Performed by: INTERNAL MEDICINE

## 2020-01-01 PROCEDURE — 3079F PR MOST RECENT DIASTOLIC BLOOD PRESSURE 80-89 MM HG: ICD-10-PCS | Mod: HCNC,CPTII,S$GLB, | Performed by: INTERNAL MEDICINE

## 2020-01-01 PROCEDURE — 1125F AMNT PAIN NOTED PAIN PRSNT: CPT | Mod: HCNC,S$GLB,, | Performed by: INTERNAL MEDICINE

## 2020-01-01 PROCEDURE — 1126F AMNT PAIN NOTED NONE PRSNT: CPT | Mod: HCNC,S$GLB,, | Performed by: INTERNAL MEDICINE

## 2020-01-14 ENCOUNTER — TELEPHONE (OUTPATIENT)
Dept: HEMATOLOGY/ONCOLOGY | Facility: CLINIC | Age: 80
End: 2020-01-14

## 2020-01-14 NOTE — TELEPHONE ENCOUNTER
"----- Message from Anabela Osuna sent at 1/14/2020  8:54 AM CST -----  Contact: Linda   Consult/Advisory:    Name Of Caller: Linda   Provider Name: Kiera PRABHAKAR MD  Does patient feel the need to be seen today? Unclear   Relationship to the Pt?: daughter   Contact Preference?: [phone]  What is the nature of the call?:    - pts daughter calling back after being advised to call and   report current condition of the pt     Additional Notes:  "Thank you for all that you do for our patients'"      "

## 2020-01-15 ENCOUNTER — TELEPHONE (OUTPATIENT)
Dept: CARDIOLOGY | Facility: CLINIC | Age: 80
End: 2020-01-15

## 2020-01-16 ENCOUNTER — HOSPITAL ENCOUNTER (OUTPATIENT)
Dept: RADIOLOGY | Facility: HOSPITAL | Age: 80
Discharge: HOME | End: 2020-01-16
Attending: INTERNAL MEDICINE
Payer: MEDICARE

## 2020-01-16 ENCOUNTER — TELEPHONE (OUTPATIENT)
Dept: HEMATOLOGY/ONCOLOGY | Facility: CLINIC | Age: 80
End: 2020-01-16

## 2020-01-16 ENCOUNTER — PATIENT MESSAGE (OUTPATIENT)
Dept: ENDOCRINOLOGY | Facility: CLINIC | Age: 80
End: 2020-01-16

## 2020-01-16 DIAGNOSIS — R05.9 COUGH: ICD-10-CM

## 2020-01-16 PROCEDURE — 71046 X-RAY EXAM CHEST 2 VIEWS: CPT | Mod: 26,HCNC,, | Performed by: RADIOLOGY

## 2020-01-16 PROCEDURE — 71046 X-RAY EXAM CHEST 2 VIEWS: CPT | Mod: TC,HCNC

## 2020-01-16 PROCEDURE — 71046 XR CHEST PA AND LATERAL: ICD-10-PCS | Mod: 26,HCNC,, | Performed by: RADIOLOGY

## 2020-01-16 NOTE — TELEPHONE ENCOUNTER
So she has thyroid cancer I sent message to Endocrine Dr. Beckett when I saw her on 12/23/19, about radioactive iodine. I will resend this message to them. Usually Endocrine treats this and if their treatment doesn't work then we see them

## 2020-01-16 NOTE — TELEPHONE ENCOUNTER
----- Message from Corrine Lal sent at 1/16/2020  4:50 PM CST -----  Contact: Linda (daughter)  Returning a call     Caller name:: Linda    Who Left Message for Patient:: Renée    Communication preference:: [phone]    Additional info::

## 2020-01-16 NOTE — TELEPHONE ENCOUNTER
Daughter has questions about her mother and if and when she will start treatment. She was told to call after the holidays if she has not heard anything. Will follow up with Dr. Qureshi tomorrow and be in touch

## 2020-01-17 NOTE — TELEPHONE ENCOUNTER
I left a message for Linda (daughter) to call me back to discuss recs from Dr. Beckett. He will call the daughter as well

## 2020-01-27 ENCOUNTER — TELEPHONE (OUTPATIENT)
Dept: ENDOCRINOLOGY | Facility: HOSPITAL | Age: 80
End: 2020-01-27

## 2020-01-27 NOTE — TELEPHONE ENCOUNTER
I called her daughter Linda to discuss the plan of care about her thyroid cancer, but got no answer. I left a message with the call back number. I told her to read the message on patient portal.

## 2020-01-29 ENCOUNTER — TELEPHONE (OUTPATIENT)
Dept: CARDIOLOGY | Facility: CLINIC | Age: 80
End: 2020-01-29

## 2020-01-31 ENCOUNTER — CLINICAL SUPPORT (OUTPATIENT)
Dept: CARDIOLOGY | Facility: CLINIC | Age: 80
End: 2020-01-31
Attending: INTERNAL MEDICINE
Payer: MEDICARE

## 2020-01-31 DIAGNOSIS — R07.9 CHEST PAIN ON EXERTION: ICD-10-CM

## 2020-01-31 LAB
CV PHARM DOSE: 0.4 MG
CV STRESS BASE HR: 90 BPM
DIASTOLIC BLOOD PRESSURE: 94 MMHG
END DIASTOLIC INDEX-HIGH: 170 ML/M2
END SYSTOLIC INDEX-HIGH: 70 ML/M2
OHS CV CPX 1 MINUTE RECOVERY HEART RATE: 111 BPM
OHS CV CPX 85 PERCENT MAX PREDICTED HEART RATE MALE: 115
OHS CV CPX MAX PREDICTED HEART RATE: 135
OHS CV CPX PATIENT IS FEMALE: 1
OHS CV CPX PATIENT IS MALE: 0
OHS CV CPX PEAK DIASTOLIC BLOOD PRESSURE: 82 MMHG
OHS CV CPX PEAK HEAR RATE: 96 BPM
OHS CV CPX PEAK RATE PRESSURE PRODUCT: NORMAL
OHS CV CPX PEAK SYSTOLIC BLOOD PRESSURE: 171 MMHG
OHS CV CPX PERCENT MAX PREDICTED HEART RATE ACHIEVED: 71
OHS CV CPX RATE PRESSURE PRODUCT PRESENTING: NORMAL
RETIRED EF AND QEF - SEE NOTES: 51 %
STRESS ECHO TARGET HR: 118 BPM
SYSTOLIC BLOOD PRESSURE: 187 MMHG

## 2020-01-31 PROCEDURE — A9502 STRESS TEST WITH MYOCARDIAL PERFUSION (CUPID ONLY): ICD-10-PCS | Mod: HCNC,S$GLB,, | Performed by: INTERNAL MEDICINE

## 2020-01-31 PROCEDURE — A9502 TC99M TETROFOSMIN: HCPCS | Mod: HCNC,S$GLB,, | Performed by: INTERNAL MEDICINE

## 2020-01-31 PROCEDURE — 93015 STRESS TEST WITH MYOCARDIAL PERFUSION (CUPID ONLY): ICD-10-PCS | Mod: HCNC,S$GLB,, | Performed by: INTERNAL MEDICINE

## 2020-01-31 PROCEDURE — 78452 STRESS TEST WITH MYOCARDIAL PERFUSION (CUPID ONLY): ICD-10-PCS | Mod: HCNC,S$GLB,, | Performed by: INTERNAL MEDICINE

## 2020-01-31 PROCEDURE — 78452 HT MUSCLE IMAGE SPECT MULT: CPT | Mod: HCNC,S$GLB,, | Performed by: INTERNAL MEDICINE

## 2020-01-31 PROCEDURE — 93015 CV STRESS TEST SUPVJ I&R: CPT | Mod: HCNC,S$GLB,, | Performed by: INTERNAL MEDICINE

## 2020-01-31 RX ORDER — REGADENOSON 0.08 MG/ML
0.4 INJECTION, SOLUTION INTRAVENOUS
Status: COMPLETED | OUTPATIENT
Start: 2020-01-31 | End: 2020-01-31

## 2020-01-31 RX ADMIN — REGADENOSON 0.4 MG: 0.08 INJECTION, SOLUTION INTRAVENOUS at 09:01

## 2020-03-03 ENCOUNTER — PES CALL (OUTPATIENT)
Dept: ADMINISTRATIVE | Facility: CLINIC | Age: 80
End: 2020-03-03

## 2020-03-03 ENCOUNTER — TELEPHONE (OUTPATIENT)
Dept: INTERNAL MEDICINE | Facility: CLINIC | Age: 80
End: 2020-03-03

## 2020-03-03 DIAGNOSIS — N18.5 CKD (CHRONIC KIDNEY DISEASE), STAGE V: Primary | ICD-10-CM

## 2020-03-03 NOTE — TELEPHONE ENCOUNTER
----- Message from Loulou Cantor sent at 3/3/2020  4:16 PM CST -----  Contact: MsJame Piedad pts  @ [facility]  [employer] [phone]  Ms. Herbert would like a call back in regards to her wanting to know if the patient was ever referred to a nephrology doctor?

## 2020-03-03 NOTE — TELEPHONE ENCOUNTER
Pt has seen neprhology previously due for follow up as well  Will put updated referral in if needed for scheudling

## 2020-03-18 ENCOUNTER — TELEPHONE (OUTPATIENT)
Dept: NEPHROLOGY | Facility: CLINIC | Age: 80
End: 2020-03-18

## 2020-03-18 DIAGNOSIS — N18.5 CHRONIC KIDNEY DISEASE, STAGE V: Primary | ICD-10-CM

## 2020-04-02 NOTE — PROGRESS NOTES
Digital Medicine: Health  Follow-Up    I talked to Mrs. Guerra's daughter. She notes that her mom got very dizzy one night, so she has been halving the amlodipine at night. She has noticed a decrease in dizziness in her mom since doing that. She notes that her mom is doing well, and she cooks some of her food. Her daughter shares in the cooking responsibility. She denies any questions at this time.     The history is provided by a relative.     HYPERTENSION    Patient's BP goal is 140/90.Patient's BP average is 113/65 mmHg, which is above goal, per 2017 ACC/AHA Hypertension Guidelines.          Intervention/Plan        Topic    Urine Protein Check     Eye Exam        Last 5 Patient Entered Readings                                      Current 30 Day Average: 113/65     Recent Readings 3/30/2020 3/25/2020 3/23/2020 3/20/2020 3/16/2020    SBP (mmHg) 147 104 109 137 106    DBP (mmHg) 78 64 88 84 62    Pulse 96 86 88 86 88                      Medication Adherence Screening   She did not miss a dose this month.      SDOH

## 2020-05-06 ENCOUNTER — PATIENT OUTREACH (OUTPATIENT)
Dept: OTHER | Facility: OTHER | Age: 80
End: 2020-05-06

## 2020-05-06 NOTE — PROGRESS NOTES
Digital Medicine: Health  Follow-Up    I called Mrs. Mckeon to follow up, and spoke to her daughter, Linda. She notes that her mom needs new dentures but they aren't able to do that at this time. She hasn't been eating much and she doesn't have an appetite. We talked about soft foods like soups, smoothies, and shakes to help. Sometimes when Mrs. Guerra's blood pressure goes low, Mrs. Mckeon cuts her amlodipine in half. She has been having periods of syncope, but Mrs. Mckeon would like to see first if it's because she's not eating. Once it's safer to go to the doctor, Mrs. Mckeon plans on having blood work done for her mom.     The history is provided by a relative. No  was used.     Follow Up  Follow-up reason(s): reading review      Readings are trending up       INTERVENTION(S)  encouragement/support and denied questions    PLAN  continue monitoring          Topic    Urine Protein Check     Eye Exam        Last 5 Patient Entered Readings                                      Current 30 Day Average: 125/69     Recent Readings 5/4/2020 5/4/2020 5/4/2020 4/28/2020 4/21/2020    SBP (mmHg) 161 168 153 105 126    DBP (mmHg) 89 92 84 73 85    Pulse 91 85 90 94 87                      Physical Activity Screening   When asked if exercising, patient responded: no    Medication Adherence Screening   She did not miss a dose this month.      SDOH

## 2020-05-20 ENCOUNTER — PATIENT OUTREACH (OUTPATIENT)
Dept: ADMINISTRATIVE | Facility: OTHER | Age: 80
End: 2020-05-20

## 2020-05-22 ENCOUNTER — OFFICE VISIT (OUTPATIENT)
Dept: CARDIOLOGY | Facility: CLINIC | Age: 80
End: 2020-05-22
Payer: MEDICARE

## 2020-05-22 VITALS
BODY MASS INDEX: 18.87 KG/M2 | WEIGHT: 96.13 LBS | SYSTOLIC BLOOD PRESSURE: 120 MMHG | HEIGHT: 60 IN | DIASTOLIC BLOOD PRESSURE: 68 MMHG | OXYGEN SATURATION: 97 % | HEART RATE: 89 BPM

## 2020-05-22 DIAGNOSIS — I10 ESSENTIAL HYPERTENSION: ICD-10-CM

## 2020-05-22 DIAGNOSIS — R07.9 CHEST PAIN ON EXERTION: ICD-10-CM

## 2020-05-22 DIAGNOSIS — R00.2 PALPITATIONS: ICD-10-CM

## 2020-05-22 DIAGNOSIS — I51.89 LEFT VENTRICULAR DIASTOLIC DYSFUNCTION WITH PRESERVED SYSTOLIC FUNCTION: ICD-10-CM

## 2020-05-22 DIAGNOSIS — I35.0 NONRHEUMATIC AORTIC VALVE STENOSIS: Primary | ICD-10-CM

## 2020-05-22 DIAGNOSIS — C73 THYROID CANCER: ICD-10-CM

## 2020-05-22 DIAGNOSIS — E78.2 MIXED HYPERLIPIDEMIA: ICD-10-CM

## 2020-05-22 DIAGNOSIS — I34.2 NONRHEUMATIC MITRAL VALVE STENOSIS: ICD-10-CM

## 2020-05-22 PROCEDURE — 1100F PR PT FALLS ASSESS DOC 2+ FALLS/FALL W/INJURY/YR: ICD-10-PCS | Mod: HCNC,CPTII,S$GLB, | Performed by: INTERNAL MEDICINE

## 2020-05-22 PROCEDURE — 1126F PR PAIN SEVERITY QUANTIFIED, NO PAIN PRESENT: ICD-10-PCS | Mod: HCNC,S$GLB,, | Performed by: INTERNAL MEDICINE

## 2020-05-22 PROCEDURE — 3288F FALL RISK ASSESSMENT DOCD: CPT | Mod: HCNC,CPTII,S$GLB, | Performed by: INTERNAL MEDICINE

## 2020-05-22 PROCEDURE — 99999 PR PBB SHADOW E&M-EST. PATIENT-LVL V: ICD-10-PCS | Mod: PBBFAC,HCNC,, | Performed by: INTERNAL MEDICINE

## 2020-05-22 PROCEDURE — 3078F DIAST BP <80 MM HG: CPT | Mod: HCNC,CPTII,S$GLB, | Performed by: INTERNAL MEDICINE

## 2020-05-22 PROCEDURE — 99499 UNLISTED E&M SERVICE: CPT | Mod: HCNC,S$GLB,, | Performed by: INTERNAL MEDICINE

## 2020-05-22 PROCEDURE — 3074F PR MOST RECENT SYSTOLIC BLOOD PRESSURE < 130 MM HG: ICD-10-PCS | Mod: HCNC,CPTII,S$GLB, | Performed by: INTERNAL MEDICINE

## 2020-05-22 PROCEDURE — 3074F SYST BP LT 130 MM HG: CPT | Mod: HCNC,CPTII,S$GLB, | Performed by: INTERNAL MEDICINE

## 2020-05-22 PROCEDURE — 1126F AMNT PAIN NOTED NONE PRSNT: CPT | Mod: HCNC,S$GLB,, | Performed by: INTERNAL MEDICINE

## 2020-05-22 PROCEDURE — 1100F PTFALLS ASSESS-DOCD GE2>/YR: CPT | Mod: HCNC,CPTII,S$GLB, | Performed by: INTERNAL MEDICINE

## 2020-05-22 PROCEDURE — 99214 OFFICE O/P EST MOD 30 MIN: CPT | Mod: HCNC,S$GLB,, | Performed by: INTERNAL MEDICINE

## 2020-05-22 PROCEDURE — 99499 RISK ADDL DX/OHS AUDIT: ICD-10-PCS | Mod: HCNC,S$GLB,, | Performed by: INTERNAL MEDICINE

## 2020-05-22 PROCEDURE — 1159F MED LIST DOCD IN RCRD: CPT | Mod: HCNC,S$GLB,, | Performed by: INTERNAL MEDICINE

## 2020-05-22 PROCEDURE — 3078F PR MOST RECENT DIASTOLIC BLOOD PRESSURE < 80 MM HG: ICD-10-PCS | Mod: HCNC,CPTII,S$GLB, | Performed by: INTERNAL MEDICINE

## 2020-05-22 PROCEDURE — 99999 PR PBB SHADOW E&M-EST. PATIENT-LVL V: CPT | Mod: PBBFAC,HCNC,, | Performed by: INTERNAL MEDICINE

## 2020-05-22 PROCEDURE — 1159F PR MEDICATION LIST DOCUMENTED IN MEDICAL RECORD: ICD-10-PCS | Mod: HCNC,S$GLB,, | Performed by: INTERNAL MEDICINE

## 2020-05-22 PROCEDURE — 3288F PR FALLS RISK ASSESSMENT DOCUMENTED: ICD-10-PCS | Mod: HCNC,CPTII,S$GLB, | Performed by: INTERNAL MEDICINE

## 2020-05-22 PROCEDURE — 99214 PR OFFICE/OUTPT VISIT, EST, LEVL IV, 30-39 MIN: ICD-10-PCS | Mod: HCNC,S$GLB,, | Performed by: INTERNAL MEDICINE

## 2020-05-22 NOTE — PATIENT INSTRUCTIONS
Referral to structural heart clinic for evaluation    Scheduling a 48 hour heart monitor to check her heart rhythm    Can stop amlodipine

## 2020-05-22 NOTE — PROGRESS NOTES
"Subjective:   Chief Complaint: Chest Pain; Dizziness; Palpitations; and Shortness of Breath    Problem List:  Aortic valve stenosis-nonrheumatic  Mitral valve stenosis  CKD4  HTN  COPD  Bilateral Carotid artery disease  Chest pain on exertion  Left ventricular diastolic dysfunction with preserved systolic function  HLD  Thyroid Cancer    History of Present Illness: Marga Guerra is a 80 y.o. is here for her follow up of valvular heart disease. She has severe aortic stenosis with NATALIE 0.7 cm2, peak velocity 3.8m/s and mean gradient 35 mmHg plus moderate MS.  Previously noted,  Parameters were not quite at replacement values for AVR and the patient has metastatic thyroid cancer.Through her daughter translating, for the past 2 months, She feels sensation of chest is "pounding/pulling" with SOB every time she performs housework.  This lasts less than 5 minutes and resolves when she rest. She performs housework every day.  She still has the same burning substernal chest discomfort with exertion relieved by rest. She reports becoming dizzy with exertion and if getting up too quickly.  She also reports one episode of syncope 2 days ago.  At about 10am, She was mopping then walked to refrigerator when she felt dizzy with "pounding" sensation to her chest and fell to floor hitting her cheek.   She takes her HTN meds at noon and dinner.       Her BP is controlled at 120/68 and is enrolled in digital medicine program.  She has been taking half her amlodipine dose in the evening due to low BP readings.  She has dyslipidemia  on high intensity statin with LDL>100 at 105 but very elevated HDL. Sleeps on one pillow-no change.     Review of Systems   Constitution: Positive for decreased appetite. Negative for chills, diaphoresis, fever, malaise/fatigue, night sweats, weight gain and weight loss.        Denies change in activity level   HENT: Negative for congestion, hearing loss, nosebleeds, sore throat and tinnitus.    Eyes: " Negative for blurred vision and visual disturbance.        No amaurosis fugax   Cardiovascular: Positive for chest pain, dyspnea on exertion, palpitations and syncope. Negative for claudication, cyanosis, irregular heartbeat, leg swelling and orthopnea.        As per HPI above   Respiratory: Positive for snoring. Negative for cough, shortness of breath, sleep disturbances due to breathing and wheezing.         Denies symptoms of SUSANNA   Endocrine:        Thyroid cancer that is metastatic to surrounding lymph nodes    Hematologic/Lymphatic: Negative for bleeding problem. Does not bruise/bleed easily.   Skin: Negative for color change, nail changes and rash.   Musculoskeletal: Positive for back pain. Negative for arthritis, falls, joint pain, joint swelling, muscle cramps, myalgias, neck pain and stiffness.        Denies muscle twitching   Gastrointestinal: Negative for abdominal pain, constipation, diarrhea, dysphagia, heartburn, hematemesis, hematochezia, melena, nausea and vomiting.   Genitourinary: Positive for nocturia (2 times a night). Negative for dysuria and hematuria.        No change in urinary output   Neurological: Positive for excessive daytime sleepiness, dizziness and numbness (chronic left face post neuro surgery). Negative for headaches, light-headedness, loss of balance, tremors, vertigo and weakness.   Psychiatric/Behavioral: Negative for altered mental status. The patient does not have insomnia and is not nervous/anxious.    Allergic/Immunologic:        Drug allergies listed elsewhere if present     Medications:  Outpatient Encounter Medications as of 5/22/2020   Medication Sig Dispense Refill    albuterol (PROVENTIL/VENTOLIN HFA) 90 mcg/actuation inhaler Inhale 2 puffs into the lungs every 6 (six) hours as needed for Wheezing. 18 g 6    amLODIPine (NORVASC) 5 MG tablet Take 2.5 mg by mouth once daily.      atorvastatin (LIPITOR) 40 MG tablet TAKE 1 TABLET(40 MG) BY MOUTH EVERY DAY 90 tablet 3     calcitRIOL (ROCALTROL) 0.5 MCG Cap TAKE 1 CAPSULE(0.5 MCG) BY MOUTH EVERY DAY 90 capsule 3    CALCIUM 600 600 mg (1,500 mg) Tab TK 4 TS PO TID WITH MEALS.  0    cholecalciferol, vitamin D3, 1,000 unit capsule Take 2 capsules (2,000 Units total) by mouth once daily. (Patient not taking: Reported on 12/27/2019) 180 capsule 3    levothyroxine (SYNTHROID) 75 MCG tablet Mon and Thurs - 1/2 tablet daily; Other 5 days - full tablet daily 90 tablet 4    omeprazole (PRILOSEC OTC) 20 MG tablet Take 1 tablet (20 mg total) by mouth once daily. 30 tablet 1    ramipril (ALTACE) 10 MG capsule Take 1 capsule (10 mg total) by mouth once daily. 90 capsule 3     No facility-administered encounter medications on file as of 5/22/2020.      Family History:  Marga's family history includes Diabetes in her mother; Heart disease in her mother; No Known Problems in her brother, daughter, father, maternal aunt, maternal grandfather, maternal grandmother, maternal uncle, paternal aunt, paternal grandfather, paternal grandmother, paternal uncle, sister, son, son, son, and son.    Social History:  Marga reports that she quit smoking about 10 years ago. Her smoking use included cigarettes. She has a 50.00 pack-year smoking history. She has never used smokeless tobacco. She reports that she does not drink alcohol or use drugs.    Objective:   /68   Pulse 89   Ht 5' (1.524 m)   Wt 43.6 kg (96 lb 1.9 oz)   SpO2 97%   BMI 18.77 kg/m²     Lab Results   Component Value Date     12/23/2019    K 4.5 12/23/2019     12/23/2019    CO2 24 12/23/2019    BUN 37 (H) 12/23/2019    CREATININE 3.3 (H) 12/23/2019    GLU 97 12/23/2019    HGBA1C 5.5 12/06/2019    MG 1.2 (L) 01/09/2014    AST 16 12/23/2019    ALT 8 (L) 12/23/2019    ALBUMIN 3.3 (L) 12/23/2019    PROT 6.7 12/23/2019    BILITOT 0.6 12/23/2019    WBC 7.35 12/23/2019    HGB 11.2 (L) 12/23/2019    HCT 35.7 (L) 12/23/2019    MCV 93 12/23/2019     12/23/2019    INR  0.9 08/08/2018    TSH 0.988 12/06/2019    CHOL 247 (H) 12/06/2019     (H) 12/06/2019    LDLCALC 105.0 12/06/2019    TRIG 100 12/06/2019           Physical Exam   Constitutional: She is oriented to person, place, and time. She appears well-developed and well-nourished. No distress.   HENT:   Head: Normocephalic and atraumatic.   Eyes: Pupils are equal, round, and reactive to light. Conjunctivae are normal. No scleral icterus.   Neck: Normal range of motion. Neck supple. No JVD present. No tracheal deviation present. No thyromegaly present.   Cardiovascular: Normal rate and regular rhythm. PMI is not displaced.   Murmur heard.  Pulses:       Carotid pulses are 2+ on the right side, and 2+ on the left side.       Radial pulses are 2+ on the right side, and 2+ on the left side.        Dorsalis pedis pulses are 2+ on the right side, and 2+ on the left side.        Posterior tibial pulses are 2+ on the right side, and 2+ on the left side.   Harsh midsystolic murmur is present with a grade of 2/6 at the upper right sternal border, upper left sternal border, lower left sternal border and apex radiating to the neck; No peripheral edema   Pulmonary/Chest: Effort normal and breath sounds normal. No stridor. No respiratory distress. She has no wheezes. She has no rales. She exhibits no tenderness.   Abdominal: Soft. Bowel sounds are normal. She exhibits no distension and no mass. There is no tenderness. There is no rebound and no guarding.   Musculoskeletal: Normal range of motion. She exhibits no edema, tenderness or deformity.   Balanced gait   Lymphadenopathy:     She has no cervical adenopathy.   Neurological: She is alert and oriented to person, place, and time.   Skin: Skin is warm and dry. No rash noted. She is not diaphoretic. No erythema. No pallor.   Psychiatric: She has a normal mood and affect. Her behavior is normal. Judgment and thought content normal.   Vitals reviewed.      Assessment:     1. Nonrheumatic  aortic valve stenosis-Severe and appears to be symptomatic with exertional cheat pain ( negative PET stress) and recent syncopy   2. Palpitations - onset 2 months ago with exertion only       4. Left ventricular diastolic dysfunction with preserved systolic function -compensated   5. Essential hypertension -with Blood Pressures now low at times   6. Mixed hyperlipidemia -on high intensity statin   7. Nonrheumatic mitral valve stenosis    8. Thyroid cancer             Plan:     Marga Guerra was seen today for aortic valve stenosis, etiology of cardiac valve disease uns and mitral valve stenosis, unspecified etiology.    Diagnosis and orders for this visit:    Nonrheumatic aortic valve stenosis  -     Ambulatory referral/consult to Interventional Cardiology; Future; Expected date: 05/29/2020 for an opinion obviously tempered by her thyroid cancer    Palpitations  -     Holter monitor - 48 hour; Future    Chest pain on exertion    Left ventricular diastolic dysfunction with preserved systolic function    Essential hypertension  -can stop amlodipine  -continue Digital medicine program    Mixed hyperlipidemia  -- Continue current regimen.     Nonrheumatic mitral valve stenosis    Thyroid cancer  -Per Heme-Onc       Follow up in about 5 weeks (around 6/26/2020).      This patient was seen and discussed with Dr. Emile Rose, APRN, FNP-BC   .  The patient has been interviewed and examined . Agree with the above edited note.

## 2020-05-27 ENCOUNTER — PATIENT OUTREACH (OUTPATIENT)
Dept: ADMINISTRATIVE | Facility: OTHER | Age: 80
End: 2020-05-27

## 2020-05-29 ENCOUNTER — CLINICAL SUPPORT (OUTPATIENT)
Dept: CARDIOLOGY | Facility: HOSPITAL | Age: 80
End: 2020-05-29
Attending: NURSE PRACTITIONER
Payer: MEDICARE

## 2020-05-29 ENCOUNTER — INITIAL CONSULT (OUTPATIENT)
Dept: CARDIOLOGY | Facility: CLINIC | Age: 80
End: 2020-05-29
Payer: MEDICARE

## 2020-05-29 VITALS
HEIGHT: 60 IN | DIASTOLIC BLOOD PRESSURE: 77 MMHG | OXYGEN SATURATION: 99 % | HEART RATE: 82 BPM | WEIGHT: 95 LBS | BODY MASS INDEX: 18.65 KG/M2 | SYSTOLIC BLOOD PRESSURE: 169 MMHG

## 2020-05-29 DIAGNOSIS — J44.9 CHRONIC OBSTRUCTIVE PULMONARY DISEASE, UNSPECIFIED COPD TYPE: ICD-10-CM

## 2020-05-29 DIAGNOSIS — I35.0 NONRHEUMATIC AORTIC VALVE STENOSIS: ICD-10-CM

## 2020-05-29 DIAGNOSIS — I51.89 LEFT VENTRICULAR DIASTOLIC DYSFUNCTION WITH PRESERVED SYSTOLIC FUNCTION: ICD-10-CM

## 2020-05-29 DIAGNOSIS — I10 ESSENTIAL HYPERTENSION: ICD-10-CM

## 2020-05-29 DIAGNOSIS — R00.2 PALPITATIONS: ICD-10-CM

## 2020-05-29 PROCEDURE — 99999 PR PBB SHADOW E&M-EST. PATIENT-LVL IV: ICD-10-PCS | Mod: PBBFAC,HCNC,, | Performed by: INTERNAL MEDICINE

## 2020-05-29 PROCEDURE — 3078F PR MOST RECENT DIASTOLIC BLOOD PRESSURE < 80 MM HG: ICD-10-PCS | Mod: HCNC,CPTII,S$GLB, | Performed by: INTERNAL MEDICINE

## 2020-05-29 PROCEDURE — 1126F AMNT PAIN NOTED NONE PRSNT: CPT | Mod: HCNC,S$GLB,, | Performed by: INTERNAL MEDICINE

## 2020-05-29 PROCEDURE — 3078F DIAST BP <80 MM HG: CPT | Mod: HCNC,CPTII,S$GLB, | Performed by: INTERNAL MEDICINE

## 2020-05-29 PROCEDURE — 93227 HOLTER MONITOR - 48 HOUR (CUPID ONLY): ICD-10-PCS | Mod: HCNC,,, | Performed by: INTERNAL MEDICINE

## 2020-05-29 PROCEDURE — 99999 PR PBB SHADOW E&M-EST. PATIENT-LVL IV: CPT | Mod: PBBFAC,HCNC,, | Performed by: INTERNAL MEDICINE

## 2020-05-29 PROCEDURE — 99499 UNLISTED E&M SERVICE: CPT | Mod: HCNC,S$GLB,, | Performed by: INTERNAL MEDICINE

## 2020-05-29 PROCEDURE — 99499 RISK ADDL DX/OHS AUDIT: ICD-10-PCS | Mod: HCNC,S$GLB,, | Performed by: INTERNAL MEDICINE

## 2020-05-29 PROCEDURE — 99205 PR OFFICE/OUTPT VISIT, NEW, LEVL V, 60-74 MIN: ICD-10-PCS | Mod: HCNC,S$GLB,, | Performed by: INTERNAL MEDICINE

## 2020-05-29 PROCEDURE — 3077F SYST BP >= 140 MM HG: CPT | Mod: HCNC,CPTII,S$GLB, | Performed by: INTERNAL MEDICINE

## 2020-05-29 PROCEDURE — 1126F PR PAIN SEVERITY QUANTIFIED, NO PAIN PRESENT: ICD-10-PCS | Mod: HCNC,S$GLB,, | Performed by: INTERNAL MEDICINE

## 2020-05-29 PROCEDURE — 3288F FALL RISK ASSESSMENT DOCD: CPT | Mod: HCNC,CPTII,S$GLB, | Performed by: INTERNAL MEDICINE

## 2020-05-29 PROCEDURE — 99205 OFFICE O/P NEW HI 60 MIN: CPT | Mod: HCNC,S$GLB,, | Performed by: INTERNAL MEDICINE

## 2020-05-29 PROCEDURE — 1100F PTFALLS ASSESS-DOCD GE2>/YR: CPT | Mod: HCNC,CPTII,S$GLB, | Performed by: INTERNAL MEDICINE

## 2020-05-29 PROCEDURE — 1159F MED LIST DOCD IN RCRD: CPT | Mod: HCNC,S$GLB,, | Performed by: INTERNAL MEDICINE

## 2020-05-29 PROCEDURE — 93225 XTRNL ECG REC<48 HRS REC: CPT | Mod: HCNC

## 2020-05-29 PROCEDURE — 3077F PR MOST RECENT SYSTOLIC BLOOD PRESSURE >= 140 MM HG: ICD-10-PCS | Mod: HCNC,CPTII,S$GLB, | Performed by: INTERNAL MEDICINE

## 2020-05-29 PROCEDURE — 1100F PR PT FALLS ASSESS DOC 2+ FALLS/FALL W/INJURY/YR: ICD-10-PCS | Mod: HCNC,CPTII,S$GLB, | Performed by: INTERNAL MEDICINE

## 2020-05-29 PROCEDURE — 93227 XTRNL ECG REC<48 HR R&I: CPT | Mod: HCNC,,, | Performed by: INTERNAL MEDICINE

## 2020-05-29 PROCEDURE — 1159F PR MEDICATION LIST DOCUMENTED IN MEDICAL RECORD: ICD-10-PCS | Mod: HCNC,S$GLB,, | Performed by: INTERNAL MEDICINE

## 2020-05-29 PROCEDURE — 3288F PR FALLS RISK ASSESSMENT DOCUMENTED: ICD-10-PCS | Mod: HCNC,CPTII,S$GLB, | Performed by: INTERNAL MEDICINE

## 2020-05-29 NOTE — ASSESSMENT & PLAN NOTE
NYHA class 3 symptoms, likely secondary to symptomatic severe aortic stenosis.  Will confirm severity of AS as well as coronary angiogram anatomy    1. Cardiac catheterization with probable PCI.   2. Antiplatelets: ASA/Plavix  3. Access: RCFA for IVUS  4. Catheters: JL4, JR4  5. Pt is a PAULETTE candidate and understands the importance of taking plavix for at least one year in ACS cases and 6 months in stable CAD. The patient understands that in case of receiving a drug coated stent the failure to comply with dual anti-platelet therapy as prescribed is likely to result in stent clothing, heart attack and death.   6. The risks, benefits, and alternatives of coronary vascular angiography and possible intervention were discussed with the patient. All questions were answered and informed consent was obtained. I had a detailed discussion with the patient regarding risk of stroke, MI, bleeding access site complications including limb loss, allergy, kidney failure including dialysis and death.  7. The patient understands the risks and benefits and wishes to go ahead with the procedure.  8. CSHA Clinical Frailty Scale: Managing well  9. All patient's questions were answered

## 2020-05-29 NOTE — LETTER
May 29, 2020      Leelee Rose, NP  [address]  [facility] [address]           [facility]  [address]  [facility] [address]  Phone: [phone]          Patient: Marga Guerra   MR Number: [id number]   YOB: 1940   Date of Visit: 5/29/2020       Dear Leelee Rose:    Thank you for referring Marga Guerra to me for evaluation. Attached you will find relevant portions of my assessment and plan of care.    If you have questions, please do not hesitate to call me. I look forward to following Marga Guerra along with you.    Sincerely,    Brett Wilburn MD    Enclosure  CC:  No Recipients    If you would like to receive this communication electronically, please contact [email or web address] or [phone] to request more information on [employer] access.    For providers and/or their staff who would like to refer a patient to [facility], please contact us through our one-stop-shop provider referral line, [facility] , at [phone].    If you feel you have received this communication in error or would no longer like to receive these types of communications, please e-mail [email or web address]

## 2020-05-29 NOTE — PROGRESS NOTES
Patient Marga Guerra, MRN [id number], was dependent on dialysis (ICD10 Z99.2) at the time of this visit on 5/29/20. This addendum is made to the medical record on 05/29/2020.

## 2020-05-29 NOTE — ASSESSMENT & PLAN NOTE
Workup as summarized above.  Has severe chronic kidney disease, which limits the ability to do a CT angiogram.  Will proceed with a coronary angiogram with minimal contrast technique, was still intravascular ultrasound of her iliac arteries under 3D nicholas to assess her LV outflow tract and root diameter.    Her echo last December does not qualify for TAVR, however her g were very close will repeat the echo and I expect that she will have elevated gradients now.    Will have her evaluated by CT surgery after her coronary angiogram, but I highly suspect she will be high risk given her CKD age and comorbidities

## 2020-05-29 NOTE — PROGRESS NOTES
INTERVENTIONAL CARDIOLOGY CLINIC  [facility]    REFERRING PHYSICIAN: Dr. Baptiste    CHIEF COMPLIANT:  Shortness of breath    HISTORY OF PRESENT ILLNESS  Marga Guerra is a 80 y.o. female referred by Dr Baptiste for evaluation of severe AS (NYHA Class III sx). The patient has a history of aortic valve stenosis-nonrheumatic, mitral valve stenosis, CKD4, HTN, COPD, Bilateral Carotid artery disease,  Chest pain on exertion, left ventricular diastolic dysfunction with preserved systolic function, HLD and Thyroid Cancer. She reports 2 months of NYHA class II sympotms. Her last echo was in December when she had moderate to severe AS. She comes for evaluation of valve replacement options     The patient has undergone the following TAVR work-up:   ECHO (Date 19): NATALIE= 0.7 cm2, MG= 38mmHg, Peak Jose J= 3.8 m/s, EF= 60%.   LHC (Date Pending)  STS: 2%   Frailty: Not frail   Iliacs are Pending   LVOT area by CTA is pending  CT Surgery risk assessment: Pending  Rhythm issues: None  PFTs: pending.  Comorbidities: CKD (Cr 3) history of neck and brain cancer.      PAST MEDICAL HISTORY  Past Medical History:   Diagnosis Date    CKD (chronic kidney disease), stage IV 2015    Degenerative disc disease     cervical spine     Depression     Hyperlipidemia     Hypertension     IGT (impaired glucose tolerance)     diet controlled/hyperglycemia fasting    Lung cancer     Macular degeneration     Osteoporosis, unspecified     Post-surgical hypothyroidism     Postsurgical hypoparathyroidism     Renal manifestation of secondary diabetes mellitus     Stroke     right frontoparietal     Thyroid cancer     Type 2 diabetes mellitus, controlled 2015        PAST SURGICAL HISTORY  Past Surgical History:   Procedure Laterality Date    BRAIN SURGERY      meningoma removed    CATARACT EXTRACTION       SECTION      EYE SURGERY      bilateral cataracts    FRACTURE SURGERY Right     wrist fracture with surgical  repair    LUNG BIOPSY N/A 8/8/2018    Procedure: BIOPSY, LUNG;  [facility];  Location: [facility] OR [address] [place] [address];  Service: Anesthesiology;  Laterality: N/A;    mengioma  resection  4/2011    left front/temporal craniotomy    right wrist fracture and repair      SPINE SURGERY      compressin fracture    THYROIDECTOMY      cancer       MEDICATIONS  Current Outpatient Medications on File Prior to Visit   Medication Sig Dispense Refill    albuterol (PROVENTIL/VENTOLIN HFA) 90 mcg/actuation inhaler Inhale 2 puffs into the lungs every 6 (six) hours as needed for Wheezing. 18 g 6    atorvastatin (LIPITOR) 40 MG tablet TAKE 1 TABLET(40 MG) BY MOUTH EVERY DAY 90 tablet 3    calcitRIOL (ROCALTROL) 0.5 MCG Cap TAKE 1 CAPSULE(0.5 MCG) BY MOUTH EVERY DAY 90 capsule 3    CALCIUM 600 600 mg (1,500 mg) Tab TK 4 TS PO TID WITH MEALS.  0    cholecalciferol, vitamin D3, 1,000 unit capsule Take 2 capsules (2,000 Units total) by mouth once daily. 180 capsule 3    levothyroxine (SYNTHROID) 75 MCG tablet Mon and Thurs - 1/2 tablet daily; Other 5 days - full tablet daily 90 tablet 4    omeprazole (PRILOSEC OTC) 20 MG tablet Take 1 tablet (20 mg total) by mouth once daily. 30 tablet 1    ramipril (ALTACE) 10 MG capsule Take 1 capsule (10 mg total) by mouth once daily. 90 capsule 3    amLODIPine (NORVASC) 5 MG tablet Take 2.5 mg by mouth once daily.       No current facility-administered medications on file prior to visit.         SOCIAL HISTORY  TOBACCO: Denies  ETOH: Denies  ILLEGAL DRUGS: Denies      HPI    Review of Systems   Constitution: Negative for fever.   HENT: Negative for congestion and hoarse voice.    Eyes: Negative for blurred vision and double vision.   Cardiovascular: Positive for chest pain and dyspnea on exertion. Negative for claudication, cyanosis, irregular heartbeat, leg swelling, near-syncope, orthopnea, palpitations and paroxysmal nocturnal dyspnea.   Respiratory: Negative for cough,  hemoptysis and shortness of breath.    Endocrine: Negative for cold intolerance and heat intolerance.   Hematologic/Lymphatic: Negative for bleeding problem. Does not bruise/bleed easily.   Skin: Negative for dry skin and nail changes.   Musculoskeletal: Negative for back pain and falls.   Gastrointestinal: Negative for abdominal pain and anorexia.   Neurological: Negative for brief paralysis, dizziness and weakness.        Objective:    Physical Exam   Constitutional: She is oriented to person, place, and time. She appears well-developed and well-nourished.   Eyes: Pupils are equal, round, and reactive to light.   Neck: No JVD present. No thyromegaly present.   Cardiovascular: Normal rate, regular rhythm and intact distal pulses.   Murmur heard.   Harsh midsystolic murmur is present with a grade of 3/6 at the upper right sternal border radiating to the neck.  Pulmonary/Chest: No respiratory distress. She has no wheezes. She exhibits no tenderness.   Abdominal: She exhibits no distension. There is no tenderness. There is no rebound.   Musculoskeletal: She exhibits no edema or tenderness.   Neurological: She is alert and oriented to person, place, and time.   Skin: Skin is warm and dry.   Psychiatric: She has a normal mood and affect.         Assessment:       1. Nonrheumatic aortic valve stenosis    2. Chronic obstructive pulmonary disease, unspecified COPD type    3. Essential hypertension    4. Left ventricular diastolic dysfunction with preserved systolic function         Plan:         Aortic stenosis  Workup as summarized above.  Has severe chronic kidney disease, which limits the ability to do a CT angiogram.  Will proceed with a coronary angiogram with minimal contrast technique, was still intravascular ultrasound of her iliac arteries under 3D nicholas to assess her LV outflow tract and root diameter.    Her echo last December does not qualify for TAVR, however her g were very close will repeat the echo and I  expect that she will have elevated gradients now.    Will have her evaluated by CT surgery after her coronary angiogram, but I highly suspect she will be high risk given her CKD age and comorbidities    COPD (chronic obstructive pulmonary disease)  Will obtain pulmonary function tests    Essential hypertension  Continue current regimen    Left ventricular diastolic dysfunction with preserved systolic function  NYHA class 3 symptoms, likely secondary to symptomatic severe aortic stenosis.  Will confirm severity of AS as well as coronary angiogram anatomy    1. Cardiac catheterization with probable PCI.   2. Antiplatelets: ASA/Plavix  3. Access: RCFA for IVUS  4. Catheters: JL4, JR4  5. Pt is a PAULETTE candidate and understands the importance of taking plavix for at least one year in ACS cases and 6 months in stable CAD. The patient understands that in case of receiving a drug coated stent the failure to comply with dual anti-platelet therapy as prescribed is likely to result in stent clothing, heart attack and death.   6. The risks, benefits, and alternatives of coronary vascular angiography and possible intervention were discussed with the patient. All questions were answered and informed consent was obtained. I had a detailed discussion with the patient regarding risk of stroke, MI, bleeding access site complications including limb loss, allergy, kidney failure including dialysis and death.  7. The patient understands the risks and benefits and wishes to go ahead with the procedure.  8. CSHA Clinical Frailty Scale: Managing well  9. All patient's questions were answered          Brett Zhou MD [facility]  Interventional Cardiology  Structural/Valvular heart disease  [phone]

## 2020-06-01 ENCOUNTER — EDUCATION (OUTPATIENT)
Dept: CARDIOLOGY | Facility: CLINIC | Age: 80
End: 2020-06-01

## 2020-06-01 DIAGNOSIS — I51.89 LEFT VENTRICULAR DIASTOLIC DYSFUNCTION WITH PRESERVED SYSTOLIC FUNCTION: ICD-10-CM

## 2020-06-01 DIAGNOSIS — I35.0 NONRHEUMATIC AORTIC VALVE STENOSIS: Primary | ICD-10-CM

## 2020-06-01 DIAGNOSIS — I35.0 SEVERE AORTIC STENOSIS: Primary | ICD-10-CM

## 2020-06-01 DIAGNOSIS — N18.4 CKD (CHRONIC KIDNEY DISEASE), STAGE IV: ICD-10-CM

## 2020-06-01 RX ORDER — CIMETIDINE 300 MG/1
300 TABLET, FILM COATED ORAL EVERY 6 HOURS
Qty: 3 TABLET | Refills: 0 | Status: ON HOLD | OUTPATIENT
Start: 2020-06-01 | End: 2020-07-06 | Stop reason: HOSPADM

## 2020-06-01 RX ORDER — DIPHENHYDRAMINE HCL 50 MG
50 CAPSULE ORAL EVERY 6 HOURS
Qty: 3 CAPSULE | Refills: 0 | Status: SHIPPED | OUTPATIENT
Start: 2020-06-01 | End: 2020-06-02

## 2020-06-01 RX ORDER — PREDNISONE 50 MG/1
50 TABLET ORAL EVERY 6 HOURS
Qty: 3 TABLET | Refills: 0 | Status: SHIPPED | OUTPATIENT
Start: 2020-06-01 | End: 2020-06-02

## 2020-06-01 RX ORDER — CLOPIDOGREL BISULFATE 75 MG/1
TABLET ORAL
Qty: 30 TABLET | Refills: 6 | Status: SHIPPED | OUTPATIENT
Start: 2020-06-01 | End: 2021-01-01

## 2020-06-01 RX ORDER — DIPHENHYDRAMINE HCL 25 MG
50 CAPSULE ORAL ONCE
Status: CANCELLED | OUTPATIENT
Start: 2020-06-01 | End: 2020-06-01

## 2020-06-01 RX ORDER — SODIUM CHLORIDE 9 MG/ML
INJECTION, SOLUTION INTRAVENOUS CONTINUOUS
Status: CANCELLED | OUTPATIENT
Start: 2020-06-01

## 2020-06-01 NOTE — PROGRESS NOTES
OUTPATIENT CATHETERIZATION INSTRUCTIONS    You have been scheduled for a procedure in the catheterization lab on Monday, July 6, 2020.     Please report to the Cardiology Waiting Area on the Third floor of the hospital and check in at 9 AM.   You will then be taken to the SSCU (Short Stay Cardiac Unit) and prepared for your procedure. Please be aware that this is not the time of your procedure but the time you are to arrive. The procedures are scheduled on an hourly basis; however, emergency cases take precedence over all other cases.       You may not have anything to eat or drink after midnight the night before your test. You may take your regular morning medications with water. If there are any medications that you should not take you will be instructed to hold them that morning. If you are diabetic and on Metformin (Glucophage) do not take it the day before, the day of, and for 2 days after your procedure.      The procedure will take 1-2 hours to perform. After the procedure, you will return to SSCU on the third floor of the hospital. You will need to lie still (or keep your arm still) for the next 4 to 6 hours to minimize bleeding from the puncture site. Your family may remain in the room with you during this time.       You may be able to be discharged home that same afternoon if there is someone to drive you home and there were no complications. If you have one of the balloon, stent, or device procedures you may spend the night in the hospital. Your doctor will determine, based on your progress, the date and time of your discharge. The results of your procedure will be discussed with you before you are discharged. Any further testing or procedures will be scheduled for you either before you leave or you will be called with these appointments.       If you should have any questions, concerns, or need to change the date of your procedure, please call ANN Sterling @ [phone]    Special Instructions:  Follow  Iodine Prep Instructions (orange sheet)  Plavix 75m tablets night before and 1 tablet morning of  **Pulmonary Studies before staring @ 7:30am    **Must have Covid test on  AFTER 11am or  (go early)**    [facility] @ [address] or [address] on [address] can do test    THE ABOVE INSTRUCTIONS WERE GIVEN TO THE PATIENT VERBALLY AND THEY VERBALIZED UNDERSTANDING.  THEY DO NOT REQUIRE ANY SPECIAL NEEDS AND DO NOT HAVE ANY LEARNING BARRIERS.          Directions for Reporting to Cardiology Waiting Area in the Hospital  If you park in the Parking Garage:  Take elevators to the1st floor of the parking garage.  Continue past the gift shop, coffee shop, and piano.  Take a right and go to the gold elevators. (Elevator B)  Take the elevator to the 3rd floor.  Follow the arrow on the sign on the wall that says Cath Lab Registration/EP Lab Registration.  Follow the long hallway all the way around until you come to a big open area.  This is the registration area.  Check in at Reception Desk.    OR    If family is dropping you off:  Have them drop you off at the front of the Hospital under the green overhang.  Enter through the doors and take a right.  Take the E elevators to the 3rd floor Cardiology Waiting Area.  Check in at the Reception Desk in the waiting room.

## 2020-06-03 LAB
OHS CV EVENT MONITOR DAY: 0
OHS CV HOLTER LENGTH DECIMAL HOURS: 48
OHS CV HOLTER LENGTH HOURS: 48
OHS CV HOLTER LENGTH MINUTES: 0

## 2020-06-05 ENCOUNTER — HOSPITAL ENCOUNTER (OUTPATIENT)
Dept: CARDIOLOGY | Facility: CLINIC | Age: 80
Discharge: HOME | End: 2020-06-05
Attending: INTERNAL MEDICINE
Payer: MEDICARE

## 2020-06-05 VITALS
WEIGHT: 95 LBS | HEIGHT: 60 IN | DIASTOLIC BLOOD PRESSURE: 70 MMHG | BODY MASS INDEX: 18.65 KG/M2 | SYSTOLIC BLOOD PRESSURE: 120 MMHG | HEART RATE: 89 BPM

## 2020-06-05 DIAGNOSIS — I51.89 LEFT VENTRICULAR DIASTOLIC DYSFUNCTION WITH PRESERVED SYSTOLIC FUNCTION: ICD-10-CM

## 2020-06-05 DIAGNOSIS — I35.0 NONRHEUMATIC AORTIC VALVE STENOSIS: ICD-10-CM

## 2020-06-05 LAB
ASCENDING AORTA: 2.62 CM
AV INDEX (PROSTH): 0.32
AV MEAN GRADIENT: 38 MMHG
AV PEAK GRADIENT: 67 MMHG
AV VALVE AREA: 0.92 CM2
AV VELOCITY RATIO: 0.35
BSA FOR ECHO PROCEDURE: 1.35 M2
CV ECHO LV RWT: 0.49 CM
DOP CALC AO PEAK VEL: 4.1 M/S
DOP CALC AO VTI: 69 CM
DOP CALC LVOT AREA: 2.8 CM2
DOP CALC LVOT DIAMETER: 1.9 CM
DOP CALC LVOT PEAK VEL: 1.44 M/S
DOP CALC LVOT STROKE VOLUME: 63.19 CM3
DOP CALCLVOT PEAK VEL VTI: 22.3 CM
E WAVE DECELERATION TIME: 161.62 MSEC
E/A RATIO: 0.57
E/E' RATIO: 30.75 M/S
ECHO LV POSTERIOR WALL: 0.76 CM (ref 0.6–1.1)
FRACTIONAL SHORTENING: 33 % (ref 28–44)
HR TR ECHO: 90 BPM
INTERVENTRICULAR SEPTUM: 0.81 CM (ref 0.6–1.1)
LA MAJOR: 4.94 CM
LA MINOR: 4.89 CM
LA WIDTH: 3.6 CM
LEFT ATRIUM SIZE: 4.28 CM
LEFT ATRIUM VOLUME INDEX: 47.3 ML/M2
LEFT ATRIUM VOLUME: 64.37 CM3
LEFT INTERNAL DIMENSION IN SYSTOLE: 2.08 CM (ref 2.1–4)
LEFT VENTRICLE DIASTOLIC VOLUME INDEX: 28.07 ML/M2
LEFT VENTRICLE DIASTOLIC VOLUME: 38.18 ML
LEFT VENTRICLE MASS INDEX: 45 G/M2
LEFT VENTRICLE SYSTOLIC VOLUME INDEX: 10.4 ML/M2
LEFT VENTRICLE SYSTOLIC VOLUME: 14.08 ML
LEFT VENTRICULAR INTERNAL DIMENSION IN DIASTOLE: 3.11 CM (ref 3.5–6)
LEFT VENTRICULAR MASS: 60.86 G
LV LATERAL E/E' RATIO: 30.75 M/S
LV SEPTAL E/E' RATIO: 30.75 M/S
MV MEAN GRADIENT: 8 MMHG
MV PEAK A VEL: 2.14 M/S
MV PEAK E VEL: 1.23 M/S
MV PEAK GRADIENT: 23 MMHG
PISA TR MAX VEL: 2.79 M/S
PULM VEIN S/D RATIO: 2.12
PV PEAK D VEL: 0.17 M/S
PV PEAK S VEL: 0.36 M/S
RA MAJOR: 2.9 CM
RA PRESSURE: 3 MMHG
RA WIDTH: 3.01 CM
RIGHT VENTRICULAR END-DIASTOLIC DIMENSION: 2.59 CM
RV TISSUE DOPPLER FREE WALL SYSTOLIC VELOCITY 1 (APICAL 4 CHAMBER VIEW): 7.94 CM/S
SINUS: 2.28 CM
STJ: 2.54 CM
TDI LATERAL: 0.04 M/S
TDI SEPTAL: 0.04 M/S
TDI: 0.04 M/S
TR MAX PG: 31 MMHG
TRICUSPID ANNULAR PLANE SYSTOLIC EXCURSION: 1.62 CM
TV REST PULMONARY ARTERY PRESSURE: 34 MMHG

## 2020-06-05 PROCEDURE — 93306 TTE W/DOPPLER COMPLETE: CPT | Mod: HCNC,S$GLB,, | Performed by: INTERNAL MEDICINE

## 2020-06-05 PROCEDURE — 93306 ECHO (CUPID ONLY): ICD-10-PCS | Mod: HCNC,S$GLB,, | Performed by: INTERNAL MEDICINE

## 2020-06-26 ENCOUNTER — LAB VISIT (OUTPATIENT)
Dept: LAB | Facility: HOSPITAL | Age: 80
End: 2020-06-26
Attending: INTERNAL MEDICINE
Payer: MEDICARE

## 2020-06-26 ENCOUNTER — TELEPHONE (OUTPATIENT)
Dept: ENDOCRINOLOGY | Facility: CLINIC | Age: 80
End: 2020-06-26

## 2020-06-26 DIAGNOSIS — E83.52 HYPERCALCEMIA: Primary | ICD-10-CM

## 2020-06-26 DIAGNOSIS — E89.2 POSTSURGICAL HYPOPARATHYROIDISM: ICD-10-CM

## 2020-06-26 DIAGNOSIS — C73 THYROID CANCER: ICD-10-CM

## 2020-06-26 DIAGNOSIS — E89.0 POST-SURGICAL HYPOTHYROIDISM: ICD-10-CM

## 2020-06-26 LAB
25(OH)D3+25(OH)D2 SERPL-MCNC: 23 NG/ML (ref 30–96)
ALBUMIN SERPL BCP-MCNC: 3.9 G/DL (ref 3.5–5.2)
ANION GAP SERPL CALC-SCNC: 12 MMOL/L (ref 8–16)
BUN SERPL-MCNC: 36 MG/DL (ref 8–23)
CALCIUM SERPL-MCNC: 12 MG/DL (ref 8.7–10.5)
CHLORIDE SERPL-SCNC: 103 MMOL/L (ref 95–110)
CO2 SERPL-SCNC: 25 MMOL/L (ref 23–29)
CREAT SERPL-MCNC: 4.2 MG/DL (ref 0.5–1.4)
EST. GFR  (AFRICAN AMERICAN): 10.7 ML/MIN/1.73 M^2
EST. GFR  (NON AFRICAN AMERICAN): 9.3 ML/MIN/1.73 M^2
GLUCOSE SERPL-MCNC: 82 MG/DL (ref 70–110)
PHOSPHATE SERPL-MCNC: 4.6 MG/DL (ref 2.7–4.5)
POTASSIUM SERPL-SCNC: 4.8 MMOL/L (ref 3.5–5.1)
PTH-INTACT SERPL-MCNC: <5 PG/ML (ref 9–77)
SODIUM SERPL-SCNC: 140 MMOL/L (ref 136–145)
T4 FREE SERPL-MCNC: 1.62 NG/DL (ref 0.71–1.51)
TSH SERPL DL<=0.005 MIU/L-ACNC: 0.21 UIU/ML (ref 0.4–4)

## 2020-06-26 PROCEDURE — 84439 ASSAY OF FREE THYROXINE: CPT | Mod: HCNC

## 2020-06-26 PROCEDURE — 82306 VITAMIN D 25 HYDROXY: CPT | Mod: HCNC

## 2020-06-26 PROCEDURE — 84443 ASSAY THYROID STIM HORMONE: CPT | Mod: HCNC

## 2020-06-26 PROCEDURE — 86800 THYROGLOBULIN ANTIBODY: CPT | Mod: HCNC

## 2020-06-26 PROCEDURE — 36415 COLL VENOUS BLD VENIPUNCTURE: CPT | Mod: HCNC

## 2020-06-26 PROCEDURE — 83970 ASSAY OF PARATHORMONE: CPT | Mod: HCNC

## 2020-06-26 PROCEDURE — 80069 RENAL FUNCTION PANEL: CPT | Mod: HCNC

## 2020-06-26 NOTE — TELEPHONE ENCOUNTER
Received result this evening that her calcium level is up to 12.0. She is currently taking calcitriol 0.5 mcg daily. She says she is not supplementing with any calcium supplements currently. I told her to hold the calcitriol for the next two days and try to hydrate well. We will repeat her renal function panel on Monday along with a PTH to see if she even needs calcitriol anymore. The remainder of her labs are pending still.

## 2020-06-29 ENCOUNTER — LAB VISIT (OUTPATIENT)
Dept: LAB | Facility: HOSPITAL | Age: 80
End: 2020-06-29
Attending: INTERNAL MEDICINE
Payer: MEDICARE

## 2020-06-29 ENCOUNTER — TELEPHONE (OUTPATIENT)
Dept: ENDOCRINOLOGY | Facility: CLINIC | Age: 80
End: 2020-06-29

## 2020-06-29 DIAGNOSIS — N18.5 CHRONIC KIDNEY DISEASE, STAGE V: ICD-10-CM

## 2020-06-29 DIAGNOSIS — E83.52 HYPERCALCEMIA: ICD-10-CM

## 2020-06-29 LAB
25(OH)D3+25(OH)D2 SERPL-MCNC: 22 NG/ML (ref 30–96)
ALBUMIN SERPL BCP-MCNC: 3.8 G/DL (ref 3.5–5.2)
ANION GAP SERPL CALC-SCNC: 10 MMOL/L (ref 8–16)
BUN SERPL-MCNC: 45 MG/DL (ref 8–23)
CALCIUM SERPL-MCNC: 10.6 MG/DL (ref 8.7–10.5)
CHLORIDE SERPL-SCNC: 102 MMOL/L (ref 95–110)
CO2 SERPL-SCNC: 26 MMOL/L (ref 23–29)
CREAT SERPL-MCNC: 4.7 MG/DL (ref 0.5–1.4)
EST. GFR  (AFRICAN AMERICAN): 9.3 ML/MIN/1.73 M^2
EST. GFR  (NON AFRICAN AMERICAN): 8.1 ML/MIN/1.73 M^2
GLUCOSE SERPL-MCNC: 92 MG/DL (ref 70–110)
PHOSPHATE SERPL-MCNC: 3.9 MG/DL (ref 2.7–4.5)
POTASSIUM SERPL-SCNC: 4.9 MMOL/L (ref 3.5–5.1)
PTH-INTACT SERPL-MCNC: <5 PG/ML (ref 9–77)
SODIUM SERPL-SCNC: 138 MMOL/L (ref 136–145)
THRYOGLOBULIN INTERPRETATION: ABNORMAL
THYROGLOB AB SERPL-ACNC: <1.8 IU/ML
THYROGLOB SERPL-MCNC: 78 NG/ML

## 2020-06-29 PROCEDURE — 80069 RENAL FUNCTION PANEL: CPT | Mod: HCNC

## 2020-06-29 PROCEDURE — 82306 VITAMIN D 25 HYDROXY: CPT | Mod: HCNC

## 2020-06-29 PROCEDURE — 83970 ASSAY OF PARATHORMONE: CPT | Mod: HCNC

## 2020-06-29 PROCEDURE — 36415 COLL VENOUS BLD VENIPUNCTURE: CPT | Mod: HCNC

## 2020-06-29 NOTE — TELEPHONE ENCOUNTER
I spoke with Ms. Mckeon (daughter). Calcium getting better, still mildly elevated - will hold calcitriol until we discuss in 2 days.  TSH slightly over suppressed as well. Will likely back off on her dose a little more in light of cardiac history and her age. I told her to hold it for the next two days as well.

## 2020-06-30 ENCOUNTER — PATIENT OUTREACH (OUTPATIENT)
Dept: ADMINISTRATIVE | Facility: OTHER | Age: 80
End: 2020-06-30

## 2020-06-30 DIAGNOSIS — E11.22 CONTROLLED TYPE 2 DIABETES MELLITUS WITH CHRONIC KIDNEY DISEASE, UNSPECIFIED CKD STAGE, UNSPECIFIED WHETHER LONG TERM INSULIN USE: Primary | ICD-10-CM

## 2020-07-01 ENCOUNTER — OFFICE VISIT (OUTPATIENT)
Dept: ENDOCRINOLOGY | Facility: CLINIC | Age: 80
End: 2020-07-01
Payer: MEDICARE

## 2020-07-01 VITALS
HEIGHT: 60 IN | SYSTOLIC BLOOD PRESSURE: 120 MMHG | DIASTOLIC BLOOD PRESSURE: 70 MMHG | BODY MASS INDEX: 17.49 KG/M2 | TEMPERATURE: 100 F | WEIGHT: 89.06 LBS | HEART RATE: 77 BPM | RESPIRATION RATE: 18 BRPM

## 2020-07-01 DIAGNOSIS — M81.0 SENILE OSTEOPOROSIS: ICD-10-CM

## 2020-07-01 DIAGNOSIS — N18.5 STAGE 5 CHRONIC KIDNEY DISEASE NOT ON CHRONIC DIALYSIS: ICD-10-CM

## 2020-07-01 DIAGNOSIS — E89.0 POST-SURGICAL HYPOTHYROIDISM: ICD-10-CM

## 2020-07-01 DIAGNOSIS — C73 THYROID CANCER: Primary | ICD-10-CM

## 2020-07-01 DIAGNOSIS — E55.9 VITAMIN D DEFICIENCY: ICD-10-CM

## 2020-07-01 DIAGNOSIS — E89.2 POSTSURGICAL HYPOPARATHYROIDISM: ICD-10-CM

## 2020-07-01 PROCEDURE — 1100F PTFALLS ASSESS-DOCD GE2>/YR: CPT | Mod: HCNC,CPTII,S$GLB, | Performed by: INTERNAL MEDICINE

## 2020-07-01 PROCEDURE — 1100F PR PT FALLS ASSESS DOC 2+ FALLS/FALL W/INJURY/YR: ICD-10-PCS | Mod: HCNC,CPTII,S$GLB, | Performed by: INTERNAL MEDICINE

## 2020-07-01 PROCEDURE — 3074F PR MOST RECENT SYSTOLIC BLOOD PRESSURE < 130 MM HG: ICD-10-PCS | Mod: HCNC,CPTII,S$GLB, | Performed by: INTERNAL MEDICINE

## 2020-07-01 PROCEDURE — 99214 PR OFFICE/OUTPT VISIT, EST, LEVL IV, 30-39 MIN: ICD-10-PCS | Mod: HCNC,S$GLB,, | Performed by: INTERNAL MEDICINE

## 2020-07-01 PROCEDURE — 99499 UNLISTED E&M SERVICE: CPT | Mod: HCNC,S$GLB,, | Performed by: INTERNAL MEDICINE

## 2020-07-01 PROCEDURE — 3288F PR FALLS RISK ASSESSMENT DOCUMENTED: ICD-10-PCS | Mod: HCNC,CPTII,S$GLB, | Performed by: INTERNAL MEDICINE

## 2020-07-01 PROCEDURE — 99214 OFFICE O/P EST MOD 30 MIN: CPT | Mod: HCNC,S$GLB,, | Performed by: INTERNAL MEDICINE

## 2020-07-01 PROCEDURE — 3078F DIAST BP <80 MM HG: CPT | Mod: HCNC,CPTII,S$GLB, | Performed by: INTERNAL MEDICINE

## 2020-07-01 PROCEDURE — 99999 PR PBB SHADOW E&M-EST. PATIENT-LVL IV: CPT | Mod: PBBFAC,HCNC,, | Performed by: INTERNAL MEDICINE

## 2020-07-01 PROCEDURE — 3078F PR MOST RECENT DIASTOLIC BLOOD PRESSURE < 80 MM HG: ICD-10-PCS | Mod: HCNC,CPTII,S$GLB, | Performed by: INTERNAL MEDICINE

## 2020-07-01 PROCEDURE — 99499 RISK ADDL DX/OHS AUDIT: ICD-10-PCS | Mod: HCNC,S$GLB,, | Performed by: INTERNAL MEDICINE

## 2020-07-01 PROCEDURE — 1126F PR PAIN SEVERITY QUANTIFIED, NO PAIN PRESENT: ICD-10-PCS | Mod: HCNC,S$GLB,, | Performed by: INTERNAL MEDICINE

## 2020-07-01 PROCEDURE — 1159F MED LIST DOCD IN RCRD: CPT | Mod: HCNC,S$GLB,, | Performed by: INTERNAL MEDICINE

## 2020-07-01 PROCEDURE — 99999 PR PBB SHADOW E&M-EST. PATIENT-LVL IV: ICD-10-PCS | Mod: PBBFAC,HCNC,, | Performed by: INTERNAL MEDICINE

## 2020-07-01 PROCEDURE — 1159F PR MEDICATION LIST DOCUMENTED IN MEDICAL RECORD: ICD-10-PCS | Mod: HCNC,S$GLB,, | Performed by: INTERNAL MEDICINE

## 2020-07-01 PROCEDURE — 3074F SYST BP LT 130 MM HG: CPT | Mod: HCNC,CPTII,S$GLB, | Performed by: INTERNAL MEDICINE

## 2020-07-01 PROCEDURE — 1126F AMNT PAIN NOTED NONE PRSNT: CPT | Mod: HCNC,S$GLB,, | Performed by: INTERNAL MEDICINE

## 2020-07-01 PROCEDURE — 3288F FALL RISK ASSESSMENT DOCD: CPT | Mod: HCNC,CPTII,S$GLB, | Performed by: INTERNAL MEDICINE

## 2020-07-01 RX ORDER — CALCITRIOL 0.25 UG/1
0.25 CAPSULE ORAL DAILY
Qty: 90 CAPSULE | Refills: 4 | Status: SHIPPED | OUTPATIENT
Start: 2020-07-01 | End: 2021-01-01 | Stop reason: SDUPTHER

## 2020-07-01 NOTE — ASSESSMENT & PLAN NOTE
Most recent calcium level was elevated. Repeat today was normal, so will resume calcitriol at 0.25 mcg daily. Continue current calcium+D supplement. Repeat labs in 4 weeks.    PTH suppressed, but in the setting of hypercalcemia (appropriate). Will check again with next labs.

## 2020-07-01 NOTE — ASSESSMENT & PLAN NOTE
Known disease in the neck. Unsure if numerous lung nodules are related to recurrence of thyroid cancer, or related to non-neoplastic process.     Given rising TG, will keep TG mildly suppressed (0.1-0.5). Avoid oversuppression given age, osteoporosis history and cardiac history.    We presented her case at tumor board last visit, and the consensus was that surgery is unlikely to be of much benefit in the absence of symptoms. She is at higher risk for complications given her cardiac disease and kidney function. I will present her case again soon before deciding on the next steps.    Follow-up in 3 months.

## 2020-07-01 NOTE — PROGRESS NOTES
Subjective:      Chief Complaint: Follow-up for thyroid cancer, post-surgical hypothyroidism and post-surgical hypoparathyroidism.    HPI: Marga Guerra is a 80 y.o. female who is here for a follow-up evaluation for Thyroid cancer surveillance, post-surgical hypothyroidism, post-surgical hypoparathyroidism and osteoporosis.    Past Medical History:   Diagnosis Date    CKD (chronic kidney disease), stage IV 1/13/2015    Degenerative disc disease     cervical spine     Depression     Hyperlipidemia     Hypertension     IGT (impaired glucose tolerance)     diet controlled/hyperglycemia fasting    Lung cancer     Macular degeneration     Osteoporosis, unspecified     Post-surgical hypothyroidism     Postsurgical hypoparathyroidism     Renal manifestation of secondary diabetes mellitus     Stroke     right frontoparietal     Thyroid cancer     Type 2 diabetes mellitus, controlled 1/13/2015     She has severe aortic stenosis and is scheduled for angiogram on 7/6/2020 in preparation for potential TAVR. She is having dyspnea on mild exertion, exertional chest discomfort and dizziness upon standing. She has lost a significant amount of weight since her last visit. She is in the process of getting new dentures, so she has not been eating much lately.    Last seen by me in 12/2019    With regards to lung cancer and thyroid cancer:  Being following by oncology for non-small cell lung cancer - on surveillance. Finished 2 cycles of chemotherapy as well as radiation. Has not seen them in a while, but her daughter reports they recently requested a follow-up appointment and will be seen soon.    PET 9/2016: Posterior right rib activity unchanged. No evidence of local recurrence or distant metastatic disease.    PET 1/2018:   There are multiple new areas of hypermetabolic activity within the neck.    - Right neck lesion lymph node ([id number], image69) SUV max 8.44  - Right paratracheal lymph node ([id number],  image75) SUV max 6.52   - Right superior mediastinal lymph node ([id number], image81) SUV max 4.55  - New pathologic fracture of the right posterior 9th rib ([id number], image17) SUV max 4.49    A left lower lobe nodule is slightly larger at 6 mm from 4 mm on PET/CT 09/12/2016.  This lesion is relatively stable and below the size threshold for PET hypermetabolic activity, although neoplastic process cannot be excluded given interval enlargement.    Remote 9th and 8th rib fracture that was present on 09/12/2016. These do not demonstrate significant hypermetabolic activity. In the adjacent pulmonary parenchyma there is associated compressive atelectasis and consolidation and near-complete resolution of the previously demonstrated cavitation.    Incidental CT findings:  Postsurgical changes of total thyroidectomy.  Mildly ectatic ascending aorta to 3.7 cm.  There is significant calcific atherosclerosis throughout the aortic arch and multiple coronary arteries.  There are multiple areas of patchy groundglass opacities throughout the right upper lobe, a mixed density linear lesion in the lingula of the left upper lobe and multiple subcentimeter nodules throughout the base of the left lower lobe, these findings are all stable since PET/CT 09/12/2016.      Impression         Three new areas of hypermetabolic lesions within the neck and superior mediastinum with SUV measurements as above.    New pathologic fracture of the right posterior 9th rib with hypermetabolic activity, as above.     Left lower lobe nodule slightly larger since prior PET/CT 09/12/2016 that is below the size threshold for PET scan, although neoplastic process cannot be excluded.     Remote rib fracture of the 9th and 8th right posterior ribs, similar to scan from 09/12/2016.  Stable pulmonary findings of patchy groundglass opacities and compressive atelectasis and consolidation adjacent to the rib fractures.     5/2018: I-123 scan did not show any  iodine avid disease    CT Chest 5/2018:   In this patient with a history of lung cancer, soft tissue attenuation and volume loss in the right lower lobe with areas of calcification, similar to prior PET-CT on 01/19/2018 and suggestive of treated lung cancer.    Upper limits of normal superior mediastinal lymph node that previously demonstrated hypermetabolic activity on prior PET-CT on 01/19/2018    Innumerable scattered pulmonary nodules, most notable in the left lower lobe that appear to have progressively increased in number and worrisome for metastatic disease.  Clinical considerations will determine the role and interval for CT surveillance.    PET-CT 12/2019:  EXAMINATION:  NM PET CT ROUTINE     CLINICAL HISTORY:  thyroid cancer; Malignant neoplasm of thyroid gland     TECHNIQUE:  15.15 mCi of F18-FDG was administered intravenously in the left antecubital fossa.  After an approximately 60 min distribution time, PET/CT images were acquired from the skull base to the mid thigh. Transmission images were acquired to correct for attenuation using a whole body low-dose CT scan without contrast with the arms positioned above the head. Glycemia at the time of injection was 97 mg/dL.     Of note, there is no indication that the patient received Thyrogen preparation, which may increase the sensitivity of the scan.     COMPARISON:  FDG PET-CT 01/09/2018, 09/12/2016, 08/14/2013     FINDINGS:  Quality of the study: Adequate.     Decreased tracer uptake within the left anterior middle cranial fossa correlating with prior left frontotemporal resection.     Postoperative changes from thyroidectomy.  New hypermetabolic right level 5A lymph node, SUV max of 10.60.  Increased size and uptake within a right level 3 cervical lymph node measuring 2.0 cm (previously 1.3 cm), SUV max of 23.7 (previously 6.52).  Increased size and uptake within a right paratracheal superior mediastinal lymph node measuring 1 cm (previously 0.6 cm) SUV  max of 16.01 (previously 4.55).  Brown fat activation posterior to the C2-C3 vertebral bodies.     Numerous subcentimeter bilateral pulmonary nodules, some which appear conspicuous/slightly larger in comparison to prior exam.  Largest nodule measures 0.8 cm in the lobe (previously 0.6 cm).  Focal tracer uptake within 0.7 cm lower lobe pulmonary nodule with peripheral calcification (series 3, image 116), SUV max 4.07.  Stable region right lower lobe volume loss, consolidation, and bronchiectasis favored to represent post radiation change.  Chronic right posterior 8th-10th rib deformities.  Several bilateral ground-glass opacities which appears similar in comparison to prior exam.  Biapical fibronodular changes and bilateral bandlike pulmonary scarring, unchanged.     In the abdomen and pelvis, there is physiologic tracer distribution within the abdominal organs and excretion into the genitourinary system.     In the bones, there are no  hypermetabolic lesions worrisome for malignancy.     Impression:     Progression of several hypermetabolic right cervical lymph nodes compatible with FDG-avid metastatic disease.  Prior index lesions have increased in size and uptake in there is a new hypermetabolic right level 5 cervical node.  There is no indication that the patient received Thyrogen preparation for this examination.  Recommend consideration of Thyrogen stimulation in preparation for future FDG PET-CT scans to assess for FDG avid disease in coordination with thyroglobulin assays and radioactive iodine whole body scans as clinically indicated.     Numerous subcentimeter bilateral pulmonary nodules, some of which have increased in size in comparison to prior exam.  Focal increased uptake within a subcentimeter left lower lobe nodule.  Given presence and relative stability of the numerous pulmonary nodules over the course of several years, favor a benign inflammatory etiology.  Attention on follow-up.     Additional  chronic pulmonary findings similar to prior exam.     Thyroid cancer history:  Total thyroidectomy with bilateral paratracheal dissection and left modified neck dissection of levels II-V, 3/12/2012  There was gross extrathyroidal tumor with infiltration of the left RLN requiring sacrifice    Thyroid Synoptic Report:  Procedure: total thyroidectomy, left neck dissections, central compartment dissection  Specimen integrity: single intact thyroid specimen  Specimen size: r lobe: 5 x 4 x 3 cm; isthmus: 4 x 3 x 3 cm ; l lobe: 6 x 4 x 3 cm  Tumor focality: multifocal; l lobe: 5 cm; isthmus: 2 nodule: 4.5 and 1.2 cm; r lobe: 3 nodules: 2.0 cm, 1.2 cm, 0.7 cm  Tumor laterality: right lobe, left lobe, and isthmus  Tumor size: 5 cm let lobe, greatest dimension  Histological type: papillary, classical and follicular variants  Margins: tumor focally extends to margin at isthmus  Tumor capsule: partially encapsulated  Tumor capsule invasion: present, widely invasive  Lymph-vascular invasion: present, focal  Lymph nodes: 9+/47  Extrathyroid extension: present  Stage: CON (pT4 pN1b MX)    S/P 100 mCi I-131- 9/2012  10/2012 WBS   1. Thyroid tissue remnant in the neck.    2. No evidence of distant metastases.      2/4/15 U/S:   Status post thyroidectomy.  No sonographic evidence of malignancy in this patient with detectable thyroglobulinemia     Neck U/S 12/2017: two suspicious lymph nodes (both biopsy proven PTC)  Neck U/S 6/2019: Increasing size of known malignant nodes; no new lymph nodes         Post-surgical hypoparathyroidism and osteoporosis  calcitriol 0.5 mcg qd - held after recent labs showed calcium 12.0  calcium - 4 pills 3 x day   Not currently supplementing with D3.             Lumbar Spine: Lumbar bone mineral density L1-L4 is 0.773g/cm2, which is a t-score of -2.5. The z-score is 0.2.    Total Hip: The total hip bone mineral density is 0.565g/cm2.  The t-score is -3.1, and the z-score is -0.9.  Femoral neck BMD is  0.443g/cm2 and the t-score is -3.7.    COMPARISONS:  Date Location BMD T-score  02/04/15 L-spine 0.724 -2.9  Total Hip 0.580 -3.0      Impression       Severe osteoporosis on basis of vertebral fracture and BMD. Total hip stable since 2015 and lumbar spine BMD increased 7%.    Recommendations:  1) Adequate calcium and Vitamin D therapy  2) Appropriate exercise  3) Consider medical therapy including bisphosphonates, denosumab.  4) Consider repeat BMD in 2 years     prolia started 9/2/16 - After treatment, patient developed severe lip swelling, which lasted the entire day. No shortness of breath, rash, wheezing, low blood pressure.      No recent falls    Has had a cold for the past few days, but previously was feeling well.    Reviewed past medical, family, social history and updated as appropriate.    Review of Systems   Constitutional: Positive for fatigue.   Respiratory: Positive for shortness of breath.    Cardiovascular: Positive for chest pain.   Gastrointestinal: Negative for abdominal pain.   Neurological: Positive for light-headedness.     Objective:     Vitals:    07/01/20 1310   BP: 120/70   Pulse: 77   Resp: 18   Temp: 99.7 °F (37.6 °C)       Physical Exam  Vitals signs and nursing note reviewed.   Constitutional:       Appearance: She is well-developed.   HENT:      Head: Normocephalic and atraumatic.   Neck:      Thyroid: No thyromegaly (No palpable residual thyroid tissue).      Trachea: No tracheal deviation.   Cardiovascular:      Rate and Rhythm: Normal rate.      Heart sounds: Murmur (3/6 barking early systolic murmur (AS)) present.   Pulmonary:      Effort: Pulmonary effort is normal. No respiratory distress.      Breath sounds: No stridor.   Abdominal:      Palpations: Abdomen is soft.   Musculoskeletal:      Comments: No digital clubbing or extremity cyanosis   Lymphadenopathy:      Cervical: Cervical adenopathy (Two anterior cervical LN palpable - R level IV and R level III. Could not feel any  on the left; similar to last exam on the right side.) present.   Skin:     General: Skin is warm and dry.   Psychiatric:         Behavior: Behavior normal.         Thought Content: Thought content normal.         Judgment: Judgment normal.         Wt Readings from Last 10 Encounters:   07/01/20 1310 40.4 kg (89 lb 1.1 oz)   06/05/20 1352 43.1 kg (95 lb)   05/29/20 1054 43.1 kg (95 lb 0.3 oz)   05/22/20 1313 43.6 kg (96 lb 1.9 oz)   12/27/19 1433 44.2 kg (97 lb 7.1 oz)   12/23/19 0936 44 kg (97 lb)   12/09/19 1611 44.5 kg (98 lb)   12/09/19 1443 44.8 kg (98 lb 12.3 oz)   12/06/19 1144 46.8 kg (103 lb 2.8 oz)   12/06/19 1102 46.8 kg (103 lb 2.8 oz)   ]    Lab Results   Component Value Date    HGBA1C 5.5 12/06/2019     Lab Results   Component Value Date    CHOL 247 (H) 12/06/2019     (H) 12/06/2019    LDLCALC 105.0 12/06/2019    TRIG 100 12/06/2019    CHOLHDL 49.4 12/06/2019     Lab Results   Component Value Date     07/01/2020    K 4.5 07/01/2020     07/01/2020    CO2 27 07/01/2020     07/01/2020    BUN 41 (H) 07/01/2020    CREATININE 4.6 (H) 07/01/2020    CALCIUM 10.1 07/01/2020    PROT 7.2 07/01/2020    ALBUMIN 3.8 07/01/2020    BILITOT 0.6 07/01/2020    ALKPHOS 47 (L) 07/01/2020    AST 14 07/01/2020    ALT 6 (L) 07/01/2020    ANIONGAP 9 07/01/2020    ESTGFRAFRICA 9.6 (A) 07/01/2020    EGFRNONAA 8.3 (A) 07/01/2020    TSH 0.213 (L) 06/26/2020        Assessment/Plan:     Thyroid cancer  Known disease in the neck. Unsure if numerous lung nodules are related to recurrence of thyroid cancer, or related to non-neoplastic process.     Given rising TG, will keep TG mildly suppressed (0.1-0.5). Avoid oversuppression given age, osteoporosis history and cardiac history.    We presented her case at tumor board last visit, and the consensus was that surgery is unlikely to be of much benefit in the absence of symptoms. She is at higher risk for complications given her cardiac disease and kidney function.  I will present her case again soon before deciding on the next steps.    Follow-up in 3 months.    Vitamin D deficiency  On vitamin D + calcium supplement daily.    Senile osteoporosis  Treatment options are limited due to severe renal dysfunction, active malignancy, and allergic reaction to Prolia. 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 Alk phos is low and PTH is low also, so this anti-resorptives would likely be counter productive. On calcium supplement (see below).     No recent falls or fractures.    Post-surgical hypothyroidism  See above.    Postsurgical hypoparathyroidism  Most recent calcium level was elevated. Repeat today was normal, so will resume calcitriol at 0.25 mcg daily. Continue current calcium+D supplement. Repeat labs in 4 weeks.    PTH suppressed, but in the setting of hypercalcemia (appropriate). Will check again with next labs.    RTC in 3 months

## 2020-07-01 NOTE — PROGRESS NOTES
Requested updates within Care Everywhere.  Patient's chart was reviewed for overdue ANA topics.  Immunizations reconciled.    Orders placed: Fit kit

## 2020-07-05 ENCOUNTER — LAB VISIT (OUTPATIENT)
Dept: URGENT CARE | Facility: CLINIC | Age: 80
End: 2020-07-05
Payer: MEDICARE

## 2020-07-05 DIAGNOSIS — Z13.9 SCREENING PROCEDURE: ICD-10-CM

## 2020-07-05 PROCEDURE — U0003 INFECTIOUS AGENT DETECTION BY NUCLEIC ACID (DNA OR RNA); SEVERE ACUTE RESPIRATORY SYNDROME CORONAVIRUS 2 (SARS-COV-2) (CORONAVIRUS DISEASE [COVID-19]), AMPLIFIED PROBE TECHNIQUE, MAKING USE OF HIGH THROUGHPUT TECHNOLOGIES AS DESCRIBED BY CMS-2020-01-R: HCPCS | Mod: HCNC

## 2020-07-06 ENCOUNTER — HOSPITAL ENCOUNTER (OUTPATIENT)
Dept: PULMONOLOGY | Facility: CLINIC | Age: 80
Discharge: HOME | End: 2020-07-06
Payer: MEDICARE

## 2020-07-06 ENCOUNTER — EDUCATION (OUTPATIENT)
Dept: CARDIOLOGY | Facility: CLINIC | Age: 80
End: 2020-07-06

## 2020-07-06 ENCOUNTER — HOSPITAL ENCOUNTER (OUTPATIENT)
Facility: HOSPITAL | Age: 80
Discharge: HOME | End: 2020-07-06
Attending: INTERNAL MEDICINE | Admitting: INTERNAL MEDICINE
Payer: MEDICARE

## 2020-07-06 VITALS
OXYGEN SATURATION: 98 % | SYSTOLIC BLOOD PRESSURE: 123 MMHG | RESPIRATION RATE: 18 BRPM | HEIGHT: 61 IN | WEIGHT: 94.69 LBS | TEMPERATURE: 98 F | HEART RATE: 94 BPM | BODY MASS INDEX: 17.88 KG/M2 | DIASTOLIC BLOOD PRESSURE: 62 MMHG

## 2020-07-06 VITALS — WEIGHT: 94 LBS | HEIGHT: 61 IN | BODY MASS INDEX: 17.75 KG/M2

## 2020-07-06 DIAGNOSIS — I35.0 SEVERE AORTIC STENOSIS: Primary | ICD-10-CM

## 2020-07-06 DIAGNOSIS — I35.0 NONRHEUMATIC AORTIC VALVE STENOSIS: ICD-10-CM

## 2020-07-06 DIAGNOSIS — I35.0 NONRHEUMATIC AORTIC VALVE STENOSIS: Primary | ICD-10-CM

## 2020-07-06 DIAGNOSIS — I51.89 LEFT VENTRICULAR DIASTOLIC DYSFUNCTION WITH PRESERVED SYSTOLIC FUNCTION: ICD-10-CM

## 2020-07-06 DIAGNOSIS — N18.4 CKD (CHRONIC KIDNEY DISEASE), STAGE IV: ICD-10-CM

## 2020-07-06 DIAGNOSIS — R07.9 CHEST PAIN ON EXERTION: ICD-10-CM

## 2020-07-06 DIAGNOSIS — Z01.812 PRE-PROCEDURE LAB EXAM: ICD-10-CM

## 2020-07-06 LAB
ANION GAP SERPL CALC-SCNC: 13 MMOL/L (ref 8–16)
APTT BLDCRRT: 22.4 SEC (ref 21–32)
BUN SERPL-MCNC: 40 MG/DL (ref 8–23)
CALCIUM SERPL-MCNC: 9.1 MG/DL (ref 8.7–10.5)
CHLORIDE SERPL-SCNC: 105 MMOL/L (ref 95–110)
CO2 SERPL-SCNC: 22 MMOL/L (ref 23–29)
CREAT SERPL-MCNC: 4.1 MG/DL (ref 0.5–1.4)
ERYTHROCYTE [DISTWIDTH] IN BLOOD BY AUTOMATED COUNT: 14.6 % (ref 11.5–14.5)
EST. GFR  (AFRICAN AMERICAN): 11 ML/MIN/1.73 M^2
EST. GFR  (NON AFRICAN AMERICAN): 9.6 ML/MIN/1.73 M^2
FEF 25 75 LLN: 0.57
FEF 25 75 PRE REF: 118.4 %
FEF 25 75 REF: 1.4
FEV05 LLN: 0.54
FEV05 REF: 1.39
FEV1 FVC LLN: 62
FEV1 FVC PRE REF: 100.1 %
FEV1 FVC REF: 77
FEV1 LLN: 1.18
FEV1 PRE REF: 84.2 %
FEV1 REF: 1.7
FVC LLN: 1.55
FVC PRE REF: 83.1 %
FVC REF: 2.23
GLUCOSE SERPL-MCNC: 113 MG/DL (ref 70–110)
HCT VFR BLD AUTO: 35.1 % (ref 37–48.5)
HGB BLD-MCNC: 11 G/DL (ref 12–16)
INR PPP: 0.9 (ref 0.8–1.2)
MCH RBC QN AUTO: 29.3 PG (ref 27–31)
MCHC RBC AUTO-ENTMCNC: 31.3 G/DL (ref 32–36)
MCV RBC AUTO: 94 FL (ref 82–98)
MVV LLN: 50
MVV PRE REF: 49.2 %
MVV REF: 59
PEF LLN: 1.93
PEF PRE REF: 84.8 %
PEF REF: 3.76
PHYSICIAN COMMENT: ABNORMAL
PLATELET # BLD AUTO: 166 K/UL (ref 150–350)
PMV BLD AUTO: 12.2 FL (ref 9.2–12.9)
POCT GLUCOSE: 123 MG/DL (ref 70–110)
POTASSIUM SERPL-SCNC: 4.7 MMOL/L (ref 3.5–5.1)
PRE FEF 25 75: 1.66 L/S (ref 0.57–2.23)
PRE FET 100: 4.83 SEC
PRE FEV05 REF: 89.9 %
PRE FEV1 FVC: 77.21 % (ref 62.15–92.19)
PRE FEV1: 1.43 L (ref 1.18–2.22)
PRE FEV5: 1.25 L (ref 0.54–2.25)
PRE FVC: 1.85 L (ref 1.55–2.91)
PRE MVV: 29 L/MIN (ref 50.06–67.73)
PRE PEF: 3.19 L/S (ref 1.93–5.58)
PROTHROMBIN TIME: 9.8 SEC (ref 9–12.5)
RBC # BLD AUTO: 3.75 M/UL (ref 4–5.4)
SARS-COV-2 RNA RESP QL NAA+PROBE: NEGATIVE
SODIUM SERPL-SCNC: 140 MMOL/L (ref 136–145)
WBC # BLD AUTO: 6.74 K/UL (ref 3.9–12.7)

## 2020-07-06 PROCEDURE — 25000003 PHARM REV CODE 250: Mod: HCNC | Performed by: INTERNAL MEDICINE

## 2020-07-06 PROCEDURE — 36415 COLL VENOUS BLD VENIPUNCTURE: CPT | Mod: HCNC

## 2020-07-06 PROCEDURE — 93799 UNLISTED CV SVC/PROCEDURE: CPT | Mod: 26,HCNC,, | Performed by: INTERNAL MEDICINE

## 2020-07-06 PROCEDURE — 94618 PULMONARY STRESS TESTING: CPT | Mod: HCNC,S$GLB,, | Performed by: INTERNAL MEDICINE

## 2020-07-06 PROCEDURE — 94729 PR C02/MEMBANE DIFFUSE CAPACITY: ICD-10-PCS | Mod: HCNC,S$GLB,, | Performed by: INTERNAL MEDICINE

## 2020-07-06 PROCEDURE — 99152 MOD SED SAME PHYS/QHP 5/>YRS: CPT | Mod: HCNC,,, | Performed by: INTERNAL MEDICINE

## 2020-07-06 PROCEDURE — 85730 THROMBOPLASTIN TIME PARTIAL: CPT | Mod: HCNC

## 2020-07-06 PROCEDURE — 63600175 PHARM REV CODE 636 W HCPCS: Mod: HCNC | Performed by: INTERNAL MEDICINE

## 2020-07-06 PROCEDURE — 94010 BREATHING CAPACITY TEST: CPT | Mod: HCNC,S$GLB,, | Performed by: INTERNAL MEDICINE

## 2020-07-06 PROCEDURE — 25500020 PHARM REV CODE 255: Mod: HCNC | Performed by: INTERNAL MEDICINE

## 2020-07-06 PROCEDURE — 93010 EKG 12-LEAD: ICD-10-PCS | Mod: HCNC,,, | Performed by: INTERNAL MEDICINE

## 2020-07-06 PROCEDURE — 99152 PR MOD CONSCIOUS SEDATION, SAME PHYS, 5+ YRS, FIRST 15 MIN: ICD-10-PCS | Mod: HCNC,,, | Performed by: INTERNAL MEDICINE

## 2020-07-06 PROCEDURE — C1894 INTRO/SHEATH, NON-LASER: HCPCS | Mod: HCNC | Performed by: INTERNAL MEDICINE

## 2020-07-06 PROCEDURE — 93454 CORONARY ARTERY ANGIO S&I: CPT | Mod: HCNC | Performed by: INTERNAL MEDICINE

## 2020-07-06 PROCEDURE — 27201423 OPTIME MED/SURG SUP & DEVICES STERILE SUPPLY: Mod: HCNC | Performed by: INTERNAL MEDICINE

## 2020-07-06 PROCEDURE — 94010 BREATHING CAPACITY TEST: ICD-10-PCS | Mod: HCNC,S$GLB,, | Performed by: INTERNAL MEDICINE

## 2020-07-06 PROCEDURE — 82962 GLUCOSE BLOOD TEST: CPT | Mod: HCNC | Performed by: INTERNAL MEDICINE

## 2020-07-06 PROCEDURE — C1769 GUIDE WIRE: HCPCS | Mod: HCNC | Performed by: INTERNAL MEDICINE

## 2020-07-06 PROCEDURE — 94618 PULMONARY STRESS TESTING: ICD-10-PCS | Mod: HCNC,S$GLB,, | Performed by: INTERNAL MEDICINE

## 2020-07-06 PROCEDURE — 93005 ELECTROCARDIOGRAM TRACING: CPT | Mod: HCNC

## 2020-07-06 PROCEDURE — 99152 MOD SED SAME PHYS/QHP 5/>YRS: CPT | Mod: HCNC | Performed by: INTERNAL MEDICINE

## 2020-07-06 PROCEDURE — 85610 PROTHROMBIN TIME: CPT | Mod: HCNC

## 2020-07-06 PROCEDURE — C1887 CATHETER, GUIDING: HCPCS | Mod: HCNC | Performed by: INTERNAL MEDICINE

## 2020-07-06 PROCEDURE — 93454 CORONARY ARTERY ANGIO S&I: CPT | Mod: 26,HCNC,, | Performed by: INTERNAL MEDICINE

## 2020-07-06 PROCEDURE — 85027 COMPLETE CBC AUTOMATED: CPT | Mod: HCNC

## 2020-07-06 PROCEDURE — 80048 BASIC METABOLIC PNL TOTAL CA: CPT | Mod: HCNC

## 2020-07-06 PROCEDURE — 93799 UNLISTED CV SVC/PROCEDURE: CPT | Mod: HCNC | Performed by: INTERNAL MEDICINE

## 2020-07-06 PROCEDURE — 93799 PR PERIPHERAL IVUS ONLY: ICD-10-PCS | Mod: 26,HCNC,, | Performed by: INTERNAL MEDICINE

## 2020-07-06 PROCEDURE — 99153 MOD SED SAME PHYS/QHP EA: CPT | Mod: HCNC | Performed by: INTERNAL MEDICINE

## 2020-07-06 PROCEDURE — 94729 DIFFUSING CAPACITY: CPT | Mod: HCNC,S$GLB,, | Performed by: INTERNAL MEDICINE

## 2020-07-06 PROCEDURE — 93010 ELECTROCARDIOGRAM REPORT: CPT | Mod: HCNC,,, | Performed by: INTERNAL MEDICINE

## 2020-07-06 PROCEDURE — 93454 PR CATH PLACE/CORONARY ANGIO, IMG SUPER/INTERP: ICD-10-PCS | Mod: 26,HCNC,, | Performed by: INTERNAL MEDICINE

## 2020-07-06 RX ORDER — DIPHENHYDRAMINE HCL 50 MG
50 CAPSULE ORAL ONCE
Status: DISCONTINUED | OUTPATIENT
Start: 2020-07-06 | End: 2020-07-06 | Stop reason: HOSPADM

## 2020-07-06 RX ORDER — PROTAMINE SULFATE 10 MG/ML
INJECTION, SOLUTION INTRAVENOUS
Status: DISCONTINUED | OUTPATIENT
Start: 2020-07-06 | End: 2020-07-06 | Stop reason: HOSPADM

## 2020-07-06 RX ORDER — SODIUM CHLORIDE 9 MG/ML
INJECTION, SOLUTION INTRAVENOUS CONTINUOUS
Status: DISCONTINUED | OUTPATIENT
Start: 2020-07-06 | End: 2020-07-06 | Stop reason: HOSPADM

## 2020-07-06 RX ORDER — ASPIRIN 325 MG
325 TABLET ORAL ONCE
Status: COMPLETED | OUTPATIENT
Start: 2020-07-06 | End: 2020-07-06

## 2020-07-06 RX ORDER — SODIUM CHLORIDE 9 MG/ML
INJECTION, SOLUTION INTRAVENOUS CONTINUOUS
Status: ACTIVE | OUTPATIENT
Start: 2020-07-06 | End: 2020-07-06

## 2020-07-06 RX ORDER — MIDAZOLAM HYDROCHLORIDE 1 MG/ML
INJECTION, SOLUTION INTRAMUSCULAR; INTRAVENOUS
Status: DISCONTINUED | OUTPATIENT
Start: 2020-07-06 | End: 2020-07-06 | Stop reason: HOSPADM

## 2020-07-06 RX ORDER — FENTANYL CITRATE 50 UG/ML
INJECTION, SOLUTION INTRAMUSCULAR; INTRAVENOUS
Status: DISCONTINUED | OUTPATIENT
Start: 2020-07-06 | End: 2020-07-06 | Stop reason: HOSPADM

## 2020-07-06 RX ORDER — LIDOCAINE HYDROCHLORIDE 20 MG/ML
INJECTION, SOLUTION INFILTRATION; PERINEURAL
Status: DISCONTINUED | OUTPATIENT
Start: 2020-07-06 | End: 2020-07-06 | Stop reason: HOSPADM

## 2020-07-06 RX ORDER — HEPARIN SODIUM 200 [USP'U]/100ML
INJECTION, SOLUTION INTRAVENOUS
Status: DISCONTINUED | OUTPATIENT
Start: 2020-07-06 | End: 2020-07-06 | Stop reason: HOSPADM

## 2020-07-06 RX ORDER — NITROGLYCERIN 5 MG/ML
INJECTION, SOLUTION INTRAVENOUS
Status: DISCONTINUED | OUTPATIENT
Start: 2020-07-06 | End: 2020-07-06 | Stop reason: HOSPADM

## 2020-07-06 RX ORDER — HEPARIN SODIUM 1000 [USP'U]/ML
INJECTION, SOLUTION INTRAVENOUS; SUBCUTANEOUS
Status: DISCONTINUED | OUTPATIENT
Start: 2020-07-06 | End: 2020-07-06 | Stop reason: HOSPADM

## 2020-07-06 RX ADMIN — ASPIRIN 325 MG ORAL TABLET 325 MG: 325 PILL ORAL at 10:07

## 2020-07-06 RX ADMIN — SODIUM CHLORIDE: 0.9 INJECTION, SOLUTION INTRAVENOUS at 10:07

## 2020-07-06 NOTE — PROGRESS NOTES
You have been scheduled for a procedure in the echo lab on Monday, July 13, 2020.     Please report to the Cardiology Waiting Area on the Third floor of the hospital and check in at 9:30 AM.     You will then be taken to the SSCU (Short Stay Cardiac Unit) and prepared for your procedure. Please be aware that this is not the time of your procedure but the time you are to arrive. The procedures are scheduled on an hourly basis; however, emergency cases take precedence over all other cases.     You may not have anything to eat or drink after midnight the night before your test. You may take your regular morning medications with water.    The procedure will take 1-2 hours to perform. After the procedure, you will return to SSCU on the third floor of the hospital. Your family may remain in the room with you during this time.     You will be discharged home that same afternoon. You must have someone to drive you home.  Your doctor will determine, based on your progress, the time of your discharge. The results of your procedure will be discussed with you before you are discharged. Any further testing or procedures will be scheduled for you either before you leave or you will be called with these appointments.       If you should have any questions, concerns, or need to change the date of your procedure, please call ANN Sterling @ [phone]    Special Instructions:  Covid Test Friday July 10         THE ABOVE INSTRUCTIONS WERE GIVEN TO THE PATIENT VERBALLY AND THEY VERBALIZED UNDERSTANDING.  THEY DO NOT REQUIRE ANY SPECIAL NEEDS AND DO NOT HAVE ANY LEARNING BARRIERS.          Directions for Reporting to Cardiology Waiting Area in the Hospital  If you park in the Parking Garage:  Take elevators to the1st floor of the parking garage.  Continue past the gift shop, coffee shop, and piano.  Take a right and go to the gold elevators. (Elevator B)  Take the elevator to the 3rd floor.  Follow the arrow on the sign on the wall  that says Cath Lab Registration/EP Lab Registration.  Follow the long hallway all the way around until you come to a big open area.  This is the registration area.  Check in at Reception Desk.    OR    If family is dropping you off:  Have them drop you off at the front of the Hospital under the green overhang.  Enter through the doors and take a right.  Take the E elevators to the 3rd floor Cardiology Waiting Area.  Check in at the Reception Desk in the waiting room.

## 2020-07-06 NOTE — PROGRESS NOTES
Patient ambulated around unit with standby assist. R groin remained CDI. No bleeding or hematoma noted. Will monitor.

## 2020-07-06 NOTE — DISCHARGE INSTRUCTIONS
Discharge Instructions for Aortic Valve Stenosis  You have been diagnosed with aortic valve stenosis. This means the aortic valve in your heart is stiff or remains in a near-closed position and has trouble opening. Because of this, your heart has to work harder to push the blood through the valve. In some cases, this extra work makes the muscle of the heart thicken. The extra work can tire the heart and cause its muscle to weaken over time. This condition often changes very slowly and doesn't need to be treated. But in some people, it may get worse faster and need to be treated. Some people can control their stenosis with medicines. In some severe cases, surgery is needed.  Home care  · Check with your doctor before taking any over-the-counter medicines, herbal products, or vitamins.  · Take your medicines exactly as directed. Dont skip doses.  · Keep all follow-up appointments. Some people with aortic stenosis dont have symptoms. Others need close follow-up and surgery.  Lifestyle changes  · Maintain a healthy weight. Get help to lose any extra pounds.  · Ask your doctor if an exercise program is right for you. Some people with aortic stenosis need to be very careful about exercise as it can result in fainting.  · Break the smoking habit. Enroll in a stop-smoking program to improve your chances of success.  Follow-up care  Make a follow-up appointment with your healthcare provider, or as directed.  When to call 911  Call 911 or go to the emergency room right away if you have any of the following:  · Chest pain or shortness of breath  · Weakness in the muscles of your face, arms, or legs  · Trouble speaking  · Rapid pulse or pounding heartbeat  · Fainting or dizziness  · Sudden vertigo   Date Last Reviewed: 6/1/2016  © 7266-4641 [employer]. [address] [place] [address], [place] [address]. All rights reserved. This information is not intended as a substitute for professional medical care. Always follow  your healthcare professional's instructions.    1. Do not strain or lift anything greater than 5 lb for 1 week.  2. Do not drive or operate any dangerous machinery for 24 hours.   3. Keep the dressing on, clean, and dry for 24 hours.   4. After 24 hours, the dressing may be removed and a shower is allowed.   5. Clean the area with mild soap and water and then leave open to air. Keep area free of any creams, lotions or ointments. .   6. Once the skin has healed, bathing in a tub or swimming is allowed.   7. Inspect the groin site daily and report to the physician any swelling at the site that   cannot be controlled with manual pressure for 10 minutes, unusual pain at the   access site or affected extremity, unusual swelling at the access site, or signs or   symptoms of infection such as redness, pain, or fever.     Call 911 if you have:   Bleeding from the puncture site that you cannot stop by doing the following:   Relax and lie down right away. Keep your leg flat and apply firm pressure to the site using your fingers and a gauze pad. Keep the pressure on for 20 minutes. Continue this until the bleeding stops. This may take awhile. When bleeding stops, cover the site with a clean bandage and keep your leg still as much as possible.

## 2020-07-06 NOTE — SUBJECTIVE & OBJECTIVE
Past Medical History:   Diagnosis Date    CKD (chronic kidney disease), stage IV 2015    Degenerative disc disease     cervical spine     Depression     Hyperlipidemia     Hypertension     IGT (impaired glucose tolerance)     diet controlled/hyperglycemia fasting    Lung cancer     Macular degeneration     Osteoporosis, unspecified     Post-surgical hypothyroidism     Postsurgical hypoparathyroidism     Renal manifestation of secondary diabetes mellitus     Stroke     right frontoparietal     Thyroid cancer     Type 2 diabetes mellitus, controlled 2015       Past Surgical History:   Procedure Laterality Date    BRAIN SURGERY      meningoma removed    CATARACT EXTRACTION       SECTION      EYE SURGERY      bilateral cataracts    FRACTURE SURGERY Right     wrist fracture with surgical repair    LUNG BIOPSY N/A 2018    Procedure: BIOPSY, LUNG;  [facility];  Location: [facility] OR [address] [place] [address];  Service: Anesthesiology;  Laterality: N/A;    mengioma  resection  2011    left front/temporal craniotomy    right wrist fracture and repair      SPINE SURGERY      compressin fracture    THYROIDECTOMY      cancer       Review of patient's allergies indicates:   Allergen Reactions    Iodine and iodide containing products Anaphylaxis     Swelling/anyphylaxis   seafood    Prolia [denosumab]      Lip and leg swelling    Shellfish containing products        PTA Medications   Medication Sig    amLODIPine (NORVASC) 5 MG tablet Take 0.5 tablets (2.5 mg total) by mouth once daily.    atorvastatin (LIPITOR) 40 MG tablet TAKE 1 TABLET(40 MG) BY MOUTH EVERY DAY    clopidogreL (PLAVIX) 75 mg tablet Take 4 tabs (300mg) night before procedure then 1 tab (75mg) daily starting day of procedure.    levothyroxine (SYNTHROID) 75 MCG tablet Mon and Thurs - 1/2 tablet daily; Other 5 days - full tablet daily    omeprazole (PRILOSEC OTC) 20 MG tablet Take 1 tablet (20 mg total)  by mouth once daily.    ramipril (ALTACE) 10 MG capsule Take 1 capsule (10 mg total) by mouth once daily.    albuterol (PROVENTIL/VENTOLIN HFA) 90 mcg/actuation inhaler Inhale 2 puffs into the lungs every 6 (six) hours as needed for Wheezing.    calcitRIOL (ROCALTROL) 0.25 MCG Cap Take 1 capsule (0.25 mcg total) by mouth once daily.    CALCIUM 600 600 mg (1,500 mg) Tab TK 4 TS PO TID WITH MEALS.    cholecalciferol, vitamin D3, 1,000 unit capsule Take 2 capsules (2,000 Units total) by mouth once daily.    cimetidine (TAGAMET) 300 MG tablet Take 1 tablet (300 mg total) by mouth every 6 (six) hours. Starting night before procedure. Take at 6pm, 12am, then 6am morning of procedure. for 3 doses     Family History     Problem Relation (Age of Onset)    Diabetes Mother    Heart disease Mother    No Known Problems Father, Sister, Daughter, Son, Son, Son, Son, Brother, Maternal Aunt, Maternal Uncle, Paternal Aunt, Paternal Uncle, Maternal Grandmother, Maternal Grandfather, Paternal Grandmother, Paternal Grandfather        Tobacco Use    Smoking status: Former Smoker     Packs/day: 1.00     Years: 50.00     Pack years: 50.00     Types: Cigarettes     Quit date: 8/22/2009     Years since quitting: 10.8    Smokeless tobacco: Never Used   Substance and Sexual Activity    Alcohol use: No    Drug use: No    Sexual activity: Never     Review of Systems   Constitution: Negative for chills, decreased appetite and fever.   HENT: Negative for congestion.    Eyes: Negative for blurred vision.   Cardiovascular: Positive for dyspnea on exertion. Negative for chest pain, claudication and irregular heartbeat.   Respiratory: Negative for cough, hemoptysis and shortness of breath.    Gastrointestinal: Negative for bloating, abdominal pain and bowel incontinence.   Neurological: Negative for dizziness, focal weakness and weakness.   Psychiatric/Behavioral: Negative for altered mental status and depression.     Objective:     Vital  Signs (Most Recent):    Vital Signs (24h Range):           There is no height or weight on file to calculate BMI.            No intake or output data in the 24 hours ending 07/06/20 0900    Lines/Drains/Airways     None                 Physical Exam   Constitutional: She is oriented to person, place, and time. No distress.   Thin, frail, elderly   HENT:   Head: Normocephalic and atraumatic.   Right Ear: External ear normal.   Left Ear: External ear normal.   Eyes: Conjunctivae and EOM are normal.   Neck: Normal range of motion. Neck supple. No thyromegaly present.   Cardiovascular: Normal rate and regular rhythm.   Murmur (holosystolic) heard.  Pulmonary/Chest: Effort normal and breath sounds normal. No respiratory distress. She has no wheezes. She has no rales.   Abdominal: Soft. She exhibits no distension. There is no abdominal tenderness.   Musculoskeletal: Normal range of motion.         General: No edema.   Neurological: She is alert and oriented to person, place, and time.   Skin: Skin is dry. She is not diaphoretic.   Psychiatric: She has a normal mood and affect. Her behavior is normal.       Significant Labs: CMP No results for input(s): NA, K, CL, CO2, GLU, BUN, CREATININE, CALCIUM, PROT, ALBUMIN, BILITOT, ALKPHOS, AST, ALT, ANIONGAP, ESTGFRAFRICA, EGFRNONAA in the last 48 hours. and CBC No results for input(s): WBC, HGB, HCT, PLT in the last 48 hours.     Labs pending    Significant Imaging: EKG: reviewed. NSR, no significant ST-T wave abnormalities

## 2020-07-06 NOTE — PLAN OF CARE
Received report from ANN Rojo. Patient s/p [facility], AAOx3. VSS, no c/o pain or discomfort at this time, resp even and unlabored. Gauze/tegaderm dressing to R groin is CDI. No active bleeding. No hematoma noted. Post procedure protocol reviewed with patient and patient's family. Understanding verbalized. Family members at bedside. Nurse call bell within reach. Will continue to monitor per post procedure protocol.

## 2020-07-06 NOTE — ASSESSMENT & PLAN NOTE
NYHA class 3 symptoms, likely secondary to symptomatic severe aortic stenosis.  Will confirm severity of AS as well as coronary angiogram anatomy     1. Cardiac catheterization with probable PCI.   2. Antiplatelets: ASA/Plavix  1. Pt took loading dose of plavix yesterday  2. Loaded with ASA this am since she did not take it yesterday.  3. Access: [facility] for IVUS  4. Catheters: JL4, JR4  5. Pt is a PAULETTE candidate and understands the importance of taking plavix for at least one year in ACS cases and 6 months in stable CAD. The patient understands that in case of receiving a drug coated stent the failure to comply with dual anti-platelet therapy as prescribed is likely to result in stent clothing, heart attack and death.   6. The risks, benefits, and alternatives of coronary vascular angiography and possible intervention were discussed with the patient. All questions were answered and informed consent was obtained. I had a detailed discussion with the patient regarding risk of stroke, MI, bleeding access site complications including limb loss, allergy, kidney failure including dialysis and death.  7. The patient understands the risks and benefits and wishes to go ahead with the procedure.  8. CSHA Clinical Frailty Scale: Managing well  9. All patient's questions were answered

## 2020-07-06 NOTE — PROCEDURES
Marga Guerra is a 80 y.o.  female patient, who presents for a 6 minute walk test ordered by MD Cosmo.  The diagnosis is Aortic Valve Disorder.  The patient's BMI is 17.8 kg/m2.  Predicted distance (lower limit of normal) is 288.87 meters.      Test Results:    The test was completed without stopping.   The total time walked was 360 seconds.  During walking, the patient reported:  Chest pain, Dyspnea, Leg pain, Lightheadedness. The patient used a wheelchair  during testing.     07/06/2020---------Distance: 274.32 meters (900 feet)     O2 Sat % Supplemental Oxygen Heart Rate Blood Pressure Aubrey Scale   Pre-exercise  (Resting) 99 % Room Air 110 bpm 130/73 mmHg 0   During Exercise 96 % Room Air 130 bpm 149/80 mmHg 5-6   Post-exercise  (Recovery) 97 % Room Air  115 bpm 148/70 mmHg       Recovery Time: 146 seconds    Performing nurse/tech: Nany CORDOVA      PREVIOUS STUDY:   The patient has not had a previous study.      CLINICAL INTERPRETATION:  Six minute walk distance is 274.32 meters (900 feet) with heavy dyspnea.  During exercise, there was significant desaturation while breathing room air.  Both blood pressure and heart rate remained stable with walking.  Tachycardia was present prior to exercise.  The patient reported non-pulmonary symptoms during exercise.  Significant exercise impairment is likely due to cardiovascular causes and subjective symptoms.  No previous study performed.  Based upon age and body mass index, exercise capacity is less than predicted.

## 2020-07-06 NOTE — Clinical Note
Manual pressure applied to the right femoral artery, right radial artery and right radial artery sheath insertion site.

## 2020-07-06 NOTE — ASSESSMENT & PLAN NOTE
Pt has severe chronic kidney disease, which limits the ability to do a CT angiogram.      Will proceed with a coronary angiogram with minimal contrast technique.     Plan for intravascular ultrasound of her iliac arteries under 3D nicholas to assess her LV outflow tract and root diameter given inability to to CT Angio.     Will have her evaluated by CT surgery after her coronary angiogram, we suspect that she will be high risk given her CKD age and comorbidities.

## 2020-07-06 NOTE — ASSESSMENT & PLAN NOTE
CKD stage V.   Risks of contrast use discussed with patient including possibility of need for hemodialysis. Precautions mentioned as well including minimal use of contrast and IV US of iliac arteries as opposed to CT Angio.

## 2020-07-06 NOTE — PLAN OF CARE
Pt and patient's daughter stated that patient took her 3 doses of allergy prep as ordered with the last one being at 6 am. Pt and pt daughter also state that patient took 4 plavix last night and one this am as ordered

## 2020-07-06 NOTE — PLAN OF CARE
Pt arrived to unit ambulatory from home accompanied by her daughter. Pt oriented to room. Iv inserted. Admit assessment completed.nurse call bell within reach. Will continue to monitor

## 2020-07-06 NOTE — BRIEF OP NOTE
POST CATH NOTE  Procedure:  Coronary angiogram  Referring MD:  Dr. Baptiste  Indication:  Pre-Tavr workup  Access:  Right femoral artery    Findings:  Non obstructive CAD  IVUS of right iliac to common femoral done        Post Cath Exam:  Vitals:    07/06/20 0908   BP: (!) 163/77   Pulse:    Resp:    Temp:        Patient tolerated the procedure well, no complications  No unusual pain, hematoma or thrill at vascular access site.  Distal pulse present without signs of ischemia.  Post-procedure orders as per EPIC    Recommendation:  - Resume care by primary team   -Routine Post cath care  -Bedrest for 6 hours  -Resume Renal diet  - IVFs NS @ 50/hr x 4 hours        Julius Monaco MD  Cardiovascular Fellow PGY-IV  [facility]

## 2020-07-06 NOTE — H&P
[facility] - Short Stay Cardiac Unit  Interventional Cardiology  H&P    Patient Name: Marga Guerra  MRN: [id number]  Admission Date: 7/6/2020  Code Status: No Order   Attending Provider: Brett Wilburn MD   Primary Care Physician: Stephanie Prescott MD  Principal Problem:<principal problem not specified>    Patient information was obtained from patient and daughter.     Subjective:     Chief Complaint:  Presented for [facility] for evaluation of AS     HPI:  Marga Guerra is a 80 y.o. female referred by Dr Baptiste for evaluation of severe AS (NYHA Class III sx). The patient has a history of aortic valve stenosis-nonrheumatic, mitral valve stenosis, CKD5, HTN, COPD, Bilateral Carotid artery disease,  Chest pain on exertion, left ventricular diastolic dysfunction with preserved systolic function, HLD and Thyroid Cancer. She reports 2 months of NYHA class II sympotms. Her last echo was in December when she had moderate to severe AS. She comes for evaluation of valve replacement options      The patient has undergone the following TAVR work-up:   · ECHO (Date 12.9.19): NATALIE= 0.7 cm2, MG= 38mmHg, Peak Jose J= 3.8 m/s, EF= 60%.   · Echo (Date 6.5.20): ANTALIE=  1.0 cm2, MG= 33mmHg, Peak Jose J= 3.7 m/s, EF= 60%.   · [facility]: planned for 7/6  · STS: 2%   · Frailty: Not frail   · Iliacs are Pending   · LVOT area by CTA is pending  · CT Surgery risk assessment: Pending  · Rhythm issues: None  · PFTs: pending.  · Comorbidities: CKD (Cr 3) history of neck and brain cancer.     Of note, patient has listed allergy to contrast, but she was prescribed prednisone, benadryl, and cimetidine regimen, which she was compliant with last night and this morning.  She was also compliant with loading dose of plavix overnight and daily dose this am. She did not take Aspirin. Pt currently in no acute distress and denies chest pain or shortness of breath.     Past Medical History:   Diagnosis Date    CKD (chronic kidney disease), stage IV 1/13/2015     Degenerative disc disease     cervical spine     Depression     Hyperlipidemia     Hypertension     IGT (impaired glucose tolerance)     diet controlled/hyperglycemia fasting    Lung cancer     Macular degeneration     Osteoporosis, unspecified     Post-surgical hypothyroidism     Postsurgical hypoparathyroidism     Renal manifestation of secondary diabetes mellitus     Stroke     right frontoparietal     Thyroid cancer     Type 2 diabetes mellitus, controlled 2015       Past Surgical History:   Procedure Laterality Date    BRAIN SURGERY      meningoma removed    CATARACT EXTRACTION       SECTION      EYE SURGERY      bilateral cataracts    FRACTURE SURGERY Right     wrist fracture with surgical repair    LUNG BIOPSY N/A 2018    Procedure: BIOPSY, LUNG;  Surgeon: [facility] Diagnostic Provider;  Location: [facility] OR [address] [place] [address];  Service: Anesthesiology;  Laterality: N/A;    mengioma  resection  2011    left front/temporal craniotomy    right wrist fracture and repair      SPINE SURGERY      compressin fracture    THYROIDECTOMY      cancer       Review of patient's allergies indicates:   Allergen Reactions    Iodine and iodide containing products Anaphylaxis     Swelling/anyphylaxis   seafood    Prolia [denosumab]      Lip and leg swelling    Shellfish containing products        PTA Medications   Medication Sig    amLODIPine (NORVASC) 5 MG tablet Take 0.5 tablets (2.5 mg total) by mouth once daily.    atorvastatin (LIPITOR) 40 MG tablet TAKE 1 TABLET(40 MG) BY MOUTH EVERY DAY    clopidogreL (PLAVIX) 75 mg tablet Take 4 tabs (300mg) night before procedure then 1 tab (75mg) daily starting day of procedure.    levothyroxine (SYNTHROID) 75 MCG tablet Mon and Thurs - 1/2 tablet daily; Other 5 days - full tablet daily    omeprazole (PRILOSEC OTC) 20 MG tablet Take 1 tablet (20 mg total) by mouth once daily.    ramipril (ALTACE) 10 MG capsule Take 1 capsule (10 mg total) by  mouth once daily.    albuterol (PROVENTIL/VENTOLIN HFA) 90 mcg/actuation inhaler Inhale 2 puffs into the lungs every 6 (six) hours as needed for Wheezing.    calcitRIOL (ROCALTROL) 0.25 MCG Cap Take 1 capsule (0.25 mcg total) by mouth once daily.    CALCIUM 600 600 mg (1,500 mg) Tab TK 4 TS PO TID WITH MEALS.    cholecalciferol, vitamin D3, 1,000 unit capsule Take 2 capsules (2,000 Units total) by mouth once daily.    cimetidine (TAGAMET) 300 MG tablet Take 1 tablet (300 mg total) by mouth every 6 (six) hours. Starting night before procedure. Take at 6pm, 12am, then 6am morning of procedure. for 3 doses     Family History     Problem Relation (Age of Onset)    Diabetes Mother    Heart disease Mother    No Known Problems Father, Sister, Daughter, Son, Son, Son, Son, Brother, Maternal Aunt, Maternal Uncle, Paternal Aunt, Paternal Uncle, Maternal Grandmother, Maternal Grandfather, Paternal Grandmother, Paternal Grandfather        Tobacco Use    Smoking status: Former Smoker     Packs/day: 1.00     Years: 50.00     Pack years: 50.00     Types: Cigarettes     Quit date: 8/22/2009     Years since quitting: 10.8    Smokeless tobacco: Never Used   Substance and Sexual Activity    Alcohol use: No    Drug use: No    Sexual activity: Never     Review of Systems   Constitution: Negative for chills, decreased appetite and fever.   HENT: Negative for congestion.    Eyes: Negative for blurred vision.   Cardiovascular: Positive for dyspnea on exertion. Negative for chest pain, claudication and irregular heartbeat.   Respiratory: Negative for cough, hemoptysis and shortness of breath.    Gastrointestinal: Negative for bloating, abdominal pain and bowel incontinence.   Neurological: Negative for dizziness, focal weakness and weakness.   Psychiatric/Behavioral: Negative for altered mental status and depression.     Objective:     Vital Signs (Most Recent):    Vital Signs (24h Range):           There is no height or weight on  file to calculate BMI.            No intake or output data in the 24 hours ending 07/06/20 0900    Lines/Drains/Airways     None                 Physical Exam   Constitutional: She is oriented to person, place, and time. No distress.   Thin, frail, elderly   HENT:   Head: Normocephalic and atraumatic.   Right Ear: External ear normal.   Left Ear: External ear normal.   Eyes: Conjunctivae and EOM are normal.   Neck: Normal range of motion. Neck supple. No thyromegaly present.   Cardiovascular: Normal rate and regular rhythm.   Murmur (holosystolic) heard.  Pulmonary/Chest: Effort normal and breath sounds normal. No respiratory distress. She has no wheezes. She has no rales.   Abdominal: Soft. She exhibits no distension. There is no abdominal tenderness.   Musculoskeletal: Normal range of motion.         General: No edema.   Neurological: She is alert and oriented to person, place, and time.   Skin: Skin is dry. She is not diaphoretic.   Psychiatric: She has a normal mood and affect. Her behavior is normal.       Significant Labs: CMP No results for input(s): NA, K, CL, CO2, GLU, BUN, CREATININE, CALCIUM, PROT, ALBUMIN, BILITOT, ALKPHOS, AST, ALT, ANIONGAP, ESTGFRAFRICA, EGFRNONAA in the last 48 hours. and CBC No results for input(s): WBC, HGB, HCT, PLT in the last 48 hours.     Labs pending    Significant Imaging: EKG: reviewed. NSR, no significant ST-T wave abnormalities    Assessment and Plan:     Aortic stenosis  Pt has severe chronic kidney disease, which limits the ability to do a CT angiogram.      Will proceed with a coronary angiogram with minimal contrast technique.     Plan for intravascular ultrasound of her iliac arteries under 3D nicholas to assess her LV outflow tract and root diameter given inability to to CT Angio.     Will have her evaluated by CT surgery after her coronary angiogram, we suspect that she will be high risk given her CKD age and comorbidities.         Left ventricular diastolic dysfunction  with preserved systolic function  NYHA class 3 symptoms, likely secondary to symptomatic severe aortic stenosis.  Will confirm severity of AS as well as coronary angiogram anatomy     1. Cardiac catheterization with probable PCI.   2. Antiplatelets: ASA/Plavix  1. Pt took loading dose of plavix yesterday  2. Loaded with ASA this am since she did not take it yesterday.  3. Access: [facility] for IVUS  4. Catheters: JL4, JR4  5. Pt is a PAULETTE candidate and understands the importance of taking plavix for at least one year in ACS cases and 6 months in stable CAD. The patient understands that in case of receiving a drug coated stent the failure to comply with dual anti-platelet therapy as prescribed is likely to result in stent clothing, heart attack and death.   6. The risks, benefits, and alternatives of coronary vascular angiography and possible intervention were discussed with the patient. All questions were answered and informed consent was obtained. I had a detailed discussion with the patient regarding risk of stroke, MI, bleeding access site complications including limb loss, allergy, kidney failure including dialysis and death.  7. The patient understands the risks and benefits and wishes to go ahead with the procedure.  8. CSHA Clinical Frailty Scale: Managing well  9. All patient's questions were answered    COPD (chronic obstructive pulmonary disease)  PFTs appear to have been performed today. Awaiting report.  Condition currently stable at this time.    CKD (chronic kidney disease), stage V  CKD stage V.   Risks of contrast use discussed with patient including possibility of need for hemodialysis. Precautions mentioned as well including minimal use of contrast and IV US of iliac arteries as opposed to CT Angio.          VTE Risk Mitigation (From admission, onward)    None          Julius Monaco MD  Interventional Cardiology   [facility] - Short Stay Cardiac Unit

## 2020-07-06 NOTE — HPI
Marga Guerra is a 80 y.o. female referred by Dr Baptiste for evaluation of severe AS (NYHA Class III sx). The patient has a history of aortic valve stenosis-nonrheumatic, mitral valve stenosis, CKD5, HTN, COPD, Bilateral Carotid artery disease,  Chest pain on exertion, left ventricular diastolic dysfunction with preserved systolic function, HLD and Thyroid Cancer. She reports 2 months of NYHA class II sympotms. Her last echo was in December when she had moderate to severe AS. She comes for evaluation of valve replacement options      The patient has undergone the following TAVR work-up:   · ECHO (Date 12.9.19): NATALIE= 0.7 cm2, MG= 38mmHg, Peak Jose J= 3.8 m/s, EF= 60%.   · Echo (Date 6.5.20): NATALIE=  1.0 cm2, MG= 33mmHg, Peak Jose J= 3.7 m/s, EF= 60%.   · [facility]: planned for 7/6  · STS: 2%   · Frailty: Not frail   · Iliacs are Pending   · LVOT area by CTA is pending  · CT Surgery risk assessment: Pending  · Rhythm issues: None  · PFTs: pending.  · Comorbidities: CKD (Cr 3) history of neck and brain cancer.     Of note, patient has listed allergy to contrast, but she was prescribed prednisone, benadryl, and cimetidine regimen, which she was compliant with last night and this morning.  She was also compliant with loading dose of plavix overnight and daily dose this am. She did not take Aspirin. Pt currently in no acute distress and denies chest pain or shortness of breath.

## 2020-07-06 NOTE — PLAN OF CARE
Pt discharged via wheelchair in care of daughter.Vs stable.No c/o pain or discomfort.Hep locks dc'd. Discharge instructions given and verbalized understanding by daughter and patient.

## 2020-07-06 NOTE — ASSESSMENT & PLAN NOTE
PFTs appear to have been performed today. Awaiting report.  Condition currently stable at this time.

## 2020-07-06 NOTE — PLAN OF CARE
TAVR Summary Note    Marga Guerra is a 80 y.o. female referred by Dr Baptiste for evaluation of severe AS (NYHA Class 2 sx).    The patient has undergone the following TAVR work-up:    · ECHO (Date 6/5/2020): NATALIE= 0.9 cm2, MG= 38 mmHg, Peak Jose J= 4.05 m/s, EF= 60%. Reviewed TTE with Dr. Francisco who confirms severe AS.  · LHC (Date 7/6/2020): Non-obstructive CAD  · STS: 2%  · Frailty: Not frail  · Iliacs per IVUS (7/6/2020): R iliac > 7 mm   · LVOT area by PASHA is pending  · CT Surgery risk assessment: Pending  · Rhythm issues: None, NSR  · PFTs: FEV1 84% predicted, patient unable to do DLCO  · Comorbidities: CKD5 (Cr 4.1-4.7), not yet dialysis dependent, history of neck and brain cancer, HTN, HLD, COPD, bilateral carotid artery disease    Signed:  Arley Navarro MD  Advanced Interventional Cardiology Fellow - PGY8  Pager: [phone]  7/6/2020 3:54 PM

## 2020-07-06 NOTE — DISCHARGE SUMMARY
Admit Date: 7/6/2020    Discharge Date:  7/6/2020    Attending Physician: Brett Wilburn MD    Discharge Physician: Julius Monaco MD    Principal Diagnoses: Severe Aortic Stenosis  Indication for Admission: Left heart cath (Left), ULTRASOUND, INTRAVASCULAR (N/A), ANGIOGRAM, CORONARY ARTERY (N/A)    Discharged Condition: Good    Hospital Course:   Patient presented for outpatient coronary angiogram which went without complication. Coronary angiogram was nonobstructive. See full cath report in Epic for details. Hemostasis of patient's R CFA access site was achieved with manual pressure. Patient was monitored per post-cath protocol, and her R groin access site was c/d/i with no hematoma. Patient to be evaluated to see if she can ambulate without difficulty prior to discharge. She was feeling well and anticipating discharge home today.      Marga Guerra is a 80 y.o. female referred by Dr Baptiste for evaluation of severe AS (NYHA Class 2 sx).     The patient has undergone the following TAVR work-up:     · ECHO (Date 6/5/2020): NATALIE= 0.9 cm2, MG= 38 mmHg, Peak Jose J= 4.05 m/s, EF= 60%. Reviewed TTE with Dr. Francisco who confirms severe AS.  · LHC (Date 7/6/2020): Non-obstructive CAD  · STS: 2%  · Frailty: Not frail  · Iliacs per IVUS (7/6/2020): R iliac > 7 mm   · LVOT area by PASHA is pending  · CT Surgery risk assessment: Pending  · Rhythm issues: None, NSR  · PFTs: FEV1 84% predicted, patient unable to do DLCO  · Comorbidities: CKD5 (Cr 4.1-4.7), not yet dialysis dependent, history of neck and brain cancer, HTN, HLD, COPD, bilateral carotid artery disease    Outpatient Plan:  - There were no medication changes    Diet: Cardiac diet    Activity: Ad prudencio    Disposition: Home or Self Care    Discharge Medications:      Medication List      CONTINUE taking these medications    albuterol 90 mcg/actuation inhaler  Commonly known as: PROVENTIL/VENTOLIN HFA  Inhale 2 puffs into the lungs every 6 (six) hours as needed for Wheezing.      amLODIPine 5 MG tablet  Commonly known as: NORVASC  Take 0.5 tablets (2.5 mg total) by mouth once daily.     atorvastatin 40 MG tablet  Commonly known as: LIPITOR  TAKE 1 TABLET(40 MG) BY MOUTH EVERY DAY     calcitRIOL 0.25 MCG Cap  Commonly known as: ROCALTROL  Take 1 capsule (0.25 mcg total) by mouth once daily.     CALCIUM 600 600 mg calcium (1,500 mg) Tab  Generic drug: calcium carbonate     cholecalciferol (vitamin D3) 25 mcg (1,000 unit) capsule  Commonly known as: VITAMIN D3  Take 2 capsules (2,000 Units total) by mouth once daily.     clopidogreL 75 mg tablet  Commonly known as: PLAVIX  Take 4 tabs (300mg) night before procedure then 1 tab (75mg) daily starting day of procedure.     levothyroxine 75 MCG tablet  Commonly known as: SYNTHROID  Mon and Thurs - 1/2 tablet daily; Other 5 days - full tablet daily     omeprazole 20 MG tablet  Commonly known as: PriLOSEC OTC  Take 1 tablet (20 mg total) by mouth once daily.     ramipriL 10 MG capsule  Commonly known as: ALTACE  Take 1 capsule (10 mg total) by mouth once daily.        STOP taking these medications    cimetidine 300 MG tablet  Commonly known as: TAGAMET          Follow Up:     Pt will be scheduled for PASHA and followed up by Dr. Zhou.    Julius Monaco MD  Cardiovascular Fellow  Pager # [phone]

## 2020-07-07 LAB — POCT GLUCOSE: 117 MG/DL (ref 70–110)

## 2020-07-08 DIAGNOSIS — I51.89 LEFT VENTRICULAR DIASTOLIC DYSFUNCTION WITH PRESERVED SYSTOLIC FUNCTION: ICD-10-CM

## 2020-07-08 DIAGNOSIS — Z01.812 PRE-PROCEDURE LAB EXAM: Primary | ICD-10-CM

## 2020-07-08 DIAGNOSIS — I35.0 SEVERE AORTIC STENOSIS: Primary | ICD-10-CM

## 2020-07-08 RX ORDER — DIPHENHYDRAMINE HCL 25 MG
50 CAPSULE ORAL ONCE
Status: CANCELLED | OUTPATIENT
Start: 2020-07-08 | End: 2020-07-08

## 2020-07-08 RX ORDER — SODIUM CHLORIDE 9 MG/ML
INJECTION, SOLUTION INTRAVENOUS CONTINUOUS
Status: CANCELLED | OUTPATIENT
Start: 2020-07-08

## 2020-07-09 ENCOUNTER — TELEPHONE (OUTPATIENT)
Dept: ENDOCRINOLOGY | Facility: CLINIC | Age: 80
End: 2020-07-09

## 2020-07-09 ENCOUNTER — LAB VISIT (OUTPATIENT)
Dept: LAB | Facility: HOSPITAL | Age: 80
End: 2020-07-09
Attending: INTERNAL MEDICINE
Payer: MEDICARE

## 2020-07-09 DIAGNOSIS — E89.2 POSTSURGICAL HYPOPARATHYROIDISM: ICD-10-CM

## 2020-07-09 LAB
ALBUMIN SERPL BCP-MCNC: 3.3 G/DL (ref 3.5–5.2)
ALP SERPL-CCNC: 38 U/L (ref 55–135)
ALT SERPL W/O P-5'-P-CCNC: 7 U/L (ref 10–44)
ANION GAP SERPL CALC-SCNC: 8 MMOL/L (ref 8–16)
AST SERPL-CCNC: 12 U/L (ref 10–40)
BILIRUB SERPL-MCNC: 0.4 MG/DL (ref 0.1–1)
BUN SERPL-MCNC: 41 MG/DL (ref 8–23)
CALCIUM SERPL-MCNC: 8.8 MG/DL (ref 8.7–10.5)
CHLORIDE SERPL-SCNC: 107 MMOL/L (ref 95–110)
CO2 SERPL-SCNC: 27 MMOL/L (ref 23–29)
CREAT SERPL-MCNC: 4.1 MG/DL (ref 0.5–1.4)
EST. GFR  (AFRICAN AMERICAN): 11 ML/MIN/1.73 M^2
EST. GFR  (NON AFRICAN AMERICAN): 9.6 ML/MIN/1.73 M^2
GLUCOSE SERPL-MCNC: 85 MG/DL (ref 70–110)
PHOSPHATE SERPL-MCNC: 3.3 MG/DL (ref 2.7–4.5)
POTASSIUM SERPL-SCNC: 4.7 MMOL/L (ref 3.5–5.1)
PROT SERPL-MCNC: 6.5 G/DL (ref 6–8.4)
PTH-INTACT SERPL-MCNC: 32 PG/ML (ref 9–77)
SODIUM SERPL-SCNC: 142 MMOL/L (ref 136–145)

## 2020-07-09 PROCEDURE — 80053 COMPREHEN METABOLIC PANEL: CPT | Mod: HCNC

## 2020-07-09 PROCEDURE — 84100 ASSAY OF PHOSPHORUS: CPT | Mod: HCNC

## 2020-07-09 PROCEDURE — 36415 COLL VENOUS BLD VENIPUNCTURE: CPT | Mod: HCNC

## 2020-07-09 PROCEDURE — 83970 ASSAY OF PARATHORMONE: CPT | Mod: HCNC

## 2020-07-09 NOTE — TELEPHONE ENCOUNTER
I spoke with Ms. Mckeon after I discussed her case at weekly conference. The consensus was similar to last time that we should only intervene on her thyroid cancer if she becomes symptomatic. Linda says the lymph nodes in the neck don't seem to be bothering her unless she pushes hard on them. I told her if they become symptomatic, that it would be reasonable to discuss surgery for symptom relief. However, at this time I think surgery would put her at unnecessary risk, knowing that her disease is not curable. She is comfortable with the plan.    PTH is in the normal range with a normal serum calcium and phos. She has some parathyroid tissue remaining, but possibly not enough to maintain a normal serum calcium. Check periodic labs to avoid hypercalcemia.

## 2020-07-10 ENCOUNTER — LAB VISIT (OUTPATIENT)
Dept: SURGERY | Facility: CLINIC | Age: 80
End: 2020-07-10
Payer: MEDICARE

## 2020-07-10 DIAGNOSIS — Z01.812 PRE-PROCEDURE LAB EXAM: ICD-10-CM

## 2020-07-10 PROCEDURE — U0003 INFECTIOUS AGENT DETECTION BY NUCLEIC ACID (DNA OR RNA); SEVERE ACUTE RESPIRATORY SYNDROME CORONAVIRUS 2 (SARS-COV-2) (CORONAVIRUS DISEASE [COVID-19]), AMPLIFIED PROBE TECHNIQUE, MAKING USE OF HIGH THROUGHPUT TECHNOLOGIES AS DESCRIBED BY CMS-2020-01-R: HCPCS | Mod: HCNC

## 2020-07-11 LAB — SARS-COV-2 RNA RESP QL NAA+PROBE: NOT DETECTED

## 2020-07-13 ENCOUNTER — HOSPITAL ENCOUNTER (OUTPATIENT)
Facility: HOSPITAL | Age: 80
Discharge: HOME | End: 2020-07-13
Attending: INTERNAL MEDICINE | Admitting: INTERNAL MEDICINE
Payer: MEDICARE

## 2020-07-13 ENCOUNTER — ANESTHESIA (OUTPATIENT)
Dept: MEDSURG UNIT | Facility: HOSPITAL | Age: 80
End: 2020-07-13
Payer: MEDICARE

## 2020-07-13 ENCOUNTER — ANESTHESIA EVENT (OUTPATIENT)
Dept: MEDSURG UNIT | Facility: HOSPITAL | Age: 80
End: 2020-07-13
Payer: MEDICARE

## 2020-07-13 VITALS
TEMPERATURE: 98 F | OXYGEN SATURATION: 95 % | WEIGHT: 94 LBS | HEART RATE: 96 BPM | DIASTOLIC BLOOD PRESSURE: 80 MMHG | SYSTOLIC BLOOD PRESSURE: 157 MMHG | BODY MASS INDEX: 18.46 KG/M2 | HEIGHT: 60 IN | RESPIRATION RATE: 16 BRPM

## 2020-07-13 DIAGNOSIS — I35.0 NONRHEUMATIC AORTIC VALVE STENOSIS: ICD-10-CM

## 2020-07-13 LAB
ASCENDING AORTA: 2.9 CM
BSA FOR ECHO PROCEDURE: 1.34 M2
POCT GLUCOSE: 88 MG/DL (ref 70–110)
SINUS: 2.7 CM
STJ: 2.5 CM

## 2020-07-13 PROCEDURE — 37000009 HC ANESTHESIA EA ADD 15 MINS: Mod: HCNC

## 2020-07-13 PROCEDURE — 63600175 PHARM REV CODE 636 W HCPCS: Mod: HCNC | Performed by: NURSE ANESTHETIST, CERTIFIED REGISTERED

## 2020-07-13 PROCEDURE — 37000008 HC ANESTHESIA 1ST 15 MINUTES: Mod: HCNC

## 2020-07-13 PROCEDURE — D9220A PRA ANESTHESIA: Mod: HCNC,ANES,, | Performed by: ANESTHESIOLOGY

## 2020-07-13 PROCEDURE — D9220A PRA ANESTHESIA: ICD-10-PCS | Mod: HCNC,ANES,, | Performed by: ANESTHESIOLOGY

## 2020-07-13 PROCEDURE — 25000003 PHARM REV CODE 250: Mod: HCNC | Performed by: INTERNAL MEDICINE

## 2020-07-13 PROCEDURE — D9220A PRA ANESTHESIA: ICD-10-PCS | Mod: HCNC,CRNA,, | Performed by: NURSE ANESTHETIST, CERTIFIED REGISTERED

## 2020-07-13 PROCEDURE — 82962 GLUCOSE BLOOD TEST: CPT | Mod: HCNC

## 2020-07-13 PROCEDURE — D9220A PRA ANESTHESIA: Mod: HCNC,CRNA,, | Performed by: NURSE ANESTHETIST, CERTIFIED REGISTERED

## 2020-07-13 RX ORDER — SODIUM CHLORIDE 0.9 % (FLUSH) 0.9 %
10 SYRINGE (ML) INJECTION
Status: DISCONTINUED | OUTPATIENT
Start: 2020-07-13 | End: 2020-07-13 | Stop reason: HOSPADM

## 2020-07-13 RX ORDER — PROPOFOL 10 MG/ML
VIAL (ML) INTRAVENOUS CONTINUOUS PRN
Status: DISCONTINUED | OUTPATIENT
Start: 2020-07-13 | End: 2020-07-13

## 2020-07-13 RX ORDER — PHENYLEPHRINE HYDROCHLORIDE 10 MG/ML
INJECTION INTRAVENOUS
Status: DISCONTINUED | OUTPATIENT
Start: 2020-07-13 | End: 2020-07-13

## 2020-07-13 RX ORDER — LIDOCAINE HYDROCHLORIDE 20 MG/ML
INJECTION INTRAVENOUS
Status: DISCONTINUED | OUTPATIENT
Start: 2020-07-13 | End: 2020-07-13

## 2020-07-13 RX ORDER — PROPOFOL 10 MG/ML
VIAL (ML) INTRAVENOUS
Status: DISCONTINUED | OUTPATIENT
Start: 2020-07-13 | End: 2020-07-13

## 2020-07-13 RX ORDER — SODIUM CHLORIDE 9 MG/ML
INJECTION, SOLUTION INTRAVENOUS ONCE
Status: COMPLETED | OUTPATIENT
Start: 2020-07-13 | End: 2020-07-13

## 2020-07-13 RX ADMIN — PHENYLEPHRINE HYDROCHLORIDE 100 MCG: 10 INJECTION INTRAVENOUS at 12:07

## 2020-07-13 RX ADMIN — PHENYLEPHRINE HYDROCHLORIDE 100 MCG: 10 INJECTION INTRAVENOUS at 11:07

## 2020-07-13 RX ADMIN — PROPOFOL 30 MG: 10 INJECTION, EMULSION INTRAVENOUS at 11:07

## 2020-07-13 RX ADMIN — PROPOFOL 150 MCG/KG/MIN: 10 INJECTION, EMULSION INTRAVENOUS at 11:07

## 2020-07-13 RX ADMIN — LIDOCAINE HYDROCHLORIDE 50 MG: 20 INJECTION, SOLUTION INTRAVENOUS at 11:07

## 2020-07-13 RX ADMIN — PROPOFOL 20 MG: 10 INJECTION, EMULSION INTRAVENOUS at 11:07

## 2020-07-13 RX ADMIN — SODIUM CHLORIDE: 0.9 INJECTION, SOLUTION INTRAVENOUS at 11:07

## 2020-07-13 RX ADMIN — PROPOFOL 50 MG: 10 INJECTION, EMULSION INTRAVENOUS at 11:07

## 2020-07-13 NOTE — SUBJECTIVE & OBJECTIVE
Past Medical History:   Diagnosis Date    CKD (chronic kidney disease), stage IV 2015    Degenerative disc disease     cervical spine     Depression     Hyperlipidemia     Hypertension     IGT (impaired glucose tolerance)     diet controlled/hyperglycemia fasting    Lung cancer     Macular degeneration     Osteoporosis, unspecified     Post-surgical hypothyroidism     Postsurgical hypoparathyroidism     Renal manifestation of secondary diabetes mellitus     Stroke     right frontoparietal     Thyroid cancer     Type 2 diabetes mellitus, controlled 2015       Past Surgical History:   Procedure Laterality Date    BRAIN SURGERY      meningoma removed    CATARACT EXTRACTION       SECTION      CORONARY ANGIOGRAPHY N/A 2020    Procedure: ANGIOGRAM, CORONARY ARTERY;  Surgeon: Brett Wilburn MD;  Location: [facility] CATH LAB;  Service: Cardiology;  Laterality: N/A;    EYE SURGERY      bilateral cataracts    FRACTURE SURGERY Right     wrist fracture with surgical repair    LEFT HEART CATHETERIZATION Left 2020    Procedure: Left heart cath;  Surgeon: Brett Wilburn MD;  Location: [facility] CATH LAB;  Service: Cardiology;  Laterality: Left;    LUNG BIOPSY N/A 2018    Procedure: BIOPSY, LUNG;  Surgeon: Desirae Diagnostic Provider;  Location: [facility] OR [address] [place] [address];  Service: Anesthesiology;  Laterality: N/A;    mengioma  resection  2011    left front/temporal craniotomy    right wrist fracture and repair      SPINE SURGERY      compressin fracture    THYROIDECTOMY      cancer       Review of patient's allergies indicates:   Allergen Reactions    Iodine and iodide containing products Anaphylaxis     Swelling/anyphylaxis   seafood    Prolia [denosumab]      Lip and leg swelling    Shellfish containing products        No current facility-administered medications on file prior to encounter.      Current Outpatient Medications on File Prior to Encounter   Medication Sig     albuterol (PROVENTIL/VENTOLIN HFA) 90 mcg/actuation inhaler Inhale 2 puffs into the lungs every 6 (six) hours as needed for Wheezing.    atorvastatin (LIPITOR) 40 MG tablet TAKE 1 TABLET(40 MG) BY MOUTH EVERY DAY    CALCIUM 600 600 mg (1,500 mg) Tab TK 4 TS PO TID WITH MEALS.    cholecalciferol, vitamin D3, 1,000 unit capsule Take 2 capsules (2,000 Units total) by mouth once daily.    levothyroxine (SYNTHROID) 75 MCG tablet Mon and Thurs - 1/2 tablet daily; Other 5 days - full tablet daily    omeprazole (PRILOSEC OTC) 20 MG tablet Take 1 tablet (20 mg total) by mouth once daily.    ramipril (ALTACE) 10 MG capsule Take 1 capsule (10 mg total) by mouth once daily.     Family History     Problem Relation (Age of Onset)    Diabetes Mother    Heart disease Mother    No Known Problems Father, Sister, Daughter, Son, Son, Son, Son, Brother, Maternal Aunt, Maternal Uncle, Paternal Aunt, Paternal Uncle, Maternal Grandmother, Maternal Grandfather, Paternal Grandmother, Paternal Grandfather        Tobacco Use    Smoking status: Former Smoker     Packs/day: 1.00     Years: 50.00     Pack years: 50.00     Types: Cigarettes     Quit date: 8/22/2009     Years since quitting: 10.8    Smokeless tobacco: Never Used   Substance and Sexual Activity    Alcohol use: No    Drug use: No    Sexual activity: Never     Review of Systems   Constitution: Negative. Negative for chills and fever.   HENT: Negative.    Eyes: Negative.    Cardiovascular: Negative for chest pain, claudication and paroxysmal nocturnal dyspnea.   Respiratory: Negative for cough, shortness of breath and wheezing.    Endocrine: Negative.    Hematologic/Lymphatic: Does not bruise/bleed easily.   Skin: Negative for nail changes and rash.   Musculoskeletal: Negative.  Negative for back pain.   Gastrointestinal: Negative for abdominal pain, melena, nausea and vomiting.   Neurological: Negative for dizziness and headaches.   Psychiatric/Behavioral: Negative for  altered mental status, depression and substance abuse.   Allergic/Immunologic: Negative.      Objective:     Vital Signs (Most Recent):  Pulse: 78 (07/13/20 1035)  Resp: 18 (07/13/20 1035)  BP: 135/72 (07/13/20 1035)  SpO2: 96 % (07/13/20 1035) Vital Signs (24h Range):  Pulse:  [78] 78  Resp:  [18] 18  SpO2:  [96 %] 96 %  BP: (135)/(72) 135/72        There is no height or weight on file to calculate BMI.    SpO2: 96 %       No intake or output data in the 24 hours ending 07/13/20 1041    Lines/Drains/Airways     None                 Physical Exam   Constitutional: She is oriented to person, place, and time. She appears well-developed and well-nourished.   HENT:   Head: Normocephalic and atraumatic.   Eyes: Pupils are equal, round, and reactive to light. Conjunctivae and EOM are normal.   Neck: No JVD present.   Cardiovascular: Normal rate, regular rhythm and intact distal pulses. Exam reveals no gallop and no friction rub.   Murmur heard.  Pulmonary/Chest: Effort normal. No stridor. She has no wheezes. She has no rales. She exhibits no tenderness.   Abdominal: Soft. Bowel sounds are normal. She exhibits no distension and no mass. There is no abdominal tenderness. There is no guarding.   Musculoskeletal:         General: No tenderness, deformity or edema.   Neurological: She is alert and oriented to person, place, and time.   Skin: Skin is warm and dry. No rash noted. No erythema.   Psychiatric: She has a normal mood and affect.       Significant Labs: CMP No results for input(s): NA, K, CL, CO2, GLU, BUN, CREATININE, CALCIUM, PROT, ALBUMIN, BILITOT, ALKPHOS, AST, ALT, ANIONGAP, ESTGFRAFRICA, EGFRNONAA in the last 48 hours. and CBC No results for input(s): WBC, HGB, HCT, PLT in the last 48 hours.    Significant Imaging: Echocardiogram:   Transthoracic echo (TTE) complete (Cupid Only):   Results for orders placed or performed during the hospital encounter of 06/05/20   Echo Color Flow Doppler? Yes   Result Value Ref  Range    Ascending aorta 2.62 cm    STJ 2.54 cm    AV mean gradient 38 mmHg    Ao peak jose j 4.1 m/s    Ao VTI 69.00 cm    IVS 0.81 0.6 - 1.1 cm    LA size 4.28 cm    Left Atrium Major Axis 4.94 cm    Left Atrium Minor Axis 4.89 cm    LVIDD 3.11 (A) 3.5 - 6.0 cm    LVIDS 2.08 (A) 2.1 - 4.0 cm    LVOT diameter 1.90 cm    LVOT peak VTI 22.30 cm    PW 0.76 0.6 - 1.1 cm    MV Peak A Jose J 2.14 m/s    E wave decelartion time 161.62 msec    MV Peak E Jose J 1.23 m/s    PV Peak D Jose J 0.17 m/s    PV Peak S Jose J 0.36 m/s    RA Major Axis 2.90 cm    RA Width 3.01 cm    RVDD 2.59 cm    Sinus 2.28 cm    TAPSE 1.62 cm    TR Max Jose J 2.79 m/s    TDI LATERAL 0.04 m/s    TDI SEPTAL 0.04 m/s    LA WIDTH 3.60 cm    LV Diastolic Volume 38.18 mL    LV Systolic Volume 14.08 mL    RV S' 7.94 cm/s    LVOT peak jose j 1.44 m/s    LV LATERAL E/E' RATIO 30.75 m/s    LV SEPTAL E/E' RATIO 30.75 m/s    FS 33 %    LA volume 64.37 cm3    LV mass 60.86 g    Left Ventricle Relative Wall Thickness 0.49 cm    AV valve area 0.92 cm2    AV Velocity Ratio 0.35     AV index (prosthetic) 0.32     E/A ratio 0.57     Mean e' 0.04 m/s    Pulm vein S/D ratio 2.12     LVOT area 2.8 cm2    LVOT stroke volume 63.19 cm3    AV peak gradient 67 mmHg    E/E' ratio 30.75 m/s    LV Systolic Volume Index 10.4 mL/m2    LV Diastolic Volume Index 28.07 mL/m2    LA Volume Index 47.3 mL/m2    LV Mass Index 45 g/m2    Triscuspid Valve Regurgitation Peak Gradient 31 mmHg    BSA 1.35 m2    Right Atrial Pressure (from IVC) 3 mmHg    MV mean gradient 8 mmHg    MV peak gradient 23 mmHg    TV rest pulmonary artery pressure 34 mmHg    HR echo 90 bpm    Narrative    · Concentric left ventricular remodeling.  · Normal left ventricular systolic function. The estimated ejection   fraction is 60%.  · Mildly reduced right ventricular systolic function.  · Grade I (mild) left ventricular diastolic dysfunction consistent with   impaired relaxation.  · Moderate left atrial enlargement.  · Mild  tricuspid regurgitation.  · Moderate mitral stenosis from marked annular calcification. The mean   diastolic gradient across the mitral valve is 8 mmHg at a heart rate of 90   bpm.  · Moderate to severe aortic valve stenosis. Aortic valve area is 1.0 cm2;   peak velocity is 3.7 m/s; mean gradient is 33 mmHg. (The LVOT VTI is   higher in this study than on the previous one with no change in AoV VTI.)  · The estimated PA systolic pressure is 34 mmHg.  · Normal central venous pressure (3 mmHg).

## 2020-07-13 NOTE — ANESTHESIA PREPROCEDURE EVALUATION
07/13/2020  Marga Guerra is a 80 y.o., female.  Pre-operative evaluation for Procedure(s) (LRB):  ECHOCARDIOGRAM,TRANSESOPHAGEAL (N/A)    Marga Guerra is a 80 y.o. female   Per SSCU Note 7/6/2020:  80 y.o. female referred by Dr Baptiste for evaluation of severe AS (NYHA Class III sx). The patient has a history of aortic valve stenosis-nonrheumatic, mitral valve stenosis, CKD5, HTN, COPD, Bilateral Carotid artery disease,  Chest pain on exertion, left ventricular diastolic dysfunction with preserved systolic function, HLD and Thyroid Cancer. She reports 2 months of NYHA class II sympotms. Her last echo was in December when she had moderate to severe AS. She comes for evaluation of valve replacement options     Patient Active Problem List   Diagnosis    Thyroid cancer    Mixed hyperlipidemia    Post-surgical hypothyroidism    Postsurgical hypoparathyroidism    Senile osteoporosis    History of stroke    Secondary cancer of bone    CKD (chronic kidney disease), stage V    Vitamin D deficiency    Type 2 diabetes mellitus with renal manifestations, controlled    History of lung cancer    COPD (chronic obstructive pulmonary disease)    Aortic atherosclerosis    Bilateral carotid artery disease    Left ventricular diastolic dysfunction with preserved systolic function    Aortic sclerosis    Type 2 diabetes mellitus with circulatory disorder    Aortic stenosis    Chest pain on exertion    Status post cataract extraction and insertion of intraocular lens    Glaucoma suspect of both eyes    Blindness and low vision    Age-related macular degeneration with central geographic atrophy    Retinal dystrophy    Essential hypertension    Cervical lymphadenopathy    Pathological fracture of rib of right side    Pathological fracture of rib with routine healing    Non-small cell cancer of right lung     Tortuous aorta    Ectatic aorta    Calcified granuloma of lung    Nonrheumatic mitral valve stenosis       Review of patient's allergies indicates:   Allergen Reactions    Iodine and iodide containing products Anaphylaxis     Swelling/anyphylaxis   seafood    Prolia [denosumab]      Lip and leg swelling    Shellfish containing products        No current facility-administered medications on file prior to encounter.      Current Outpatient Medications on File Prior to Encounter   Medication Sig Dispense Refill    albuterol (PROVENTIL/VENTOLIN HFA) 90 mcg/actuation inhaler Inhale 2 puffs into the lungs every 6 (six) hours as needed for Wheezing. 18 g 6    atorvastatin (LIPITOR) 40 MG tablet TAKE 1 TABLET(40 MG) BY MOUTH EVERY DAY 90 tablet 3    CALCIUM 600 600 mg (1,500 mg) Tab TK 4 TS PO TID WITH MEALS.  0    cholecalciferol, vitamin D3, 1,000 unit capsule Take 2 capsules (2,000 Units total) by mouth once daily. 180 capsule 3    levothyroxine (SYNTHROID) 75 MCG tablet Mon and Thurs - 1/2 tablet daily; Other 5 days - full tablet daily 90 tablet 4    omeprazole (PRILOSEC OTC) 20 MG tablet Take 1 tablet (20 mg total) by mouth once daily. 30 tablet 1    ramipril (ALTACE) 10 MG capsule Take 1 capsule (10 mg total) by mouth once daily. 90 capsule 3       Past Surgical History:   Procedure Laterality Date    BRAIN SURGERY      meningoma removed    CATARACT EXTRACTION       SECTION      CORONARY ANGIOGRAPHY N/A 2020    Procedure: ANGIOGRAM, CORONARY ARTERY;  Surgeon: Brett Wilburn MD;  Location: [facility] CATH LAB;  Service: Cardiology;  Laterality: N/A;    EYE SURGERY      bilateral cataracts    FRACTURE SURGERY Right     wrist fracture with surgical repair    LEFT HEART CATHETERIZATION Left 2020    Procedure: Left heart cath;  Surgeon: Brett Wilburn MD;  Location: [facility] CATH LAB;  Service: Cardiology;  Laterality: Left;    LUNG BIOPSY N/A 2018    Procedure: BIOPSY,  LUNG;  [facility];  Location: [facility] OR [address] [place] [address];  Service: Anesthesiology;  Laterality: N/A;    mengioma  resection  4/2011    left front/temporal craniotomy    right wrist fracture and repair      SPINE SURGERY      compressin fracture    THYROIDECTOMY      cancer       Social History     Socioeconomic History    Marital status:      Spouse name: Not on file    Number of children: Not on file    Years of education: Not on file    Highest education level: Not on file   Occupational History    Not on file   Social Needs    Financial resource strain: Not on file    Food insecurity     Worry: Not on file     Inability: Not on file    Transportation needs     Medical: Not on file     Non-medical: Not on file   Tobacco Use    Smoking status: Former Smoker     Packs/day: 1.00     Years: 50.00     Pack years: 50.00     Types: Cigarettes     Quit date: 8/22/2009     Years since quitting: 10.8    Smokeless tobacco: Never Used   Substance and Sexual Activity    Alcohol use: No    Drug use: No    Sexual activity: Never   Lifestyle    Physical activity     Days per week: Not on file     Minutes per session: Not on file    Stress: Not on file   Relationships    Social connections     Talks on phone: Not on file     Gets together: Not on file     Attends Sabianism service: Not on file     Active member of club or organization: Not on file     Attends meetings of clubs or organizations: Not on file     Relationship status: Not on file   Other Topics Concern    Not on file   Social History Narrative    Not on file         CBC: No results for input(s): WBC, RBC, HGB, HCT, PLT, MCV, MCH, MCHC in the last 72 hours.    CMP: No results for input(s): NA, K, CL, CO2, BUN, CREATININE, GLU, MG, PHOS, CALCIUM, ALBUMIN, PROT, ALKPHOS, ALT, AST, BILITOT in the last 72 hours.    INR  No results for input(s): PT, INR, PROTIME, APTT in the last 72 hours.        Diagnostic Studies:      EKG:  7/6/2020:  QTc Int : 485 ms   Normal sinus rhythm   Possible Left atrial enlargement   Borderline Abnormal ECG   When compared with ECG of 31-JAN-2020 09:17,   No significant change was found       PASHA 6/5/2020:  · Concentric left ventricular remodeling.  · Normal left ventricular systolic function. The estimated ejection fraction is 60%.  · Mildly reduced right ventricular systolic function.  · Grade I (mild) left ventricular diastolic dysfunction consistent with impaired relaxation.  · Moderate left atrial enlargement.  · Mild tricuspid regurgitation.  · Moderate mitral stenosis from marked annular calcification. The mean diastolic gradient across the mitral valve is 8 mmHg at a heart rate of 90 bpm.  · Severe aortic valve stenosis. Aortic valve area is 0.9 cm2; peak velocity is 4.05 m/s; mean gradient is 38 mmHg. (The LVOT VTI is higher in this study than on the previous one with no change in AoV VTI.)  · The estimated PA systolic pressure is 34 mmHg.  · Normal central venous pressure (3 mmHg).        Anesthesia Evaluation    I have reviewed the Patient Summary Reports.    I have reviewed the Nursing Notes. I have reviewed the NPO Status.   I have reviewed the Medications.     Review of Systems  Anesthesia Hx:  No problems with previous Anesthesia  Denies Family Hx of Anesthesia complications.   Denies Personal Hx of Anesthesia complications.   Cardiovascular:   Hypertension    Pulmonary:   COPD    Renal/:   Chronic Renal Disease, CRI    Musculoskeletal:   Arthritis     Endocrine:   Diabetes Hypothyroidism        Physical Exam  General:  Well nourished    Airway/Jaw/Neck:  Airway Findings: Mouth Opening: Normal Tongue: Normal  General Airway Assessment: Adult, Good  Mallampati: II  TM Distance: Normal, at least 6 cm  Jaw/Neck Findings:  Neck ROM: Normal ROM       Chest/Lungs:  Chest/Lungs Findings: Clear to auscultation     Heart/Vascular:  Heart Findings: Rate: Normal  Rhythm: Regular Rhythm        Mental  Status:  Mental Status Findings:  Alert and Oriented, Cooperative         Anesthesia Plan  Type of Anesthesia, risks & benefits discussed:  Anesthesia Type:  MAC, general  Patient's Preference:   Intra-op Monitoring Plan: standard ASA monitors  Intra-op Monitoring Plan Comments:   Post Op Pain Control Plan: per primary service following discharge from PACU  Post Op Pain Control Plan Comments:   Induction:   IV  Beta Blocker:         Informed Consent: Patient understands risks and agrees with Anesthesia plan.  Questions answered. Anesthesia consent signed with patient.  ASA Score: 3     Day of Surgery Review of History & Physical:    H&P update referred to the surgeon.         Ready For Surgery From Anesthesia Perspective.

## 2020-07-13 NOTE — HOSPITAL COURSE
Patient underwent uncomplicated PASHA (please see full report). Feeling well with mild throat soreness following procedure. Denies chest pain, palpitations or shortness of breath. Will follow up with Dr. Zhou as scheduled for TAVR.

## 2020-07-13 NOTE — PROGRESS NOTES
Patient given home medication regimen and discharge instructions. Patient informed of follow up appointments and given a copy of discharge instructions with follow up appointments listed. Peripheral IV discontinued. Patient discharged home per MD orders with all personal belongings.

## 2020-07-13 NOTE — H&P
[facility]  Cardiology  History and Physical     Patient Name: Marga Guerra  MRN: [id number]  Admission Date: 7/13/2020  Code Status: Full Code   Attending Provider: Brett Wilburn MD   Primary Care Physician: Stephanie Prescott MD  Principal Problem:<principal problem not specified>    Patient information was obtained from patient, past medical records and ER records.     Subjective:     Chief Complaint:  Severe AS     HPI:  PASHA for LVOT/TAVR eval    Marga Guerra is a 80 y.o. F with severe aortic stenosis, CKD5, HTN, COPD, Bilateral carotid artery disease who has been seen by Dr. Zhou for evaluation for TAVR.. Pt referred for PASHA to evaluate the LVOT area given significant CKD. Denies any history of known GI pathology or issues.    Dysphagia or odynophagia:  No  Liver Disease, esophageal disease, or known varices:  No  Upper GI Bleeding: No  Snoring:  No  Sleep Apnea:  No  Prior neck surgery or radiation:  No  History of anesthetic difficulties:  No  Family history of anesthetic difficulties:  No  Last oral intake:  12 hours ago  Able to move neck in all directions:  Yes         TTE 6/5/20:   Normal left ventricular systolic function. The estimated ejection fraction is 60%. Concentric LVH  Mildly reduced right ventricular systolic function.  Grade I (mild) left ventricular diastolic dysfunction consistent with impaired relaxation.  Moderate left atrial enlargement.  Mild tricuspid regurgitation.  Moderate mitral stenosis from marked annular calcification. The mean diastolic gradient across the mitral valve is 8 mmHg at a heart rate of 90 bpm.  Severe aortic valve stenosis. NATALIE 0.9 cm2; PV is 4.05 m/s; MG 38 mmHg. LVOT VTI 22.3 (higher in this study than on the previous one with no change in AoV VTI.)  The estimated PA systolic pressure is 34 mmHg.  Normal central venous pressure (3 mmHg  TTE 12/9/10:   Normal left ventricular systolic function. The estimated ejection  fraction is 60%  Normal right ventricular systolic function.  Mild left atrial enlargement.  Marked mitral annular calcification with a subsequent moderate mitral stenosis. The mean diastolic gradient across the mitral valve is 6 mmHg at a heart rate of 85 bpm.  Severe aortic valve stenosis. NATALIE  0.7 cm2; PV: 3.8 m/s; MG 35 mmHg. LVOT VTT 16.86 cm  The estimated PA systolic pressure is 32 mm Hg  Normal central venous pressure (3 mm Hg).  [facility] 20: non-obstructive CAD    LVOT area by CTA:pending (pt allergic to contrast and has CKD 5)      Past Medical History:   Diagnosis Date    CKD (chronic kidney disease), stage IV 2015    Degenerative disc disease     cervical spine     Depression     Hyperlipidemia     Hypertension     IGT (impaired glucose tolerance)     diet controlled/hyperglycemia fasting    Lung cancer     Macular degeneration     Osteoporosis, unspecified     Post-surgical hypothyroidism     Postsurgical hypoparathyroidism     Renal manifestation of secondary diabetes mellitus     Stroke     right frontoparietal     Thyroid cancer     Type 2 diabetes mellitus, controlled 2015       Past Surgical History:   Procedure Laterality Date    BRAIN SURGERY      meningoma removed    CATARACT EXTRACTION       SECTION      CORONARY ANGIOGRAPHY N/A 2020    Procedure: ANGIOGRAM, CORONARY ARTERY;  Surgeon: Brett Wilburn MD;  Location: [facility] CATH LAB;  Service: Cardiology;  Laterality: N/A;    EYE SURGERY      bilateral cataracts    FRACTURE SURGERY Right     wrist fracture with surgical repair    LEFT HEART CATHETERIZATION Left 2020    Procedure: Left heart cath;  Surgeon: Brett Wilburn MD;  Location: [facility] CATH LAB;  Service: Cardiology;  Laterality: Left;    LUNG BIOPSY N/A 2018    Procedure: BIOPSY, LUNG;  Surgeon: [facility] Diagnostic Provider;  Location: [facility] OR [address] [place] [address];  Service: Anesthesiology;  Laterality: N/A;    mengioma  resection  2011     left front/temporal craniotomy    right wrist fracture and repair      SPINE SURGERY      compressin fracture    THYROIDECTOMY      cancer       Review of patient's allergies indicates:   Allergen Reactions    Iodine and iodide containing products Anaphylaxis     Swelling/anyphylaxis   seafood    Prolia [denosumab]      Lip and leg swelling    Shellfish containing products        No current facility-administered medications on file prior to encounter.      Current Outpatient Medications on File Prior to Encounter   Medication Sig    albuterol (PROVENTIL/VENTOLIN HFA) 90 mcg/actuation inhaler Inhale 2 puffs into the lungs every 6 (six) hours as needed for Wheezing.    atorvastatin (LIPITOR) 40 MG tablet TAKE 1 TABLET(40 MG) BY MOUTH EVERY DAY    CALCIUM 600 600 mg (1,500 mg) Tab TK 4 TS PO TID WITH MEALS.    cholecalciferol, vitamin D3, 1,000 unit capsule Take 2 capsules (2,000 Units total) by mouth once daily.    levothyroxine (SYNTHROID) 75 MCG tablet Mon and Thurs - 1/2 tablet daily; Other 5 days - full tablet daily    omeprazole (PRILOSEC OTC) 20 MG tablet Take 1 tablet (20 mg total) by mouth once daily.    ramipril (ALTACE) 10 MG capsule Take 1 capsule (10 mg total) by mouth once daily.     Family History     Problem Relation (Age of Onset)    Diabetes Mother    Heart disease Mother    No Known Problems Father, Sister, Daughter, Son, Son, Son, Son, Brother, Maternal Aunt, Maternal Uncle, Paternal Aunt, Paternal Uncle, Maternal Grandmother, Maternal Grandfather, Paternal Grandmother, Paternal Grandfather        Tobacco Use    Smoking status: Former Smoker     Packs/day: 1.00     Years: 50.00     Pack years: 50.00     Types: Cigarettes     Quit date: 8/22/2009     Years since quitting: 10.8    Smokeless tobacco: Never Used   Substance and Sexual Activity    Alcohol use: No    Drug use: No    Sexual activity: Never     Review of Systems   Constitution: Negative. Negative for chills and fever.    HENT: Negative.    Eyes: Negative.    Cardiovascular: Negative for chest pain, claudication and paroxysmal nocturnal dyspnea.   Respiratory: Negative for cough, shortness of breath and wheezing.    Endocrine: Negative.    Hematologic/Lymphatic: Does not bruise/bleed easily.   Skin: Negative for nail changes and rash.   Musculoskeletal: Negative.  Negative for back pain.   Gastrointestinal: Negative for abdominal pain, melena, nausea and vomiting.   Neurological: Negative for dizziness and headaches.   Psychiatric/Behavioral: Negative for altered mental status, depression and substance abuse.   Allergic/Immunologic: Negative.      Objective:     Vital Signs (Most Recent):  Pulse: 78 (07/13/20 1035)  Resp: 18 (07/13/20 1035)  BP: 135/72 (07/13/20 1035)  SpO2: 96 % (07/13/20 1035) Vital Signs (24h Range):  Pulse:  [78] 78  Resp:  [18] 18  SpO2:  [96 %] 96 %  BP: (135)/(72) 135/72        There is no height or weight on file to calculate BMI.    SpO2: 96 %       No intake or output data in the 24 hours ending 07/13/20 1041    Lines/Drains/Airways     None                 Physical Exam   Constitutional: She is oriented to person, place, and time. She appears well-developed and well-nourished.   HENT:   Head: Normocephalic and atraumatic.   Eyes: Pupils are equal, round, and reactive to light. Conjunctivae and EOM are normal.   Neck: No JVD present.   Cardiovascular: Normal rate, regular rhythm and intact distal pulses. Exam reveals no gallop and no friction rub.   Murmur heard.  Pulmonary/Chest: Effort normal. No stridor. She has no wheezes. She has no rales. She exhibits no tenderness.   Abdominal: Soft. Bowel sounds are normal. She exhibits no distension and no mass. There is no abdominal tenderness. There is no guarding.   Musculoskeletal:         General: No tenderness, deformity or edema.   Neurological: She is alert and oriented to person, place, and time.   Skin: Skin is warm and dry. No rash noted. No erythema.    Psychiatric: She has a normal mood and affect.       Significant Labs: CMP No results for input(s): NA, K, CL, CO2, GLU, BUN, CREATININE, CALCIUM, PROT, ALBUMIN, BILITOT, ALKPHOS, AST, ALT, ANIONGAP, ESTGFRAFRICA, EGFRNONAA in the last 48 hours. and CBC No results for input(s): WBC, HGB, HCT, PLT in the last 48 hours.    Significant Imaging: Echocardiogram:   Transthoracic echo (TTE) complete (Cupid Only):   Results for orders placed or performed during the hospital encounter of 06/05/20   Echo Color Flow Doppler? Yes   Result Value Ref Range    Ascending aorta 2.62 cm    STJ 2.54 cm    AV mean gradient 38 mmHg    Ao peak jose j 4.1 m/s    Ao VTI 69.00 cm    IVS 0.81 0.6 - 1.1 cm    LA size 4.28 cm    Left Atrium Major Axis 4.94 cm    Left Atrium Minor Axis 4.89 cm    LVIDD 3.11 (A) 3.5 - 6.0 cm    LVIDS 2.08 (A) 2.1 - 4.0 cm    LVOT diameter 1.90 cm    LVOT peak VTI 22.30 cm    PW 0.76 0.6 - 1.1 cm    MV Peak A Jose J 2.14 m/s    E wave decelartion time 161.62 msec    MV Peak E Jose J 1.23 m/s    PV Peak D Jose J 0.17 m/s    PV Peak S Jose J 0.36 m/s    RA Major Axis 2.90 cm    RA Width 3.01 cm    RVDD 2.59 cm    Sinus 2.28 cm    TAPSE 1.62 cm    TR Max Jose J 2.79 m/s    TDI LATERAL 0.04 m/s    TDI SEPTAL 0.04 m/s    LA WIDTH 3.60 cm    LV Diastolic Volume 38.18 mL    LV Systolic Volume 14.08 mL    RV S' 7.94 cm/s    LVOT peak jose j 1.44 m/s    LV LATERAL E/E' RATIO 30.75 m/s    LV SEPTAL E/E' RATIO 30.75 m/s    FS 33 %    LA volume 64.37 cm3    LV mass 60.86 g    Left Ventricle Relative Wall Thickness 0.49 cm    AV valve area 0.92 cm2    AV Velocity Ratio 0.35     AV index (prosthetic) 0.32     E/A ratio 0.57     Mean e' 0.04 m/s    Pulm vein S/D ratio 2.12     LVOT area 2.8 cm2    LVOT stroke volume 63.19 cm3    AV peak gradient 67 mmHg    E/E' ratio 30.75 m/s    LV Systolic Volume Index 10.4 mL/m2    LV Diastolic Volume Index 28.07 mL/m2    LA Volume Index 47.3 mL/m2    LV Mass Index 45 g/m2    Triscuspid Valve Regurgitation  Peak Gradient 31 mmHg    BSA 1.35 m2    Right Atrial Pressure (from IVC) 3 mmHg    MV mean gradient 8 mmHg    MV peak gradient 23 mmHg    TV rest pulmonary artery pressure 34 mmHg    HR echo 90 bpm    Narrative    · Concentric left ventricular remodeling.  · Normal left ventricular systolic function. The estimated ejection   fraction is 60%.  · Mildly reduced right ventricular systolic function.  · Grade I (mild) left ventricular diastolic dysfunction consistent with   impaired relaxation.  · Moderate left atrial enlargement.  · Mild tricuspid regurgitation.  · Moderate mitral stenosis from marked annular calcification. The mean   diastolic gradient across the mitral valve is 8 mmHg at a heart rate of 90   bpm.  · Moderate to severe aortic valve stenosis. Aortic valve area is 1.0 cm2;   peak velocity is 3.7 m/s; mean gradient is 33 mmHg. (The LVOT VTI is   higher in this study than on the previous one with no change in AoV VTI.)  · The estimated PA systolic pressure is 34 mmHg.  · Normal central venous pressure (3 mmHg).        Assessment and Plan:     Aortic sclerosis  1. PASHA for evaluation of LVOT diameter (CTA contrindicated given renal function)  -No absolute contraindications of esophageal stricture, tumor, perforation, laceration,or diverticulum and/or active GI bleed  -The risks, benefits & alternatives of the procedure were explained to the patient.   -The risks of transesophageal echo include but are not limited to:  Dental trauma, esophageal trauma/perforation, bleeding, laryngospasm/brochospasm, aspiration, sore throat/hoarseness, & dislodgement of the endotracheal tube/nasogastric tube (where applicable).    -The risks of moderate sedation include hypotension, respiratory depression, arrhythmias, bronchospasm, & death.    -Informed consent was obtained. The patient is agreeable to proceed with the procedure and all questions and concerns addressed.    Case discussed with an attending in echocardiography  lab.     Further recommendations per attending addendum          VTE Risk Mitigation (From admission, onward)         Ordered     IP VTE LOW RISK PATIENT  Once      07/13/20 Lai Kendrick MD  Cardiology   [facility]

## 2020-07-13 NOTE — PLAN OF CARE
Patient arrived to room. PIV placed.. Admit assessment completed. Plan of care discussed with patient. Will monitor

## 2020-07-13 NOTE — ASSESSMENT & PLAN NOTE
1. PASHA for evaluation of LVOT diameter.  -No absolute contraindications of esophageal stricture, tumor, perforation, laceration,or diverticulum and/or active GI bleed  -The risks, benefits & alternatives of the procedure were explained to the patient.   -The risks of transesophageal echo include but are not limited to:  Dental trauma, esophageal trauma/perforation, bleeding, laryngospasm/brochospasm, aspiration, sore throat/hoarseness, & dislodgement of the endotracheal tube/nasogastric tube (where applicable).    -The risks of moderate sedation include hypotension, respiratory depression, arrhythmias, bronchospasm, & death.    -Informed consent was obtained. The patient is agreeable to proceed with the procedure and all questions and concerns addressed.  - s/p PASHA for LVOT assessment (please see full report in Epic)

## 2020-07-13 NOTE — PLAN OF CARE
Patient returned to SSCU s/p PASHA. Patient AAO, VSS. Complain of irritation to throat. Daughter at bedside. Awaiting MD to place discharge orders and speak with patient. Will continue to monitor.

## 2020-07-13 NOTE — ASSESSMENT & PLAN NOTE
1. PASHA for evaluation of LVOT diameter (CTA contrindicated given renal function)  -No absolute contraindications of esophageal stricture, tumor, perforation, laceration,or diverticulum and/or active GI bleed  -The risks, benefits & alternatives of the procedure were explained to the patient.   -The risks of transesophageal echo include but are not limited to:  Dental trauma, esophageal trauma/perforation, bleeding, laryngospasm/brochospasm, aspiration, sore throat/hoarseness, & dislodgement of the endotracheal tube/nasogastric tube (where applicable).    -The risks of moderate sedation include hypotension, respiratory depression, arrhythmias, bronchospasm, & death.    -Informed consent was obtained. The patient is agreeable to proceed with the procedure and all questions and concerns addressed.    Case discussed with an attending in echocardiography lab.     Further recommendations per attending addendum

## 2020-07-13 NOTE — TRANSFER OF CARE
Anesthesia Transfer of Care Note    Patient: Marga Guerra    Procedure(s) Performed: Procedure(s) (LRB):  ECHOCARDIOGRAM,TRANSESOPHAGEAL (N/A)    Patient location: PACU    Anesthesia Type: general    Transport from OR: Transported from OR on 2-3 L/min O2 by NC with adequate spontaneous ventilation    Post pain: adequate analgesia    Post assessment: no apparent anesthetic complications    Post vital signs: stable    Level of consciousness: awake    Nausea/Vomiting: no nausea/vomiting    Complications: none    Transfer of care protocol was followed      Last vitals:   Visit Vitals  BP (!) 105/59   Pulse 98   Temp 36.2 °C (97.2 °F) (Temporal)   Resp (!) 26   Ht 5' (1.524 m)   Wt 42.6 kg (94 lb)   SpO2 95%   Breastfeeding No   BMI 18.36 kg/m²

## 2020-07-13 NOTE — HPI
PASHA for LVOT/TAVR domenica Guerra is a 80 y.o. F with severe aortic stenosis, CKD5, HTN, COPD, Bilateral carotid artery disease who has been seen by Dr. Zhou for evaluation for TAVR.. Pt referred for PASHA to evaluate the LVOT area given significant CKD. Denies any history of known GI pathology or issues.    Dysphagia or odynophagia:  No  Liver Disease, esophageal disease, or known varices:  No  Upper GI Bleeding: No  Snoring:  No  Sleep Apnea:  No  Prior neck surgery or radiation:  No  History of anesthetic difficulties:  No  Family history of anesthetic difficulties:  No  Last oral intake:  12 hours ago  Able to move neck in all directions:  Yes         TTE 6/5/20:   Normal left ventricular systolic function. The estimated ejection fraction is 60%. Concentric LVH  Mildly reduced right ventricular systolic function.  Grade I (mild) left ventricular diastolic dysfunction consistent with impaired relaxation.  Moderate left atrial enlargement.  Mild tricuspid regurgitation.  Moderate mitral stenosis from marked annular calcification. The mean diastolic gradient across the mitral valve is 8 mmHg at a heart rate of 90 bpm.  Severe aortic valve stenosis. NATALIE 0.9 cm2; PV is 4.05 m/s; MG 38 mmHg. LVOT VTI 22.3 (higher in this study than on the previous one with no change in AoV VTI.)  The estimated PA systolic pressure is 34 mmHg.  Normal central venous pressure (3 mmHg  TTE 12/9/10:   Normal left ventricular systolic function. The estimated ejection fraction is 60%  Normal right ventricular systolic function.  Mild left atrial enlargement.  Marked mitral annular calcification with a subsequent moderate mitral stenosis. The mean diastolic gradient across the mitral valve is 6 mmHg at a heart rate of 85 bpm.  Severe aortic valve stenosis. NATALIE  0.7 cm2; PV: 3.8 m/s; MG 35 mmHg. LVOT VTT 16.86 cm  The estimated PA systolic pressure is 32 mm Hg  Normal central venous pressure (3 mm Hg).  [facility] 7/6/20: non-obstructive  CAD    LVOT area by CTA:pending (pt allergic to contrast and has CKD 5)

## 2020-07-13 NOTE — DISCHARGE SUMMARY
[facility]  Cardiology  Discharge Summary      Patient Name: Marga Guerra  MRN: [id number]  Admission Date: 7/13/2020  Hospital Length of Stay: 0 days  Discharge Date and Time:  07/13/2020 1:49 PM  Attending Physician: Brett Wilburn MD    Discharging Provider: Zoltan Kendrick MD  Primary Care Physician: Stephanie Prescott MD    HPI:   PASHA for LVOT/TAVR eval    Marga Guerra is a 80 y.o. F with severe aortic stenosis, CKD5, HTN, COPD, Bilateral carotid artery disease who has been seen by Dr. Zhou for evaluation for TAVR.. Pt referred for PASHA to evaluate the LVOT area given significant CKD. Denies any history of known GI pathology or issues.    Dysphagia or odynophagia:  No  Liver Disease, esophageal disease, or known varices:  No  Upper GI Bleeding: No  Snoring:  No  Sleep Apnea:  No  Prior neck surgery or radiation:  No  History of anesthetic difficulties:  No  Family history of anesthetic difficulties:  No  Last oral intake:  12 hours ago  Able to move neck in all directions:  Yes         TTE 6/5/20:   Normal left ventricular systolic function. The estimated ejection fraction is 60%. Concentric LVH  Mildly reduced right ventricular systolic function.  Grade I (mild) left ventricular diastolic dysfunction consistent with impaired relaxation.  Moderate left atrial enlargement.  Mild tricuspid regurgitation.  Moderate mitral stenosis from marked annular calcification. The mean diastolic gradient across the mitral valve is 8 mmHg at a heart rate of 90 bpm.  Severe aortic valve stenosis. NATALIE 0.9 cm2; PV is 4.05 m/s; MG 38 mmHg. LVOT VTI 22.3 (higher in this study than on the previous one with no change in AoV VTI.)  The estimated PA systolic pressure is 34 mmHg.  Normal central venous pressure (3 mmHg  TTE 12/9/10:   Normal left ventricular systolic function. The estimated ejection fraction is 60%  Normal right ventricular systolic function.  Mild left atrial  enlargement.  Marked mitral annular calcification with a subsequent moderate mitral stenosis. The mean diastolic gradient across the mitral valve is 6 mmHg at a heart rate of 85 bpm.  Severe aortic valve stenosis. NATALIE  0.7 cm2; PV: 3.8 m/s; MG 35 mmHg. LVOT VTT 16.86 cm  The estimated PA systolic pressure is 32 mm Hg  Normal central venous pressure (3 mm Hg).  [facility] 7/6/20: non-obstructive CAD    LVOT area by CTA:pending (pt allergic to contrast and has CKD 5)      Procedure(s) (LRB):  ECHOCARDIOGRAM,TRANSESOPHAGEAL (N/A)     Indwelling Lines/Drains at time of discharge:  Lines/Drains/Airways     None                 Hospital Course:  Patient underwent uncomplicated PASHA (please see full report). Feeling well with mild throat soreness following procedure. Denies chest pain, palpitations or shortness of breath. Will follow up with Dr. Zhou as scheduled for TAVR.      Significant Diagnostic Studies: Labs: CMP No results for input(s): NA, K, CL, CO2, GLU, BUN, CREATININE, CALCIUM, PROT, ALBUMIN, BILITOT, ALKPHOS, AST, ALT, ANIONGAP, ESTGFRAFRICA, EGFRNONAA in the last 48 hours. and CBC No results for input(s): WBC, HGB, HCT, PLT in the last 48 hours.    Pending Diagnostic Studies:     Procedure Component Value Units Date/Time    Transesophageal echo (PASHA) [[id number]] Resulted: 07/13/20 1345    Order Status: Sent Lab Status: In process Updated: 07/13/20 1345     BSA 1.34 m2     Transesophageal echo (PASHA) [[id number]]     Order Status: Sent Lab Status: No result           Final Active Diagnoses:    Diagnosis Date Noted POA    Aortic stenosis [I35.0] 07/25/2016 Yes      Problems Resolved During this Admission:     Aortic stenosis  1. PASHA for evaluation of LVOT diameter.  -No absolute contraindications of esophageal stricture, tumor, perforation, laceration,or diverticulum and/or active GI bleed  -The risks, benefits & alternatives of the procedure were explained to the patient.   -The risks of transesophageal echo include  but are not limited to:  Dental trauma, esophageal trauma/perforation, bleeding, laryngospasm/brochospasm, aspiration, sore throat/hoarseness, & dislodgement of the endotracheal tube/nasogastric tube (where applicable).    -The risks of moderate sedation include hypotension, respiratory depression, arrhythmias, bronchospasm, & death.    -Informed consent was obtained. The patient is agreeable to proceed with the procedure and all questions and concerns addressed.  - s/p PASHA for LVOT assessment (please see full report in Epic)        Discharged Condition: good    Disposition: Home or Self Care    Follow Up:    Patient Instructions:   No discharge procedures on file.  Medications:  Reconciled Home Medications:      Medication List      CONTINUE taking these medications    albuterol 90 mcg/actuation inhaler  Commonly known as: PROVENTIL/VENTOLIN HFA  Inhale 2 puffs into the lungs every 6 (six) hours as needed for Wheezing.     amLODIPine 5 MG tablet  Commonly known as: NORVASC  Take 0.5 tablets (2.5 mg total) by mouth once daily.     atorvastatin 40 MG tablet  Commonly known as: LIPITOR  TAKE 1 TABLET(40 MG) BY MOUTH EVERY DAY     calcitRIOL 0.25 MCG Cap  Commonly known as: ROCALTROL  Take 1 capsule (0.25 mcg total) by mouth once daily.     CALCIUM 600 600 mg calcium (1,500 mg) Tab  Generic drug: calcium carbonate  TK 4 TS PO TID WITH MEALS.     cholecalciferol (vitamin D3) 25 mcg (1,000 unit) capsule  Commonly known as: VITAMIN D3  Take 2 capsules (2,000 Units total) by mouth once daily.     clopidogreL 75 mg tablet  Commonly known as: PLAVIX  Take 4 tabs (300mg) night before procedure then 1 tab (75mg) daily starting day of procedure.     levothyroxine 75 MCG tablet  Commonly known as: SYNTHROID  Mon and Thurs - 1/2 tablet daily; Other 5 days - full tablet daily     omeprazole 20 MG tablet  Commonly known as: PriLOSEC OTC  Take 1 tablet (20 mg total) by mouth once daily.     ramipriL 10 MG capsule  Commonly known as:  ALTACE  Take 1 capsule (10 mg total) by mouth once daily.            Time spent on the discharge of patient: 35 minutes    Zoltan Kendrick MD  Cardiology  [facility]

## 2020-07-14 NOTE — ANESTHESIA POSTPROCEDURE EVALUATION
Anesthesia Post Evaluation    Patient: Marga Guerra    Procedure(s) Performed: Procedure(s) (LRB):  ECHOCARDIOGRAM,TRANSESOPHAGEAL (N/A)    Final Anesthesia Type: general    Patient location during evaluation: PACU  Patient participation: Yes- Able to Participate  Level of consciousness: awake and alert  Post-procedure vital signs: reviewed and stable  Pain management: adequate  Airway patency: patent    PONV status at discharge: No PONV  Anesthetic complications: no      Cardiovascular status: hemodynamically stable  Respiratory status: spontaneous ventilation  Follow-up not needed.          Vitals Value Taken Time   /80 07/13/20 1311   Temp 36.9 °C (98.4 °F) 07/13/20 1311   Pulse 96 07/13/20 1311   Resp 16 07/13/20 1311   SpO2 95 % 07/13/20 1311         No case tracking events are documented in the log.      Pain/Desirae Score: Desirae Score: 10 (7/13/2020  1:13 PM)

## 2020-07-27 ENCOUNTER — LAB VISIT (OUTPATIENT)
Dept: SURGERY | Facility: CLINIC | Age: 80
DRG: 267 | End: 2020-07-27
Payer: MEDICARE

## 2020-07-27 DIAGNOSIS — Z01.812 PRE-PROCEDURE LAB EXAM: ICD-10-CM

## 2020-07-27 LAB — SARS-COV-2 RNA RESP QL NAA+PROBE: NOT DETECTED

## 2020-07-27 PROCEDURE — U0003 INFECTIOUS AGENT DETECTION BY NUCLEIC ACID (DNA OR RNA); SEVERE ACUTE RESPIRATORY SYNDROME CORONAVIRUS 2 (SARS-COV-2) (CORONAVIRUS DISEASE [COVID-19]), AMPLIFIED PROBE TECHNIQUE, MAKING USE OF HIGH THROUGHPUT TECHNOLOGIES AS DESCRIBED BY CMS-2020-01-R: HCPCS | Mod: HCNC

## 2020-07-29 ENCOUNTER — ANESTHESIA EVENT (OUTPATIENT)
Dept: MEDSURG UNIT | Facility: HOSPITAL | Age: 80
DRG: 267 | End: 2020-07-29
Payer: MEDICARE

## 2020-07-29 ENCOUNTER — DOCUMENTATION ONLY (OUTPATIENT)
Dept: CARDIOLOGY | Facility: CLINIC | Age: 80
End: 2020-07-29

## 2020-07-29 NOTE — PROGRESS NOTES
UPDATED TAVR PLAN    Marga Guerra is a 80 y.o. female referred by Dr Baptiste for evaluation of severe AS (NYHA Class 2 sx).     The patient has undergone the following TAVR work-up:     · ECHO (Date 6/5/2020): NATALIE= 0.9 cm2, MG= 38 mmHg, Peak Jose J= 4.05 m/s, EF= 60%. Reviewed TTE with Dr. Francisco who confirms severe AS.  · LHC (Date 7/6/2020): Non-obstructive CAD  · STS: 2%  · Frailty: Not frail  · Iliacs per IVUS (7/6/2020): R iliac > 7 mm   · LVOT area by PASHA: mean aortic annular diameter is 1.8 cm,  area 2.5 cm² and circumference 5.7 cm. Heavily calcified annulus.   · CT Surgery risk assessment: high risk, per Dr. Schaffer  · Rhythm issues: None, NSR  · PFTs: FEV1 84% predicted, patient unable to do DLCO  · Comorbidities: CKD5 (Cr 4.1-4.7), not yet dialysis dependent, history of neck and brain cancer, COPD, bilateral carotid artery disease    Marga Guerra is a 26 mm Evolut valve candidate via RTF access.    1. Valve: 26 mm Evolut       2. TAVR access: RTF        3. Valvuloplasty balloon: 20 mm True      4. Viabahn size if needed: 7x5  5. Antithrombotic therapy: ASA/Plavix  6. Pacemaker risk factors: None

## 2020-07-30 ENCOUNTER — ANESTHESIA (OUTPATIENT)
Dept: MEDSURG UNIT | Facility: HOSPITAL | Age: 80
DRG: 267 | End: 2020-07-30
Payer: MEDICARE

## 2020-07-30 ENCOUNTER — HOSPITAL ENCOUNTER (INPATIENT)
Facility: HOSPITAL | Age: 80
LOS: 1 days | Discharge: HOME | DRG: 267 | End: 2020-07-31
Attending: INTERNAL MEDICINE | Admitting: INTERNAL MEDICINE
Payer: MEDICARE

## 2020-07-30 DIAGNOSIS — I51.89 LEFT VENTRICULAR DIASTOLIC DYSFUNCTION WITH PRESERVED SYSTOLIC FUNCTION: ICD-10-CM

## 2020-07-30 DIAGNOSIS — R94.31 QT PROLONGATION: ICD-10-CM

## 2020-07-30 DIAGNOSIS — D62 ACUTE BLOOD LOSS ANEMIA: ICD-10-CM

## 2020-07-30 DIAGNOSIS — N18.5 CKD (CHRONIC KIDNEY DISEASE), STAGE V: ICD-10-CM

## 2020-07-30 DIAGNOSIS — Z95.2 S/P TAVR (TRANSCATHETER AORTIC VALVE REPLACEMENT): ICD-10-CM

## 2020-07-30 DIAGNOSIS — I35.0 SEVERE AORTIC STENOSIS: ICD-10-CM

## 2020-07-30 DIAGNOSIS — Z95.2 S/P TAVR (TRANSCATHETER AORTIC VALVE REPLACEMENT): Primary | ICD-10-CM

## 2020-07-30 LAB
ABO + RH BLD: NORMAL
ANION GAP SERPL CALC-SCNC: 14 MMOL/L (ref 8–16)
BLD GP AB SCN CELLS X3 SERPL QL: NORMAL
BUN SERPL-MCNC: 36 MG/DL (ref 8–23)
CALCIUM SERPL-MCNC: 9.8 MG/DL (ref 8.7–10.5)
CHLORIDE SERPL-SCNC: 104 MMOL/L (ref 95–110)
CO2 SERPL-SCNC: 22 MMOL/L (ref 23–29)
CREAT SERPL-MCNC: 3.9 MG/DL (ref 0.5–1.4)
ERYTHROCYTE [DISTWIDTH] IN BLOOD BY AUTOMATED COUNT: 15.4 % (ref 11.5–14.5)
EST. GFR  (AFRICAN AMERICAN): 11.7 ML/MIN/1.73 M^2
EST. GFR  (NON AFRICAN AMERICAN): 10.1 ML/MIN/1.73 M^2
GLUCOSE SERPL-MCNC: 140 MG/DL (ref 70–110)
HCT VFR BLD AUTO: 31.5 % (ref 37–48.5)
HGB BLD-MCNC: 10.1 G/DL (ref 12–16)
INR PPP: 0.9 (ref 0.8–1.2)
MCH RBC QN AUTO: 29.8 PG (ref 27–31)
MCHC RBC AUTO-ENTMCNC: 32.1 G/DL (ref 32–36)
MCV RBC AUTO: 93 FL (ref 82–98)
PLATELET # BLD AUTO: 201 K/UL (ref 150–350)
PMV BLD AUTO: 11.9 FL (ref 9.2–12.9)
POTASSIUM SERPL-SCNC: 4.7 MMOL/L (ref 3.5–5.1)
PROTHROMBIN TIME: 10.1 SEC (ref 9–12.5)
RBC # BLD AUTO: 3.39 M/UL (ref 4–5.4)
SODIUM SERPL-SCNC: 140 MMOL/L (ref 136–145)
WBC # BLD AUTO: 5.78 K/UL (ref 3.9–12.7)

## 2020-07-30 PROCEDURE — C1887 CATHETER, GUIDING: HCPCS | Mod: HCNC | Performed by: INTERNAL MEDICINE

## 2020-07-30 PROCEDURE — 99499 NO LOS: ICD-10-PCS | Mod: HCNC,,, | Performed by: INTERNAL MEDICINE

## 2020-07-30 PROCEDURE — 93005 ELECTROCARDIOGRAM TRACING: CPT | Mod: HCNC

## 2020-07-30 PROCEDURE — 85610 PROTHROMBIN TIME: CPT | Mod: HCNC

## 2020-07-30 PROCEDURE — 27800903 OPTIME MED/SURG SUP & DEVICES OTHER IMPLANTS: Mod: HCNC | Performed by: INTERNAL MEDICINE

## 2020-07-30 PROCEDURE — A4216 STERILE WATER/SALINE, 10 ML: HCPCS | Mod: HCNC | Performed by: STUDENT IN AN ORGANIZED HEALTH CARE EDUCATION/TRAINING PROGRAM

## 2020-07-30 PROCEDURE — 25000003 PHARM REV CODE 250: Mod: HCNC | Performed by: STUDENT IN AN ORGANIZED HEALTH CARE EDUCATION/TRAINING PROGRAM

## 2020-07-30 PROCEDURE — 86920 COMPATIBILITY TEST SPIN: CPT | Mod: HCNC

## 2020-07-30 PROCEDURE — 93010 EKG 12-LEAD: ICD-10-PCS | Mod: 76,HCNC,, | Performed by: INTERNAL MEDICINE

## 2020-07-30 PROCEDURE — 33361 REPLACE AORTIC VALVE PERQ: CPT | Mod: Q0,HCNC | Performed by: INTERNAL MEDICINE

## 2020-07-30 PROCEDURE — C1760 CLOSURE DEV, VASC: HCPCS | Mod: HCNC | Performed by: INTERNAL MEDICINE

## 2020-07-30 PROCEDURE — 93010 ELECTROCARDIOGRAM REPORT: CPT | Mod: 76,HCNC,, | Performed by: INTERNAL MEDICINE

## 2020-07-30 PROCEDURE — 36620 INSERTION CATHETER ARTERY: CPT | Mod: 59,HCNC,Q0, | Performed by: ANESTHESIOLOGY

## 2020-07-30 PROCEDURE — 37000009 HC ANESTHESIA EA ADD 15 MINS: Mod: HCNC | Performed by: INTERNAL MEDICINE

## 2020-07-30 PROCEDURE — 20000000 HC ICU ROOM: Mod: HCNC

## 2020-07-30 PROCEDURE — C1725 CATH, TRANSLUMIN NON-LASER: HCPCS | Mod: HCNC | Performed by: INTERNAL MEDICINE

## 2020-07-30 PROCEDURE — 27201423 OPTIME MED/SURG SUP & DEVICES STERILE SUPPLY: Mod: HCNC | Performed by: INTERNAL MEDICINE

## 2020-07-30 PROCEDURE — 99223 1ST HOSP IP/OBS HIGH 75: CPT | Mod: HCNC,GC,, | Performed by: INTERNAL MEDICINE

## 2020-07-30 PROCEDURE — 27000191 HC C-V MONITORING: Mod: HCNC

## 2020-07-30 PROCEDURE — 86850 RBC ANTIBODY SCREEN: CPT | Mod: HCNC

## 2020-07-30 PROCEDURE — 93010 ELECTROCARDIOGRAM REPORT: CPT | Mod: HCNC,,, | Performed by: INTERNAL MEDICINE

## 2020-07-30 PROCEDURE — 27000239 HC STAND-BY BYPASS PUMP: Mod: HCNC

## 2020-07-30 PROCEDURE — C1769 GUIDE WIRE: HCPCS | Mod: HCNC | Performed by: INTERNAL MEDICINE

## 2020-07-30 PROCEDURE — 33361 PR TAVR, PERCUTANEOUS FEMORAL: ICD-10-PCS | Mod: 62,Q0,HCNC, | Performed by: THORACIC SURGERY (CARDIOTHORACIC VASCULAR SURGERY)

## 2020-07-30 PROCEDURE — 25000003 PHARM REV CODE 250: Mod: HCNC | Performed by: INTERNAL MEDICINE

## 2020-07-30 PROCEDURE — 33361 PR TAVR, PERCUTANEOUS FEMORAL: ICD-10-PCS | Mod: 62,Q0,HCNC, | Performed by: INTERNAL MEDICINE

## 2020-07-30 PROCEDURE — 85027 COMPLETE CBC AUTOMATED: CPT | Mod: HCNC

## 2020-07-30 PROCEDURE — 33361 REPLACE AORTIC VALVE PERQ: CPT | Mod: 62,Q0,HCNC, | Performed by: THORACIC SURGERY (CARDIOTHORACIC VASCULAR SURGERY)

## 2020-07-30 PROCEDURE — C1894 INTRO/SHEATH, NON-LASER: HCPCS | Mod: HCNC | Performed by: INTERNAL MEDICINE

## 2020-07-30 PROCEDURE — 37000008 HC ANESTHESIA 1ST 15 MINUTES: Mod: HCNC | Performed by: INTERNAL MEDICINE

## 2020-07-30 PROCEDURE — 80048 BASIC METABOLIC PNL TOTAL CA: CPT | Mod: HCNC

## 2020-07-30 PROCEDURE — D9220A PRA ANESTHESIA: Mod: HCNC,Q0,, | Performed by: ANESTHESIOLOGY

## 2020-07-30 PROCEDURE — 36620 ARTERIAL: ICD-10-PCS | Mod: 59,HCNC,Q0, | Performed by: ANESTHESIOLOGY

## 2020-07-30 PROCEDURE — D9220A PRA ANESTHESIA: ICD-10-PCS | Mod: HCNC,Q0,, | Performed by: ANESTHESIOLOGY

## 2020-07-30 PROCEDURE — 99499 UNLISTED E&M SERVICE: CPT | Mod: HCNC,,, | Performed by: INTERNAL MEDICINE

## 2020-07-30 PROCEDURE — 99223 PR INITIAL HOSPITAL CARE,LEVL III: ICD-10-PCS | Mod: HCNC,GC,, | Performed by: INTERNAL MEDICINE

## 2020-07-30 PROCEDURE — 33361 REPLACE AORTIC VALVE PERQ: CPT | Mod: 62,Q0,HCNC, | Performed by: INTERNAL MEDICINE

## 2020-07-30 PROCEDURE — 63600175 PHARM REV CODE 636 W HCPCS: Mod: HCNC | Performed by: STUDENT IN AN ORGANIZED HEALTH CARE EDUCATION/TRAINING PROGRAM

## 2020-07-30 DEVICE — VALVE EVOLUT PRO+ TAVR 26MM: Type: IMPLANTABLE DEVICE | Site: HEART | Status: FUNCTIONAL

## 2020-07-30 RX ORDER — HEPARIN SODIUM 1000 [USP'U]/ML
INJECTION, SOLUTION INTRAVENOUS; SUBCUTANEOUS
Status: DISCONTINUED | OUTPATIENT
Start: 2020-07-30 | End: 2020-07-30

## 2020-07-30 RX ORDER — POTASSIUM CHLORIDE 20 MEQ/15ML
40 SOLUTION ORAL
Status: DISCONTINUED | OUTPATIENT
Start: 2020-07-30 | End: 2020-07-31 | Stop reason: HOSPADM

## 2020-07-30 RX ORDER — SODIUM,POTASSIUM PHOSPHATES 280-250MG
2 POWDER IN PACKET (EA) ORAL
Status: DISCONTINUED | OUTPATIENT
Start: 2020-07-30 | End: 2020-07-31 | Stop reason: HOSPADM

## 2020-07-30 RX ORDER — NOREPINEPHRINE BITARTRATE/D5W 4MG/250ML
0.02 PLASTIC BAG, INJECTION (ML) INTRAVENOUS CONTINUOUS
Status: DISCONTINUED | OUTPATIENT
Start: 2020-07-30 | End: 2020-07-31 | Stop reason: HOSPADM

## 2020-07-30 RX ORDER — NOREPINEPHRINE BITARTRATE 1 MG/ML
INJECTION, SOLUTION INTRAVENOUS
Status: DISCONTINUED | OUTPATIENT
Start: 2020-07-30 | End: 2020-07-30

## 2020-07-30 RX ORDER — ATORVASTATIN CALCIUM 20 MG/1
40 TABLET, FILM COATED ORAL NIGHTLY
Status: DISCONTINUED | OUTPATIENT
Start: 2020-07-30 | End: 2020-07-31 | Stop reason: HOSPADM

## 2020-07-30 RX ORDER — CLOPIDOGREL BISULFATE 75 MG/1
75 TABLET ORAL ONCE
Status: DISCONTINUED | OUTPATIENT
Start: 2020-07-31 | End: 2020-07-30

## 2020-07-30 RX ORDER — LANOLIN ALCOHOL/MO/W.PET/CERES
800 CREAM (GRAM) TOPICAL
Status: DISCONTINUED | OUTPATIENT
Start: 2020-07-30 | End: 2020-07-31 | Stop reason: HOSPADM

## 2020-07-30 RX ORDER — SODIUM CHLORIDE 9 MG/ML
INJECTION, SOLUTION INTRAVENOUS CONTINUOUS
Status: ACTIVE | OUTPATIENT
Start: 2020-07-30 | End: 2020-07-30

## 2020-07-30 RX ORDER — NAPROXEN SODIUM 220 MG/1
81 TABLET, FILM COATED ORAL DAILY
Status: DISCONTINUED | OUTPATIENT
Start: 2020-07-31 | End: 2020-07-31 | Stop reason: HOSPADM

## 2020-07-30 RX ORDER — SODIUM CHLORIDE 0.9 % (FLUSH) 0.9 %
3 SYRINGE (ML) INJECTION
Status: CANCELLED | OUTPATIENT
Start: 2020-07-30

## 2020-07-30 RX ORDER — DEXMEDETOMIDINE HYDROCHLORIDE 100 UG/ML
INJECTION, SOLUTION INTRAVENOUS
Status: DISCONTINUED | OUTPATIENT
Start: 2020-07-30 | End: 2020-07-30

## 2020-07-30 RX ORDER — SODIUM CHLORIDE 9 MG/ML
INJECTION, SOLUTION INTRAVENOUS CONTINUOUS
Status: DISCONTINUED | OUTPATIENT
Start: 2020-07-30 | End: 2020-07-31 | Stop reason: HOSPADM

## 2020-07-30 RX ORDER — CLOPIDOGREL BISULFATE 75 MG/1
75 TABLET ORAL ONCE
Status: COMPLETED | OUTPATIENT
Start: 2020-07-30 | End: 2020-07-30

## 2020-07-30 RX ORDER — FENTANYL CITRATE 50 UG/ML
25 INJECTION, SOLUTION INTRAMUSCULAR; INTRAVENOUS EVERY 5 MIN PRN
Status: CANCELLED | OUTPATIENT
Start: 2020-07-30

## 2020-07-30 RX ORDER — DIPHENHYDRAMINE HYDROCHLORIDE 50 MG/ML
25 INJECTION INTRAMUSCULAR; INTRAVENOUS EVERY 6 HOURS PRN
Status: CANCELLED | OUTPATIENT
Start: 2020-07-30

## 2020-07-30 RX ORDER — ASPIRIN 81 MG/1
81 TABLET ORAL DAILY
Qty: 30 TABLET | Refills: 11 | Status: ON HOLD | OUTPATIENT
Start: 2020-07-30 | End: 2021-01-01 | Stop reason: HOSPADM

## 2020-07-30 RX ORDER — POTASSIUM CHLORIDE 20 MEQ/15ML
40 SOLUTION ORAL
Status: DISCONTINUED | OUTPATIENT
Start: 2020-07-30 | End: 2020-07-30

## 2020-07-30 RX ORDER — ALBUTEROL SULFATE 90 UG/1
2 AEROSOL, METERED RESPIRATORY (INHALATION) EVERY 6 HOURS PRN
Status: DISCONTINUED | OUTPATIENT
Start: 2020-07-30 | End: 2020-07-31 | Stop reason: HOSPADM

## 2020-07-30 RX ORDER — LEVOTHYROXINE SODIUM 75 UG/1
75 TABLET ORAL
Status: DISCONTINUED | OUTPATIENT
Start: 2020-07-31 | End: 2020-07-31 | Stop reason: HOSPADM

## 2020-07-30 RX ORDER — CALCIUM CARBONATE 500(1250)
1500 TABLET ORAL 3 TIMES DAILY
Status: DISCONTINUED | OUTPATIENT
Start: 2020-07-30 | End: 2020-07-31 | Stop reason: HOSPADM

## 2020-07-30 RX ORDER — DIPHENHYDRAMINE HCL 50 MG
50 CAPSULE ORAL ONCE
Status: COMPLETED | OUTPATIENT
Start: 2020-07-30 | End: 2020-07-30

## 2020-07-30 RX ORDER — NAPROXEN SODIUM 220 MG/1
81 TABLET, FILM COATED ORAL ONCE
Status: COMPLETED | OUTPATIENT
Start: 2020-07-30 | End: 2020-07-30

## 2020-07-30 RX ORDER — FUROSEMIDE 20 MG/1
TABLET ORAL
Qty: 10 TABLET | Refills: 0 | Status: ON HOLD | OUTPATIENT
Start: 2020-07-30 | End: 2021-01-01 | Stop reason: HOSPADM

## 2020-07-30 RX ORDER — POTASSIUM CHLORIDE 1.5 G/1.58G
40 POWDER, FOR SOLUTION ORAL
Status: DISCONTINUED | OUTPATIENT
Start: 2020-07-30 | End: 2020-07-31 | Stop reason: HOSPADM

## 2020-07-30 RX ORDER — CLOPIDOGREL BISULFATE 75 MG/1
75 TABLET ORAL DAILY
Status: DISCONTINUED | OUTPATIENT
Start: 2020-07-31 | End: 2020-07-31 | Stop reason: HOSPADM

## 2020-07-30 RX ADMIN — DIPHENHYDRAMINE HYDROCHLORIDE 50 MG: 50 CAPSULE ORAL at 07:07

## 2020-07-30 RX ADMIN — DEXTROSE 2 G: 50 INJECTION, SOLUTION INTRAVENOUS at 08:07

## 2020-07-30 RX ADMIN — NOREPINEPHRINE BITARTRATE 0.02 MCG/KG/MIN: 1 INJECTION, SOLUTION, CONCENTRATE INTRAVENOUS at 08:07

## 2020-07-30 RX ADMIN — CALCIUM 1500 MG: 500 TABLET ORAL at 08:07

## 2020-07-30 RX ADMIN — DEXMEDETOMIDINE HYDROCHLORIDE 90 MCG: 100 INJECTION, SOLUTION, CONCENTRATE INTRAVENOUS at 07:07

## 2020-07-30 RX ADMIN — SODIUM CHLORIDE, SODIUM GLUCONATE, SODIUM ACETATE, POTASSIUM CHLORIDE, MAGNESIUM CHLORIDE, SODIUM PHOSPHATE, DIBASIC, AND POTASSIUM PHOSPHATE: .53; .5; .37; .037; .03; .012; .00082 INJECTION, SOLUTION INTRAVENOUS at 07:07

## 2020-07-30 RX ADMIN — HEPARIN SODIUM 2000 UNITS: 1000 INJECTION INTRAVENOUS; SUBCUTANEOUS at 08:07

## 2020-07-30 RX ADMIN — ASPIRIN 81 MG 81 MG: 81 TABLET ORAL at 07:07

## 2020-07-30 RX ADMIN — CALCIUM 1500 MG: 500 TABLET ORAL at 03:07

## 2020-07-30 RX ADMIN — NOREPINEPHRINE BITARTRATE 8 MCG: 1 INJECTION, SOLUTION, CONCENTRATE INTRAVENOUS at 08:07

## 2020-07-30 RX ADMIN — HEPARIN SODIUM 6000 UNITS: 1000 INJECTION INTRAVENOUS; SUBCUTANEOUS at 08:07

## 2020-07-30 RX ADMIN — ATORVASTATIN CALCIUM 40 MG: 20 TABLET, FILM COATED ORAL at 08:07

## 2020-07-30 RX ADMIN — DEXMEDETOMIDINE HYDROCHLORIDE 1 MCG/KG/HR: 100 INJECTION, SOLUTION, CONCENTRATE INTRAVENOUS at 07:07

## 2020-07-30 RX ADMIN — CLOPIDOGREL 75 MG: 75 TABLET, FILM COATED ORAL at 07:07

## 2020-07-30 RX ADMIN — SODIUM CHLORIDE: 0.9 INJECTION, SOLUTION INTRAVENOUS at 10:07

## 2020-07-30 NOTE — OP NOTE
[facility]  Cardiothoracic Surgery Operative Report    Patient Name:  Marga Guerra; [id number]    DATE OF PROCEDURE: 07/30/2020   ATTENDING SURGEONS: Jorden Gill M.D., Brett Zhou M.D., and Jorden Gonzalez M.D.  PREOPERATIVE DIAGNOSIS:  Severe aortic stenosis.  POSTOPERATIVE DIAGNOSIS:  Severe aortic stenosis  ?  OPERATION PERFORMED: Transcatheter aortic valve insertion via transfemoral approach using a 26mm Medtronic Evolut  ANESTHESIA: General.  ESTIMATED BLOOD LOSS: 10 mL.  BRIEF HISTORY: This patient is an 80 year old female with symptomatic severe aortic stenosis.  The patient has had progressive dyspnea on exertion. This prompted a thoughtful and thorough evaluation, which demonstrated severe aortic stenosis. Given the comorbid conditions, a transcatheter valve insertion was recommended. The patient now presents for transcatheter aortic valve insertion.  PROCEDURE: After obtaining informed and written consent, the patient was brought to the cath lab and placed on the cath table in supine position. After induction of adequate anesthesia, a transvenous pacing wire was placed.  Bilateral femoral arterial and femoral venous access was obtained. A wire was advanced across the aortic valve. It was exchanged for stiff wire, and a 18mm aortic balloon was placed. Under rapid ventricular pacing, balloon valvuloplasty was performed. The balloon was then withdrawn, and a valve was advanced to the level of the aortic annulus. Once the team was satisfied that the valve was in proper position, it was deployed. Excellent positioning was obtained. Post-procedure echo demonstrated no aortic insufficiency. The femoral access sites were controlled using percutaneous techniques. The patient tolerated the procedure well, there were no complications. At the conclusion of the case, sponge and instrument counts were correct.

## 2020-07-30 NOTE — ANESTHESIA PREPROCEDURE EVALUATION
[facility]  Anesthesia Pre-Operative Evaluation         Patient Name: Marga Guerra  YOB: 1940  MRN: [id number]    SUBJECTIVE:     Pre-operative evaluation for Procedure(s) (LRB):  Replacement-valve-aortic (N/A)     07/30/2020    Marga Guerra is a 80 y.o. female w/ a significant PMHx of severe aortic stenosis, CKD5, HTN, COPD, bilateral carotid disease.     Patient now presents for the above procedure(s).      Prev airway: None documented.    PASHA 7/13/20  · Severe aortic valve stenosis.  · The aortic annulus is heavily calcified. The mean aortic annular diameter is 1.8 cm, area area 2.5 cm² and circumference 5.7 cm.  · Severe mitral annular calcification.  · Mild-to-moderate mitral regurgitation.  · Normal left ventricular systolic function. The estimated ejection fraction is 60%.  · Normal appearing left atrial appendage. No thrombus is present in the appendage.  · Normal right ventricular systolic function.  · Mild tricuspid regurgitation.  · Grade 3 plaque present in the ascending aorta and descending aorta.     [facility] 7/6/20  1. Non obstructive coronary artery disease  2. Right aortoiliac system with minimal diameter > 7 mm    Patient Active Problem List   Diagnosis    Thyroid cancer    Mixed hyperlipidemia    Post-surgical hypothyroidism    Postsurgical hypoparathyroidism    Senile osteoporosis    History of stroke    Secondary cancer of bone    CKD (chronic kidney disease), stage V    Vitamin D deficiency    Type 2 diabetes mellitus with renal manifestations, controlled    History of lung cancer    COPD (chronic obstructive pulmonary disease)    Aortic atherosclerosis    Bilateral carotid artery disease    Left ventricular diastolic dysfunction with preserved systolic function    Aortic sclerosis    Type 2 diabetes mellitus with circulatory disorder    Aortic stenosis    Chest pain on exertion    Status post cataract extraction and insertion of intraocular  lens    Glaucoma suspect of both eyes    Blindness and low vision    Age-related macular degeneration with central geographic atrophy    Retinal dystrophy    Essential hypertension    Cervical lymphadenopathy    Pathological fracture of rib of right side    Pathological fracture of rib with routine healing    Non-small cell cancer of right lung    Tortuous aorta    Ectatic aorta    Calcified granuloma of lung    Nonrheumatic mitral valve stenosis       Review of patient's allergies indicates:   Allergen Reactions    Iodine and iodide containing products Anaphylaxis     Swelling/anyphylaxis   seafood    Prolia [denosumab]      Lip and leg swelling    Shellfish containing products        Current Inpatient Medications:      No current facility-administered medications on file prior to encounter.      Current Outpatient Medications on File Prior to Encounter   Medication Sig Dispense Refill    albuterol (PROVENTIL/VENTOLIN HFA) 90 mcg/actuation inhaler Inhale 2 puffs into the lungs every 6 (six) hours as needed for Wheezing. 18 g 6    amLODIPine (NORVASC) 5 MG tablet Take 0.5 tablets (2.5 mg total) by mouth once daily. 90 tablet 0    atorvastatin (LIPITOR) 40 MG tablet TAKE 1 TABLET(40 MG) BY MOUTH EVERY DAY 90 tablet 3    calcitRIOL (ROCALTROL) 0.25 MCG Cap Take 1 capsule (0.25 mcg total) by mouth once daily. 90 capsule 4    CALCIUM 600 600 mg (1,500 mg) Tab TK 4 TS PO TID WITH MEALS.  0    cholecalciferol, vitamin D3, 1,000 unit capsule Take 2 capsules (2,000 Units total) by mouth once daily. 180 capsule 3    clopidogreL (PLAVIX) 75 mg tablet Take 4 tabs (300mg) night before procedure then 1 tab (75mg) daily starting day of procedure. 30 tablet 6    levothyroxine (SYNTHROID) 75 MCG tablet Mon and Thurs - 1/2 tablet daily; Other 5 days - full tablet daily 90 tablet 4    omeprazole (PRILOSEC OTC) 20 MG tablet Take 1 tablet (20 mg total) by mouth once daily. 30 tablet 1    ramipril (ALTACE) 10  MG capsule Take 1 capsule (10 mg total) by mouth once daily. 90 capsule 3       Past Surgical History:   Procedure Laterality Date    BRAIN SURGERY  2014    meningoma removed    CATARACT EXTRACTION       SECTION      CORONARY ANGIOGRAPHY N/A 2020    Procedure: ANGIOGRAM, CORONARY ARTERY;  Surgeon: Brett Wilburn MD;  Location: [facility] CATH LAB;  Service: Cardiology;  Laterality: N/A;    EYE SURGERY      bilateral cataracts    FRACTURE SURGERY Right     wrist fracture with surgical repair    LEFT HEART CATHETERIZATION Left 2020    Procedure: Left heart cath;  Surgeon: Brett Wilburn MD;  Location: [facility] CATH LAB;  Service: Cardiology;  Laterality: Left;    LUNG BIOPSY N/A 2018    Procedure: BIOPSY, LUNG;  [facility];  Location: [facility] OR [address] [place] [address];  Service: Anesthesiology;  Laterality: N/A;    mengioma  resection  2011    left front/temporal craniotomy    right wrist fracture and repair      SPINE SURGERY      compressin fracture    THYROIDECTOMY      cancer       Social History     Socioeconomic History    Marital status:      Spouse name: Not on file    Number of children: Not on file    Years of education: Not on file    Highest education level: Not on file   Occupational History    Not on file   Social Needs    Financial resource strain: Not on file    Food insecurity     Worry: Not on file     Inability: Not on file    Transportation needs     Medical: Not on file     Non-medical: Not on file   Tobacco Use    Smoking status: Former Smoker     Packs/day: 1.00     Years: 50.00     Pack years: 50.00     Types: Cigarettes     Quit date: 2009     Years since quitting: 10.9    Smokeless tobacco: Never Used   Substance and Sexual Activity    Alcohol use: No    Drug use: No    Sexual activity: Never   Lifestyle    Physical activity     Days per week: Not on file     Minutes per session: Not on file    Stress: Not on file    Relationships    Social connections     Talks on phone: Not on file     Gets together: Not on file     Attends Islam service: Not on file     Active member of club or organization: Not on file     Attends meetings of clubs or organizations: Not on file     Relationship status: Not on file   Other Topics Concern    Not on file   Social History Narrative    Not on file       OBJECTIVE:     Vital Signs Range (Last 24H):         Significant Labs:  Lab Results   Component Value Date    WBC 6.74 07/06/2020    HGB 11.0 (L) 07/06/2020    HCT 35.1 (L) 07/06/2020     07/06/2020    CHOL 247 (H) 12/06/2019    TRIG 100 12/06/2019     (H) 12/06/2019    ALT 7 (L) 07/09/2020    AST 12 07/09/2020     07/09/2020    K 4.7 07/09/2020     07/09/2020    CREATININE 4.1 (H) 07/09/2020    BUN 41 (H) 07/09/2020    CO2 27 07/09/2020    TSH 0.213 (L) 06/26/2020    INR 0.9 07/06/2020    HGBA1C 5.5 12/06/2019       Diagnostic Studies: No relevant studies.    EKG:   Results for orders placed or performed during the hospital encounter of 07/06/20   EKG 12-lead    Collection Time: 07/06/20  9:05 AM    Narrative    Test Reason : I35.0,N18.4,I51.9,    Vent. Rate : 086 BPM     Atrial Rate : 086 BPM     P-R Int : 114 ms          QRS Dur : 074 ms      QT Int : 406 ms       P-R-T Axes : 041 038 061 degrees     QTc Int : 485 ms    Normal sinus rhythm  Possible Left atrial enlargement  Borderline Abnormal ECG  When compared with ECG of 31-JAN-2020 09:17,  No significant change was found  Confirmed by ROBB PINA MD ([id number]) on 7/6/2020 1:23:24 PM    Referred By: ROSA LIU           Confirmed By:ROBB PINA MD       2D ECHO:  TTE:  Results for orders placed or performed during the hospital encounter of 06/05/20   Echo Color Flow Doppler? Yes   Result Value Ref Range    Ascending aorta 2.62 cm    STJ 2.54 cm    AV mean gradient 38 mmHg    Ao peak christal 4.1 m/s    Ao VTI 69.00 cm    IVS 0.81 0.6 - 1.1 cm    LA size  4.28 cm    Left Atrium Major Axis 4.94 cm    Left Atrium Minor Axis 4.89 cm    LVIDD 3.11 (A) 3.5 - 6.0 cm    LVIDS 2.08 (A) 2.1 - 4.0 cm    LVOT diameter 1.90 cm    LVOT peak VTI 22.30 cm    PW 0.76 0.6 - 1.1 cm    MV Peak A Jose J 2.14 m/s    E wave decelartion time 161.62 msec    MV Peak E Jose J 1.23 m/s    PV Peak D Jose J 0.17 m/s    PV Peak S Jose J 0.36 m/s    RA Major Axis 2.90 cm    RA Width 3.01 cm    RVDD 2.59 cm    Sinus 2.28 cm    TAPSE 1.62 cm    TR Max Jose J 2.79 m/s    TDI LATERAL 0.04 m/s    TDI SEPTAL 0.04 m/s    LA WIDTH 3.60 cm    LV Diastolic Volume 38.18 mL    LV Systolic Volume 14.08 mL    RV S' 7.94 cm/s    LVOT peak jose j 1.44 m/s    LV LATERAL E/E' RATIO 30.75 m/s    LV SEPTAL E/E' RATIO 30.75 m/s    FS 33 %    LA volume 64.37 cm3    LV mass 60.86 g    Left Ventricle Relative Wall Thickness 0.49 cm    AV valve area 0.92 cm2    AV Velocity Ratio 0.35     AV index (prosthetic) 0.32     E/A ratio 0.57     Mean e' 0.04 m/s    Pulm vein S/D ratio 2.12     LVOT area 2.8 cm2    LVOT stroke volume 63.19 cm3    AV peak gradient 67 mmHg    E/E' ratio 30.75 m/s    LV Systolic Volume Index 10.4 mL/m2    LV Diastolic Volume Index 28.07 mL/m2    LA Volume Index 47.3 mL/m2    LV Mass Index 45 g/m2    Triscuspid Valve Regurgitation Peak Gradient 31 mmHg    BSA 1.35 m2    Right Atrial Pressure (from IVC) 3 mmHg    MV mean gradient 8 mmHg    MV peak gradient 23 mmHg    TV rest pulmonary artery pressure 34 mmHg    HR echo 90 bpm    Narrative    · Concentric left ventricular remodeling.  · Normal left ventricular systolic function. The estimated ejection   fraction is 60%.  · Mildly reduced right ventricular systolic function.  · Grade I (mild) left ventricular diastolic dysfunction consistent with   impaired relaxation.  · Moderate left atrial enlargement.  · Mild tricuspid regurgitation.  · Moderate mitral stenosis from marked annular calcification. The mean   diastolic gradient across the mitral valve is 8 mmHg at a heart  rate of 90   bpm.  · Moderate to severe aortic valve stenosis. Aortic valve area is 1.0 cm2;   peak velocity is 3.7 m/s; mean gradient is 33 mmHg. (The LVOT VTI is   higher in this study than on the previous one with no change in AoV VTI.)  · The estimated PA systolic pressure is 34 mmHg.  · Normal central venous pressure (3 mmHg).          PASHA:  Results for orders placed or performed during the hospital encounter of 07/13/20   Transesophageal echo (PASHA)   Result Value Ref Range    BSA 1.34 m2    Sinus 2.70 cm    STJ 2.50 cm    Ascending aorta 2.90 cm    Narrative    · Severe aortic valve stenosis.  · The aortic annulus is heavily calcified. The mean aortic annular   diameter is 1.8 cm, area area 2.5 cm² and circumference 5.7 cm.  · Severe mitral annular calcification.  · Mild-to-moderate mitral regurgitation.  · Normal left ventricular systolic function. The estimated ejection   fraction is 60%.  · Normal appearing left atrial appendage. No thrombus is present in the   appendage.  · Normal right ventricular systolic function.  · Mild tricuspid regurgitation.  · Grade 3 plaque present in the ascending aorta and descending aorta.          ASSESSMENT/PLAN:         Anesthesia Evaluation    I have reviewed the Patient Summary Reports.    I have reviewed the Nursing Notes. I have reviewed the NPO Status.   I have reviewed the Medications.     Review of Systems  Anesthesia Hx:  No problems with previous Anesthesia Denies Hx of Anesthetic complications  History of prior surgery of interest to airway management or planning: Denies Family Hx of Anesthesia complications.   Denies Personal Hx of Anesthesia complications.   Cardiovascular:   Exercise tolerance: poor Hypertension Valvular problems/Murmurs, AS    Pulmonary:   COPD    Renal/:   Chronic Renal Disease, CRI    Musculoskeletal:   Arthritis     Neurological:   CVA    Endocrine:   Diabetes Hypothyroidism    Psych:   depression             Anesthesia Plan  Type of  Anesthesia, risks & benefits discussed:  Anesthesia Type:  MAC  Patient's Preference:   Intra-op Monitoring Plan: standard ASA monitors, arterial line and central line  Intra-op Monitoring Plan Comments:   Post Op Pain Control Plan: multimodal analgesia, IV/PO Opioids PRN and per primary service following discharge from PACU  Post Op Pain Control Plan Comments:   Induction:   IV  Beta Blocker:  Patient is not currently on a Beta-Blocker (No further documentation required).       Informed Consent: Patient understands risks and agrees with Anesthesia plan.  Questions answered. Anesthesia consent signed with patient.  ASA Score: 4     Day of Surgery Review of History & Physical:    H&P update referred to the surgeon.         Ready For Surgery From Anesthesia Perspective.

## 2020-07-30 NOTE — HPI
80 y.o. female referred by Dr Baptiste initially for evaluation of severe AS (NYHA Class III sx) here today for TAVR procedure. The patient has a history of aortic valve stenosis-nonrheumatic, mitral valve stenosis, CKD5, HTN, COPD, Bilateral Carotid artery disease,  Chest pain on exertion, left ventricular diastolic dysfunction with preserved systolic function, HLD and Thyroid Cancer. She reports 2 months of NYHA class II sympotms.      Marga Guerra is a 80 y.o. female referred by Dr Baptiste for evaluation of severe AS (NYHA Class 2 sx).     The patient has undergone the following TAVR work-up:     · ECHO (Date 6/5/2020): NATALIE= 0.9 cm2, MG= 38 mmHg, Peak Jose J= 4.05 m/s, EF= 60%. Reviewed TTE with Dr. Francisco who confirms severe AS.  · LHC (Date 7/6/2020): Non-obstructive CAD  · STS: 2%  · Frailty: Not frail  · Iliacs per IVUS (7/6/2020): R iliac > 7 mm   · LVOT area by PASHA: mean aortic annular diameter is 1.8 cm,  area 2.5 cm² and circumference 5.7 cm. Heavily calcified annulus.   · CT Surgery risk assessment: high risk, per Dr. Schaffer  · Rhythm issues: None, NSR  · PFTs: FEV1 84% predicted, patient unable to do DLCO  · Comorbidities: CKD5 (Cr 4.1-4.7), not yet dialysis dependent, history of neck and brain cancer, COPD, bilateral carotid artery disease     Marga Guerra is a 26 mm Evolut valve candidate via RTF access.

## 2020-07-30 NOTE — ANESTHESIA POSTPROCEDURE EVALUATION
Anesthesia Post Evaluation    Patient: Marga Guerra    Procedure(s) Performed: Procedure(s) (LRB):  Replacement-valve-aortic (N/A)    Final Anesthesia Type: general    Patient location during evaluation: ICU  Patient participation: Yes- Able to Participate  Level of consciousness: awake  Post-procedure vital signs: reviewed and stable  Pain management: adequate  Airway patency: patent    PONV status at discharge: No PONV  Anesthetic complications: no      Cardiovascular status: hemodynamically stable  Respiratory status: unassisted  Follow-up not needed.          Vitals Value Taken Time   /66 07/30/20 1446   Temp 36.4 °C (97.5 °F) 07/30/20 1300   Pulse 68 07/30/20 1458   Resp 17 07/30/20 1458   SpO2 100 % 07/30/20 1458   Vitals shown include unvalidated device data.      No case tracking events are documented in the log.      Pain/Desirae Score: No data recorded

## 2020-07-30 NOTE — CONSULTS
[facility]  Cardiac Electrophysiology  Consult Note    Admission Date: 2020  Code Status: Prior   Attending Provider: Brett Wilburn MD  Consulting Provider: Beatriz Segovia MD  Principal Problem:Severe aortic stenosis    Inpatient consult to Electrophysiology  Consult performed by: Beatriz Segovia MD  Consult ordered by: Mikael Schilling MD        Subjective:       HPI:   Ms. Guerra is a 80 y.o. female with cardiac history of severe symptomatic non rheumatic AS  ( NATALIE= 0.9 cm2, MG= 38 mmHg, Peak Jose J= 4.05 m/s, EF= 60% ) s/p s/p TAVR with 26 mm Evolut valve (Dr. Zhou 20),mitral valve stenosis, non obstructive CAD, CKD5, HTN, COPD, Bilateral Carotid artery disease,  HLD and Thyroid Cancer. EP has been consulted for post TAVR monitoring.     Pre TAVR EKR with NSR.                Past Medical History:   Diagnosis Date    CKD (chronic kidney disease), stage IV 2015    Degenerative disc disease     cervical spine     Depression     Hyperlipidemia     Hypertension     IGT (impaired glucose tolerance)     diet controlled/hyperglycemia fasting    Lung cancer     Macular degeneration     Osteoporosis, unspecified     Post-surgical hypothyroidism     Postsurgical hypoparathyroidism     Renal manifestation of secondary diabetes mellitus     Stroke     right frontoparietal     Thyroid cancer     Type 2 diabetes mellitus, controlled 2015       Past Surgical History:   Procedure Laterality Date    BRAIN SURGERY      meningoma removed    CATARACT EXTRACTION       SECTION      CORONARY ANGIOGRAPHY N/A 2020    Procedure: ANGIOGRAM, CORONARY ARTERY;  Surgeon: Brett Wilburn MD;  Location: [facility] CATH LAB;  Service: Cardiology;  Laterality: N/A;    EYE SURGERY      bilateral cataracts    FRACTURE SURGERY Right     wrist fracture with surgical repair    LEFT HEART CATHETERIZATION Left 2020    Procedure: Left heart cath;  Surgeon: Brett PARKER  Abelardo Wilburn MD;  Location: [facility] CATH LAB;  Service: Cardiology;  Laterality: Left;    LUNG BIOPSY N/A 8/8/2018    Procedure: BIOPSY, LUNG;  [facility];  Location: [facility] OR [address] [place] [address];  Service: Anesthesiology;  Laterality: N/A;    mengioma  resection  4/2011    left front/temporal craniotomy    right wrist fracture and repair      SPINE SURGERY      compressin fracture    THYROIDECTOMY      cancer       Review of patient's allergies indicates:   Allergen Reactions    Iodine and iodide containing products Anaphylaxis     Swelling/anyphylaxis   seafood    Prolia [denosumab]      Lip and leg swelling    Shellfish containing products        No current facility-administered medications on file prior to encounter.      Current Outpatient Medications on File Prior to Encounter   Medication Sig    amLODIPine (NORVASC) 5 MG tablet Take 0.5 tablets (2.5 mg total) by mouth once daily.    atorvastatin (LIPITOR) 40 MG tablet TAKE 1 TABLET(40 MG) BY MOUTH EVERY DAY    calcitRIOL (ROCALTROL) 0.25 MCG Cap Take 1 capsule (0.25 mcg total) by mouth once daily.    CALCIUM 600 600 mg (1,500 mg) Tab TK 4 TS PO TID WITH MEALS.    cholecalciferol, vitamin D3, 1,000 unit capsule Take 2 capsules (2,000 Units total) by mouth once daily.    clopidogreL (PLAVIX) 75 mg tablet Take 4 tabs (300mg) night before procedure then 1 tab (75mg) daily starting day of procedure.    levothyroxine (SYNTHROID) 75 MCG tablet Mon and Thurs - 1/2 tablet daily; Other 5 days - full tablet daily    ramipril (ALTACE) 10 MG capsule Take 1 capsule (10 mg total) by mouth once daily.    albuterol (PROVENTIL/VENTOLIN HFA) 90 mcg/actuation inhaler Inhale 2 puffs into the lungs every 6 (six) hours as needed for Wheezing.    omeprazole (PRILOSEC OTC) 20 MG tablet Take 1 tablet (20 mg total) by mouth once daily.     Family History     Problem Relation (Age of Onset)    Diabetes Mother    Heart disease Mother    No Known Problems Father,  Sister, Daughter, Son, Son, Son, Son, Brother, Maternal Aunt, Maternal Uncle, Paternal Aunt, Paternal Uncle, Maternal Grandmother, Maternal Grandfather, Paternal Grandmother, Paternal Grandfather        Tobacco Use    Smoking status: Former Smoker     Packs/day: 1.00     Years: 50.00     Pack years: 50.00     Types: Cigarettes     Quit date: 8/22/2009     Years since quitting: 10.9    Smokeless tobacco: Never Used   Substance and Sexual Activity    Alcohol use: No    Drug use: No    Sexual activity: Never     Review of Systems   Constitution: Negative.   HENT: Negative.    Eyes: Negative.    Cardiovascular: Negative.    Respiratory: Negative.    Endocrine: Negative.    Hematologic/Lymphatic: Negative.    Skin: Negative.    Musculoskeletal: Negative.    Gastrointestinal: Negative.    Genitourinary: Negative.    Neurological: Negative.      Objective:     Vital Signs (Most Recent):  Temp: 98.2 °F (36.8 °C) (07/30/20 0647)  Pulse: 90 (07/30/20 0647)  Resp: 20 (07/30/20 0647)  BP: (!) 145/78 (07/30/20 0649)  SpO2: 100 % (07/30/20 0647) Vital Signs (24h Range):  Temp:  [98.2 °F (36.8 °C)] 98.2 °F (36.8 °C)  Pulse:  [90] 90  Resp:  [20] 20  SpO2:  [100 %] 100 %  BP: (145-158)/(75-78) 145/78       Weight: 44.5 kg (98 lb)  Body mass index is 19.14 kg/m².    SpO2: 100 %  O2 Device (Oxygen Therapy): room air    Physical Exam   Constitutional: She is oriented to person, place, and time. She appears well-developed.   HENT:   Head: Normocephalic and atraumatic.   Eyes: Pupils are equal, round, and reactive to light. Conjunctivae are normal.   Neck: Normal range of motion.   Pulmonary/Chest: No respiratory distress.   Abdominal: Soft.   Musculoskeletal: Normal range of motion.   Neurological: She is alert and oriented to person, place, and time.   Skin: Skin is warm.              Significant Labs:   BMP:   Recent Labs   Lab 07/30/20 0627   *      K 4.7      CO2 22*   BUN 36*   CREATININE 3.9*   CALCIUM  9.8   , CBC:   Recent Labs   Lab 07/30/20  0627   WBC 5.78   HGB 10.1*   HCT 31.5*      , INR:   Recent Labs   Lab 07/30/20 0627   INR 0.9    and Lipid Panel No results for input(s): CHOL, HDL, LDLCALC, TRIG, CHOLHDL in the last 48 hours.    Significant Imaging: Echocardiogram:   2D echo with color flow doppler:   Results for orders placed or performed during the hospital encounter of 07/25/16   2D echo with color flow doppler   Result Value Ref Range    QEF 65 55 - 65    Diastolic Dysfunction No     Aortic Valve Stenosis MILD (A)     Mitral Valve Mobility NORMAL     Narrative    Date of Procedure: 07/25/2016        TEST DESCRIPTION   Technical Quality: This is a technically challenging study.     Aorta: The aortic root is normal in size, measuring 2.1 cm at sinotubular junction and 2.9 cm at Sinuses of Valsalva. The proximal ascending aorta is normal in size, measuring 2.9 cm across.     Left Atrium: The left atrial volume index is normal, measuring 31.71 cc/m2.     Left Ventricle: The left ventricle is normal in size, with an end-diastolic diameter of 3.4 cm, and an end-systolic diameter of 2.4 cm. LV wall thickness is normal, with the septum and the posterior wall each measuring 0.7 cm across. Relative wall   thickness was normal at 0.41, and the LV mass index was 40.4 g/m2 consistent with normal left ventricular mass. Global left ventricular systolic function appears normal. Visually estimated ejection fraction is 60-65%. The LV Doppler derived stroke volume   equals 81.0 ccs.   The E/e'(lat) is 10, consistent with normal diastolic function.     Right Atrium: The right atrium is normal in size, measuring 3.4 cm in length and 3.3 cm in width in the apical view.     Right Ventricle: The right ventricle is normal in size measuring 2.0 cm at the base in the apical right ventricle-focused view. Global right ventricular systolic function appears normal. Tricuspid annular plane systolic excursion (TAPSE) is 1.6  cm.     Aortic Valve:  The aortic valve is moderately sclerotic with moderately restricted leaflet mobility. The peak velocity obtained across the aortic valve is 2.8 m/s, which translates to a peak gradient of 31.0 mmHg. The mean gradient is 14.0 mmHg. Using a   left ventricular outflow tract diameter of 1.9 cm, a left ventricular outflow tract velocity time integral of 30 cm, and a peak instantaneous transvalvular velocity time integral of 45 cm, the calculated aortic valve area is 1.81 cm2, consistent with   mild aortic stenosis.     Mitral Valve:  The mitral valve is mildly sclerotic with normal leaflet mobility. There is mitral annular calcification.     Tricuspid Valve:  The tricuspid valve is normal in structure with normal leaflet mobility.     IVC: IVC is normal in size and collapses > 50% with a sniff, suggesting normal right atrial pressure of 3 mmHg.     Intracavitary: There is no evidence of pericardial effusion, intracavity mass, thrombi, or vegetation.         CONCLUSIONS     1 - Normal left ventricular systolic function (EF 60-65%).     2 - Normal left ventricular diastolic function.     3 - Normal right ventricular systolic function .     4 - Mild aortic stenosis, NATALIE = 1.81 cm2, peak velocity = 2.8 m/s, mean gradient = 14.0 mmHg.             This document has been electronically    SIGNED BY: Cecy Lopez MD On: 07/25/2016 14:57               Assessment and Plan:     * Severe aortic stenosis  Ms. Guerra is a 80 y.o. female with cardiac history of severe symptomatic non rheumatic AS  ( NATALIE= 0.9 cm2, MG= 38 mmHg, Peak Jose J= 4.05 m/s, EF= 60% ) s/p s/p TAVR with 26 mm Evolut valve (Dr. Zhou 7/30/20),mitral valve stenosis, non obstructive CAD, CKD5, HTN, COPD, Bilateral Carotid artery disease,  HLD and Thyroid Cancer. EP has been consulted for post TAVR monitoring.    Pre-TAVR EKG: NSR  86 bpm,, QRS 74 ms  Post-TAVR EKG 1: NSR 67 bpm, , QRS 88  Post-op day 1 EKG: pending  Telemonitor:  pending  TVP: VVI 45 @ 5 mv, loss of capture .3mv, threshold .4mv      PLAN:  - Routine Post op management per structural team    - if persistent LBBB occurs will plan for EP study to evaluate the need for PPM  - EP team will continue to follow, please call for any questions or concerns             Thank you for your consult. I will follow-up with patient. Please contact us if you have any additional questions.    Beatriz Segovia MD  Cardiac Electrophysiology  [facility]

## 2020-07-30 NOTE — SUBJECTIVE & OBJECTIVE
Past Medical History:   Diagnosis Date    CKD (chronic kidney disease), stage IV 2015    Degenerative disc disease     cervical spine     Depression     Hyperlipidemia     Hypertension     IGT (impaired glucose tolerance)     diet controlled/hyperglycemia fasting    Lung cancer     Macular degeneration     Osteoporosis, unspecified     Post-surgical hypothyroidism     Postsurgical hypoparathyroidism     Renal manifestation of secondary diabetes mellitus     Stroke     right frontoparietal     Thyroid cancer     Type 2 diabetes mellitus, controlled 2015       Past Surgical History:   Procedure Laterality Date    BRAIN SURGERY      meningoma removed    CATARACT EXTRACTION       SECTION      CORONARY ANGIOGRAPHY N/A 2020    Procedure: ANGIOGRAM, CORONARY ARTERY;  Surgeon: Brett Wilburn MD;  Location: [facility] CATH LAB;  Service: Cardiology;  Laterality: N/A;    EYE SURGERY      bilateral cataracts    FRACTURE SURGERY Right     wrist fracture with surgical repair    LEFT HEART CATHETERIZATION Left 2020    Procedure: Left heart cath;  Surgeon: Brett Wilburn MD;  Location: [facility] CATH LAB;  Service: Cardiology;  Laterality: Left;    LUNG BIOPSY N/A 2018    Procedure: BIOPSY, LUNG;  Surgeon: Desirae Diagnostic Provider;  Location: [facility] OR [address] [place] [address];  Service: Anesthesiology;  Laterality: N/A;    mengioma  resection  2011    left front/temporal craniotomy    right wrist fracture and repair      SPINE SURGERY      compressin fracture    THYROIDECTOMY      cancer       Review of patient's allergies indicates:   Allergen Reactions    Iodine and iodide containing products Anaphylaxis     Swelling/anyphylaxis   seafood    Prolia [denosumab]      Lip and leg swelling    Shellfish containing products        No current facility-administered medications on file prior to encounter.      Current Outpatient Medications on File Prior to Encounter   Medication Sig     amLODIPine (NORVASC) 5 MG tablet Take 0.5 tablets (2.5 mg total) by mouth once daily.    atorvastatin (LIPITOR) 40 MG tablet TAKE 1 TABLET(40 MG) BY MOUTH EVERY DAY    calcitRIOL (ROCALTROL) 0.25 MCG Cap Take 1 capsule (0.25 mcg total) by mouth once daily.    CALCIUM 600 600 mg (1,500 mg) Tab TK 4 TS PO TID WITH MEALS.    cholecalciferol, vitamin D3, 1,000 unit capsule Take 2 capsules (2,000 Units total) by mouth once daily.    clopidogreL (PLAVIX) 75 mg tablet Take 4 tabs (300mg) night before procedure then 1 tab (75mg) daily starting day of procedure.    levothyroxine (SYNTHROID) 75 MCG tablet Mon and Thurs - 1/2 tablet daily; Other 5 days - full tablet daily    ramipril (ALTACE) 10 MG capsule Take 1 capsule (10 mg total) by mouth once daily.    albuterol (PROVENTIL/VENTOLIN HFA) 90 mcg/actuation inhaler Inhale 2 puffs into the lungs every 6 (six) hours as needed for Wheezing.    omeprazole (PRILOSEC OTC) 20 MG tablet Take 1 tablet (20 mg total) by mouth once daily.     Family History     Problem Relation (Age of Onset)    Diabetes Mother    Heart disease Mother    No Known Problems Father, Sister, Daughter, Son, Son, Son, Son, Brother, Maternal Aunt, Maternal Uncle, Paternal Aunt, Paternal Uncle, Maternal Grandmother, Maternal Grandfather, Paternal Grandmother, Paternal Grandfather        Tobacco Use    Smoking status: Former Smoker     Packs/day: 1.00     Years: 50.00     Pack years: 50.00     Types: Cigarettes     Quit date: 8/22/2009     Years since quitting: 10.9    Smokeless tobacco: Never Used   Substance and Sexual Activity    Alcohol use: No    Drug use: No    Sexual activity: Never     Review of Systems   Constitution: Negative.   HENT: Negative.    Eyes: Negative.    Cardiovascular: Negative.    Respiratory: Negative.    Endocrine: Negative.    Hematologic/Lymphatic: Negative.    Skin: Negative.    Musculoskeletal: Negative.    Gastrointestinal: Negative.    Genitourinary: Negative.     Neurological: Negative.      Objective:     Vital Signs (Most Recent):  Temp: 98.2 °F (36.8 °C) (07/30/20 0647)  Pulse: 90 (07/30/20 0647)  Resp: 20 (07/30/20 0647)  BP: (!) 145/78 (07/30/20 0649)  SpO2: 100 % (07/30/20 0647) Vital Signs (24h Range):  Temp:  [98.2 °F (36.8 °C)] 98.2 °F (36.8 °C)  Pulse:  [90] 90  Resp:  [20] 20  SpO2:  [100 %] 100 %  BP: (145-158)/(75-78) 145/78       Weight: 44.5 kg (98 lb)  Body mass index is 19.14 kg/m².    SpO2: 100 %  O2 Device (Oxygen Therapy): room air    Physical Exam   Constitutional: She is oriented to person, place, and time. She appears well-developed.   HENT:   Head: Normocephalic and atraumatic.   Eyes: Pupils are equal, round, and reactive to light. Conjunctivae are normal.   Neck: Normal range of motion.   Pulmonary/Chest: No respiratory distress.   Abdominal: Soft.   Musculoskeletal: Normal range of motion.   Neurological: She is alert and oriented to person, place, and time.   Skin: Skin is warm.              Significant Labs:   BMP:   Recent Labs   Lab 07/30/20 0627   *      K 4.7      CO2 22*   BUN 36*   CREATININE 3.9*   CALCIUM 9.8   , CBC:   Recent Labs   Lab 07/30/20 0627   WBC 5.78   HGB 10.1*   HCT 31.5*      , INR:   Recent Labs   Lab 07/30/20 0627   INR 0.9    and Lipid Panel No results for input(s): CHOL, HDL, LDLCALC, TRIG, CHOLHDL in the last 48 hours.    Significant Imaging: Echocardiogram:   2D echo with color flow doppler:   Results for orders placed or performed during the hospital encounter of 07/25/16   2D echo with color flow doppler   Result Value Ref Range    QEF 65 55 - 65    Diastolic Dysfunction No     Aortic Valve Stenosis MILD (A)     Mitral Valve Mobility NORMAL     Narrative    Date of Procedure: 07/25/2016        TEST DESCRIPTION   Technical Quality: This is a technically challenging study.     Aorta: The aortic root is normal in size, measuring 2.1 cm at sinotubular junction and 2.9 cm at Sinuses of  Valsalva. The proximal ascending aorta is normal in size, measuring 2.9 cm across.     Left Atrium: The left atrial volume index is normal, measuring 31.71 cc/m2.     Left Ventricle: The left ventricle is normal in size, with an end-diastolic diameter of 3.4 cm, and an end-systolic diameter of 2.4 cm. LV wall thickness is normal, with the septum and the posterior wall each measuring 0.7 cm across. Relative wall   thickness was normal at 0.41, and the LV mass index was 40.4 g/m2 consistent with normal left ventricular mass. Global left ventricular systolic function appears normal. Visually estimated ejection fraction is 60-65%. The LV Doppler derived stroke volume   equals 81.0 ccs.   The E/e'(lat) is 10, consistent with normal diastolic function.     Right Atrium: The right atrium is normal in size, measuring 3.4 cm in length and 3.3 cm in width in the apical view.     Right Ventricle: The right ventricle is normal in size measuring 2.0 cm at the base in the apical right ventricle-focused view. Global right ventricular systolic function appears normal. Tricuspid annular plane systolic excursion (TAPSE) is 1.6 cm.     Aortic Valve:  The aortic valve is moderately sclerotic with moderately restricted leaflet mobility. The peak velocity obtained across the aortic valve is 2.8 m/s, which translates to a peak gradient of 31.0 mmHg. The mean gradient is 14.0 mmHg. Using a   left ventricular outflow tract diameter of 1.9 cm, a left ventricular outflow tract velocity time integral of 30 cm, and a peak instantaneous transvalvular velocity time integral of 45 cm, the calculated aortic valve area is 1.81 cm2, consistent with   mild aortic stenosis.     Mitral Valve:  The mitral valve is mildly sclerotic with normal leaflet mobility. There is mitral annular calcification.     Tricuspid Valve:  The tricuspid valve is normal in structure with normal leaflet mobility.     IVC: IVC is normal in size and collapses > 50% with a  sniff, suggesting normal right atrial pressure of 3 mmHg.     Intracavitary: There is no evidence of pericardial effusion, intracavity mass, thrombi, or vegetation.         CONCLUSIONS     1 - Normal left ventricular systolic function (EF 60-65%).     2 - Normal left ventricular diastolic function.     3 - Normal right ventricular systolic function .     4 - Mild aortic stenosis, NATALIE = 1.81 cm2, peak velocity = 2.8 m/s, mean gradient = 14.0 mmHg.             This document has been electronically    SIGNED BY: Cecy Lopez MD On: 07/25/2016 14:57

## 2020-07-30 NOTE — H&P
INTERVENTIONAL CARDIOLOGY  VALVE CENTER      Reason for Consult: Severe Aortic Stenosis    HISTORY OF PRESENT ILLNESS:    80 y.o. female referred by Dr Baptiste initially for evaluation of severe AS (NYHA Class III sx) here today for TAVR procedure. The patient has a history of aortic valve stenosis-nonrheumatic, mitral valve stenosis, CKD5, HTN, COPD, Bilateral Carotid artery disease,  Chest pain on exertion, left ventricular diastolic dysfunction with preserved systolic function, HLD and Thyroid Cancer. She reports 2 months of NYHA class II sympotms.        Marga Guerra is a 80 y.o. female referred by Dr Baptiste for evaluation of severe AS (NYHA Class 2 sx).     The patient has undergone the following TAVR work-up:     · ECHO (Date 6/5/2020): NATALIE= 0.9 cm2, MG= 38 mmHg, Peak Jose J= 4.05 m/s, EF= 60%. Reviewed TTE with Dr. Francisco who confirms severe AS.  · LHC (Date 7/6/2020): Non-obstructive CAD  · STS: 2%  · Frailty: Not frail  · Iliacs per IVUS (7/6/2020): R iliac > 7 mm   · LVOT area by PASHA: mean aortic annular diameter is 1.8 cm,  area 2.5 cm² and circumference 5.7 cm. Heavily calcified annulus.   · CT Surgery risk assessment: high risk, per Dr. Schaffer  · Rhythm issues: None, NSR  · PFTs: FEV1 84% predicted, patient unable to do DLCO  · Comorbidities: CKD5 (Cr 4.1-4.7), not yet dialysis dependent, history of neck and brain cancer, COPD, bilateral carotid artery disease     Marga Guerra is a 26 mm Evolut valve candidate via RTF access.         Past Medical History:   Diagnosis Date    CKD (chronic kidney disease), stage IV 1/13/2015    Degenerative disc disease     cervical spine     Depression     Hyperlipidemia     Hypertension     IGT (impaired glucose tolerance)     diet controlled/hyperglycemia fasting    Lung cancer     Macular degeneration     Osteoporosis, unspecified     Post-surgical hypothyroidism     Postsurgical hypoparathyroidism     Renal manifestation of secondary diabetes mellitus      Stroke     right frontoparietal     Thyroid cancer     Type 2 diabetes mellitus, controlled 2015       Past Surgical History:   Procedure Laterality Date    BRAIN SURGERY  2014    meningoma removed    CATARACT EXTRACTION       SECTION      CORONARY ANGIOGRAPHY N/A 2020    Procedure: ANGIOGRAM, CORONARY ARTERY;  Surgeon: Brett Wilburn MD;  Location: [facility] CATH LAB;  Service: Cardiology;  Laterality: N/A;    EYE SURGERY      bilateral cataracts    FRACTURE SURGERY Right     wrist fracture with surgical repair    LEFT HEART CATHETERIZATION Left 2020    Procedure: Left heart cath;  Surgeon: Brett Wilburn MD;  Location: [facility] CATH LAB;  Service: Cardiology;  Laterality: Left;    LUNG BIOPSY N/A 2018    Procedure: BIOPSY, LUNG;  Surgeon: [facility] Diagnostic Provider;  Location: [facility] OR [address] [place] [address];  Service: Anesthesiology;  Laterality: N/A;    mengioma  resection  2011    left front/temporal craniotomy    right wrist fracture and repair      SPINE SURGERY      compressin fracture    THYROIDECTOMY      cancer       ROS    BP (!) 145/78 (BP Location: Right arm, Patient Position: Lying)   Pulse 90   Temp 98.2 °F (36.8 °C) (Oral)   Resp 20   Ht 5' (1.524 m)   Wt 44.5 kg (98 lb)   SpO2 100%   Breastfeeding No   BMI 19.14 kg/m²     Physical Exam    CMP  Sodium   Date Value Ref Range Status   2020 142 136 - 145 mmol/L Final     Potassium   Date Value Ref Range Status   2020 4.7 3.5 - 5.1 mmol/L Final     Chloride   Date Value Ref Range Status   2020 107 95 - 110 mmol/L Final     CO2   Date Value Ref Range Status   2020 27 23 - 29 mmol/L Final     Glucose   Date Value Ref Range Status   2020 85 70 - 110 mg/dL Final     BUN, Bld   Date Value Ref Range Status   2020 41 (H) 8 - 23 mg/dL Final     Creatinine   Date Value Ref Range Status   2020 4.1 (H) 0.5 - 1.4 mg/dL Final     Calcium   Date Value Ref Range Status   2020 8.8 8.7 -  10.5 mg/dL Final     Total Protein   Date Value Ref Range Status   07/09/2020 6.5 6.0 - 8.4 g/dL Final     Albumin   Date Value Ref Range Status   07/09/2020 3.3 (L) 3.5 - 5.2 g/dL Final     Total Bilirubin   Date Value Ref Range Status   07/09/2020 0.4 0.1 - 1.0 mg/dL Final     Comment:     For infants and newborns, interpretation of results should be based  on gestational age, weight and in agreement with clinical  observations.  Premature Infant recommended reference ranges:  Up to 24 hours.............<8.0 mg/dL  Up to 48 hours............<12.0 mg/dL  3-5 days..................<15.0 mg/dL  6-29 days.................<15.0 mg/dL       Alkaline Phosphatase   Date Value Ref Range Status   07/09/2020 38 (L) 55 - 135 U/L Final     AST   Date Value Ref Range Status   07/09/2020 12 10 - 40 U/L Final     ALT   Date Value Ref Range Status   07/09/2020 7 (L) 10 - 44 U/L Final     Anion Gap   Date Value Ref Range Status   07/09/2020 8 8 - 16 mmol/L Final     eGFR if    Date Value Ref Range Status   07/09/2020 11.0 (A) >60 mL/min/1.73 m^2 Final     eGFR if non    Date Value Ref Range Status   07/09/2020 9.6 (A) >60 mL/min/1.73 m^2 Final     Comment:     Calculation used to obtain the estimated glomerular filtration  rate (eGFR) is the CKD-EPI equation.        Lab Results   Component Value Date    WBC 6.74 07/06/2020    HGB 11.0 (L) 07/06/2020    HCT 35.1 (L) 07/06/2020    MCV 94 07/06/2020     07/06/2020     Lab Results   Component Value Date    APTT 22.4 07/06/2020     Lab Results   Component Value Date    INR 0.9 07/06/2020    INR 0.9 08/08/2018    INR 0.9 02/09/2011       ASSESSMENT/PLAN:     Patient Active Problem List   Diagnosis    Thyroid cancer    Mixed hyperlipidemia    Post-surgical hypothyroidism    Postsurgical hypoparathyroidism    Senile osteoporosis    History of stroke    Secondary cancer of bone    CKD (chronic kidney disease), stage V    Vitamin D deficiency     Type 2 diabetes mellitus with renal manifestations, controlled    History of lung cancer    COPD (chronic obstructive pulmonary disease)    Aortic atherosclerosis    Bilateral carotid artery disease    Left ventricular diastolic dysfunction with preserved systolic function    Aortic sclerosis    Type 2 diabetes mellitus with circulatory disorder    Aortic stenosis    Chest pain on exertion    Status post cataract extraction and insertion of intraocular lens    Glaucoma suspect of both eyes    Blindness and low vision    Age-related macular degeneration with central geographic atrophy    Retinal dystrophy    Essential hypertension    Cervical lymphadenopathy    Pathological fracture of rib of right side    Pathological fracture of rib with routine healing    Non-small cell cancer of right lung    Tortuous aorta    Ectatic aorta    Calcified granuloma of lung    Nonrheumatic mitral valve stenosis       Serve Aortic stenosis     · ECHO (Date 6/5/2020): NATALIE= 0.9 cm2, MG= 38 mmHg, Peak Jose J= 4.05 m/s, EF= 60%. Reviewed TTE with Dr. Francisco who confirms severe AS.  · LHC (Date 7/6/2020): Non-obstructive CAD  · STS: 2%  · Frailty: Not frail  · Iliacs per IVUS (7/6/2020): R iliac > 7 mm   · LVOT area by PASHA: mean aortic annular diameter is 1.8 cm,  area 2.5 cm² and circumference 5.7 cm. Heavily calcified annulus.   · CT Surgery risk assessment: high risk, per Dr. Schaffer  · Rhythm issues: None, NSR  · PFTs: FEV1 84% predicted, patient unable to do DLCO  · Comorbidities: CKD5 (Cr 4.1-4.7), not yet dialysis dependent, history of neck and brain cancer, COPD, bilateral carotid artery disease       1. Valve:26  mm Evolut  2. TAVR access: RTF  3. Valvuloplasty balloon: 20 mm True  4. Viabahn size if needed: 7x5  5. Antithrombotic therapy: ASAxPlavix  6. Pacemaker risk factors: None  1. The patient was explained the the procedure carries around a 12.5% risk of permanent pacemaker requirement and that risk  depends on the patients underlying conduction system.  7. Patient was educated abut the pathophysiology and natural history of severe aortic stenosis and was educated about the treatment options including surgical and transcatheter valve replacement. She agrees to be full code for the forseable future and to undergo workup for treatment of severe AS.   8. The risks, benefits, and alternatives of transcatheter aortic valve replacement were discussed with the patient. All questions were answered and informed consent was obtained. I had a detailed discussion with the patient regarding risk of stroke, MI, bleeding access site complications including limb loss, allergy, kidney failure including dialysis and death.  9. The patient understands the risks and benefits and wishes to go ahead with the procedure.  10. All patient's questions were answered       Attending addendum to follow    Mikael Schilling MD  Interventional Cardiovascular & Structural Fellow, PGY8  Pager: [phone]

## 2020-07-30 NOTE — PLAN OF CARE
Pt arrived to floor ambulatory from home accompanied by her daughter. Pt oriented to room. Iv inserted. Admit assessment completed. Pt and pat's daughter states she took all 3 doses of her allergy prep as ordered. Nurse call bell within reach. Daughter at bedside. Pt denies any complaints at this time. Will continue to monitor

## 2020-07-30 NOTE — HOSPITAL COURSE
Ms. Guerra was admitted and underwent successful placement of a 26 mm EvolutR-PRO TAVR via RTF access under MAC sedation. A transthoracic echo was performed immediately post procedure which showed no paravalvular leak. An aortic valve mean gradient of 5 mmHg and a maximum velocity through the aortic valve of 1.2 m/s. She was transported to the CCU in stable condition with a TVP and arterial line in place. EP was consulted. She remained hemodynamically stable overnight. The following morning, she developed symptomatic anemia requiring transfusion. EP continued to follow and no PPM was recommended. She ambulated without difficulty. She was eager to go home. It was felt she was stable for discharge.

## 2020-07-30 NOTE — TRANSFER OF CARE
Anesthesia Transfer of Care Note    Patient: Marga Guerra    Procedure(s) Performed: Procedure(s) (LRB):  Replacement-valve-aortic (N/A)    Patient location: ICU    Anesthesia Type: MAC    Transport from OR: Transported from OR on 6-10 L/min O2 by face mask with adequate spontaneous ventilation. Continuous ECG monitoring in transport. Continuous SpO2 monitoring in transport. Continuos invasive BP monitoring in transport    Post pain: adequate analgesia    Post assessment: no apparent anesthetic complications    Post vital signs: stable    Level of consciousness: awake and responds to stimulation    Nausea/Vomiting: no nausea/vomiting    Complications: none    Transfer of care protocol was followed      Last vitals:   Visit Vitals  BP (!) 145/78 (BP Location: Right arm, Patient Position: Lying)   Pulse 90   Temp 36.8 °C (98.2 °F) (Oral)   Resp 20   Ht 5' (1.524 m)   Wt 44.5 kg (98 lb)   SpO2 100%   Breastfeeding No   BMI 19.14 kg/m²

## 2020-07-30 NOTE — ASSESSMENT & PLAN NOTE
Ms. Guerra is a 80 y.o. female with cardiac history of severe symptomatic non rheumatic AS  ( NATALIE= 0.9 cm2, MG= 38 mmHg, Peak Jose J= 4.05 m/s, EF= 60% ) s/p s/p TAVR with 26 mm Evolut valve (Dr. Zhou 7/30/20),mitral valve stenosis, non obstructive CAD, CKD5, HTN, COPD, Bilateral Carotid artery disease,  HLD and Thyroid Cancer. EP has been consulted for post TAVR monitoring.    Pre-TAVR EKG: NSR  86 bpm,, QRS 74 ms  Post-TAVR EKG 1: pending  Post-op day 1 EKG: pending  Telemonitor: pending  TVP: pending     PLAN:  - Routine Post op management per structural team    - if persistent LBBB occurs will plan for EP study to evaluate the need for PPM  - EP team will continue to follow, please call for any questions or concerns

## 2020-07-30 NOTE — HPI
Ms. Guerra is a 80 y.o. female with cardiac history of severe symptomatic non rheumatic AS  ( NATALIE= 0.9 cm2, MG= 38 mmHg, Peak Jose J= 4.05 m/s, EF= 60% ) s/p s/p TAVR with 26 mm Evolut valve (Dr. Zhou 7/30/20),mitral valve stenosis, non obstructive CAD, CKD5, HTN, COPD, Bilateral Carotid artery disease,  HLD and Thyroid Cancer. EP has been consulted for post TAVR monitoring.     Pre TAVR EKR with NSR.

## 2020-07-30 NOTE — PROCEDURES
Brief Operative Note:    : Brett Wilburn MD     Referring Physician: Stephanie Prescott     All Operators: Surgeon(s):  MD Arley Mendoza MD Mohanad Abbas Hasan, MD Stephen M. Spindel, MD Stephen R. Ramee, MD     Preoperative Diagnosis: Severe aortic stenosis [I35.0]     Postop Diagnosis: Severe aortic stenosis [I35.0]    Treatments/Procedures: Procedure(s) (LRB):  Replacement-valve-aortic (N/A)    Findings:Severe structural disease is present s/p 26 mm Evolut. See catheterization report for full details.    Estimated Blood loss: 20 cc    Specimens removed: No        Mikael Schilling MD  Interventional Cardiovascular Fellow, PGY7  Pager: [phone]

## 2020-07-30 NOTE — ANESTHESIA PROCEDURE NOTES
Arterial    Diagnosis: Aortic stenosis    Patient location during procedure: done in OR  Procedure start time: 7/30/2020 7:41 AM  Timeout: 7/30/2020 7:40 AM  Procedure end time: 7/30/2020 8:00 AM    Staffing  Authorizing Provider: Everett Franco MD  Performing Provider: Ji Cespedes MD    Anesthesiologist was present at the time of the procedure.    Preanesthetic Checklist  Completed: patient identified, site marked, surgical consent, pre-op evaluation, timeout performed, IV checked, risks and benefits discussed, monitors and equipment checked and anesthesia consent givenArterial  Skin Prep: chlorhexidine gluconate  Local Infiltration: lidocaine  Orientation: right  Location: brachial  Catheter Size: 20 G  Catheter placement by Ultrasound guidance. Heme positive aspiration all ports.  Vessel Caliber: small, patent, compressibility normal  Needle advanced into vessel with real time Ultrasound guidance.Insertion Attempts: 3  Assessment  Dressing: secured with tape and tegaderm  Patient: Tolerated well  Additional Notes  Radial artery caliber very small. Two attempts at mid-upper arm radial. Successful brachial.

## 2020-07-30 NOTE — PLAN OF CARE
PCP: Stephanie Prescott MD    Pharmacy: [facility] [place] [address]  [phone]    Payor: [facility] Medicare [facility] ([id number])    SW contacted pt cg (daughter) via telephone to complete assessment. CG stated pt is independent and denied use of DME prior to admit. Denied dialysis and coumadin clinic visit. Stated that she provides care for pt if/when needed. Pt resides in home with her spouse. Stated she would provide transport for pt upon d/c.     No further needs at time of call.        07/30/20 1212   Discharge Assessment   Assessment Type Discharge Planning Assessment   Confirmed/corrected address and phone number on facesheet? Yes   Assessment information obtained from? Caregiver   Communicated expected length of stay with patient/caregiver no  (not yet determined)   Prior to hospitilization cognitive status: Alert/Oriented   Prior to hospitalization functional status: Independent   Current Functional Status: Independent   Facility Arrived From: home   Is patient able to care for self after discharge? Unable to determine at this time (comments)   Who are your caregiver(s) and their phone number(s)? Daughter; Linda Breen; [phone]   Readmission Within the Last 30 Days unable to assess   Patient currently being followed by outpatient case management? Unable to determine (comments)   Patient currently receives any other outside agency services? No   Equipment Currently Used at Home none   Do you have any problems affording any of your prescribed medications? No   Does the patient have transportation home? Yes   Transportation Anticipated family or friend will provide   Dialysis Name and Scheduled days n/a   Does the patient receive services at the Coumadin Clinic? No   Discharge Plan A Home with family   Discharge Plan B Home   DME Needed Upon Discharge  none   Patient/Family in Agreement with Plan yes       Kaylene Mccauley LMSW  Case Management Social Worker   [facility]

## 2020-07-30 NOTE — ASSESSMENT & PLAN NOTE
- Successful transfemoral aortic valve replacement with a 26 mm EvolutR-PRO valve on 7/30/20.  - No paravalvular leak shown on post-valve echo  - Follow up with Structural Clinic in 1 month and 1 year with labs and Echo   - ASA / Plavix for 6 months.   - No non sterile procedures which could cause endocarditis for 6 months post TAVR including dental work, endoscopy, colonoscopy, and  procedures.   - SBE prophylaxis for life.

## 2020-07-31 VITALS
TEMPERATURE: 99 F | BODY MASS INDEX: 19.24 KG/M2 | RESPIRATION RATE: 21 BRPM | WEIGHT: 98 LBS | HEART RATE: 88 BPM | DIASTOLIC BLOOD PRESSURE: 58 MMHG | SYSTOLIC BLOOD PRESSURE: 131 MMHG | OXYGEN SATURATION: 99 % | HEIGHT: 60 IN

## 2020-07-31 PROBLEM — D62 ACUTE BLOOD LOSS ANEMIA: Status: ACTIVE | Noted: 2020-07-31

## 2020-07-31 LAB
ANION GAP SERPL CALC-SCNC: 9 MMOL/L (ref 8–16)
BASOPHILS # BLD AUTO: 0.01 K/UL (ref 0–0.2)
BASOPHILS # BLD AUTO: 0.01 K/UL (ref 0–0.2)
BASOPHILS NFR BLD: 0.1 % (ref 0–1.9)
BASOPHILS NFR BLD: 0.1 % (ref 0–1.9)
BLD PROD TYP BPU: NORMAL
BLOOD UNIT EXPIRATION DATE: NORMAL
BLOOD UNIT TYPE CODE: 5100
BLOOD UNIT TYPE: NORMAL
BUN SERPL-MCNC: 44 MG/DL (ref 8–23)
CALCIUM SERPL-MCNC: 8.9 MG/DL (ref 8.7–10.5)
CHLORIDE SERPL-SCNC: 107 MMOL/L (ref 95–110)
CO2 SERPL-SCNC: 24 MMOL/L (ref 23–29)
CODING SYSTEM: NORMAL
CREAT SERPL-MCNC: 3.7 MG/DL (ref 0.5–1.4)
DIFFERENTIAL METHOD: ABNORMAL
DIFFERENTIAL METHOD: ABNORMAL
DISPENSE STATUS: NORMAL
EOSINOPHIL # BLD AUTO: 0 K/UL (ref 0–0.5)
EOSINOPHIL # BLD AUTO: 0 K/UL (ref 0–0.5)
EOSINOPHIL NFR BLD: 0 % (ref 0–8)
EOSINOPHIL NFR BLD: 0.1 % (ref 0–8)
ERYTHROCYTE [DISTWIDTH] IN BLOOD BY AUTOMATED COUNT: 15.3 % (ref 11.5–14.5)
ERYTHROCYTE [DISTWIDTH] IN BLOOD BY AUTOMATED COUNT: 15.6 % (ref 11.5–14.5)
EST. GFR  (AFRICAN AMERICAN): 12.5 ML/MIN/1.73 M^2
EST. GFR  (NON AFRICAN AMERICAN): 10.8 ML/MIN/1.73 M^2
GLUCOSE SERPL-MCNC: 103 MG/DL (ref 70–110)
HCT VFR BLD AUTO: 23.3 % (ref 37–48.5)
HCT VFR BLD AUTO: 27 % (ref 37–48.5)
HGB BLD-MCNC: 7.4 G/DL (ref 12–16)
HGB BLD-MCNC: 8.6 G/DL (ref 12–16)
IMM GRANULOCYTES # BLD AUTO: 0.05 K/UL (ref 0–0.04)
IMM GRANULOCYTES # BLD AUTO: 0.08 K/UL (ref 0–0.04)
IMM GRANULOCYTES NFR BLD AUTO: 0.4 % (ref 0–0.5)
IMM GRANULOCYTES NFR BLD AUTO: 0.7 % (ref 0–0.5)
LYMPHOCYTES # BLD AUTO: 0.8 K/UL (ref 1–4.8)
LYMPHOCYTES # BLD AUTO: 1.2 K/UL (ref 1–4.8)
LYMPHOCYTES NFR BLD: 10.6 % (ref 18–48)
LYMPHOCYTES NFR BLD: 7.4 % (ref 18–48)
MCH RBC QN AUTO: 29.6 PG (ref 27–31)
MCH RBC QN AUTO: 30.2 PG (ref 27–31)
MCHC RBC AUTO-ENTMCNC: 31.8 G/DL (ref 32–36)
MCHC RBC AUTO-ENTMCNC: 31.9 G/DL (ref 32–36)
MCV RBC AUTO: 93 FL (ref 82–98)
MCV RBC AUTO: 95 FL (ref 82–98)
MONOCYTES # BLD AUTO: 0.7 K/UL (ref 0.3–1)
MONOCYTES # BLD AUTO: 1 K/UL (ref 0.3–1)
MONOCYTES NFR BLD: 6.4 % (ref 4–15)
MONOCYTES NFR BLD: 8.4 % (ref 4–15)
NEUTROPHILS # BLD AUTO: 9.4 K/UL (ref 1.8–7.7)
NEUTROPHILS # BLD AUTO: 9.4 K/UL (ref 1.8–7.7)
NEUTROPHILS NFR BLD: 80.4 % (ref 38–73)
NEUTROPHILS NFR BLD: 85.4 % (ref 38–73)
NRBC BLD-RTO: 0 /100 WBC
NRBC BLD-RTO: 0 /100 WBC
PLATELET # BLD AUTO: 147 K/UL (ref 150–350)
PLATELET # BLD AUTO: 169 K/UL (ref 150–350)
PMV BLD AUTO: 11.7 FL (ref 9.2–12.9)
PMV BLD AUTO: 12.2 FL (ref 9.2–12.9)
POC ACTIVATED CLOTTING TIME K: 120 SEC (ref 74–137)
POC ACTIVATED CLOTTING TIME K: 224 SEC (ref 74–137)
POCT GLUCOSE: 111 MG/DL (ref 70–110)
POTASSIUM SERPL-SCNC: 4.3 MMOL/L (ref 3.5–5.1)
RBC # BLD AUTO: 2.5 M/UL (ref 4–5.4)
RBC # BLD AUTO: 2.85 M/UL (ref 4–5.4)
SAMPLE: ABNORMAL
SAMPLE: NORMAL
SODIUM SERPL-SCNC: 140 MMOL/L (ref 136–145)
TRANS ERYTHROCYTES VOL PATIENT: NORMAL ML
WBC # BLD AUTO: 11.02 K/UL (ref 3.9–12.7)
WBC # BLD AUTO: 11.74 K/UL (ref 3.9–12.7)

## 2020-07-31 PROCEDURE — 93005 ELECTROCARDIOGRAM TRACING: CPT | Mod: HCNC

## 2020-07-31 PROCEDURE — 63600175 PHARM REV CODE 636 W HCPCS: Mod: HCNC | Performed by: PHYSICIAN ASSISTANT

## 2020-07-31 PROCEDURE — P9021 RED BLOOD CELLS UNIT: HCPCS | Mod: HCNC

## 2020-07-31 PROCEDURE — 36430 TRANSFUSION BLD/BLD COMPNT: CPT | Mod: HCNC

## 2020-07-31 PROCEDURE — 93010 EKG 12-LEAD: ICD-10-PCS | Mod: HCNC,,, | Performed by: INTERNAL MEDICINE

## 2020-07-31 PROCEDURE — 99239 HOSP IP/OBS DSCHRG MGMT >30: CPT | Mod: HCNC,,, | Performed by: PHYSICIAN ASSISTANT

## 2020-07-31 PROCEDURE — 85025 COMPLETE CBC W/AUTO DIFF WBC: CPT | Mod: HCNC

## 2020-07-31 PROCEDURE — 99232 SBSQ HOSP IP/OBS MODERATE 35: CPT | Mod: HCNC,,, | Performed by: INTERNAL MEDICINE

## 2020-07-31 PROCEDURE — 93010 ELECTROCARDIOGRAM REPORT: CPT | Mod: HCNC,,, | Performed by: INTERNAL MEDICINE

## 2020-07-31 PROCEDURE — 80048 BASIC METABOLIC PNL TOTAL CA: CPT | Mod: HCNC

## 2020-07-31 PROCEDURE — 99232 PR SUBSEQUENT HOSPITAL CARE,LEVL II: ICD-10-PCS | Mod: HCNC,,, | Performed by: INTERNAL MEDICINE

## 2020-07-31 PROCEDURE — 99239 PR HOSPITAL DISCHARGE DAY,>30 MIN: ICD-10-PCS | Mod: HCNC,,, | Performed by: PHYSICIAN ASSISTANT

## 2020-07-31 PROCEDURE — 25000003 PHARM REV CODE 250: Mod: HCNC | Performed by: STUDENT IN AN ORGANIZED HEALTH CARE EDUCATION/TRAINING PROGRAM

## 2020-07-31 RX ORDER — PANTOPRAZOLE SODIUM 40 MG/1
40 TABLET, DELAYED RELEASE ORAL DAILY
Qty: 30 TABLET | Refills: 11 | Status: SHIPPED | OUTPATIENT
Start: 2020-07-31 | End: 2021-01-01

## 2020-07-31 RX ORDER — FUROSEMIDE 10 MG/ML
80 INJECTION INTRAMUSCULAR; INTRAVENOUS ONCE
Status: COMPLETED | OUTPATIENT
Start: 2020-07-31 | End: 2020-07-31

## 2020-07-31 RX ORDER — HYDROCODONE BITARTRATE AND ACETAMINOPHEN 500; 5 MG/1; MG/1
TABLET ORAL
Status: DISCONTINUED | OUTPATIENT
Start: 2020-07-31 | End: 2020-07-31 | Stop reason: HOSPADM

## 2020-07-31 RX ADMIN — ASPIRIN 81 MG CHEWABLE TABLET 81 MG: 81 TABLET CHEWABLE at 09:07

## 2020-07-31 RX ADMIN — CALCIUM 1500 MG: 500 TABLET ORAL at 03:07

## 2020-07-31 RX ADMIN — CLOPIDOGREL 75 MG: 75 TABLET, FILM COATED ORAL at 09:07

## 2020-07-31 RX ADMIN — LEVOTHYROXINE SODIUM 75 MCG: 75 TABLET ORAL at 06:07

## 2020-07-31 RX ADMIN — CALCIUM 1500 MG: 500 TABLET ORAL at 09:07

## 2020-07-31 RX ADMIN — FUROSEMIDE 80 MG: 10 INJECTION, SOLUTION INTRAMUSCULAR; INTRAVENOUS at 11:07

## 2020-07-31 NOTE — NURSING
Discharged pt via wheelchair by ANN Jean to home with daughter.     All belongings sent with pt and daughter.    No acute events during transport.

## 2020-07-31 NOTE — SUBJECTIVE & OBJECTIVE
Interval History: overnight no major events, noted to be hypotensive when trendelenburg however femoral site with out hematoma, given IVF. This AM no complaints.    Tele: SR with conducted PACs and infrequent PVCs    ROS  Objective:     Vital Signs (Most Recent):  Temp: 98.9 °F (37.2 °C) (07/31/20 0715)  Pulse: 80 (07/31/20 0715)  Resp: 19 (07/31/20 0715)  BP: (!) 158/68 (07/31/20 0715)  SpO2: 98 % (07/31/20 0715) Vital Signs (24h Range):  Temp:  [97.2 °F (36.2 °C)-98.9 °F (37.2 °C)] 98.9 °F (37.2 °C)  Pulse:  [63-87] 80  Resp:  [11-35] 19  SpO2:  [98 %-100 %] 98 %  BP: ()/(42-79) 158/68  Arterial Line BP: ()/(35-75) 162/56     Weight: 44.5 kg (98 lb)  Body mass index is 19.14 kg/m².     SpO2: 98 %  O2 Device (Oxygen Therapy): room air    Physical Exam   Constitutional: She is oriented to person, place, and time. She appears well-developed.   Thin, temporal wasting   HENT:   Head: Normocephalic and atraumatic.   Eyes: Pupils are equal, round, and reactive to light. Conjunctivae are normal.   Neck: Normal range of motion.   Cardiovascular:   Systolic ejection murmor, TVP wires/box noted. Overall hypovelmic with delayed cap refill.    Pulmonary/Chest: No respiratory distress.   Abdominal: Soft.   Musculoskeletal: Normal range of motion.   Neurological: She is alert and oriented to person, place, and time.   Skin: Skin is warm.       Significant Labs:   BMP:   Recent Labs   Lab 07/30/20 0627 07/31/20  0015   * 103    140   K 4.7 4.3    107   CO2 22* 24   BUN 36* 44*   CREATININE 3.9* 3.7*   CALCIUM 9.8 8.9   , CBC:   Recent Labs   Lab 07/30/20 0627 07/31/20  0015   WBC 5.78 11.02   HGB 10.1* 7.4*   HCT 31.5* 23.3*    147*   , INR:   Recent Labs   Lab 07/30/20  0627   INR 0.9   , Lipid Panel No results for input(s): CHOL, HDL, LDLCALC, TRIG, CHOLHDL in the last 48 hours. and Troponin No results for input(s): TROPONINI in the last 48 hours.    Significant Imaging: Echocardiogram:    Transthoracic echo (TTE) complete (Cupid Only):   Results for orders placed or performed during the hospital encounter of 06/05/20   Echo Color Flow Doppler? Yes   Result Value Ref Range    Ascending aorta 2.62 cm    STJ 2.54 cm    AV mean gradient 38 mmHg    Ao peak jose j 4.1 m/s    Ao VTI 69.00 cm    IVS 0.81 0.6 - 1.1 cm    LA size 4.28 cm    Left Atrium Major Axis 4.94 cm    Left Atrium Minor Axis 4.89 cm    LVIDD 3.11 (A) 3.5 - 6.0 cm    LVIDS 2.08 (A) 2.1 - 4.0 cm    LVOT diameter 1.90 cm    LVOT peak VTI 22.30 cm    PW 0.76 0.6 - 1.1 cm    MV Peak A Jose J 2.14 m/s    E wave decelartion time 161.62 msec    MV Peak E Jose J 1.23 m/s    PV Peak D Jose J 0.17 m/s    PV Peak S Jose J 0.36 m/s    RA Major Axis 2.90 cm    RA Width 3.01 cm    RVDD 2.59 cm    Sinus 2.28 cm    TAPSE 1.62 cm    TR Max Jose J 2.79 m/s    TDI LATERAL 0.04 m/s    TDI SEPTAL 0.04 m/s    LA WIDTH 3.60 cm    LV Diastolic Volume 38.18 mL    LV Systolic Volume 14.08 mL    RV S' 7.94 cm/s    LVOT peak jose j 1.44 m/s    LV LATERAL E/E' RATIO 30.75 m/s    LV SEPTAL E/E' RATIO 30.75 m/s    FS 33 %    LA volume 64.37 cm3    LV mass 60.86 g    Left Ventricle Relative Wall Thickness 0.49 cm    AV valve area 0.92 cm2    AV Velocity Ratio 0.35     AV index (prosthetic) 0.32     E/A ratio 0.57     Mean e' 0.04 m/s    Pulm vein S/D ratio 2.12     LVOT area 2.8 cm2    LVOT stroke volume 63.19 cm3    AV peak gradient 67 mmHg    E/E' ratio 30.75 m/s    LV Systolic Volume Index 10.4 mL/m2    LV Diastolic Volume Index 28.07 mL/m2    LA Volume Index 47.3 mL/m2    LV Mass Index 45 g/m2    Triscuspid Valve Regurgitation Peak Gradient 31 mmHg    BSA 1.35 m2    Right Atrial Pressure (from IVC) 3 mmHg    MV mean gradient 8 mmHg    MV peak gradient 23 mmHg    TV rest pulmonary artery pressure 34 mmHg    HR echo 90 bpm    Narrative    · Concentric left ventricular remodeling.  · Normal left ventricular systolic function. The estimated ejection   fraction is 60%.  · Mildly reduced  right ventricular systolic function.  · Grade I (mild) left ventricular diastolic dysfunction consistent with   impaired relaxation.  · Moderate left atrial enlargement.  · Mild tricuspid regurgitation.  · Moderate mitral stenosis from marked annular calcification. The mean   diastolic gradient across the mitral valve is 8 mmHg at a heart rate of 90   bpm.  · Moderate to severe aortic valve stenosis. Aortic valve area is 1.0 cm2;   peak velocity is 3.7 m/s; mean gradient is 33 mmHg. (The LVOT VTI is   higher in this study than on the previous one with no change in AoV VTI.)  · The estimated PA systolic pressure is 34 mmHg.  · Normal central venous pressure (3 mmHg).       and EKG: SR with incorrect lead placement per my read

## 2020-07-31 NOTE — PLAN OF CARE
CMICU DAILY GOALS       A: Awake    RASS: Goal - RASS Goal: 0-->alert and calm  Actual - RASS ([place] Agitation-Sedation Scale): 0-->alert and calm   Restraint necessity:    B: Breath   SBT: NA   C: Coordinate A & B, analgesics/sedatives   Pain: managed    SAT: NA  D: Delirium   CAM-ICU: Overall CAM-ICU: Negative  E: Early(intubated/ Progressive (non-intubated) Mobility   MOVE Screen: Fail   Activity: Activity Management: activity adjusted per tolerance  FAS: Feeding/Nutrition   Diet order: Diet/Nutrition Received: 2 gram sodium, low saturated fat/low cholesterol,   Fluid restriction:    T: Thrombus   DVT prophylaxis: VTE Required Core Measure: Pharmacological prophylaxis initiated/maintained  H: HOB Elevation   Head of Bed (HOB): HOB at 20-30 degrees  U: Ulcer Prophylaxis   GI: yes  G: Glucose control   managed    S: Skin   Bundle compliance: yes     Date: Pericare only today. PM Bath  B: Bowel Function   no issues   I: Indwelling Catheters   Snow necessity:     CVC necessity: No   IPAD offered: Not appropriate  D: De-escalation Antibx   Yes  Plan for the day   Pt will be reevaluated for possible TVP in the morning. When placed pt in trendelenberg, pressures dropped, but in reverse and they came right back up. Vitals otherwise stable throughout shift. WCTM.  Family/Goals of care/Code Status   Code Status: Prior     VS and assessment per flow sheet, patient progressing towards goals as tolerated, plan of care reviewed with Marga Guerra and family, all concerns addressed, will continue to monitor.

## 2020-07-31 NOTE — DISCHARGE SUMMARY
[facility]  Interventional Cardiology  Discharge Summary      Patient Name: Marga Guerra  MRN: [id number]  Admission Date: 7/30/2020  Hospital Length of Stay: 1 days  Discharge Date and Time:  07/31/2020 3:10 PM  Attending Physician: Brett Wilburn MD  Discharging Provider: Cassia Rooney PA-C  Primary Care Physician: Stephanie Prescott MD    HPI:  80 y.o. female referred by Dr Baptiste initially for evaluation of severe AS (NYHA Class III sx) here today for TAVR procedure. The patient has a history of aortic valve stenosis-nonrheumatic, mitral valve stenosis, CKD5, HTN, COPD, Bilateral Carotid artery disease,  Chest pain on exertion, left ventricular diastolic dysfunction with preserved systolic function, HLD and Thyroid Cancer. She reports 2 months of NYHA class II sympotms.      Marga Guerra is a 80 y.o. female referred by Dr Baptiste for evaluation of severe AS (NYHA Class 2 sx).     The patient has undergone the following TAVR work-up:     · ECHO (Date 6/5/2020): NATALIE= 0.9 cm2, MG= 38 mmHg, Peak Josej = 4.05 m/s, EF= 60%. Reviewed TTE with Dr. Francisco who confirms severe AS.  · LHC (Date 7/6/2020): Non-obstructive CAD  · STS: 2%  · Frailty: Not frail  · Iliacs per IVUS (7/6/2020): R iliac > 7 mm   · LVOT area by PASHA: mean aortic annular diameter is 1.8 cm,  area 2.5 cm² and circumference 5.7 cm. Heavily calcified annulus.   · CT Surgery risk assessment: high risk, per Dr. Schaffer  · Rhythm issues: None, NSR  · PFTs: FEV1 84% predicted, patient unable to do DLCO  · Comorbidities: CKD5 (Cr 4.1-4.7), not yet dialysis dependent, history of neck and brain cancer, COPD, bilateral carotid artery disease     Marga Guerra is a 26 mm Evolut valve candidate via RTF access.      Procedure(s) (LRB):  Replacement-valve-aortic (N/A)     Indwelling Lines/Drains at time of discharge:  Lines/Drains/Airways     None                 Hospital Course:  Ms. Guerra was admitted and underwent successful placement of a  26 mm EvolutR-PRO TAVR via RTF access under MAC sedation. A transthoracic echo was performed immediately post procedure which showed no paravalvular leak. An aortic valve mean gradient of 5 mmHg and a maximum velocity through the aortic valve of 1.2 m/s. She was transported to the CCU in stable condition with a TVP and arterial line in place. EP was consulted. She remained hemodynamically stable overnight. The following morning, she developed symptomatic anemia requiring transfusion. EP continued to follow and no PPM was recommended. She ambulated without difficulty. She was eager to go home. It was felt she was stable for discharge.     Review of Systems   Constitution: Negative for chills and fever.   HENT: Negative for sore throat.    Eyes: Negative for blurred vision.   Cardiovascular: Negative for chest pain, claudication, cyanosis, dyspnea on exertion, irregular heartbeat, leg swelling, near-syncope, orthopnea, palpitations, paroxysmal nocturnal dyspnea and syncope.   Respiratory: Negative for cough and sputum production.    Hematologic/Lymphatic: Does not bruise/bleed easily.   Skin: Negative for itching, rash and suspicious lesions.   Musculoskeletal: Negative for falls.   Gastrointestinal: Negative for abdominal pain and change in bowel habit.   Genitourinary: Negative for dysuria.   Neurological: Negative for disturbances in coordination, dizziness and loss of balance.   Psychiatric/Behavioral: Negative for altered mental status.     Physical Exam   Constitutional: She is oriented to person, place, and time. She appears well-developed and well-nourished. No distress.   HENT:   Head: Normocephalic and atraumatic.   Nose: Nose normal.   Eyes: EOM are normal.   Neck: Normal range of motion. Neck supple. No JVD present.   Cardiovascular: Normal rate and regular rhythm.   No murmur heard.  Pulmonary/Chest: Effort normal and breath sounds normal. No respiratory distress. She has no wheezes. She has no rales.    Abdominal: Soft. She exhibits no distension.   Musculoskeletal: Normal range of motion.         General: No edema.   Neurological: She is alert and oriented to person, place, and time.   Skin: Skin is warm and dry. She is not diaphoretic.   Bilateral groin sites soft and non tender without noted hematomas or active bleeding.    Psychiatric: She has a normal mood and affect. Her behavior is normal. Judgment and thought content normal.     Vitals:    07/31/20 1400   BP: 139/60   Pulse: 84   Resp: (!) 21   Temp:        Consults (From admission, onward)        Status Ordering Provider     Inpatient consult to Electrophysiology  Once     Provider:  (Not yet assigned)    Completed JEANNETTE ARZATE          Significant Diagnostic Studies: Labs:   BMP:   Recent Labs   Lab 07/30/20  0627 07/31/20  0015   * 103    140   K 4.7 4.3    107   CO2 22* 24   BUN 36* 44*   CREATININE 3.9* 3.7*   CALCIUM 9.8 8.9   , CBC   Recent Labs   Lab 07/30/20  0627 07/31/20  0015 07/31/20  0843   WBC 5.78 11.02 11.74   HGB 10.1* 7.4* 8.6*   HCT 31.5* 23.3* 27.0*    147* 169    and All labs within the past 24 hours have been reviewed    Pending Diagnostic Studies:     None        * S/P TAVR (transcatheter aortic valve replacement)  - Successful transfemoral aortic valve replacement with a 26 mm EvolutR-PRO valve on 7/30/20.  - No paravalvular leak shown on post-valve echo  - Follow up with [facility] in 1 month and 1 year with labs and Echo   - ASA / Plavix for 6 months.   - No non sterile procedures which could cause endocarditis for 6 months post TAVR including dental work, endoscopy, colonoscopy, and  procedures.   - SBE prophylaxis for life.     Acute blood loss anemia  - Resolved.  - Symptomatic anemia today requiring transfusion.      Severe aortic stenosis  - See s/p TAVR.    CKD (chronic kidney disease), stage V  - No contrast used during TAVR.  - Cr remained stable.       Discharged Condition:  good    Follow Up:    Patient Instructions:   No discharge procedures on file.  Medications:  Reconciled Home Medications:      Medication List      START taking these medications    aspirin 81 MG EC tablet  Commonly known as: ECOTRIN  Take 1 tablet (81 mg total) by mouth once daily.     furosemide 20 MG tablet  Commonly known as: LASIX  Take AS NEEDED, daily for 3 lb weight gain overnight or 5 lbs in 5 days.     pantoprazole 40 MG tablet  Commonly known as: PROTONIX  Take 1 tablet (40 mg total) by mouth once daily.        CONTINUE taking these medications    albuterol 90 mcg/actuation inhaler  Commonly known as: PROVENTIL/VENTOLIN HFA  Inhale 2 puffs into the lungs every 6 (six) hours as needed for Wheezing.     amLODIPine 5 MG tablet  Commonly known as: NORVASC  Take 0.5 tablets (2.5 mg total) by mouth once daily.     atorvastatin 40 MG tablet  Commonly known as: LIPITOR  TAKE 1 TABLET(40 MG) BY MOUTH EVERY DAY     calcitRIOL 0.25 MCG Cap  Commonly known as: ROCALTROL  Take 1 capsule (0.25 mcg total) by mouth once daily.     CALCIUM 600 600 mg calcium (1,500 mg) Tab  Generic drug: calcium carbonate  TK 4 TS PO TID WITH MEALS.     cholecalciferol (vitamin D3) 25 mcg (1,000 unit) capsule  Commonly known as: VITAMIN D3  Take 2 capsules (2,000 Units total) by mouth once daily.     clopidogreL 75 mg tablet  Commonly known as: PLAVIX  Take 4 tabs (300mg) night before procedure then 1 tab (75mg) daily starting day of procedure.     levothyroxine 75 MCG tablet  Commonly known as: SYNTHROID  Mon and Thurs - 1/2 tablet daily; Other 5 days - full tablet daily     ramipriL 10 MG capsule  Commonly known as: ALTACE  Take 1 capsule (10 mg total) by mouth once daily.        STOP taking these medications    omeprazole 20 MG tablet  Commonly known as: PriLOSEC OTC            Time spent on the discharge of patient: 40 minutes    Cassia Rooney PA-C  Interventional Cardiology  [facility]

## 2020-07-31 NOTE — ASSESSMENT & PLAN NOTE
Ms. Guerra is a 80 y.o. female with cardiac history of severe symptomatic non rheumatic AS  ( NATALIE= 0.9 cm2, MG= 38 mmHg, Peak Jose J= 4.05 m/s, EF= 60% ) s/p s/p TAVR with 26 mm Evolut valve (Dr. Zhou 7/30/20),mitral valve stenosis, non obstructive CAD, CKD5, HTN, COPD, Bilateral Carotid artery disease,  HLD and Thyroid Cancer. EP has been consulted for post TAVR monitoring.    Pre-TAVR EKG: NSR  86 bpm,, QRS 74 ms  Post-TAVR EKG 1: QRS <120  Post-op day 1 EKG: QRS <120  Telemonitor: narrow QRS  TVP: VVI 45, output 5 mv, LOC @ .3mv and threshold @.4mv     PLAN:  - Routine Post op management per structural team    - okay to remove TVP    -  thank you for the consult, please call back with any questions. Patient formally staffed with Dr. Woo.

## 2020-08-03 NOTE — PLAN OF CARE
08/03/20 0941   Final Note   Assessment Type Final Discharge Note   Anticipated Discharge Disposition Home   What phone number can be called within the next 1-3 days to see how you are doing after discharge? [phone]   Hospital Follow Up  Appt(s) scheduled? Yes   Discharge plans and expectations educations in teach back method with documentation complete? Yes   Right Care Referral Info   Post Acute Recommendation No Care   Post-Acute Status   Post-Acute Authorization Other   Other Status No Post-Acute Service Needs     Rajat Prather MSN, RN-BC  Critical Care-   Ext. [phone]

## 2020-08-04 ENCOUNTER — PATIENT OUTREACH (OUTPATIENT)
Dept: OTHER | Facility: OTHER | Age: 80
End: 2020-08-04

## 2020-08-04 DIAGNOSIS — I35.0 SEVERE AORTIC STENOSIS: Primary | ICD-10-CM

## 2020-08-05 ENCOUNTER — HOSPITAL ENCOUNTER (OUTPATIENT)
Dept: RADIOLOGY | Facility: HOSPITAL | Age: 80
Discharge: HOME | End: 2020-08-05
Attending: PHYSICIAN ASSISTANT
Payer: MEDICARE

## 2020-08-05 ENCOUNTER — OFFICE VISIT (OUTPATIENT)
Dept: CARDIOLOGY | Facility: CLINIC | Age: 80
End: 2020-08-05
Payer: MEDICARE

## 2020-08-05 VITALS
SYSTOLIC BLOOD PRESSURE: 143 MMHG | WEIGHT: 91.25 LBS | OXYGEN SATURATION: 99 % | HEART RATE: 97 BPM | HEIGHT: 60 IN | DIASTOLIC BLOOD PRESSURE: 73 MMHG | BODY MASS INDEX: 17.91 KG/M2

## 2020-08-05 DIAGNOSIS — I35.0 SEVERE AORTIC STENOSIS: Primary | ICD-10-CM

## 2020-08-05 DIAGNOSIS — R27.0 ATAXIA, UNSPECIFIED: ICD-10-CM

## 2020-08-05 DIAGNOSIS — N18.5 CKD (CHRONIC KIDNEY DISEASE), STAGE V: ICD-10-CM

## 2020-08-05 DIAGNOSIS — D64.9 ANEMIA, UNSPECIFIED TYPE: ICD-10-CM

## 2020-08-05 DIAGNOSIS — Z95.2 S/P TAVR (TRANSCATHETER AORTIC VALVE REPLACEMENT): ICD-10-CM

## 2020-08-05 PROCEDURE — 1126F AMNT PAIN NOTED NONE PRSNT: CPT | Mod: HCNC,S$GLB,, | Performed by: INTERNAL MEDICINE

## 2020-08-05 PROCEDURE — 99212 OFFICE O/P EST SF 10 MIN: CPT | Mod: HCNC,S$GLB,, | Performed by: INTERNAL MEDICINE

## 2020-08-05 PROCEDURE — 99212 PR OFFICE/OUTPT VISIT, EST, LEVL II, 10-19 MIN: ICD-10-PCS | Mod: HCNC,S$GLB,, | Performed by: INTERNAL MEDICINE

## 2020-08-05 PROCEDURE — 3288F FALL RISK ASSESSMENT DOCD: CPT | Mod: HCNC,CPTII,S$GLB, | Performed by: INTERNAL MEDICINE

## 2020-08-05 PROCEDURE — 1100F PTFALLS ASSESS-DOCD GE2>/YR: CPT | Mod: HCNC,CPTII,S$GLB, | Performed by: INTERNAL MEDICINE

## 2020-08-05 PROCEDURE — 3288F PR FALLS RISK ASSESSMENT DOCUMENTED: ICD-10-PCS | Mod: HCNC,CPTII,S$GLB, | Performed by: INTERNAL MEDICINE

## 2020-08-05 PROCEDURE — 1126F PR PAIN SEVERITY QUANTIFIED, NO PAIN PRESENT: ICD-10-PCS | Mod: HCNC,S$GLB,, | Performed by: INTERNAL MEDICINE

## 2020-08-05 PROCEDURE — 3078F PR MOST RECENT DIASTOLIC BLOOD PRESSURE < 80 MM HG: ICD-10-PCS | Mod: HCNC,CPTII,S$GLB, | Performed by: INTERNAL MEDICINE

## 2020-08-05 PROCEDURE — 3078F DIAST BP <80 MM HG: CPT | Mod: HCNC,CPTII,S$GLB, | Performed by: INTERNAL MEDICINE

## 2020-08-05 PROCEDURE — 99999 PR PBB SHADOW E&M-EST. PATIENT-LVL IV: ICD-10-PCS | Mod: PBBFAC,HCNC,,

## 2020-08-05 PROCEDURE — 1159F MED LIST DOCD IN RCRD: CPT | Mod: HCNC,S$GLB,, | Performed by: INTERNAL MEDICINE

## 2020-08-05 PROCEDURE — 3077F SYST BP >= 140 MM HG: CPT | Mod: HCNC,CPTII,S$GLB, | Performed by: INTERNAL MEDICINE

## 2020-08-05 PROCEDURE — 1159F PR MEDICATION LIST DOCUMENTED IN MEDICAL RECORD: ICD-10-PCS | Mod: HCNC,S$GLB,, | Performed by: INTERNAL MEDICINE

## 2020-08-05 PROCEDURE — 70551 MRI BRAIN STEM W/O DYE: CPT | Mod: 26,HCNC,, | Performed by: RADIOLOGY

## 2020-08-05 PROCEDURE — 99999 PR PBB SHADOW E&M-EST. PATIENT-LVL IV: CPT | Mod: PBBFAC,HCNC,,

## 2020-08-05 PROCEDURE — 70551 MRI BRAIN WITHOUT CONTRAST: ICD-10-PCS | Mod: 26,HCNC,, | Performed by: RADIOLOGY

## 2020-08-05 PROCEDURE — 3077F PR MOST RECENT SYSTOLIC BLOOD PRESSURE >= 140 MM HG: ICD-10-PCS | Mod: HCNC,CPTII,S$GLB, | Performed by: INTERNAL MEDICINE

## 2020-08-05 PROCEDURE — 70551 MRI BRAIN STEM W/O DYE: CPT | Mod: TC,HCNC

## 2020-08-05 PROCEDURE — 1100F PR PT FALLS ASSESS DOC 2+ FALLS/FALL W/INJURY/YR: ICD-10-PCS | Mod: HCNC,CPTII,S$GLB, | Performed by: INTERNAL MEDICINE

## 2020-08-05 NOTE — ASSESSMENT & PLAN NOTE
S/p successful transfemoral aortic valve replacement with a 26 mm EvolutR-PRO valve on 7/30/20. On ASA / Plavix.

## 2020-08-05 NOTE — ASSESSMENT & PLAN NOTE
Cr 3.7 post TAVR which is her baseline. She continues to make urine and is not on dialysis. Will recheck BMP today. Avoid contrast.

## 2020-08-05 NOTE — PROGRESS NOTES
Subjective:    Patient ID:  Marga Guerra is a 80 y.o. female who presents for evaluation of Dizziness      Referring Physician: Dr. Emile BORJAS  Ms. Guerra is an 80 year old female with a past medical history of CKD (Cr 3.7), hx of head and neck cancer, COPD, mitral stenosis, and bilateral carotid artery disease who presents 6 days s/p successful placement of a 26 mm EvolutR-PRO TAVR via RTF access under MAC sedation. Today she complains of dizziness and a new gait disturbance. She reports dizziness upon standing and denies all symptoms at rest. She states that she has been feeling unbalanced since her TAVR with weakness in her right leg when walking. She admits to a fall 2 days ago after standing. She states she fell towards her right side. Of note, she was transfused 1 unit of blood prior to discharge for symptomatic anemia. She also has a history of CKD. Her Cr remained stable at 3.7 post TAVR. She still makes urine and is not currently on dialysis.     Review of Systems   Constitution: Negative for chills and fever.   HENT: Negative for sore throat.    Eyes: Negative for blurred vision.   Cardiovascular: Negative for chest pain, claudication, cyanosis, dyspnea on exertion, irregular heartbeat, leg swelling, near-syncope, orthopnea, palpitations, paroxysmal nocturnal dyspnea and syncope.   Respiratory: Negative for cough and sputum production.    Hematologic/Lymphatic: Bruises/bleeds easily.   Skin: Negative for itching, rash and suspicious lesions.   Musculoskeletal: Negative for falls.   Gastrointestinal: Negative for abdominal pain and change in bowel habit.   Genitourinary: Negative for dysuria.   Neurological: Positive for disturbances in coordination, dizziness and focal weakness. Negative for loss of balance.   Psychiatric/Behavioral: Negative for altered mental status.        Past Medical History:   Diagnosis Date    CKD (chronic kidney disease), stage IV 1/13/2015    Degenerative disc disease      cervical spine     Depression     Hyperlipidemia     Hypertension     IGT (impaired glucose tolerance)     diet controlled/hyperglycemia fasting    Lung cancer     Macular degeneration     Osteoporosis, unspecified     Post-surgical hypothyroidism     Postsurgical hypoparathyroidism     Renal manifestation of secondary diabetes mellitus     Stroke     right frontoparietal     Thyroid cancer     Type 2 diabetes mellitus, controlled 1/13/2015     Current Outpatient Medications on File Prior to Visit   Medication Sig Dispense Refill    albuterol (PROVENTIL/VENTOLIN HFA) 90 mcg/actuation inhaler Inhale 2 puffs into the lungs every 6 (six) hours as needed for Wheezing. 18 g 6    amLODIPine (NORVASC) 5 MG tablet Take 0.5 tablets (2.5 mg total) by mouth once daily. 90 tablet 0    aspirin (ECOTRIN) 81 MG EC tablet Take 1 tablet (81 mg total) by mouth once daily. 30 tablet 11    atorvastatin (LIPITOR) 40 MG tablet TAKE 1 TABLET(40 MG) BY MOUTH EVERY DAY 90 tablet 3    calcitRIOL (ROCALTROL) 0.25 MCG Cap Take 1 capsule (0.25 mcg total) by mouth once daily. 90 capsule 4    CALCIUM 600 600 mg (1,500 mg) Tab TK 4 TS PO TID WITH MEALS.  0    cholecalciferol, vitamin D3, 1,000 unit capsule Take 2 capsules (2,000 Units total) by mouth once daily. 180 capsule 3    clopidogreL (PLAVIX) 75 mg tablet Take 4 tabs (300mg) night before procedure then 1 tab (75mg) daily starting day of procedure. 30 tablet 6    furosemide (LASIX) 20 MG tablet Take AS NEEDED, daily for 3 lb weight gain overnight or 5 lbs in 5 days. 10 tablet 0    levothyroxine (SYNTHROID) 75 MCG tablet Mon and Thurs - 1/2 tablet daily; Other 5 days - full tablet daily 90 tablet 4    pantoprazole (PROTONIX) 40 MG tablet Take 1 tablet (40 mg total) by mouth once daily. 30 tablet 11    ramipril (ALTACE) 10 MG capsule Take 1 capsule (10 mg total) by mouth once daily. 90 capsule 3     No current facility-administered medications on file prior to visit.       Vitals:    08/05/20 1340   BP: (!) 143/73   BP Location: Left arm   Patient Position: Sitting   BP Method: Pediatric (Automatic)   Pulse: 97   SpO2: 99%   Weight: 41.4 kg (91 lb 4.3 oz)   Height: 5' (1.524 m)     Body mass index is 17.83 kg/m².  Objective:    Physical Exam   Constitutional: She is oriented to person, place, and time. She appears well-developed and well-nourished. No distress.   + cerebellar signs with unstable gait.    HENT:   Head: Normocephalic and atraumatic.   Eyes: EOM are normal.   Neck: Normal range of motion. No JVD present.   Cardiovascular: Intact distal pulses.   No murmur heard.  Pulmonary/Chest: Effort normal and breath sounds normal. No respiratory distress.   Abdominal: Soft. She exhibits no distension.   Musculoskeletal:         General: No edema.   Neurological: She is alert and oriented to person, place, and time.   Skin: Skin is warm and dry. She is not diaphoretic.   Vitals reviewed.        Test(s) Reviewed  I have reviewed the following in detail:  [] Stress test   [] Angiography   [] Echocardiogram   [x] Labs: CBC: H/H 9.9/32.4   [] Other:     Assessment:   Ataxia, unspecified  Patient reports new gait disturbance/dizziness since discharge with 1 reported fall 2 days ago. Suspect possible cerebellar infarct post TAVR. Will order non contrast MRI Brain for further evaluation and have her evaluated by Vascular Neurology. Will not obtain a contrast due to CKD (Cr 3.7).      S/P TAVR (transcatheter aortic valve replacement)  S/p successful transfemoral aortic valve replacement with a 26 mm EvolutR-PRO valve on 7/30/20. On ASA / Plavix.     CKD (chronic kidney disease), stage V  Cr 3.7 post TAVR which is her baseline. She continues to make urine and is not on dialysis. Will recheck BMP today. Avoid contrast.     Anemia  Symptomatic anemia post TAVR requiring transfusion. H/H at that time was 8.6/27. Today her H/H is stable at 9.9/32.4.       Plan:     1. Plan for MRI Brain  without contrast to evaluate for possible posterior circulation stroke.   2. Will refer to Vascular Neurology for further evaluation.  3. Continue ASA/Plavix/Statin.  4. Follow up in [facility] as scheduled.  5. SBE prophylaxis for life.     Orders Placed This Encounter   Procedures    MRI Brain Without Contrast          Cassia Rooney PA-C  Interventional Cardiology  [facility]

## 2020-08-05 NOTE — ASSESSMENT & PLAN NOTE
Patient reports new gait disturbance/dizziness since discharge with 1 reported fall 2 days ago. Suspect possible cerebellar infarct post TAVR. Will order non contrast MRI Brain for further evaluation and have her evaluated by Vascular Neurology. Will not obtain a contrast due to CKD (Cr 3.7).

## 2020-08-05 NOTE — ASSESSMENT & PLAN NOTE
Symptomatic anemia post TAVR requiring transfusion. H/H at that time was 8.6/27. Today her H/H is stable at 9.9/32.4.

## 2020-08-06 ENCOUNTER — TELEPHONE (OUTPATIENT)
Dept: NEUROLOGY | Facility: CLINIC | Age: 80
End: 2020-08-06
Payer: MEDICARE

## 2020-08-06 NOTE — TELEPHONE ENCOUNTER
----- Message from Dominic Alvarenga MD sent at 8/6/2020 12:17 PM CDT -----  Regarding: STAT CLINIC APPOINTMENT  Please schedule this lady in clinic early next week.    Perhaps Tuesday w Dr. Molina.  Acute cerebellar stroke on MRI      Thanks.  ----- Message -----  From: Cassia Rooney PA-C  Sent: 8/5/2020   3:32 PM CDT  To: Dominic Alvarenga MD    Hi Dr. Alvarenga, this is the patient that Dr. Gill spoke with you about. She is on her way to MRI now. We did not order contrast because her Cr is 3.7. Please let me know if you need anything else.    Thank you!  Cassia Rooney PA-C

## 2020-08-06 NOTE — TELEPHONE ENCOUNTER
KALI Islas Staff             Hi, Dr Gill just spoke with Dr. Alvarenga regarding this patient. He asked us to send her to MRI then he will see her in clinic. She is on her way to MRI right now. I'm not sure if he has openings to see her today or not but could you please give the daughter a call to try to get her scheduled? The daughter's name is Linda and her number is [phone].     Thank you!   Cassia Rooney PA-C   [phone]

## 2020-08-07 ENCOUNTER — PATIENT OUTREACH (OUTPATIENT)
Dept: ADMINISTRATIVE | Facility: HOSPITAL | Age: 80
End: 2020-08-07

## 2020-08-07 DIAGNOSIS — Z78.0 ASYMPTOMATIC MENOPAUSAL STATE: ICD-10-CM

## 2020-08-07 DIAGNOSIS — M94.9 DISORDER OF BONE AND ARTICULAR CARTILAGE: Primary | ICD-10-CM

## 2020-08-07 DIAGNOSIS — M89.9 DISORDER OF BONE AND ARTICULAR CARTILAGE: Primary | ICD-10-CM

## 2020-08-07 NOTE — PROGRESS NOTES
Spoke with daughter, scheduled Dexa Scan and A1C- Urine to be collected at upcoming PCP visit- PCP to address Vaccine and Foot Exam.

## 2020-08-17 ENCOUNTER — TELEPHONE (OUTPATIENT)
Dept: NEUROLOGY | Facility: CLINIC | Age: 80
End: 2020-08-17

## 2020-08-17 ENCOUNTER — HOSPITAL ENCOUNTER (OUTPATIENT)
Dept: RADIOLOGY | Facility: CLINIC | Age: 80
Discharge: HOME | End: 2020-08-17
Attending: INTERNAL MEDICINE
Payer: MEDICARE

## 2020-08-17 DIAGNOSIS — M89.9 DISORDER OF BONE AND ARTICULAR CARTILAGE: ICD-10-CM

## 2020-08-17 DIAGNOSIS — Z78.0 ASYMPTOMATIC MENOPAUSAL STATE: ICD-10-CM

## 2020-08-17 DIAGNOSIS — M94.9 DISORDER OF BONE AND ARTICULAR CARTILAGE: ICD-10-CM

## 2020-08-17 PROCEDURE — 77080 DXA BONE DENSITY AXIAL: CPT | Mod: 26,HCNC,, | Performed by: INTERNAL MEDICINE

## 2020-08-17 PROCEDURE — 77080 DEXA BONE DENSITY SPINE HIP: ICD-10-PCS | Mod: 26,HCNC,, | Performed by: INTERNAL MEDICINE

## 2020-08-17 PROCEDURE — 77080 DXA BONE DENSITY AXIAL: CPT | Mod: TC,HCNC

## 2020-08-21 ENCOUNTER — TELEPHONE (OUTPATIENT)
Dept: NEUROLOGY | Facility: CLINIC | Age: 80
End: 2020-08-21

## 2020-08-21 ENCOUNTER — OFFICE VISIT (OUTPATIENT)
Dept: INTERNAL MEDICINE | Facility: CLINIC | Age: 80
End: 2020-08-21
Payer: MEDICARE

## 2020-08-21 VITALS
OXYGEN SATURATION: 100 % | HEART RATE: 85 BPM | HEIGHT: 60 IN | BODY MASS INDEX: 18.27 KG/M2 | WEIGHT: 93.06 LBS | SYSTOLIC BLOOD PRESSURE: 130 MMHG | DIASTOLIC BLOOD PRESSURE: 78 MMHG

## 2020-08-21 DIAGNOSIS — R26.9 GAIT DISTURBANCE: ICD-10-CM

## 2020-08-21 DIAGNOSIS — E11.22 CONTROLLED TYPE 2 DIABETES MELLITUS WITH CHRONIC KIDNEY DISEASE, UNSPECIFIED CKD STAGE, UNSPECIFIED WHETHER LONG TERM INSULIN USE: ICD-10-CM

## 2020-08-21 DIAGNOSIS — I35.0 NONRHEUMATIC AORTIC VALVE STENOSIS: ICD-10-CM

## 2020-08-21 DIAGNOSIS — Z85.118 HX OF CANCER OF LUNG: ICD-10-CM

## 2020-08-21 DIAGNOSIS — R27.0 ATAXIA: ICD-10-CM

## 2020-08-21 DIAGNOSIS — E78.2 MIXED HYPERLIPIDEMIA: ICD-10-CM

## 2020-08-21 DIAGNOSIS — N18.5 CKD (CHRONIC KIDNEY DISEASE), STAGE V: ICD-10-CM

## 2020-08-21 DIAGNOSIS — R27.0 ATAXIA, UNSPECIFIED: ICD-10-CM

## 2020-08-21 DIAGNOSIS — Z95.2 S/P TAVR (TRANSCATHETER AORTIC VALVE REPLACEMENT): Primary | ICD-10-CM

## 2020-08-21 DIAGNOSIS — C73 THYROID CANCER: ICD-10-CM

## 2020-08-21 DIAGNOSIS — I63.9 CEREBELLAR INFARCT: ICD-10-CM

## 2020-08-21 DIAGNOSIS — Z85.118 HISTORY OF LUNG CANCER: ICD-10-CM

## 2020-08-21 DIAGNOSIS — I10 ESSENTIAL HYPERTENSION: ICD-10-CM

## 2020-08-21 DIAGNOSIS — I70.0 AORTIC ATHEROSCLEROSIS: ICD-10-CM

## 2020-08-21 PROCEDURE — 99999 PR PBB SHADOW E&M-EST. PATIENT-LVL IV: ICD-10-PCS | Mod: PBBFAC,HCNC,, | Performed by: INTERNAL MEDICINE

## 2020-08-21 PROCEDURE — 99999 PR PBB SHADOW E&M-EST. PATIENT-LVL IV: CPT | Mod: PBBFAC,HCNC,, | Performed by: INTERNAL MEDICINE

## 2020-08-21 PROCEDURE — 99499 RISK ADDL DX/OHS AUDIT: ICD-10-PCS | Mod: HCNC,S$GLB,, | Performed by: INTERNAL MEDICINE

## 2020-08-21 PROCEDURE — 99499 UNLISTED E&M SERVICE: CPT | Mod: HCNC,S$GLB,, | Performed by: INTERNAL MEDICINE

## 2020-08-21 PROCEDURE — 99214 OFFICE O/P EST MOD 30 MIN: CPT | Mod: HCNC,S$GLB,, | Performed by: INTERNAL MEDICINE

## 2020-08-21 PROCEDURE — 99214 PR OFFICE/OUTPT VISIT, EST, LEVL IV, 30-39 MIN: ICD-10-PCS | Mod: HCNC,S$GLB,, | Performed by: INTERNAL MEDICINE

## 2020-08-21 NOTE — PROGRESS NOTES
A off again 1  Subjective:       Patient ID: Marga Guerra is a 80 y.o. female.    Chief Complaint: Follow-up   Transitional Care Note    Family and/or Caretaker present at visit?  Yes.  Diagnostic tests reviewed/disposition: No diagnosic tests pending after this hospitalization.  Disease/illness education: pt/family aware   Home health/community services discussion/referrals: Patient does not have home health established from hospital visit.  They do not need home health.  If needed, we will set up home health for the patient.   Establishment or re-establishment of referral orders for community resources: No other necessary community resources.   Discussion with other health care providers: No discussion with other health care providers necessary pt has had cardiology follow up  Will keep her neurology appt next week as planned .         This is an 80-year-old who presents today for follow-up.  She is brought in by her daughter reports that she did end up developing some increasing shortness of breath and chest discomfort with her aortic valve and underwent TAVR procedure in August.  She has continued to remain on her blood pressure medications and has been stable she had an episode after the procedure where she had a fall and injured her right side has a bit of a bruise and rib discomfort but that seems to be getting better she did follow-up with her cardiologist and they underwent imaging and she looks like she may have had a possible cerebellar stroke.  She has been on aspirin and Plavix since the procedure and she has had no falls daughter has been cautious with her as well.  She has a follow-up with neurology scheduled next week.  Patient is doing well today her appetite is improved she continues to have multiple comorbidities including chronic kidney disease thyroid cancer and history of previous lung cancer follows with her specialists.  She seems to be doing better and breathing better since she had her  TAVR and she is also living at home with her daughter currently    HPI  Review of Systems   Constitutional:        Appetite doing well    HENT:        Cervical nodes non tender    Respiratory: Negative for shortness of breath.    Cardiovascular: Negative for chest pain and leg swelling.   Musculoskeletal:        Bruise right arm  Some discomfort right side  No brusigin    Neurological:        Balance issues        Objective:     Blood pressure 130/78, pulse 85, height 5' (1.524 m), weight 42.2 kg (93 lb 0.6 oz), SpO2 100 %.    Physical Exam  Constitutional:       General: She is not in acute distress.  HENT:      Head: Normocephalic.   Eyes:      General: No scleral icterus.  Neck:      Musculoskeletal: Neck supple.   Cardiovascular:      Rate and Rhythm: Normal rate and regular rhythm.      Heart sounds: Normal heart sounds. No murmur. No friction rub. No gallop.    Pulmonary:      Effort: Pulmonary effort is normal. No respiratory distress.      Breath sounds: Normal breath sounds.   Abdominal:      General: Bowel sounds are normal.      Palpations: Abdomen is soft. There is no mass.      Tenderness: There is no abdominal tenderness.   Musculoskeletal:      Comments: Groin healing bruise resolving    Skin:     Findings: No erythema.   Neurological:      Mental Status: She is alert.      Comments: Gait disturbance  Balance          Assessment:       1. S/P TAVR (transcatheter aortic valve replacement)    2. Ataxia, unspecified    3. Nonrheumatic aortic valve stenosis    4. Essential hypertension    5. Controlled type 2 diabetes mellitus with chronic kidney disease, unspecified CKD stage, unspecified whether long term insulin use    6. Gait disturbance    7. Ataxia    8. History of lung cancer    9. CKD (chronic kidney disease), stage V    10. Thyroid cancer    11. Mixed hyperlipidemia    12. Aortic atherosclerosis    13. Hx of cancer of lung    14. Cerebellar infarct        Plan:       Marga was seen today for  follow-up.    Diagnoses and all orders for this visit:    S/P TAVR (transcatheter aortic valve replacement)  Nonrheumatic aortic valve stenosis  Cerebellar infarct  Gait disturbance   She continues to follow with cardiology her breathing has improved after procedure according to daughter she is doing well we discussed the cautious with her balance and recent infarct in she has a neurology follow-up planned fall precautions in a prescription for a walker was provided discussed consider physical therapy daughter would like to keep her neurology appointment as planned and discuss     Essential hypertension  Blood pressure acceptable    Controlled type 2 diabetes mellitus with chronic kidney disease, unspecified CKD stage, unspecified whether long term insulin use  Hemoglobin A1c within normal range she has normalized with dietary measures alone  -     Microalbumin/creatinine urine ratio; Future    Gait disturbance  Ataxia  Cerebellar infarct   -     WALKER FOR HOME USE      CKD (chronic kidney disease), stage V  Has been stable euvolemic    Thyroid cancer  She continues to follow with endocrinology they are moniutoring with continued surveillance    Mixed hyperlipidemia  Aortic atherosclerosis  She remains on cholesterol medicine and tolerates without difficulty    Hx of cancer of lung  She continues to follow with Hematology has been stable    Recent fall seems to be improving we discussed x-ray they declined at this time continued fall precautions recommended is    Recent labs reviewed

## 2020-08-21 NOTE — TELEPHONE ENCOUNTER
Called and left a message for  regarding an appt with . I stated to give me a call regarding this matter

## 2020-08-24 ENCOUNTER — TELEPHONE (OUTPATIENT)
Dept: NEUROLOGY | Facility: CLINIC | Age: 80
End: 2020-08-24

## 2020-08-24 ENCOUNTER — OFFICE VISIT (OUTPATIENT)
Dept: FAMILY MEDICINE | Facility: CLINIC | Age: 80
End: 2020-08-24
Payer: MEDICARE

## 2020-08-24 VITALS
SYSTOLIC BLOOD PRESSURE: 130 MMHG | HEART RATE: 95 BPM | OXYGEN SATURATION: 98 % | WEIGHT: 92.81 LBS | BODY MASS INDEX: 18.22 KG/M2 | TEMPERATURE: 98 F | DIASTOLIC BLOOD PRESSURE: 70 MMHG | HEIGHT: 60 IN

## 2020-08-24 DIAGNOSIS — I70.0 AORTIC ATHEROSCLEROSIS: ICD-10-CM

## 2020-08-24 DIAGNOSIS — I77.819 ECTATIC AORTA: ICD-10-CM

## 2020-08-24 DIAGNOSIS — I77.1 TORTUOUS AORTA: Primary | ICD-10-CM

## 2020-08-24 DIAGNOSIS — C73 THYROID CANCER: ICD-10-CM

## 2020-08-24 DIAGNOSIS — J44.9 CHRONIC OBSTRUCTIVE PULMONARY DISEASE, UNSPECIFIED COPD TYPE: ICD-10-CM

## 2020-08-24 DIAGNOSIS — C34.91 NON-SMALL CELL CANCER OF RIGHT LUNG: ICD-10-CM

## 2020-08-24 DIAGNOSIS — Z00.00 ENCOUNTER FOR PREVENTIVE HEALTH EXAMINATION: ICD-10-CM

## 2020-08-24 DIAGNOSIS — R63.6 UNDERWEIGHT: ICD-10-CM

## 2020-08-24 DIAGNOSIS — I10 ESSENTIAL HYPERTENSION: ICD-10-CM

## 2020-08-24 DIAGNOSIS — E78.2 MIXED HYPERLIPIDEMIA: ICD-10-CM

## 2020-08-24 DIAGNOSIS — I77.9 BILATERAL CAROTID ARTERY DISEASE, UNSPECIFIED TYPE: ICD-10-CM

## 2020-08-24 DIAGNOSIS — J84.10 CALCIFIED GRANULOMA OF LUNG: ICD-10-CM

## 2020-08-24 DIAGNOSIS — N18.5 CKD (CHRONIC KIDNEY DISEASE), STAGE V: ICD-10-CM

## 2020-08-24 DIAGNOSIS — D69.6 THROMBOCYTOPENIA, UNSPECIFIED: ICD-10-CM

## 2020-08-24 DIAGNOSIS — E89.0 POST-SURGICAL HYPOTHYROIDISM: ICD-10-CM

## 2020-08-24 DIAGNOSIS — R26.9 ABNORMALITY OF GAIT AND MOBILITY: ICD-10-CM

## 2020-08-24 DIAGNOSIS — E11.21 CONTROLLED TYPE 2 DIABETES MELLITUS WITH DIABETIC NEPHROPATHY, WITH LONG-TERM CURRENT USE OF INSULIN: ICD-10-CM

## 2020-08-24 DIAGNOSIS — C79.51 SECONDARY CANCER OF BONE: ICD-10-CM

## 2020-08-24 DIAGNOSIS — Z79.4 CONTROLLED TYPE 2 DIABETES MELLITUS WITH DIABETIC NEPHROPATHY, WITH LONG-TERM CURRENT USE OF INSULIN: ICD-10-CM

## 2020-08-24 PROCEDURE — 99999 PR PBB SHADOW E&M-EST. PATIENT-LVL V: ICD-10-PCS | Mod: PBBFAC,HCNC,, | Performed by: NURSE PRACTITIONER

## 2020-08-24 PROCEDURE — 99499 UNLISTED E&M SERVICE: CPT | Mod: HCNC,S$GLB,, | Performed by: NURSE PRACTITIONER

## 2020-08-24 PROCEDURE — 3078F PR MOST RECENT DIASTOLIC BLOOD PRESSURE < 80 MM HG: ICD-10-PCS | Mod: HCNC,CPTII,S$GLB, | Performed by: NURSE PRACTITIONER

## 2020-08-24 PROCEDURE — 99397 PER PM REEVAL EST PAT 65+ YR: CPT | Mod: HCNC,S$GLB,, | Performed by: NURSE PRACTITIONER

## 2020-08-24 PROCEDURE — 3075F SYST BP GE 130 - 139MM HG: CPT | Mod: HCNC,CPTII,S$GLB, | Performed by: NURSE PRACTITIONER

## 2020-08-24 PROCEDURE — 99397 PR PREVENTIVE VISIT,EST,65 & OVER: ICD-10-PCS | Mod: HCNC,S$GLB,, | Performed by: NURSE PRACTITIONER

## 2020-08-24 PROCEDURE — 99999 PR PBB SHADOW E&M-EST. PATIENT-LVL V: CPT | Mod: PBBFAC,HCNC,, | Performed by: NURSE PRACTITIONER

## 2020-08-24 PROCEDURE — 3078F DIAST BP <80 MM HG: CPT | Mod: HCNC,CPTII,S$GLB, | Performed by: NURSE PRACTITIONER

## 2020-08-24 PROCEDURE — 3075F PR MOST RECENT SYSTOLIC BLOOD PRESS GE 130-139MM HG: ICD-10-PCS | Mod: HCNC,CPTII,S$GLB, | Performed by: NURSE PRACTITIONER

## 2020-08-24 PROCEDURE — 99499 RISK ADDL DX/OHS AUDIT: ICD-10-PCS | Mod: HCNC,S$GLB,, | Performed by: NURSE PRACTITIONER

## 2020-08-24 RX ORDER — CIMETIDINE 300 MG/1
1 TABLET, FILM COATED ORAL 4 TIMES DAILY
COMMUNITY
Start: 2020-07-24 | End: 2020-08-24 | Stop reason: ALTCHOICE

## 2020-08-24 NOTE — TELEPHONE ENCOUNTER
----- Message from Shanti Solano MA sent at 8/21/2020  3:53 PM CDT -----  Contact: Linda (daughter) @ [phone]    ----- Message -----  From: Shade Martinez  Sent: 8/21/2020   2:03 PM CDT  To: Tracy Mckeon is calling to reschedule appt on 08/25/20 to a Monday or Friday

## 2020-08-24 NOTE — TELEPHONE ENCOUNTER
----- Message from Flora López sent at 8/24/2020  1:28 PM CDT -----  Regarding: pt appt  Contact: Linda@ [phone]  Calling to speak with someone regarding patient's appt, asking if the doctor would do a virtual. Please call before changing the appt. Please call.

## 2020-08-24 NOTE — PATIENT INSTRUCTIONS
- Be sure to receive your shingles vaccine series (Shingrix) at your local pharmacy.  Counseling and Referral of Other Preventative  (Italic type indicates deductible and co-insurance are waived)    Patient Name: Marga Guerra  Today's Date: 8/24/2020    Health Maintenance       Date Due Completion Date    Urine Microalbumin 05/28/2019 5/28/2018    Foot Exam 11/21/2019 11/21/2018    Override on 11/21/2018: Done    Override on 4/17/2017: Done    Override on 6/27/2016: Done    Eye Exam 08/28/2020 (Originally 12/18/2019) 12/18/2018    Shingles Vaccine (1 of 2) 09/25/2020 (Originally 4/29/1988) ---    Influenza Vaccine (1) 09/01/2020 11/21/2018    Lipid Panel 12/06/2020 12/6/2019    Hemoglobin A1c 02/17/2021 8/17/2020    DEXA SCAN 08/17/2022 8/17/2020    TETANUS VACCINE 06/27/2026 6/27/2016 (N/S)    Override on 6/27/2016: (N/S) (ordered and scheduled)        Orders Placed This Encounter   Procedures    Ambulatory referral/consult to Optometry     The following information is provided to all patients.  This information is to help you find resources for any of the problems found today that may be affecting your health:                Living healthy guide: www.[facility].[place].gov      Understanding Diabetes: www.diabetes.org      Eating healthy: www.cdc.gov/healthyweight      CDC home safety checklist: www.cdc.gov/steadi/patient.html      Agency on Aging: www.goea.[place].gov      Alcoholics anonymous (AA): www.aa.org      Physical Activity: www.mike.nih.gov/cp0qxde      Tobacco use: www.quitwithusla.org

## 2020-08-24 NOTE — PROGRESS NOTES
Marga Guerra presented for a  Medicare AWV and comprehensive Health Risk Assessment today. The following components were reviewed and updated with the assistance of her daughter Linda:    · Medical history  · Family History  · Social history  · Allergies and Current Medications  · Health Risk Assessment  · Health Maintenance  · Care Team     ** See Completed Assessments for Annual Wellness Visit within the encounter summary.**         The following assessments were completed:  · Living Situation  · CAGE  · Depression Screening  · Timed Get Up and Go  · Whisper Test  · Cognitive Function Screening      · Nutrition Screening  · ADL Screening  · PAQ Screening        Vitals:    08/24/20 1154   BP: 130/70   BP Location: Left arm   Patient Position: Sitting   Pulse: 95   Temp: 97.8 °F (36.6 °C)   SpO2: 98%   Weight: 42.1 kg (92 lb 13 oz)   Height: 5' (1.524 m)     Body mass index is 18.13 kg/m².     Physical Exam  Constitutional:       General: She is not in acute distress.     Appearance: Normal appearance. She is well-developed, well-groomed and underweight.   HENT:      Head: Normocephalic.      Right Ear: External ear normal.      Left Ear: External ear normal.   Eyes:      General:         Right eye: No discharge.         Left eye: No discharge.   Neck:      Vascular: No carotid bruit.   Cardiovascular:      Rate and Rhythm: Normal rate and regular rhythm.      Pulses:           Carotid pulses are 2+ on the right side and 2+ on the left side.       Radial pulses are 2+ on the right side and 2+ on the left side.        Dorsalis pedis pulses are 2+ on the right side and 2+ on the left side.      Heart sounds: No murmur.   Pulmonary:      Effort: Pulmonary effort is normal. No respiratory distress.      Breath sounds: No wheezing or rhonchi.   Abdominal:      General: Bowel sounds are normal. There is no distension.      Palpations: Abdomen is soft.      Tenderness: There is no abdominal tenderness.    Musculoskeletal:      Right lower leg: No edema.      Left lower leg: No edema.   Skin:     General: Skin is warm and dry.      Coloration: Skin is not pale.   Neurological:      Mental Status: She is alert and oriented to person, place, and time.      Coordination: Coordination normal.      Gait: Gait normal.   Psychiatric:         Attention and Perception: Attention normal.         Mood and Affect: Mood and affect normal.         Speech: Speech normal.         Behavior: Behavior normal. Behavior is cooperative.         Cognition and Memory: Memory is impaired. She exhibits impaired recent memory.      Comments: Mini-Cog Test Score of 0  Scheduled to see Neurology             Diagnoses and health risks identified today and associated recommendations/orders:    1. Encounter for preventive health examination    2. Controlled type 2 diabetes mellitus with diabetic nephropathy, with long-term current use of insulin  Chronic; stable. Followed by Endocrinology.  - Ambulatory referral/consult to Optometry; Future    3. CKD (chronic kidney disease), stage V  Chronic; stable on medication. Followed by Nephrology.    4. Mixed hyperlipidemia  Chronic; stable on medication. Follow up with PCP.    5. Thrombocytopenia, unspecified  Chronic; stable. Follow up with PCP.    6. Essential hypertension  Chronic; stable on medication. Follow up with PCP.    7. Bilateral carotid artery disease, unspecified type  Chronic; stable. Followed by Cardiology.    8. Aortic atherosclerosis  Chronic; stable. Followed by Cardiology.    9. Ectatic aorta  Chronic; stable. Followed by Cardiology.    10. Tortuous aorta  Chronic; stable. Followed by Cardiology.    11. Thyroid cancer  Chronic; stable. Followed by Hematology/Oncology.    12. Post-surgical hypothyroidism  Chronic; stable on medication. Follow up with PCP.    13. Non-small cell cancer of right lung  Chronic; stable. Followed by Hematology/Oncology.    14. Secondary cancer of  bone  Chronic; stable. Followed by Hematology/Oncology.    15. Chronic obstructive pulmonary disease, unspecified COPD type  Chronic; stable on medication. Follow up with PCP.    16. Calcified granuloma of lung  Chronic; stable. Follow up with PCP.    17. Abnormality of gait and mobility  Chronic; stable. Had two falls this past year. Follow up with PCP.    18. Body mass index (BMI) 19.9 or less, adult  Patient's BMI is abnormal. Was living with , is now living with  and daughter who assists with meals. Encouraged patient to eat a well-balanced, low salt/low fat ADA diet and discussed importance of engaging in physical activity at least 5x/week for a minimum of 30 min/day.    19. Underweight  Patient's BMI is abnormal. Was living with , is now living with  and daughter who assists with meals. Encouraged patient to eat a well-balanced, low salt/low fat ADA diet and discussed importance of engaging in physical activity at least 5x/week for a minimum of 30 min/day.      Provided Marga with a 5-10 year written screening schedule and personal prevention plan. Recommendations were developed using the USPSTF age appropriate recommendations. Education, counseling, and referrals were provided as needed. After Visit Summary printed and given to patient which includes a list of additional screenings/tests needed.    I offered to discuss end of life issues, including information on how to make advance directives that the patient could use to name someone who would make medical decisions on their behalf if they became too ill to make themselves.    ___Patient declined  _X_Patient is interested, I provided paper work and offered to discuss.      Follow up for your next annual wellness visit.       Hope Vaughan NP

## 2020-08-24 NOTE — TELEPHONE ENCOUNTER
Called and left a message for  regarding 's appt with 's. I stated that I switched over her appt to a virtual visit

## 2020-08-25 ENCOUNTER — OFFICE VISIT (OUTPATIENT)
Dept: NEUROLOGY | Facility: CLINIC | Age: 80
End: 2020-08-25
Payer: MEDICARE

## 2020-08-25 DIAGNOSIS — Z86.73 HISTORY OF STROKE: Primary | ICD-10-CM

## 2020-08-25 PROCEDURE — 1101F PR PT FALLS ASSESS DOC 0-1 FALLS W/OUT INJ PAST YR: ICD-10-PCS | Mod: HCNC,CPTII,95, | Performed by: PSYCHIATRY & NEUROLOGY

## 2020-08-25 PROCEDURE — 1101F PT FALLS ASSESS-DOCD LE1/YR: CPT | Mod: HCNC,CPTII,95, | Performed by: PSYCHIATRY & NEUROLOGY

## 2020-08-25 PROCEDURE — 99203 OFFICE O/P NEW LOW 30 MIN: CPT | Mod: HCNC,95,, | Performed by: PSYCHIATRY & NEUROLOGY

## 2020-08-25 PROCEDURE — 1159F MED LIST DOCD IN RCRD: CPT | Mod: HCNC,95,, | Performed by: PSYCHIATRY & NEUROLOGY

## 2020-08-25 PROCEDURE — 1159F PR MEDICATION LIST DOCUMENTED IN MEDICAL RECORD: ICD-10-PCS | Mod: HCNC,95,, | Performed by: PSYCHIATRY & NEUROLOGY

## 2020-08-25 PROCEDURE — 99203 PR OFFICE/OUTPT VISIT, NEW, LEVL III, 30-44 MIN: ICD-10-PCS | Mod: HCNC,95,, | Performed by: PSYCHIATRY & NEUROLOGY

## 2020-08-25 NOTE — LETTER
August 27, 2020      Dominic Alvarenga MD  [address]  [facility] [address]           [facility]  [address]  [facility] [address]  Phone: [phone]          Patient: Marga Guerra   MR Number: [id number]   YOB: 1940   Date of Visit: 8/25/2020       Dear Dr. Dominic Alvarenga:    Thank you for referring Marga Guerra to me for evaluation. Attached you will find relevant portions of my assessment and plan of care.    If you have questions, please do not hesitate to call me. I look forward to following Marga Guerra along with you.    Sincerely,    Sriram Molina MD    Enclosure  CC:  No Recipients    If you would like to receive this communication electronically, please contact [email or web address] or [phone] to request more information on [employer] Link access.    For providers and/or their staff who would like to refer a patient to [facility], please contact us through our one-stop-shop provider referral line, [facility], at [phone].    If you feel you have received this communication in error or would no longer like to receive these types of communications, please e-mail [email or web address]

## 2020-08-25 NOTE — PROGRESS NOTES
Vascular Neurology  Clinic Note    ___________________  ASSESSMENT & PLAN    Problem List Items Addressed This Visit        1 - High    History of stroke - Primary    Current Assessment & Plan     R cerebellar & PCA infarct noted w/ symptoms shortly after TAVR.  Mild residual imabalance when walking. Has not gotten any PT.  Continue antiplatelet agents at discretion of cardiology team, currently on DAPT now post TAVR  BP long term goal < 130/80 mmHg  Physical therapy referral  Continue current statin without changes.         Relevant Orders    Ambulatory referral/consult to Physical/Occupational Therapy        VIRTUAL VISIT with GRANDDAUGHTER TRANSLATING    Reason For Visit (Chief Complaint): cerebellar stroke    HPI: 80 y.o. right handed female with TAVR 7/30 w/o complication. The next day she felt diziness and blurred vision, and these symptoms lasted 1-2 days and was a little off balance and then had a fall as well. However from time to time she has had some feeling of falling to her right side. She currently has not received any therapy.  When she walks over long distances she has some trouble breathing.     Brain Imaging:  MRI brain -8/5   Small acute right inferior cerebellar infarct.  Additional subcentimeter foci in the right temporal and occipital lobes.     Right anterior temporal lobe and inferior frontal encephalomalacia, likely related to the prior extra-axial mass resection in this region.     Mild generalized cerebral volume loss and chronic microvascular ischemic disease    Vessel Imaging:  None  Cardiac Evaluation:  PASHA 7/13  · Severe aortic valve stenosis.  · The aortic annulus is heavily calcified. The mean aortic annular diameter is 1.8 cm, area area 2.5 cm² and circumference 5.7 cm.  · Severe mitral annular calcification.  · Mild-to-moderate mitral regurgitation.  · Normal left ventricular systolic function. The estimated ejection fraction is 60%.  · Normal appearing left atrial appendage. No  thrombus is present in the appendage.  · Normal right ventricular systolic function.  · Mild tricuspid regurgitation.  · Grade 3 plaque present in the ascending aorta and descending aorta  Other:     Relevant Labwork:  Recent Labs   Lab 12/06/19  1337 08/17/20  1249   Hemoglobin A1C 5.5 5.6   LDL Cholesterol 105.0  --     H  --    Triglycerides 100  --    Cholesterol 247 H  --        I independently viewed the above imaging studies in addition to reviewing the report.  I reviewed the above labwork.    Review of Systems  Msk: negative for muscle pain  Skin: negative for pruritis  Neuro: negative for headache  All others negative    Past Medical History  Past Medical History:   Diagnosis Date    Degenerative disc disease     cervical spine     Depression     Hyperlipidemia     Hypertension     IGT (impaired glucose tolerance)     diet controlled/hyperglycemia fasting    Lung cancer     Macular degeneration     Osteoporosis, unspecified     Post-surgical hypothyroidism     Postsurgical hypoparathyroidism     Renal manifestation of secondary diabetes mellitus     Stroke     right frontoparietal     Thyroid cancer     Type 2 diabetes mellitus, controlled 1/13/2015     Family History  No relevant history   Social History  Never Smoker     Medication List with Changes/Refills   Current Medications    ALBUTEROL (PROVENTIL/VENTOLIN HFA) 90 MCG/ACTUATION INHALER    Inhale 2 puffs into the lungs every 6 (six) hours as needed for Wheezing.    AMLODIPINE (NORVASC) 5 MG TABLET    Take 0.5 tablets (2.5 mg total) by mouth once daily.    ASPIRIN (ECOTRIN) 81 MG EC TABLET    Take 1 tablet (81 mg total) by mouth once daily.    ATORVASTATIN (LIPITOR) 40 MG TABLET    TAKE 1 TABLET(40 MG) BY MOUTH EVERY DAY    CALCITRIOL (ROCALTROL) 0.25 MCG CAP    Take 1 capsule (0.25 mcg total) by mouth once daily.    CALCIUM 600 600 MG (1,500 MG) TAB    TK 4 TS PO TID WITH MEALS.    CHOLECALCIFEROL, VITAMIN D3, 1,000 UNIT CAPSULE   "  Take 2 capsules (2,000 Units total) by mouth once daily.    CLOPIDOGREL (PLAVIX) 75 MG TABLET    Take 4 tabs (300mg) night before procedure then 1 tab (75mg) daily starting day of procedure.    FUROSEMIDE (LASIX) 20 MG TABLET    Take AS NEEDED, daily for 3 lb weight gain overnight or 5 lbs in 5 days.    LEVOTHYROXINE (SYNTHROID) 75 MCG TABLET    Mon and Thurs - 1/2 tablet daily; Other 5 days - full tablet daily    PANTOPRAZOLE (PROTONIX) 40 MG TABLET    Take 1 tablet (40 mg total) by mouth once daily.    RAMIPRIL (ALTACE) 10 MG CAPSULE    Take 1 capsule (10 mg total) by mouth once daily.       EXAM  Vital Signs:  Vitals - 1 value per visit 7/6/2020 7/13/2020 7/30/2020 7/31/2020 8/5/2020 8/21/2020 8/24/2020   SYSTOLIC - 157 - 131 143 130 130   DIASTOLIC - 80 - 58 73 78 70   PULSE - 96 - 88 97 85 95   TEMPERATURE - 98.4 - 99.4 - - 97.8   RESPIRATIONS - 16 - 21 - - -   SPO2 - 95 - 99 99 100 98   Weight (lb) 94 94 98 - 91.27 93.03 92.81   Weight (kg) 42.638 42.638 44.453 - 41.4 42.2 42.1   HEIGHT 5' 1" 5' 0" 5' 0" - 5' 0" 5' 0" 5' 0"   BODY MASS INDEX 17.76 18.36 19.14 - 17.83 18.17 18.13   VISIT REPORT - - - - - - -   Pain Score  - - - - 0 - 0   Some recent data might be hidden         MD Reynaldo  Vascular Neurology  Office [phone]  Fax [phone]  "

## 2020-08-26 ENCOUNTER — LAB VISIT (OUTPATIENT)
Dept: LAB | Facility: HOSPITAL | Age: 80
End: 2020-08-26
Attending: INTERNAL MEDICINE
Payer: MEDICARE

## 2020-08-26 ENCOUNTER — CLINICAL SUPPORT (OUTPATIENT)
Dept: REHABILITATION | Facility: HOSPITAL | Age: 80
End: 2020-08-26
Payer: MEDICARE

## 2020-08-26 DIAGNOSIS — E11.22 CONTROLLED TYPE 2 DIABETES MELLITUS WITH CHRONIC KIDNEY DISEASE, UNSPECIFIED CKD STAGE, UNSPECIFIED WHETHER LONG TERM INSULIN USE: ICD-10-CM

## 2020-08-26 DIAGNOSIS — Z86.73 HISTORY OF STROKE: ICD-10-CM

## 2020-08-26 DIAGNOSIS — R29.898 WEAKNESS OF BOTH LOWER EXTREMITIES: ICD-10-CM

## 2020-08-26 DIAGNOSIS — Z74.09 IMPAIRED FUNCTIONAL MOBILITY, BALANCE, GAIT, AND ENDURANCE: ICD-10-CM

## 2020-08-26 DIAGNOSIS — R27.8 COORDINATION IMPAIRMENT: ICD-10-CM

## 2020-08-26 LAB
ALBUMIN/CREAT UR: 18.8 UG/MG (ref 0–30)
CREAT UR-MCNC: 64 MG/DL (ref 15–325)
MICROALBUMIN UR DL<=1MG/L-MCNC: 12 UG/ML

## 2020-08-26 PROCEDURE — 97163 PT EVAL HIGH COMPLEX 45 MIN: CPT | Mod: HCNC,PN

## 2020-08-26 PROCEDURE — 82043 UR ALBUMIN QUANTITATIVE: CPT | Mod: HCNC

## 2020-08-26 NOTE — PLAN OF CARE
[facility]  Physical Therapy Neurological Rehabilitation Initial Evaluation    Name: Marga Guerra  Clinic Number: [id number]    Therapy Diagnosis:   Encounter Diagnoses   Name Primary?    History of stroke     Weakness of both lower extremities     Impaired functional mobility, balance, gait, and endurance     Coordination impairment      Physician: Sriram Molina MD    Physician Orders: PT Eval and Treat   Medical Diagnosis from Referral: Z86.73 (ICD-10-CM) - History of stroke  Evaluation Date: 8/26/2020  Authorization Period Expiration: 12/31/2020  Plan of Care Expiration: 10/23/2020  Visit # / Visits authorized: 1/ 1    Time In: 12:30PM  Time Out: 1:15PM  Total Billable Time: 45 minutes (1 high eval)    Precautions: Standard, Fall and HTN (monitor vitals); poor vision (macular degeneration); use  ([place])    Subjective   Date of onset: July 2020  Note: Subjective and most of objective sections completed with assistance of [employer]  (ID: [id number])  History of current condition - Marga reports (and per chart review): Pt suffered cerebellar stroke about a month ago following transcatheter aortic valve replacement. She reports she has not received any physical therapy since initial symptoms began. Since stroke, she has noted dizziness and deviations to the right when walking. She has fallen twice since initial symptoms began; one occurred while walking, the other occurred after standing up suddenly during her hospital stay.     Medical History:   Past Medical History:   Diagnosis Date    Degenerative disc disease     cervical spine     Depression     Hyperlipidemia     Hypertension     IGT (impaired glucose tolerance)     diet controlled/hyperglycemia fasting    Lung cancer     Macular degeneration     Osteoporosis, unspecified     Post-surgical hypothyroidism     Postsurgical hypoparathyroidism     Renal manifestation of secondary diabetes mellitus      Stroke     right frontoparietal     Thyroid cancer     Type 2 diabetes mellitus, controlled 2015       Surgical History:   Marga Guerra  has a past surgical history that includes Thyroidectomy; mengioma  resection (2011);  section; right wrist fracture and repair; Spine surgery; Brain surgery (); Fracture surgery (Right); Cataract extraction; Eye surgery; Lung biopsy (N/A, 2018); Left heart catheterization (Left, 2020); Coronary angiography (N/A, 2020); and Aortic valve replacement (N/A, 2020).    Medications:   Marga has a current medication list which includes the following prescription(s): albuterol, amlodipine, aspirin, atorvastatin, calcitriol, calcium 600, cholecalciferol (vitamin d3), clopidogrel, furosemide, levothyroxine, pantoprazole, and ramipril.    Allergies:   Review of patient's allergies indicates:   Allergen Reactions    Iodine and iodide containing products Anaphylaxis     Swelling/anyphylaxis   seafood    Prolia [denosumab]      Lip and leg swelling    Shellfish containing products       Imaging, MRI Brain without Contrast (2020): Small acute right inferior cerebellar infarct.  Additional subcentimeter foci in the right temporal and occipital lobes. Right anterior temporal lobe and inferior frontal encephalomalacia, likely related to the prior extra-axial mass resection in this region. Mild generalized cerebral volume loss and chronic microvascular ischemic disease. Partially visualized enlarged right level 5 cervical lymph node.  Patient has known history of thyroid cancer with enlarged cervical lymph nodes, better delineated on prior PET CT study of 2019.    Prior Therapy: No prior therapy  Social History: Currently lives with daughter  Falls: Two since CVA  DME: None currently    Home Environment: 1 story home; 3 steps to enter   Exercise Routine / History: No   Family Present at time of Eval: No   Occupation: None  Prior Level of Function:  Independent but with decreased endurance  Current Level of Function: Daughter and granddaughter are assisting with food preparation, bathing, and other upright activities     Pain: N/A  Current 0/10, worst 0/10, best 0/10   Location: N/A  Description: N/A  Aggravating Factors: N/A  Easing Factors: N/A     Patient's goals: Pt would like to work on walking and balance    Objective   Vitals  Supine: 187/89mmHg; 86bpm  Sittin/92mmHg; 89bpm  Standin/87mmHg; 92bpm  End of session: 180/87mmHg; 97bpm    - Command followin% simple; 50% complex   - Speech: no deficits     Mental status: alert, oriented to person, place, and time  Behavior:  calm and cooperative  Attention Span and Concentration:  Decreased    Posture Alignment :forward head    Sensation: Not assessed    Tone: 0 - No increase in muscle tone    Visual/Auditory: Per daughter and chart review, pt has macular degeneration    Coordination:   - fine motor: Intact  - UE coordination: Intact per finger-nose assessment     - LE coordination: Impaired for rapid alternating movements and heel shin bilaterally    RANGE OF MOTION--LOWER EXTREMITIES: WFL         RANGE OF MOTION--LOWER EXTREMITIES: WFL    Upper Extremity Strength: Not assessed    Lower Extremity Strength   RLE LLE   Hip Flexion: 4/5 4-/5   Hip Extension:  3/5 3/5   Hip Abduction: 4-/5 3+/5   Knee Extension: 4+/5 4+/5   Knee Flexion: 4-/5 3+/5   Ankle Dorsiflexion: 4/5 4-/5   Ankle Plantarflexion: 4/5 3+/5     Gait Assessment:   - AD used: None  - Assistance: SBA to CGA  - Distance: 150+ feet throughout session  - Stairs: Not assessed    Gait Analysis:  Deviations noted: Significant path deviations/gait ataxia; pt tends to veer to the right and requires correction from PT to prevent making contact with obstacles     Impairments contributing to deviations:  Macular degeneration and recent cerebellar CVA    Endurance Deficit: Pt reports fatigue after manual muscle testing     Balance  Assessment:    Sitting balance (static,dynamic): WNL both statically and dynamically; pt reaches outside [place] to PT's hand during assessment and sits statically >5min without LoB  Standing balance (static, dynamic): WNL for static balance; Fair for dynamic balance (pt able to  jacket from floor independently without LoB but does require CGA occasionally during overground ambulation)    Timed Up and Go: 13 seconds, no AD, SBA    Single Leg Stance: <2 seconds bilaterally    CMS Impairment/Limitation/Restriction for FOTO Stroke Lower Extremity Survey    Therapist reviewed FOTO scores for Marga Guerra on 8/26/2020.   FOTO documents entered into [employer] - see Media section.    Limitation Score: 42%         TREATMENT   Treatment to be initiated at next appointment: BLE strengthening (SLR, clamshells, SL hip abduction, bridges), NuStep, coordination activity (toe tapping, step ups, dot mat with colored tape, 4-square stepping), single leg stance with UE support    Home Exercises and Patient Education Provided    Education provided:   - PT plan of care  - Hypertensive readings during evaluation (pt and daughter)    Written Home Exercises Provided: NEXT.    Assessment   Marga is a 80 y.o. female referred to outpatient Physical Therapy with a medical diagnosis of History of stroke. Patient presents with decreased BLE strength, coordination deficits and gait ataxia, risk for falls, impaired postural control, and poor activity tolerance. She is appropriate for physical therapy services to address these deficits, improve independence with functional mobility, and improve overall quality of life. Of note, pt was hypertensive when assessed for orthostatic response today. Referring physician will be notified, and vitals will be monitored throughout physical therapy management. During PT management, pt's poor vision will also be considered.     Patient prognosis is Fair.   Patient will benefit from skilled outpatient Physical  Therapy to address the deficits stated above and in the chart below, provide patient/family education, and to maximize patient's level of independence.     Plan of care discussed with patient: Yes  Patient's spiritual, cultural and educational needs considered and patient is agreeable to the plan of care and goals as stated below:     Anticipated Barriers for therapy: Age, poor vision, language barrier, hypertension    Medical Necessity is demonstrated by the following  History  Co-morbidities and personal factors that may impact the plan of care Co-morbidities:   Degenerative disc disease   cervical spine    Depression   Hyperlipidemia   Hypertension   IGT (impaired glucose tolerance)   diet controlled/hyperglycemia fasting   Lung cancer   Macular degeneration   Osteoporosis, unspecified   Post-surgical hypothyroidism   Postsurgical hypoparathyroidism   Renal manifestation of secondary diabetes mellitus   Stroke   right frontoparietal    Thyroid cancer   Type 2 diabetes mellitus, controlled     Personal Factors:   age     high   Examination  Body Structures and Functions, activity limitations and participation restrictions that may impact the plan of care Body Regions:   lower extremities  trunk    Body Systems:    strength  gross coordinated movement  balance  gait  transfers  transitions  motor control  motor learning    Participation Restrictions:   Decreased independence due to fall risk    Activity limitations:   Learning and applying knowledge  no deficits    General Tasks and Commands  undertaking multiple tasks    Communication  communicating with/receiving non-verbal language    Mobility  lifting and carrying objects  walking  driving (bike, car, motorcycle)    Self care  washing oneself (bathing, drying, washing hands)  caring for body parts (brushing teeth, shaving, grooming)  dressing  looking after one's health    Domestic Life  shopping  cooking  doing house work (cleaning house, washing dishes,  laundry)    Interactions/Relationships  no deficits    Life Areas  no deficits    Community and Social Life  community life  recreation and leisure         high   Clinical Presentation unstable clinical presentation with unpredictable characteristics high   Decision Making/ Complexity Score: high     Goals:  Short Term Goals: 4 weeks   1. Pt and daughter will be compliant with HEP in order to maximize PT benefits.  2. Pt will increase BLE MMT grades by 1/2 grade in order to improve strength for ADLs such as bathing.  3. Pt will complete >/=3 seconds for (B) SLS in order to improve postural control during activities such as lower body dressing.    Long Term Goals: 8 weeks   1. Pt will increase BLE MMT grades by 1 grade in order to improve strength for ADLs such as bathing.  2. Pt will complete >/=5 seconds for (B) SLS in order to improve postural control during activities such as lower body dressing.  3. Pt will complete TUG in </=11 seconds without AD and without loss of balance in order to decrease risk for falls.  4. Pt will score </=32% for FOTO limitation score in order to improve self-perception of functional mobility deficits.  5. Pt will report 0 falls from onset of PT management.    Plan   Plan of care Certification: 8/26/2020 to 10/23/2020.    Outpatient Physical Therapy 2 times weekly for 8 weeks to include the following interventions: Gait Training, Manual Therapy, Moist Heat/ Ice, Neuromuscular Re-ed, Orthotic Management and Training, Patient Education, Self Care, Therapeutic Activites, Therapeutic Exercise and Modalities PRN.     DEVONTE JACQUES, PT

## 2020-08-27 NOTE — ASSESSMENT & PLAN NOTE
R cerebellar & PCA infarct noted w/ symptoms shortly after TAVR.  Mild residual imabalance when walking. Has not gotten any PT.  Continue antiplatelet agents at discretion of cardiology team, currently on DAPT now post TAVR  BP long term goal < 130/80 mmHg  Physical therapy referral  Continue current statin without changes.

## 2020-08-28 ENCOUNTER — OFFICE VISIT (OUTPATIENT)
Dept: OPTOMETRY | Facility: CLINIC | Age: 80
End: 2020-08-28
Payer: COMMERCIAL

## 2020-08-28 ENCOUNTER — OFFICE VISIT (OUTPATIENT)
Dept: OPTOMETRY | Facility: CLINIC | Age: 80
End: 2020-08-28
Payer: MEDICARE

## 2020-08-28 ENCOUNTER — HOSPITAL ENCOUNTER (OUTPATIENT)
Dept: CARDIOLOGY | Facility: HOSPITAL | Age: 80
Discharge: HOME | End: 2020-08-28
Attending: PHYSICIAN ASSISTANT
Payer: MEDICARE

## 2020-08-28 ENCOUNTER — OFFICE VISIT (OUTPATIENT)
Dept: CARDIOLOGY | Facility: CLINIC | Age: 80
End: 2020-08-28
Payer: MEDICARE

## 2020-08-28 VITALS
HEIGHT: 60 IN | BODY MASS INDEX: 18.26 KG/M2 | HEART RATE: 80 BPM | SYSTOLIC BLOOD PRESSURE: 138 MMHG | WEIGHT: 93 LBS | DIASTOLIC BLOOD PRESSURE: 70 MMHG

## 2020-08-28 VITALS
BODY MASS INDEX: 18.53 KG/M2 | DIASTOLIC BLOOD PRESSURE: 67 MMHG | HEART RATE: 96 BPM | OXYGEN SATURATION: 98 % | SYSTOLIC BLOOD PRESSURE: 148 MMHG | HEIGHT: 60 IN | WEIGHT: 94.38 LBS

## 2020-08-28 DIAGNOSIS — E11.9 TYPE 2 DIABETES MELLITUS WITHOUT RETINOPATHY: ICD-10-CM

## 2020-08-28 DIAGNOSIS — Z96.1 STATUS POST CATARACT EXTRACTION AND INSERTION OF INTRAOCULAR LENS, UNSPECIFIED LATERALITY: ICD-10-CM

## 2020-08-28 DIAGNOSIS — H35.3190 AGE-RELATED MACULAR DEGENERATION WITH CENTRAL GEOGRAPHIC ATROPHY: Primary | ICD-10-CM

## 2020-08-28 DIAGNOSIS — Z79.4 CONTROLLED TYPE 2 DIABETES MELLITUS WITH DIABETIC NEPHROPATHY, WITH LONG-TERM CURRENT USE OF INSULIN: ICD-10-CM

## 2020-08-28 DIAGNOSIS — H54.10 BLINDNESS AND LOW VISION: ICD-10-CM

## 2020-08-28 DIAGNOSIS — E11.21 CONTROLLED TYPE 2 DIABETES MELLITUS WITH DIABETIC NEPHROPATHY, WITH LONG-TERM CURRENT USE OF INSULIN: ICD-10-CM

## 2020-08-28 DIAGNOSIS — N18.5 CKD (CHRONIC KIDNEY DISEASE), STAGE V: ICD-10-CM

## 2020-08-28 DIAGNOSIS — H35.50 RETINAL DYSTROPHY: ICD-10-CM

## 2020-08-28 DIAGNOSIS — Z98.49 STATUS POST CATARACT EXTRACTION AND INSERTION OF INTRAOCULAR LENS, UNSPECIFIED LATERALITY: ICD-10-CM

## 2020-08-28 DIAGNOSIS — Z95.2 S/P TAVR (TRANSCATHETER AORTIC VALVE REPLACEMENT): ICD-10-CM

## 2020-08-28 DIAGNOSIS — H40.003 GLAUCOMA SUSPECT OF BOTH EYES: ICD-10-CM

## 2020-08-28 DIAGNOSIS — I35.0 NONRHEUMATIC AORTIC VALVE STENOSIS: ICD-10-CM

## 2020-08-28 DIAGNOSIS — R27.0 ATAXIA, UNSPECIFIED: ICD-10-CM

## 2020-08-28 LAB
ASCENDING AORTA: 2.45 CM
AV INDEX (PROSTH): 0.87
AV MEAN GRADIENT: 8 MMHG
AV PEAK GRADIENT: 14 MMHG
AV VALVE AREA: 3.93 CM2
AV VELOCITY RATIO: 0.94
BSA FOR ECHO PROCEDURE: 1.34 M2
CV ECHO LV RWT: 0.49 CM
DOP CALC AO PEAK VEL: 1.9 M/S
DOP CALC AO VTI: 32.51 CM
DOP CALC LVOT AREA: 4.5 CM2
DOP CALC LVOT DIAMETER: 2.4 CM
DOP CALC LVOT PEAK VEL: 1.78 M/S
DOP CALC LVOT STROKE VOLUME: 127.83 CM3
DOP CALCLVOT PEAK VEL VTI: 28.27 CM
E WAVE DECELERATION TIME: 162.9 MSEC
E/A RATIO: 0.68
E/E' RATIO: 32.44 M/S
ECHO LV POSTERIOR WALL: 0.73 CM (ref 0.6–1.1)
FRACTIONAL SHORTENING: 32 % (ref 28–44)
INTERVENTRICULAR SEPTUM: 0.78 CM (ref 0.6–1.1)
LA MAJOR: 4.33 CM
LA MINOR: 4.24 CM
LA WIDTH: 3.27 CM
LEFT ATRIUM SIZE: 3.83 CM
LEFT ATRIUM VOLUME INDEX: 33.8 ML/M2
LEFT ATRIUM VOLUME: 45.61 CM3
LEFT INTERNAL DIMENSION IN SYSTOLE: 2.01 CM (ref 2.1–4)
LEFT VENTRICLE DIASTOLIC VOLUME INDEX: 24.9 ML/M2
LEFT VENTRICLE DIASTOLIC VOLUME: 33.57 ML
LEFT VENTRICLE MASS INDEX: 39 G/M2
LEFT VENTRICLE SYSTOLIC VOLUME INDEX: 9.5 ML/M2
LEFT VENTRICLE SYSTOLIC VOLUME: 12.85 ML
LEFT VENTRICULAR INTERNAL DIMENSION IN DIASTOLE: 2.95 CM (ref 3.5–6)
LEFT VENTRICULAR MASS: 53.05 G
LV LATERAL E/E' RATIO: 29.2 M/S
LV SEPTAL E/E' RATIO: 36.5 M/S
MV MEAN GRADIENT: 11 MMHG
MV PEAK A VEL: 2.16 M/S
MV PEAK E VEL: 1.46 M/S
MV STENOSIS PRESSURE HALF TIME: 144 MS
MV VALVE AREA P 1/2 METHOD: 1.53 CM2
PISA TR MAX VEL: 2.97 M/S
PULM VEIN S/D RATIO: 1.12
PV PEAK D VEL: 0.43 M/S
PV PEAK S VEL: 0.48 M/S
RA MAJOR: 3.32 CM
RA PRESSURE: 3 MMHG
RA WIDTH: 2.97 CM
RIGHT VENTRICULAR END-DIASTOLIC DIMENSION: 2.47 CM
RV TISSUE DOPPLER FREE WALL SYSTOLIC VELOCITY 1 (APICAL 4 CHAMBER VIEW): 14.3 CM/S
SINUS: 2.26 CM
STJ: 2.24 CM
TDI LATERAL: 0.05 M/S
TDI SEPTAL: 0.04 M/S
TDI: 0.05 M/S
TR MAX PG: 35 MMHG
TRICUSPID ANNULAR PLANE SYSTOLIC EXCURSION: 1.43 CM
TV REST PULMONARY ARTERY PRESSURE: 38 MMHG

## 2020-08-28 PROCEDURE — 99499 RISK ADDL DX/OHS AUDIT: ICD-10-PCS | Mod: S$GLB,,, | Performed by: OPTOMETRIST

## 2020-08-28 PROCEDURE — 1126F AMNT PAIN NOTED NONE PRSNT: CPT | Mod: HCNC,S$GLB,, | Performed by: INTERNAL MEDICINE

## 2020-08-28 PROCEDURE — 93306 TTE W/DOPPLER COMPLETE: CPT | Mod: 26,HCNC,, | Performed by: INTERNAL MEDICINE

## 2020-08-28 PROCEDURE — 99499 UNLISTED E&M SERVICE: CPT | Mod: HCNC,S$GLB,, | Performed by: OPTOMETRIST

## 2020-08-28 PROCEDURE — 93306 TTE W/DOPPLER COMPLETE: CPT | Mod: HCNC

## 2020-08-28 PROCEDURE — 3288F PR FALLS RISK ASSESSMENT DOCUMENTED: ICD-10-PCS | Mod: HCNC,CPTII,S$GLB, | Performed by: INTERNAL MEDICINE

## 2020-08-28 PROCEDURE — 99999 PR PBB SHADOW E&M-EST. PATIENT-LVL IV: CPT | Mod: PBBFAC,HCNC,, | Performed by: INTERNAL MEDICINE

## 2020-08-28 PROCEDURE — 3078F PR MOST RECENT DIASTOLIC BLOOD PRESSURE < 80 MM HG: ICD-10-PCS | Mod: HCNC,CPTII,S$GLB, | Performed by: INTERNAL MEDICINE

## 2020-08-28 PROCEDURE — 92014 PR EYE EXAM, EST PATIENT,COMPREHESV: ICD-10-PCS | Mod: S$GLB,,, | Performed by: OPTOMETRIST

## 2020-08-28 PROCEDURE — 1159F PR MEDICATION LIST DOCUMENTED IN MEDICAL RECORD: ICD-10-PCS | Mod: HCNC,S$GLB,, | Performed by: INTERNAL MEDICINE

## 2020-08-28 PROCEDURE — 1100F PTFALLS ASSESS-DOCD GE2>/YR: CPT | Mod: HCNC,CPTII,S$GLB, | Performed by: INTERNAL MEDICINE

## 2020-08-28 PROCEDURE — 92014 COMPRE OPH EXAM EST PT 1/>: CPT | Mod: S$GLB,,, | Performed by: OPTOMETRIST

## 2020-08-28 PROCEDURE — 3288F FALL RISK ASSESSMENT DOCD: CPT | Mod: HCNC,CPTII,S$GLB, | Performed by: INTERNAL MEDICINE

## 2020-08-28 PROCEDURE — 99999 PR PBB SHADOW E&M-EST. PATIENT-LVL IV: ICD-10-PCS | Mod: PBBFAC,HCNC,, | Performed by: INTERNAL MEDICINE

## 2020-08-28 PROCEDURE — 3074F PR MOST RECENT SYSTOLIC BLOOD PRESSURE < 130 MM HG: ICD-10-PCS | Mod: HCNC,CPTII,S$GLB, | Performed by: INTERNAL MEDICINE

## 2020-08-28 PROCEDURE — 1100F PR PT FALLS ASSESS DOC 2+ FALLS/FALL W/INJURY/YR: ICD-10-PCS | Mod: HCNC,CPTII,S$GLB, | Performed by: INTERNAL MEDICINE

## 2020-08-28 PROCEDURE — 99999 PR PBB SHADOW E&M-EST. PATIENT-LVL III: ICD-10-PCS | Mod: PBBFAC,,, | Performed by: OPTOMETRIST

## 2020-08-28 PROCEDURE — 99999 PR PBB SHADOW E&M-EST. PATIENT-LVL II: ICD-10-PCS | Mod: PBBFAC,HCNC,, | Performed by: OPTOMETRIST

## 2020-08-28 PROCEDURE — 99214 PR OFFICE/OUTPT VISIT, EST, LEVL IV, 30-39 MIN: ICD-10-PCS | Mod: HCNC,S$GLB,, | Performed by: INTERNAL MEDICINE

## 2020-08-28 PROCEDURE — 99999 PR PBB SHADOW E&M-EST. PATIENT-LVL III: CPT | Mod: PBBFAC,,, | Performed by: OPTOMETRIST

## 2020-08-28 PROCEDURE — 99499 UNLISTED E&M SERVICE: CPT | Mod: S$GLB,,, | Performed by: OPTOMETRIST

## 2020-08-28 PROCEDURE — 3078F DIAST BP <80 MM HG: CPT | Mod: HCNC,CPTII,S$GLB, | Performed by: INTERNAL MEDICINE

## 2020-08-28 PROCEDURE — 99999 PR PBB SHADOW E&M-EST. PATIENT-LVL II: CPT | Mod: PBBFAC,HCNC,, | Performed by: OPTOMETRIST

## 2020-08-28 PROCEDURE — 99214 OFFICE O/P EST MOD 30 MIN: CPT | Mod: HCNC,S$GLB,, | Performed by: INTERNAL MEDICINE

## 2020-08-28 PROCEDURE — 99499 NO LOS: ICD-10-PCS | Mod: HCNC,S$GLB,, | Performed by: OPTOMETRIST

## 2020-08-28 PROCEDURE — 1159F MED LIST DOCD IN RCRD: CPT | Mod: HCNC,S$GLB,, | Performed by: INTERNAL MEDICINE

## 2020-08-28 PROCEDURE — 3074F SYST BP LT 130 MM HG: CPT | Mod: HCNC,CPTII,S$GLB, | Performed by: INTERNAL MEDICINE

## 2020-08-28 PROCEDURE — 93306 ECHO (CUPID ONLY): ICD-10-PCS | Mod: 26,HCNC,, | Performed by: INTERNAL MEDICINE

## 2020-08-28 PROCEDURE — 1126F PR PAIN SEVERITY QUANTIFIED, NO PAIN PRESENT: ICD-10-PCS | Mod: HCNC,S$GLB,, | Performed by: INTERNAL MEDICINE

## 2020-08-28 NOTE — ASSESSMENT & PLAN NOTE
Doing greath, 1 month past TAVR, no PVL, normal LV function. Asymptomatic.   Creatiine stable, no need for dialysisi.

## 2020-08-28 NOTE — PROGRESS NOTES
"HPI     Patient daughter states, "patient has no aids at home for visual acuity.  Patient likes to cook, and would like to read ingredients to cook.    Never been to [facility]    Last edited by Volodymyr Stratton, OD on 8/28/2020  4:09 PM. (History)            Assessment /Plan     For exam results, see Encounter Report.    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 Vision stable, retina stable per old photos. Monitor condition. Patient to report any changes. RTC 1 year recheck. Offered [facility] referral, pt declined.   4. Monitor condition. Patient to report any changes. RTC 1 year recheck.  5. IOP stable, cd ratio stable, no fam hist of glaucoma.     6, No diabetic retinopathy, no csme. Return in 1 year for dilated eye exam.        "

## 2020-08-28 NOTE — PROGRESS NOTES
Assessment /Plan     For exam results, see Encounter Report.    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 See other exam same date

## 2020-08-28 NOTE — PROGRESS NOTES
Subjective:    Patient ID:  Marga Guerra is a 80 y.o. female who presents for follow-up 1 month after TAVR    HPI    Marga Guerra is a 80 y.o. female is here for follow up after transcatheter valve replacement. He underwent successful TAVR with 26 mm EvolutR valve via RTF access one month ago. Since then she reports that his symptoms have resolved, she reports no dyspnea or angina. Her procedure was performed without contrast given CKD stage 5. Her post op course was complicated with small cerebellar stroke for which she has some residual ataxia but this is not lifestyle limiting. She feels great and is very happy with her current health.    NYHA Class: I  CCS Class: 0    Review of Systems   Constitution: Negative for fever.   HENT: Negative for congestion and hoarse voice.    Eyes: Negative for blurred vision and double vision.   Cardiovascular: Negative for chest pain, claudication, cyanosis, dyspnea on exertion, irregular heartbeat, leg swelling, near-syncope, orthopnea, palpitations and paroxysmal nocturnal dyspnea.   Respiratory: Negative for cough, hemoptysis and shortness of breath.    Endocrine: Negative for cold intolerance and heat intolerance.   Hematologic/Lymphatic: Negative for bleeding problem. Does not bruise/bleed easily.   Skin: Negative for dry skin and nail changes.   Musculoskeletal: Negative for back pain and falls.   Gastrointestinal: Negative for abdominal pain and anorexia.   Neurological: Positive for loss of balance. Negative for brief paralysis, dizziness and weakness.        Objective:    Physical Exam   Constitutional: She is oriented to person, place, and time. She appears well-developed and well-nourished.   Eyes: Pupils are equal, round, and reactive to light.   Neck: No JVD present. No thyromegaly present.   Cardiovascular: Normal rate, regular rhythm, normal heart sounds and intact distal pulses.   Pulmonary/Chest: No respiratory distress. She has no wheezes. She exhibits no  tenderness.   Abdominal: She exhibits no distension. There is no abdominal tenderness. There is no rebound.   Musculoskeletal:         General: No tenderness or edema.   Neurological: She is alert and oriented to person, place, and time.   Skin: Skin is warm and dry.   Psychiatric: She has a normal mood and affect.         Assessment:       1. Nonrheumatic aortic valve stenosis    2. Ataxia, unspecified    3. CKD (chronic kidney disease), stage V         Plan:         Aortic stenosis  Doing greath, 1 month past TAVR, no PVL, normal LV function. Asymptomatic.   Creatiine stable, no need for dialysisi.    Ataxia, unspecified  Secondary to cerebelar stroke. Doing better with therapy    CKD (chronic kidney disease), stage V  Stable after TAVR. No need for HD    ar  Brett Zhou MD [facility]  Interventional Cardiology  Structural/Valvular heart disease  [phone]

## 2020-08-28 NOTE — LETTER
August 28, 2020      oHpe Vaughan, NP  [address]  [place] [address]           [place] [facility]  [address]  [place] [address]  Phone: [phone]  Fax: [phone]          Patient: Marga Guerra   MR Number: [id number]   YOB: 1940   Date of Visit: 8/28/2020       Dear Hope Vaughan:    Thank you for referring Marga Guerra to me for evaluation. Attached you will find relevant portions of my assessment and plan of care.    If you have questions, please do not hesitate to call me. I look forward to following Marga Guerra along with you.    Sincerely,    Volodymyr Stratton, OD    Enclosure  CC:  No Recipients    If you would like to receive this communication electronically, please contact [email or web address] or [phone] to request more information on [employer] access.    For providers and/or their staff who would like to refer a patient to [facility], please contact us through our one-stop-shop provider referral line, [facility] , at [phone].    If you feel you have received this communication in error or would no longer like to receive these types of communications, please e-mail [email or web address]

## 2020-08-31 NOTE — PROGRESS NOTES
Digital Medicine: Health  Follow-Up    The history is provided by caregiver. The patient has granted authorization to speak to this person.             Reason for review: Blood pressure at goal      Additional Follow-up details: Mrs. Gresham recently had surgery. She and her , Leo, are now living with their daughter, Linda. Mrs. Mckeon notes that it has been hard, but she enjoys taking care of them.     Patient feels well overall. She is recovering from surgery. Denies any hypotensive symptoms at this time.        Diet-Not assessed          Physical Activity-Not assessed    Medication Adherence-Medication Adherence not addressed.      Substance, Sleep, Stress-Not assessed      Continue current diet/physical activity routine.       Addressed any questions or concerns and patient has my contact information if needed prior to next outreach. Patient verbalizes understanding.          There are no preventive care reminders to display for this patient.    Last 5 Patient Entered Readings                                      Current 30 Day Average: 113/52     Recent Readings 8/26/2020 8/26/2020 8/26/2020 8/26/2020 8/26/2020    SBP (mmHg) 113 121 63 91 94    DBP (mmHg) 53 57 45 49 48    Pulse 96 97 133 96 103

## 2020-09-11 ENCOUNTER — CLINICAL SUPPORT (OUTPATIENT)
Dept: REHABILITATION | Facility: HOSPITAL | Age: 80
End: 2020-09-11
Payer: MEDICARE

## 2020-09-11 DIAGNOSIS — R29.898 WEAKNESS OF BOTH LOWER EXTREMITIES: ICD-10-CM

## 2020-09-11 DIAGNOSIS — R27.8 COORDINATION IMPAIRMENT: ICD-10-CM

## 2020-09-11 DIAGNOSIS — Z74.09 IMPAIRED FUNCTIONAL MOBILITY, BALANCE, GAIT, AND ENDURANCE: ICD-10-CM

## 2020-09-11 PROCEDURE — 97112 NEUROMUSCULAR REEDUCATION: CPT | Mod: HCNC,PN,CQ

## 2020-09-11 PROCEDURE — 97110 THERAPEUTIC EXERCISES: CPT | Mod: HCNC,PN,CQ

## 2020-09-11 NOTE — PROGRESS NOTES
"  Physical Therapy Treatment Note     Name: Marga Guerra  Clinic Number: [id number]    Therapy Diagnosis:   Encounter Diagnoses   Name Primary?    Weakness of both lower extremities     Impaired functional mobility, balance, gait, and endurance     Coordination impairment      Physician: Sriram Molina MD    Visit Date: 9/11/2020    Physician Orders: PT Eval and Treat   Medical Diagnosis from Referral: Z86.73 (ICD-10-CM) - History of stroke  Evaluation Date: 8/26/2020  Authorization Period Expiration: 12/31/2020  Plan of Care Expiration: 10/23/2020  Visit # / Visits authorized: 1 /24 (+1 evaluation)  FOTO: 2 /5  Time In: 12:45 PM   Time Out: 1:30 PM   Total Billable Time: 45 minutes 3TE    Precautions: Standard, Fall and HTN (monitor vitals); poor vision (macular degeneration); use  ([place])    Subjective     Pt reports: "Im usually tired, but I don't have any pain to report today". Pt agreeable to PT session.  She will be compliant with home exercise program.  Response to previous treatment: evaluation previous session   Functional change: none stated     Pain: 0/10  Location:  None reported      Objective   Initial  BP:166/87       HR: 92 bpm  Mid BP:137/78 HR: 94 bpm  End  BP:143/75 HR: 108 bpm    Marga received therapeutic exercises to develop strength, endurance and ROM for 30 minutes including:    -SLR  2x10 B  -lumbar bridge 2x10  -hip add iso 5"x15 w/ yellow t ball  -supine clam YTB 2x10    Heel raises 2x10 //  Marga participated in neuromuscular re-education activities to improve: Balance and Proprioception for 15 minutes. The following activities were included:  -lateral sidestepping 8ft with no UE assist x 4 trials B  -step overs w/ miguel   2x10 B with 1 UE assist  -tandem gait in //bars with 1 UE use 4 trials   -Nustep x 6 min for B UE/ LE reciprocal AROM      Home Exercises Provided and Patient Education Provided     Education provided:   - informed pt about her elevated blood " pressure  - advised to monitor BP at home and inform MD if hypertensive     Written Home Exercises Provided: Patient instructed to cont prior HEP.  Exercises were reviewed and Marga was able to demonstrate them prior to the end of the session.  Marga demonstrated good  understanding of the education provided.     See EMR under Patient Instructions for exercises provided prior visit.    Assessment     Pt able to complete session with no adverse reactions and no reports of increased pain noted. She was granted seated rest breaks as needed. Pt tolerated standing activities with no major loss of balance today.   Marga is progressing well towards her goals.   Pt prognosis is Good.     Pt will continue to benefit from skilled outpatient physical therapy to address the deficits listed in the problem list box on initial evaluation, provide pt/family education and to maximize pt's level of independence in the home and community environment.     Pt's spiritual, cultural and educational needs considered and pt agreeable to plan of care and goals.     Anticipated barriers to physical therapy: see precautions    Goals:  Short Term Goals: 4 weeks   1. Pt and daughter will be compliant with HEP in order to maximize PT benefits. (NOT MET, ONGOING)  2. Pt will increase BLE MMT grades by 1/2 grade in order to improve strength for ADLs such as bathing. (NOT MET, ONGOING)  3. Pt will complete >/=3 seconds for (B) SLS in order to improve postural control during activities such as lower body dressing. (NOT MET, ONGOING)     Long Term Goals: 8 weeks   1. Pt will increase BLE MMT grades by 1 grade in order to improve strength for ADLs such as bathing. (NOT MET, ONGOING)  2. Pt will complete >/=5 seconds for (B) SLS in order to improve postural control during activities such as lower body dressing. (NOT MET, ONGOING)  3. Pt will complete TUG in </=11 seconds without AD and without loss of balance in order to decrease risk for falls. (NOT  MET, ONGOING)  4. Pt will score </=32% for FOTO limitation score in order to improve self-perception of functional mobility deficits. (NOT MET, ONGOING)  5. Pt will report 0 falls from onset of PT management. (NOT MET, ONGOING)    Plan     Cont to progress PT towards established PT goals.   MONITOR BP    Ronny Arizmendi, PTA

## 2020-09-25 NOTE — PROGRESS NOTES
"  Physical Therapy Treatment Note     Name: Marga Guerra  Clinic Number: [id number]    Therapy Diagnosis:   Encounter Diagnoses   Name Primary?    Weakness of both lower extremities     Impaired functional mobility, balance, gait, and endurance     Coordination impairment      Physician: Sriram Molina MD    Visit Date: 9/25/2020    Physician Orders: PT Eval and Treat   Medical Diagnosis from Referral: Z86.73 (ICD-10-CM) - History of stroke  Evaluation Date: 8/26/2020  Authorization Period Expiration: 12/31/2020  Plan of Care Expiration: 10/23/2020  Visit # / Visits authorized: 2 /24 (3 total)  FOTO: 3 /5  Time In: 12:00 PM   Time Out: 12:45 PM   Total Billable Time: 45 minutes (2NMR, 1TE)    Precautions: Standard, Fall and HTN (monitor vitals); poor vision (macular degeneration); use  ([place])     Subjective    ID [id number]    Pt reports: She has been taking blood pressure at home and it has been occasionally high  She will be compliant with home exercise program.   Response to previous treatment: no adverse response  Functional change: none stated     Pain: 0/10  Location:  None reported      Objective   Initial     BP:180/103     HR: 95 bpm  After 5 min seated rest  BP:183/97       HR: 93 bpm  After 2 min standing   BP: 156/86  HR: 99 bpm  After 10 min exercise   BP: 155/93  HR: 102 bpm  End     BP: 157/82  HR: 102 bpm    Marga received therapeutic exercises to develop strength, endurance and ROM for 10 minutes including:    -hip add iso 5"x30 w/ yellow t ball  -Heel raises 3x10     Marga participated in neuromuscular re-education activities to improve: Balance and Proprioception for 35 minutes. The following activities were included:  -SLS 2x30B" intermittent UE support  -Tandem stance 2x30B" intermittent UE support  - Four square towel step-overs 5 repetitions x each direction  - Reciprocal toe tapping to blue platform 3x20  - Tandem stance with VB chest press 2x15  - Small Vinicius on airex " "4x20" intermittent UE support  - Myra stepovers (4) x 10 lengths     Home Exercises Provided and Patient Education Provided     Education provided:   - informed pt about her elevated blood pressure  - advised to monitor BP at home and inform MD if hypertensive     Written Home Exercises Provided: Patient instructed to cont prior HEP.  Exercises were reviewed and Marga was able to demonstrate them prior to the end of the session.  Marga demonstrated good  understanding of the education provided.     See EMR under Patient Instructions for exercises provided prior visit.    Assessment     Full session limited today due to high BP values upon arrival. With rest/standing, BP values still high but safe to complete exercise (see above). Daughter and pt notified of high levels and educated to continue monitoring at home. Despite this, pt performed very well with exercises completed during today's session. She is able to appropriately challenge balance by minimizing BUE support, but she still uses appropriate reaching strategies as needed for postural control. She demonstrated some difficulty coordinating BLE during obstacle avoidance activity and would benefit from continued PT addressing this and progression toward goals.    Marga is progressing well towards her goals.   Pt prognosis is Good.     Pt will continue to benefit from skilled outpatient physical therapy to address the deficits listed in the problem list box on initial evaluation, provide pt/family education and to maximize pt's level of independence in the home and community environment.     Pt's spiritual, cultural and educational needs considered and pt agreeable to plan of care and goals.     Anticipated barriers to physical therapy: see precautions    Goals:  Short Term Goals: 4 weeks   1. Pt and daughter will be compliant with HEP in order to maximize PT benefits. (NOT MET, ONGOING)  2. Pt will increase BLE MMT grades by 1/2 grade in order to improve " strength for ADLs such as bathing. (NOT MET, ONGOING)  3. Pt will complete >/=3 seconds for (B) SLS in order to improve postural control during activities such as lower body dressing. (NOT MET, ONGOING)     Long Term Goals: 8 weeks   1. Pt will increase BLE MMT grades by 1 grade in order to improve strength for ADLs such as bathing. (NOT MET, ONGOING)  2. Pt will complete >/=5 seconds for (B) SLS in order to improve postural control during activities such as lower body dressing. (NOT MET, ONGOING)  3. Pt will complete TUG in </=11 seconds without AD and without loss of balance in order to decrease risk for falls. (NOT MET, ONGOING)  4. Pt will score </=32% for FOTO limitation score in order to improve self-perception of functional mobility deficits. (NOT MET, ONGOING)  5. Pt will report 0 falls from onset of PT management. (NOT MET, ONGOING)    Plan     Cont to progress PT towards established PT goals.   MONITOR BP;  Complete exercises not attempted today (below); provide HEP  -SLR  2x10 B  -lumbar bridge 2x10  -supine clam YTB 2x10  -lateral sidestepping 8ft with no UE assist x 4 trials B  -tandem gait in //bars with 1 UE use 4 trials   -Nustep x 6 min for B UE/ LE reciprocal AROM    DEVONTE JACQUES, PT

## 2020-10-01 PROBLEM — I63.9 CEREBELLAR STROKE: Status: ACTIVE | Noted: 2020-01-01

## 2020-10-02 NOTE — PROGRESS NOTES
"  Physical Therapy Treatment Note     Name: Marga Guerra  Clinic Number: [id number]    Therapy Diagnosis:   Encounter Diagnoses   Name Primary?    Weakness of both lower extremities     Impaired functional mobility, balance, gait, and endurance     Coordination impairment      Physician: Sriram Molina MD    Visit Date: 10/2/2020    Physician Orders: PT Eval and Treat   Medical Diagnosis from Referral: Z86.73 (ICD-10-CM) - History of stroke  Evaluation Date: 8/26/2020  Authorization Period Expiration: 12/31/2020  Plan of Care Expiration: 10/23/2020  Visit # / Visits authorized: 3 /24 (4 total)  FOTO: 4/5  Time In: 12:00 PM   Time Out: 12:45 PM   Total Billable Time: 45 minutes (2NMR, 1TE)    Precautions: Standard, Fall and HTN (monitor vitals); poor vision (macular degeneration); use  ([place]ominican)     Subjective     Pt reports: No new complaints; she is agreeable to therapy today  She will be compliant with home exercise program. (provided today)  Response to previous treatment: no adverse response  Functional change: none stated     Pain: 0/10  Location:  None reported      Objective   Initial     BP:173/87     HR: 88 bpm  After 5 min seated rest  BP:164/80       HR: 98 bpm  After 20 min exercise   BP: 165/83  HR: 90 bpm  End     BP: 154/80  HR: 91 bpm    Marga received therapeutic exercises to develop strength, endurance and ROM for 15 minutes including:    -Heel raises 3x10B  -Standing marches x30  -Standing hip abduction 3x10B    Marga participated in neuromuscular re-education activities to improve: Balance and Proprioception for 30 minutes. The following activities were included:  -SLS 3x30B" intermittent UE support  -side stepping over single miguel 3x30"  -Tandem stance 2x30B" intermittent UE support  -Beep board with green med ball chest press 2x15  -Reciprocal toe tapping to blue platform 3x30"  -NuStep recumbent stepper with BUE/BLE reciprocal motion for improved coordination of all four " extremities x 5 minutes; level 2.0    Timed Up and Go: 11 seconds; no LoB; no AD    Home Exercises Provided and Patient Education Provided     Education provided:   - informed pt about her elevated blood pressure  - advised to monitor BP at home and inform MD if hypertensive     Written Home Exercises Provided: Patient instructed to cont prior HEP.  Exercises were reviewed and Marga was able to demonstrate them prior to the end of the session.  Marga demonstrated good  understanding of the education provided.     See EMR under Patient Instructions for exercises provided prior visit.    Assessment     Pt tolerated increased overall volume of exercise today without adverse response; hypertensive BP values still noted (above) but values improved with rest and standing exercise. When briefly reassessed today, pt decreased TUG time by 2 seconds, indicating slight decrease in fall risk since initiating therapy. Pt and daughter receptive to education on HEP. She would benefit from continued PT services to progress toward goals.    Marga is progressing well towards her goals.   Pt prognosis is Good.     Pt will continue to benefit from skilled outpatient physical therapy to address the deficits listed in the problem list box on initial evaluation, provide pt/family education and to maximize pt's level of independence in the home and community environment.     Pt's spiritual, cultural and educational needs considered and pt agreeable to plan of care and goals.     Anticipated barriers to physical therapy: see precautions    Goals:  Short Term Goals: 4 weeks   1. Pt and daughter will be compliant with HEP in order to maximize PT benefits. (NOT MET, ONGOING)  2. Pt will increase BLE MMT grades by 1/2 grade in order to improve strength for ADLs such as bathing. (NOT MET, ONGOING)  3. Pt will complete >/=3 seconds for (B) SLS in order to improve postural control during activities such as lower body dressing. (NOT MET,  "ONGOING)     Long Term Goals: 8 weeks   1. Pt will increase BLE MMT grades by 1 grade in order to improve strength for ADLs such as bathing. (NOT MET, ONGOING)  2. Pt will complete >/=5 seconds for (B) SLS in order to improve postural control during activities such as lower body dressing. (NOT MET, ONGOING)  3. Pt will complete TUG in </=11 seconds without AD and without loss of balance in order to decrease risk for falls. MET 10/2/2020  4. Pt will score </=32% for FOTO limitation score in order to improve self-perception of functional mobility deficits. (NOT MET, ONGOING)  5. Pt will report 0 falls from onset of PT management. (NOT MET, ONGOING)    Plan     Cont to progress PT towards established PT goals.   MONITOR BP;  Complete exercises not attempted today (below); follow up with HEP compliance  -SLR  2x10 B  -lumbar bridge 2x10  -supine clam YTB 2x10  -lateral sidestepping 8ft with no UE assist x 4 trials B  -tandem gait in //bars with 1 UE use 4 trials   - Tandem stance with VB chest press 2x15  - Small Vinicius on airex 4x20" intermittent UE support  - Myra stepovers (4) x 10 lengths    DEVONTE JACQUES, PT       "

## 2020-10-02 NOTE — PROGRESS NOTES
Subjective:   Patient ID:  Marga Guerra is a 80 y.o. female who presents for follow-up of Nonrheumatic aortic valve stenosis (6 weeks fu)      HPI:   Marga Guerrapresents for follow up of valvular heart disease having recent TAVR for severe aortic stenosis.  The patient did have Tete procedural embolic stroke with a significant recovery.  She she in addition, the patient has moderate mitral stenosis.  She is at being followed for metastatic thyroid cancer.  Her daughter translates but states that she has been doing very well since the procedure and has had none of the preprocedural symptomatology that she had been experiencing.Marga Guerra denies chest pain, shortness of breath, palpitations, presyncope , or syncope.  She has hypertension on the digital monitoring of program with blood pressure is 130/or less.  She has dyslipidemia on high-intensity statin.  She has CKD  that was not worsened post procedure.  Review of Systems   Constitution: Negative for malaise/fatigue, weight gain and weight loss.   Eyes: Negative for blurred vision.   Cardiovascular: Negative for chest pain, claudication, cyanosis, dyspnea on exertion, irregular heartbeat, leg swelling, near-syncope, orthopnea, palpitations, paroxysmal nocturnal dyspnea and syncope.   Respiratory: Negative for cough, shortness of breath and wheezing.    Musculoskeletal: Positive for back pain. Negative for falls and myalgias.   Gastrointestinal: Negative for abdominal pain, heartburn, nausea and vomiting.   Genitourinary: Negative for nocturia.   Neurological: Negative for brief paralysis, dizziness, focal weakness, headaches, numbness, paresthesias and weakness.   Psychiatric/Behavioral: Negative for altered mental status.       Current Outpatient Medications   Medication Sig    albuterol (PROVENTIL/VENTOLIN HFA) 90 mcg/actuation inhaler Inhale 2 puffs into the lungs every 6 (six) hours as needed for Wheezing.    amLODIPine (NORVASC) 5 MG tablet Take  "0.5 tablets (2.5 mg total) by mouth once daily.    aspirin (ECOTRIN) 81 MG EC tablet Take 1 tablet (81 mg total) by mouth once daily.    atorvastatin (LIPITOR) 40 MG tablet TAKE 1 TABLET(40 MG) BY MOUTH EVERY DAY    calcitRIOL (ROCALTROL) 0.25 MCG Cap Take 1 capsule (0.25 mcg total) by mouth once daily.    CALCIUM 600 600 mg (1,500 mg) Tab TK 4 TS PO TID WITH MEALS.    cholecalciferol, vitamin D3, 1,000 unit capsule Take 2 capsules (2,000 Units total) by mouth once daily.    clopidogreL (PLAVIX) 75 mg tablet Take 4 tabs (300mg) night before procedure then 1 tab (75mg) daily starting day of procedure.    furosemide (LASIX) 20 MG tablet Take AS NEEDED, daily for 3 lb weight gain overnight or 5 lbs in 5 days.    levothyroxine (SYNTHROID) 75 MCG tablet Mon and Thurs - 1/2 tablet daily; Other 5 days - full tablet daily    pantoprazole (PROTONIX) 40 MG tablet Take 1 tablet (40 mg total) by mouth once daily.    ramipril (ALTACE) 10 MG capsule Take 1 capsule (10 mg total) by mouth once daily.    flu vac 2020 [id number]-[id number],PF, (FLUAD QUAD 2020-21,65Y UP,,PF,) 60 mcg (15 mcg x 4)/0.5 mL Syrg Inject 0.5 mLs into the muscle once. for 1 dose     No current facility-administered medications for this visit.      Objective:   Physical Exam   Constitutional: She is oriented to person, place, and time. She appears well-developed. No distress.   BP (!) 183/79 (BP Location: Left arm, Patient Position: Sitting, BP Method: Pediatric (Automatic))   Pulse 85   Ht 4' 10.75" (1.492 m)   Wt 43.2 kg (95 lb 3.8 oz)   BMI 19.40 kg/m²    HENT:   Head: Normocephalic.   Eyes: Pupils are equal, round, and reactive to light. Conjunctivae are normal. No scleral icterus.   Neck: Neck supple. No JVD present. No thyromegaly present.   Cardiovascular: Normal rate, regular rhythm and intact distal pulses. PMI is not displaced. Exam reveals no gallop and no friction rub.   Murmur heard.   Medium-pitched midsystolic murmur is present with a " grade of 1/6 at the lower left sternal border.  High-pitched blowing decrescendo early diastolic murmur is present with a grade of 2/6 at the upper left sternal border and lower left sternal border radiating to the apex.  Pulmonary/Chest: Effort normal and breath sounds normal. No respiratory distress. She has no wheezes. She has no rales.   Abdominal: Soft. She exhibits no distension. There is no splenomegaly or hepatomegaly. There is no abdominal tenderness.   Musculoskeletal:         General: No tenderness or edema.      Comments: Gait normal   Neurological: She is alert and oriented to person, place, and time.   Skin: Skin is warm and dry. She is not diaphoretic.   Psychiatric: She has a normal mood and affect. Her behavior is normal.       Lab Results   Component Value Date     08/28/2020    K 4.4 08/28/2020     08/28/2020    CO2 25 08/28/2020    BUN 48 (H) 08/28/2020    CREATININE 3.9 (H) 08/28/2020    GLU 85 08/28/2020    HGBA1C 5.6 08/17/2020    MG 1.2 (L) 01/09/2014    AST 12 07/09/2020    ALT 7 (L) 07/09/2020    ALBUMIN 3.3 (L) 07/09/2020    PROT 6.5 07/09/2020    BILITOT 0.4 07/09/2020    WBC 5.78 08/28/2020    HGB 9.9 (L) 08/28/2020    HCT 32.8 (L) 08/28/2020    MCV 97 08/28/2020     08/28/2020    TSH 0.213 (L) 06/26/2020    CHOL 247 (H) 12/06/2019     (H) 12/06/2019    LDLCALC 105.0 12/06/2019    TRIG 100 12/06/2019       Assessment:     1. Nonrheumatic aortic valve stenosis    2. S/P TAVR (transcatheter aortic valve replacement) :  Significant symptomatic improvement   3. Cerebellar stroke :  Resolving   4. Nonrheumatic mitral valve stenosis :  Moderate   5. Left ventricular diastolic dysfunction with preserved systolic function:  Compensated    6. Essential hypertension:  Adequate control    7. CKD (chronic kidney disease), stage V :  Stable   8. Mixed hyperlipidemia :  On high-intensity statin       Plan:     Marga was seen today for nonrheumatic aortic valve  stenosis.    Diagnoses and all orders for this visit:    Nonrheumatic aortic valve stenosis    S/P TAVR (transcatheter aortic valve replacement)    Cerebellar stroke    Nonrheumatic mitral valve stenosis    Left ventricular diastolic dysfunction with preserved systolic function  Continue current regimen  Essential hypertension  Continue current regimen  CKD (chronic kidney disease), stage V    Mixed hyperlipidemia  Continue current regimen

## 2020-10-05 PROBLEM — S70.02XA: Chronic | Status: ACTIVE | Noted: 2020-01-01

## 2020-10-05 PROBLEM — S70.02XA: Status: ACTIVE | Noted: 2020-01-01

## 2020-10-05 NOTE — PROGRESS NOTES
Subjective:      Chief Complaint: Follow-up for thyroid cancer, post-surgical hypothyroidism and post-surgical hypoparathyroidism.    HPI: Marga Guerra is a 80 y.o. female who is here for a follow-up evaluation for Thyroid cancer surveillance, post-surgical hypothyroidism, post-surgical hypoparathyroidism and osteoporosis.    Past Medical History:   Diagnosis Date    Degenerative disc disease     cervical spine     Depression     Hyperlipidemia     Hypertension     IGT (impaired glucose tolerance)     diet controlled/hyperglycemia fasting    Lung cancer     Macular degeneration     Osteoporosis, unspecified     Post-surgical hypothyroidism     Postsurgical hypoparathyroidism     Renal manifestation of secondary diabetes mellitus     Stroke     right frontoparietal     Thyroid cancer     Type 2 diabetes mellitus, controlled 1/13/2015       Last seen by me in 7/1/2020  Since our last visit, she has undergone TAVR for severe aortic stenosis.    With regards to lung cancer and thyroid cancer:  10/5/2020:  She denies any new complaints today regarding the neck swelling.  Denies shortness of breath.  After her TAVR, she sustained a small stroke which caused her to fall to the side when she ambulated.  She is doing physical therapy, and has been recovering quite well.  Balance has improved significantly.  She denies any recent falls.    ___  Being following by oncology for non-small cell lung cancer - on surveillance. Finished 2 cycles of chemotherapy as well as radiation. Has not seen them in a while, but her daughter reports they recently requested a follow-up appointment and will be seen soon.    PET 9/2016: Posterior right rib activity unchanged. No evidence of local recurrence or distant metastatic disease.    PET 1/2018:   There are multiple new areas of hypermetabolic activity within the neck.    - Right neck lesion lymph node ([id number], image69) SUV max 8.44  - Right paratracheal lymph node  ([id number], image75) SUV max 6.52   - Right superior mediastinal lymph node ([id number], image81) SUV max 4.55  - New pathologic fracture of the right posterior 9th rib ([id number], image17) SUV max 4.49    A left lower lobe nodule is slightly larger at 6 mm from 4 mm on PET/CT 09/12/2016.  This lesion is relatively stable and below the size threshold for PET hypermetabolic activity, although neoplastic process cannot be excluded given interval enlargement.    Remote 9th and 8th rib fracture that was present on 09/12/2016. These do not demonstrate significant hypermetabolic activity. In the adjacent pulmonary parenchyma there is associated compressive atelectasis and consolidation and near-complete resolution of the previously demonstrated cavitation.    Incidental CT findings:  Postsurgical changes of total thyroidectomy.  Mildly ectatic ascending aorta to 3.7 cm.  There is significant calcific atherosclerosis throughout the aortic arch and multiple coronary arteries.  There are multiple areas of patchy groundglass opacities throughout the right upper lobe, a mixed density linear lesion in the lingula of the left upper lobe and multiple subcentimeter nodules throughout the base of the left lower lobe, these findings are all stable since PET/CT 09/12/2016.      Impression         Three new areas of hypermetabolic lesions within the neck and superior mediastinum with SUV measurements as above.    New pathologic fracture of the right posterior 9th rib with hypermetabolic activity, as above.     Left lower lobe nodule slightly larger since prior PET/CT 09/12/2016 that is below the size threshold for PET scan, although neoplastic process cannot be excluded.     Remote rib fracture of the 9th and 8th right posterior ribs, similar to scan from 09/12/2016.  Stable pulmonary findings of patchy groundglass opacities and compressive atelectasis and consolidation adjacent to the rib fractures.     5/2018: I-123 scan  did not show any iodine avid disease    CT Chest 5/2018:   In this patient with a history of lung cancer, soft tissue attenuation and volume loss in the right lower lobe with areas of calcification, similar to prior PET-CT on 01/19/2018 and suggestive of treated lung cancer.    Upper limits of normal superior mediastinal lymph node that previously demonstrated hypermetabolic activity on prior PET-CT on 01/19/2018    Innumerable scattered pulmonary nodules, most notable in the left lower lobe that appear to have progressively increased in number and worrisome for metastatic disease.  Clinical considerations will determine the role and interval for CT surveillance.    PET-CT 12/2019:  EXAMINATION:  NM PET CT ROUTINE     CLINICAL HISTORY:  thyroid cancer; Malignant neoplasm of thyroid gland     TECHNIQUE:  15.15 mCi of F18-FDG was administered intravenously in the left antecubital fossa.  After an approximately 60 min distribution time, PET/CT images were acquired from the skull base to the mid thigh. Transmission images were acquired to correct for attenuation using a whole body low-dose CT scan without contrast with the arms positioned above the head. Glycemia at the time of injection was 97 mg/dL.     Of note, there is no indication that the patient received Thyrogen preparation, which may increase the sensitivity of the scan.     COMPARISON:  FDG PET-CT 01/09/2018, 09/12/2016, 08/14/2013     FINDINGS:  Quality of the study: Adequate.     Decreased tracer uptake within the left anterior middle cranial fossa correlating with prior left frontotemporal resection.     Postoperative changes from thyroidectomy.  New hypermetabolic right level 5A lymph node, SUV max of 10.60.  Increased size and uptake within a right level 3 cervical lymph node measuring 2.0 cm (previously 1.3 cm), SUV max of 23.7 (previously 6.52).  Increased size and uptake within a right paratracheal superior mediastinal lymph node measuring 1 cm  (previously 0.6 cm) SUV max of 16.01 (previously 4.55).  Brown fat activation posterior to the C2-C3 vertebral bodies.     Numerous subcentimeter bilateral pulmonary nodules, some which appear conspicuous/slightly larger in comparison to prior exam.  Largest nodule measures 0.8 cm in the lobe (previously 0.6 cm).  Focal tracer uptake within 0.7 cm lower lobe pulmonary nodule with peripheral calcification (series 3, image 116), SUV max 4.07.  Stable region right lower lobe volume loss, consolidation, and bronchiectasis favored to represent post radiation change.  Chronic right posterior 8th-10th rib deformities.  Several bilateral ground-glass opacities which appears similar in comparison to prior exam.  Biapical fibronodular changes and bilateral bandlike pulmonary scarring, unchanged.     In the abdomen and pelvis, there is physiologic tracer distribution within the abdominal organs and excretion into the genitourinary system.     In the bones, there are no  hypermetabolic lesions worrisome for malignancy.     Impression:     Progression of several hypermetabolic right cervical lymph nodes compatible with FDG-avid metastatic disease.  Prior index lesions have increased in size and uptake in there is a new hypermetabolic right level 5 cervical node.  There is no indication that the patient received Thyrogen preparation for this examination.  Recommend consideration of Thyrogen stimulation in preparation for future FDG PET-CT scans to assess for FDG avid disease in coordination with thyroglobulin assays and radioactive iodine whole body scans as clinically indicated.     Numerous subcentimeter bilateral pulmonary nodules, some of which have increased in size in comparison to prior exam.  Focal increased uptake within a subcentimeter left lower lobe nodule.  Given presence and relative stability of the numerous pulmonary nodules over the course of several years, favor a benign inflammatory etiology.  Attention on  follow-up.     Additional chronic pulmonary findings similar to prior exam.     Thyroid cancer history:  Total thyroidectomy with bilateral paratracheal dissection and left modified neck dissection of levels II-V, 3/12/2012  There was gross extrathyroidal tumor with infiltration of the left RLN requiring sacrifice    Thyroid Synoptic Report:  Procedure: total thyroidectomy, left neck dissections, central compartment dissection  Specimen integrity: single intact thyroid specimen  Specimen size: r lobe: 5 x 4 x 3 cm; isthmus: 4 x 3 x 3 cm ; l lobe: 6 x 4 x 3 cm  Tumor focality: multifocal; l lobe: 5 cm; isthmus: 2 nodule: 4.5 and 1.2 cm; r lobe: 3 nodules: 2.0 cm, 1.2 cm, 0.7 cm  Tumor laterality: right lobe, left lobe, and isthmus  Tumor size: 5 cm let lobe, greatest dimension  Histological type: papillary, classical and follicular variants  Margins: tumor focally extends to margin at isthmus  Tumor capsule: partially encapsulated  Tumor capsule invasion: present, widely invasive  Lymph-vascular invasion: present, focal  Lymph nodes: 9+/47  Extrathyroid extension: present  Stage: CON (pT4 pN1b MX)    S/P 100 mCi I-131- 9/2012  10/2012 WBS   1. Thyroid tissue remnant in the neck.    2. No evidence of distant metastases.      2/4/15 U/S:   Status post thyroidectomy.  No sonographic evidence of malignancy in this patient with detectable thyroglobulinemia     Neck U/S 12/2017: two suspicious lymph nodes (both biopsy proven PTC)  Neck U/S 6/2019: Increasing size of known malignant nodes; no new lymph nodes         Post-surgical hypoparathyroidism and osteoporosis  calcitriol 0.25 mcg qd  Takes calcium + Vitamin D chewables one time per day.  Last serum calcium 9.0 (uncorrected)             Lumbar Spine: Lumbar bone mineral density L1-L4 is 0.773g/cm2, which is a t-score of -2.5. The z-score is 0.2.    Total Hip: The total hip bone mineral density is 0.565g/cm2.  The t-score is -3.1, and the z-score is -0.9.  Femoral neck  BMD is 0.443g/cm2 and the t-score is -3.7.    COMPARISONS:  Date Location BMD T-score  02/04/15 L-spine 0.724 -2.9  Total Hip 0.580 -3.0      Impression       Severe osteoporosis on basis of vertebral fracture and BMD. Total hip stable since 2015 and lumbar spine BMD increased 7%.    Recommendations:  1) Adequate calcium and Vitamin D therapy  2) Appropriate exercise  3) Consider medical therapy including bisphosphonates, denosumab.  4) Consider repeat BMD in 2 years     prolia started 9/2/16 - After treatment, patient developed severe lip swelling, which lasted the entire day. No shortness of breath, rash, wheezing, low blood pressure.      No recent falls    Has had a cold for the past few days, but previously was feeling well.    Reviewed past medical, family, social history and updated as appropriate.    Review of Systems   Respiratory: Negative for shortness of breath.    Cardiovascular: Negative for chest pain.   Gastrointestinal: Negative for abdominal pain.       Objective:     Vitals:    10/05/20 1426   BP: (!) 158/82   Pulse: 80       Physical Exam  Vitals signs and nursing note reviewed.   Constitutional:       Appearance: She is well-developed.   HENT:      Head: Normocephalic and atraumatic.   Neck:      Thyroid: No thyromegaly (No palpable residual thyroid tissue).      Trachea: No tracheal deviation.   Cardiovascular:      Rate and Rhythm: Normal rate.   Pulmonary:      Effort: Pulmonary effort is normal. No respiratory distress.      Breath sounds: No stridor.   Abdominal:      Palpations: Abdomen is soft.   Musculoskeletal:      Comments: No digital clubbing or extremity cyanosis   On left inner thigh there is an area of blue hyperpigmentation with a small nodular focus in the center; most likely ecchymosis  Lymphadenopathy:      Cervical: Cervical adenopathy (Two anterior cervical LN palpable - R level IV and R level III. Could not feel any on the left; similar to last exam on the right side.)  present.   Skin:     General: Skin is warm and dry.   Psychiatric:         Behavior: Behavior normal.         Thought Content: Thought content normal.         Judgment: Judgment normal.         Wt Readings from Last 10 Encounters:   10/05/20 1426 42.8 kg (94 lb 7.5 oz)   10/02/20 1450 43.2 kg (95 lb 3.8 oz)   08/28/20 0906 42.2 kg (93 lb)   08/28/20 1000 42.8 kg (94 lb 5.7 oz)   08/24/20 1154 42.1 kg (92 lb 13 oz)   08/21/20 1328 42.2 kg (93 lb 0.6 oz)   08/05/20 1340 41.4 kg (91 lb 4.3 oz)   07/30/20 0647 44.5 kg (98 lb)   07/13/20 1130 42.6 kg (94 lb)   07/13/20 1035 42.6 kg (94 lb)   07/06/20 0904 42.9 kg (94 lb 10.6 oz)       Lab Results   Component Value Date    HGBA1C 5.6 08/17/2020     Lab Results   Component Value Date    CHOL 247 (H) 12/06/2019     (H) 12/06/2019    LDLCALC 105.0 12/06/2019    TRIG 100 12/06/2019    CHOLHDL 49.4 12/06/2019     Lab Results   Component Value Date     08/28/2020    K 4.4 08/28/2020     08/28/2020    CO2 25 08/28/2020    GLU 85 08/28/2020    BUN 48 (H) 08/28/2020    CREATININE 3.9 (H) 08/28/2020    CALCIUM 9.0 08/28/2020    PROT 6.5 07/09/2020    ALBUMIN 3.3 (L) 07/09/2020    BILITOT 0.4 07/09/2020    ALKPHOS 38 (L) 07/09/2020    AST 12 07/09/2020    ALT 7 (L) 07/09/2020    ANIONGAP 9 08/28/2020    ESTGFRAFRICA 11.7 (A) 08/28/2020    EGFRNONAA 10.1 (A) 08/28/2020    TSH 0.213 (L) 06/26/2020        Assessment/Plan:     Thyroid cancer  Known disease in the neck.  Numerous lung nodules are most likely related to metastatic thyroid cancer, but cannot rule out benign etiology based on very slow change over time.    Given rising TG, will keep TG mildly suppressed (0.1-0.5). Avoid oversuppression given age, osteoporosis history and cardiac history.    This is the case several times at tumor Board.  The consensus is to hold off on any additional treatment such as I 131 or surgery for her thyroid cancer in the absence of symptoms.  She has numerous comorbid  conditions, including stage 5 kidney disease, severe osteoporosis, and aortic stenosis with recent embolic stroke after TAVR.    Follow-up in 3 months.    Vitamin D deficiency  Started on supplemental vitamin-D.  Check periodically.    Senile osteoporosis  Treatment options are limited due to severe renal dysfunction, active malignancy, and allergic reaction to Prolia. 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 Alk phos is low and PTH is low also, so this anti-resorptives would likely be counter productive. On calcium supplement (see below).     No recent falls or fractures.  Discussed the importance of fall precautions.    Post-surgical hypothyroidism  See above.  Checking TSH today.    Postsurgical hypoparathyroidism  Most recent calcium level was normal.  Recheck serum calcium today.  PTH level was not completely undetectable on the most recent labs, suggesting she does have some residual parathyroid function.      Traumatic ecchymosis of hip, left, initial encounter  Suspect ecchymosis based on exam. However, I told her if it doesn't improve in 2 weeks that we could check imaging to make sure there is no underlying clot or tumor.    RTC in 3 months

## 2020-10-05 NOTE — ASSESSMENT & PLAN NOTE
Suspect ecchymosis based on exam. However, I told her if it doesn't improve in 2 weeks that we could check imaging to make sure there is no underlying clot or tumor.

## 2020-10-05 NOTE — ASSESSMENT & PLAN NOTE
Known disease in the neck.  Numerous lung nodules are most likely related to metastatic thyroid cancer, but cannot rule out benign etiology based on very slow change over time.    Given rising TG, will keep TG mildly suppressed (0.1-0.5). Avoid oversuppression given age, osteoporosis history and cardiac history.    This is the case several times at tumor Board.  The consensus is to hold off on any additional treatment such as I 131 or surgery for her thyroid cancer in the absence of symptoms.  She has numerous comorbid conditions, including stage 5 kidney disease, severe osteoporosis, and aortic stenosis with recent embolic stroke after TAVR.    Follow-up in 3 months.

## 2020-10-05 NOTE — ASSESSMENT & PLAN NOTE
Treatment options are limited due to severe renal dysfunction, active malignancy, and allergic reaction to Prolia. 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 Alk phos is low and PTH is low also, so this anti-resorptives would likely be counter productive. On calcium supplement (see below).     No recent falls or fractures.  Discussed the importance of fall precautions.

## 2020-10-05 NOTE — ASSESSMENT & PLAN NOTE
Most recent calcium level was normal.  Recheck serum calcium today.  PTH level was not completely undetectable on the most recent labs, suggesting she does have some residual parathyroid function.

## 2020-10-08 NOTE — TELEPHONE ENCOUNTER
Please call her daughter to let her know that I reviewed the labs, and she can continue the same doses of levothyroxine and calcitriol for now. Let me know if she has questions.

## 2020-10-16 NOTE — PROGRESS NOTES
Physical Therapy Treatment Note/Discharge Summary     Name: Marga Guerra  Clinic Number: [id number]    Therapy Diagnosis:   Encounter Diagnoses   Name Primary?    Weakness of both lower extremities     Impaired functional mobility, balance, gait, and endurance     Coordination impairment      Physician: Sriram Molina MD    Visit Date: 10/16/2020    Physician Orders: PT Eval and Treat   Medical Diagnosis from Referral: Z86.73 (ICD-10-CM) - History of stroke  Evaluation Date: 8/26/2020  Authorization Period Expiration: 12/31/2020  Plan of Care Expiration: 10/23/2020  Visit # / Visits authorized: 4 /24 (5 total)  FOTO: 5/5 (complete)  Time In: 12:00 PM   Time Out: 12:45 PM   Total Billable Time: 45 minutes (1NMR, 2TE)    Precautions: Standard, Fall and HTN (monitor vitals); poor vision (macular degeneration); use  (Malay)     Subjective     Pt reports: Agreeable to discharge today. Daughter is also agreeable to discharge and says that she has noticed improvements in her mother's mobility over the past several weeks  She was compliant with home exercise program.  Response to previous treatment: No adverse response  Functional change: Improved functional mobility in home per daughter    Pain: 0/10  Location:  None reported      Objective   Initial     BP:174/90     HR: 88 bpm  After 5 min seated rest  BP:164/87       HR: 90 bpm  After 20 min exercise   BP: 168/87  HR: 93 bpm  End     BP: 166/84  HR: 93 bpm    Marga received therapeutic exercises to develop strength, endurance and ROM for 35 minutes including:    -Heel raises 3x10B  -Standing marches x30  -Standing hip abduction 3x10B  -Sit to stands 3x10  -Objective tests/measures (below)    Timed Up and Go: 10.08 seconds; no LoB; no AD    Single Leg Stance (L): 4 seconds  Single Leg Stance (R): 5 seconds    Lower Extremity Strength (updated values in parentheses)    RLE LLE   Hip Flexion: 4/5 (4+/5) 4-/5 (4/5)   Hip Extension:  3/5 (4/5) 3/5 (4/5)    Hip Abduction: 4-/5 (4/5) 3+/5 (4/5)   Knee Extension: 4+/5 (5/5) 4+/5 (5/5)   Knee Flexion: 4-/5 (4/5) 3+/5 (4/5)   Ankle Dorsiflexion: 4/5 (4+/5) 4-/5 (4+/5)   Ankle Plantarflexion: 4/5 (5/5) 3+/5 (5/5)      CMS Impairment/Limitation/Restriction for FOTO Stroke Lower Extremity Survey: 19%    Marga participated in neuromuscular re-education activities to improve: Balance and Proprioception for 10 minutes. The following activities were included:    -NuStep recumbent stepper with BUE/BLE reciprocal motion for improved coordination of all four extremities x 10 minutes; level 1.0    Home Exercises Provided and Patient Education Provided     Education provided:   - informed pt about her elevated blood pressure  - advised to monitor BP at home and inform MD if hypertensive   - continue HEP    Written Home Exercises Provided: Patient instructed to cont prior HEP.  Exercises were reviewed and Marga was able to demonstrate them prior to the end of the session.  Marga demonstrated good  understanding of the education provided.     See EMR under Patient Instructions for exercises provided 10/16/2020    Assessment     Pt still hypertensive during session today but daughter has been monitoring at home and BP has been WNL. Pt has history of poor attendance in therapy and overall, PT has been limited by hypertensive BP values upon arrival. Still, pt with good participation during sessions and compliance with HEP; improvements were noted when reassessed today. Per TUG/observation, pt has decreased fall risk and improved postural control and functional mobility. She has also improved BLE strength per MMT. Daughter also reports improved mobility in home and no recent falls. Pt is appropriate for discharge home at this time secondary to meeting majority of functional goals. Daughter and pt receptive to education regarding continuing HEP/BP monitoring and ability to call PT with any questions/concerns post-discharge.    Marga is  progressing well towards her goals.   Pt prognosis is Good.     Pt will continue to benefit from skilled outpatient physical therapy to address the deficits listed in the problem list box on initial evaluation, provide pt/family education and to maximize pt's level of independence in the home and community environment.     Pt's spiritual, cultural and educational needs considered and pt agreeable to plan of care and goals.     Anticipated barriers to physical therapy: see precautions    Goals:  Short Term Goals: 4 weeks   1. Pt and daughter will be compliant with HEP in order to maximize PT benefits. MET 10/16/2020  2. Pt will increase BLE MMT grades by 1/2 grade in order to improve strength for ADLs such as bathing. MET 10/16/2020  3. Pt will complete >/=3 seconds for (B) SLS in order to improve postural control during activities such as lower body dressing. MET 10/16/2020     Long Term Goals: 8 weeks   1. Pt will increase BLE MMT grades by 1 grade in order to improve strength for ADLs such as bathing. (NOT MET, ONGOING)  2. Pt will complete >/=5 seconds for (B) SLS in order to improve postural control during activities such as lower body dressing. MET 10/16/2020  3. Pt will complete TUG in </=11 seconds without AD and without loss of balance in order to decrease risk for falls. MET 10/2/2020  4. Pt will score </=32% for FOTO limitation score in order to improve self-perception of functional mobility deficits. MET 10/16/2020  5. Pt will report 0 falls from onset of PT management. MET 10/16/2020    Plan     Discharge PT    DEVONTE JACQUES, PT, DPT

## 2020-10-22 PROBLEM — R27.8 COORDINATION IMPAIRMENT: Status: RESOLVED | Noted: 2020-08-26 | Resolved: 2020-01-01

## 2020-10-22 PROBLEM — R29.898 WEAKNESS OF BOTH LOWER EXTREMITIES: Status: RESOLVED | Noted: 2020-08-26 | Resolved: 2020-01-01

## 2020-10-22 PROBLEM — Z74.09 IMPAIRED FUNCTIONAL MOBILITY, BALANCE, GAIT, AND ENDURANCE: Status: RESOLVED | Noted: 2020-08-26 | Resolved: 2020-01-01

## 2021-01-01 ENCOUNTER — OFFICE VISIT (OUTPATIENT)
Dept: HEMATOLOGY/ONCOLOGY | Facility: CLINIC | Age: 81
DRG: 640 | End: 2021-01-01
Payer: MEDICARE

## 2021-01-01 ENCOUNTER — TELEPHONE (OUTPATIENT)
Dept: HEMATOLOGY/ONCOLOGY | Facility: CLINIC | Age: 81
End: 2021-01-01

## 2021-01-01 ENCOUNTER — TELEPHONE (OUTPATIENT)
Dept: PALLIATIVE MEDICINE | Facility: CLINIC | Age: 81
End: 2021-01-01

## 2021-01-01 ENCOUNTER — TELEPHONE (OUTPATIENT)
Dept: NEPHROLOGY | Facility: CLINIC | Age: 81
End: 2021-01-01

## 2021-01-01 ENCOUNTER — PATIENT MESSAGE (OUTPATIENT)
Dept: ENDOCRINOLOGY | Facility: CLINIC | Age: 81
End: 2021-01-01

## 2021-01-01 ENCOUNTER — PES CALL (OUTPATIENT)
Dept: ADMINISTRATIVE | Facility: CLINIC | Age: 81
End: 2021-01-01

## 2021-01-01 ENCOUNTER — HOSPITAL ENCOUNTER (OUTPATIENT)
Dept: RADIOLOGY | Facility: HOSPITAL | Age: 81
Discharge: HOME | End: 2021-02-01
Attending: INTERNAL MEDICINE
Payer: MEDICARE

## 2021-01-01 ENCOUNTER — OFFICE VISIT (OUTPATIENT)
Dept: NEPHROLOGY | Facility: CLINIC | Age: 81
End: 2021-01-01
Payer: MEDICARE

## 2021-01-01 ENCOUNTER — DOCUMENT SCAN (OUTPATIENT)
Dept: HOME HEALTH SERVICES | Facility: HOSPITAL | Age: 81
End: 2021-01-01
Payer: MEDICARE

## 2021-01-01 ENCOUNTER — TELEPHONE (OUTPATIENT)
Dept: ENDOCRINOLOGY | Facility: CLINIC | Age: 81
End: 2021-01-01

## 2021-01-01 ENCOUNTER — PATIENT MESSAGE (OUTPATIENT)
Dept: ADMINISTRATIVE | Facility: OTHER | Age: 81
End: 2021-01-01

## 2021-01-01 ENCOUNTER — OFFICE VISIT (OUTPATIENT)
Dept: PALLIATIVE MEDICINE | Facility: CLINIC | Age: 81
End: 2021-01-01
Payer: MEDICARE

## 2021-01-01 ENCOUNTER — HOSPITAL ENCOUNTER (EMERGENCY)
Facility: HOSPITAL | Age: 81
Discharge: HOME | End: 2021-06-06
Attending: EMERGENCY MEDICINE
Payer: MEDICARE

## 2021-01-01 ENCOUNTER — OFFICE VISIT (OUTPATIENT)
Dept: HEMATOLOGY/ONCOLOGY | Facility: CLINIC | Age: 81
End: 2021-01-01
Payer: MEDICARE

## 2021-01-01 ENCOUNTER — INFUSION (OUTPATIENT)
Dept: INFUSION THERAPY | Facility: HOSPITAL | Age: 81
End: 2021-01-01
Attending: INTERNAL MEDICINE
Payer: MEDICARE

## 2021-01-01 ENCOUNTER — PATIENT MESSAGE (OUTPATIENT)
Dept: HEMATOLOGY/ONCOLOGY | Facility: CLINIC | Age: 81
End: 2021-01-01

## 2021-01-01 ENCOUNTER — PATIENT MESSAGE (OUTPATIENT)
Dept: ADMINISTRATIVE | Facility: HOSPITAL | Age: 81
End: 2021-01-01

## 2021-01-01 ENCOUNTER — LAB VISIT (OUTPATIENT)
Dept: LAB | Facility: HOSPITAL | Age: 81
End: 2021-01-01
Attending: INTERNAL MEDICINE
Payer: MEDICARE

## 2021-01-01 ENCOUNTER — HOSPITAL ENCOUNTER (OUTPATIENT)
Dept: RADIOLOGY | Facility: HOSPITAL | Age: 81
Discharge: HOME | End: 2021-05-14
Attending: INTERNAL MEDICINE
Payer: MEDICARE

## 2021-01-01 ENCOUNTER — DOCUMENTATION ONLY (OUTPATIENT)
Dept: CARDIOLOGY | Facility: CLINIC | Age: 81
End: 2021-01-01

## 2021-01-01 ENCOUNTER — NURSE TRIAGE (OUTPATIENT)
Dept: ADMINISTRATIVE | Facility: CLINIC | Age: 81
End: 2021-01-01

## 2021-01-01 ENCOUNTER — HOSPITAL ENCOUNTER (OUTPATIENT)
Dept: ENDOCRINOLOGY | Facility: CLINIC | Age: 81
Discharge: HOME | End: 2021-04-16
Attending: INTERNAL MEDICINE
Payer: MEDICARE

## 2021-01-01 ENCOUNTER — IMMUNIZATION (OUTPATIENT)
Dept: PHARMACY | Facility: CLINIC | Age: 81
End: 2021-01-01
Payer: MEDICARE

## 2021-01-01 ENCOUNTER — HOSPITAL ENCOUNTER (OUTPATIENT)
Dept: CARDIOLOGY | Facility: HOSPITAL | Age: 81
Discharge: HOME | End: 2021-05-31
Attending: INTERNAL MEDICINE
Payer: MEDICARE

## 2021-01-01 ENCOUNTER — EXTERNAL HOME HEALTH (OUTPATIENT)
Dept: HOME HEALTH SERVICES | Facility: HOSPITAL | Age: 81
End: 2021-01-01
Payer: MEDICARE

## 2021-01-01 ENCOUNTER — DOCUMENTATION ONLY (OUTPATIENT)
Dept: HEMATOLOGY/ONCOLOGY | Facility: CLINIC | Age: 81
End: 2021-01-01

## 2021-01-01 ENCOUNTER — SPECIALTY PHARMACY (OUTPATIENT)
Dept: PHARMACY | Facility: CLINIC | Age: 81
End: 2021-01-01
Payer: MEDICARE

## 2021-01-01 ENCOUNTER — HOSPITAL ENCOUNTER (INPATIENT)
Facility: HOSPITAL | Age: 81
LOS: 4 days | Discharge: HOSPICE | DRG: 640 | End: 2021-07-13
Attending: EMERGENCY MEDICINE | Admitting: STUDENT IN AN ORGANIZED HEALTH CARE EDUCATION/TRAINING PROGRAM
Payer: MEDICARE

## 2021-01-01 ENCOUNTER — OFFICE VISIT (OUTPATIENT)
Dept: ENDOCRINOLOGY | Facility: CLINIC | Age: 81
End: 2021-01-01
Payer: MEDICARE

## 2021-01-01 ENCOUNTER — HOSPITAL ENCOUNTER (INPATIENT)
Facility: HOSPITAL | Age: 81
LOS: 2 days | Discharge: HOME HEALTH | DRG: 682 | End: 2021-06-16
Attending: EMERGENCY MEDICINE | Admitting: HOSPITALIST
Payer: MEDICARE

## 2021-01-01 ENCOUNTER — TELEPHONE (OUTPATIENT)
Dept: INTERNAL MEDICINE | Facility: CLINIC | Age: 81
End: 2021-01-01

## 2021-01-01 ENCOUNTER — HOSPITAL ENCOUNTER (OUTPATIENT)
Dept: CARDIOLOGY | Facility: CLINIC | Age: 81
Discharge: HOME | End: 2021-05-31
Payer: MEDICARE

## 2021-01-01 ENCOUNTER — PATIENT OUTREACH (OUTPATIENT)
Dept: ADMINISTRATIVE | Facility: OTHER | Age: 81
End: 2021-01-01

## 2021-01-01 VITALS
TEMPERATURE: 98 F | OXYGEN SATURATION: 97 % | WEIGHT: 82.88 LBS | SYSTOLIC BLOOD PRESSURE: 72 MMHG | RESPIRATION RATE: 16 BRPM | HEART RATE: 85 BPM | HEIGHT: 59 IN | RESPIRATION RATE: 16 BRPM | HEART RATE: 107 BPM | DIASTOLIC BLOOD PRESSURE: 61 MMHG | DIASTOLIC BLOOD PRESSURE: 47 MMHG | SYSTOLIC BLOOD PRESSURE: 120 MMHG | HEIGHT: 59 IN | BODY MASS INDEX: 16.71 KG/M2 | BODY MASS INDEX: 17.68 KG/M2 | TEMPERATURE: 98 F

## 2021-01-01 VITALS
HEART RATE: 93 BPM | DIASTOLIC BLOOD PRESSURE: 81 MMHG | WEIGHT: 89.75 LBS | BODY MASS INDEX: 18.12 KG/M2 | TEMPERATURE: 99 F | OXYGEN SATURATION: 94 % | SYSTOLIC BLOOD PRESSURE: 175 MMHG | RESPIRATION RATE: 18 BRPM

## 2021-01-01 VITALS
SYSTOLIC BLOOD PRESSURE: 108 MMHG | BODY MASS INDEX: 17.64 KG/M2 | HEART RATE: 86 BPM | OXYGEN SATURATION: 97 % | WEIGHT: 87.5 LBS | DIASTOLIC BLOOD PRESSURE: 48 MMHG | HEIGHT: 59 IN

## 2021-01-01 VITALS
DIASTOLIC BLOOD PRESSURE: 60 MMHG | BODY MASS INDEX: 16.21 KG/M2 | HEART RATE: 86 BPM | WEIGHT: 94.94 LBS | SYSTOLIC BLOOD PRESSURE: 104 MMHG | RESPIRATION RATE: 20 BRPM | HEIGHT: 64 IN | OXYGEN SATURATION: 99 %

## 2021-01-01 VITALS
HEART RATE: 79 BPM | RESPIRATION RATE: 20 BRPM | OXYGEN SATURATION: 97 % | SYSTOLIC BLOOD PRESSURE: 198 MMHG | DIASTOLIC BLOOD PRESSURE: 96 MMHG | WEIGHT: 93.5 LBS | HEIGHT: 60 IN | BODY MASS INDEX: 18.36 KG/M2 | TEMPERATURE: 99 F

## 2021-01-01 VITALS
DIASTOLIC BLOOD PRESSURE: 64 MMHG | TEMPERATURE: 99 F | RESPIRATION RATE: 16 BRPM | OXYGEN SATURATION: 96 % | SYSTOLIC BLOOD PRESSURE: 151 MMHG | HEART RATE: 100 BPM

## 2021-01-01 VITALS
HEART RATE: 93 BPM | SYSTOLIC BLOOD PRESSURE: 167 MMHG | DIASTOLIC BLOOD PRESSURE: 73 MMHG | BODY MASS INDEX: 18.14 KG/M2 | RESPIRATION RATE: 18 BRPM | TEMPERATURE: 99 F | HEIGHT: 59 IN | WEIGHT: 90 LBS | OXYGEN SATURATION: 97 %

## 2021-01-01 VITALS
HEART RATE: 90 BPM | WEIGHT: 93 LBS | DIASTOLIC BLOOD PRESSURE: 90 MMHG | HEIGHT: 60 IN | BODY MASS INDEX: 18.26 KG/M2 | SYSTOLIC BLOOD PRESSURE: 180 MMHG

## 2021-01-01 VITALS
WEIGHT: 84.69 LBS | OXYGEN SATURATION: 94 % | TEMPERATURE: 99 F | HEART RATE: 87 BPM | HEIGHT: 60 IN | DIASTOLIC BLOOD PRESSURE: 57 MMHG | RESPIRATION RATE: 18 BRPM | BODY MASS INDEX: 16.62 KG/M2 | SYSTOLIC BLOOD PRESSURE: 103 MMHG

## 2021-01-01 VITALS
HEART RATE: 88 BPM | SYSTOLIC BLOOD PRESSURE: 133 MMHG | WEIGHT: 103.38 LBS | OXYGEN SATURATION: 95 % | RESPIRATION RATE: 17 BRPM | HEIGHT: 59 IN | DIASTOLIC BLOOD PRESSURE: 75 MMHG | TEMPERATURE: 98 F | BODY MASS INDEX: 20.84 KG/M2

## 2021-01-01 DIAGNOSIS — Z71.89 COUNSELING REGARDING ADVANCE CARE PLANNING AND GOALS OF CARE: ICD-10-CM

## 2021-01-01 DIAGNOSIS — R41.82 ALTERED MENTAL STATUS: ICD-10-CM

## 2021-01-01 DIAGNOSIS — R00.0 TACHYCARDIA: ICD-10-CM

## 2021-01-01 DIAGNOSIS — N18.5 CKD (CHRONIC KIDNEY DISEASE), STAGE V: ICD-10-CM

## 2021-01-01 DIAGNOSIS — Z71.89 ADVANCE CARE PLANNING: ICD-10-CM

## 2021-01-01 DIAGNOSIS — Z51.5 PALLIATIVE CARE ENCOUNTER: ICD-10-CM

## 2021-01-01 DIAGNOSIS — E89.0 POST-SURGICAL HYPOTHYROIDISM: ICD-10-CM

## 2021-01-01 DIAGNOSIS — C73 MALIGNANT NEOPLASM OF THYROID METASTATIC TO LYMPH NODE OF NECK: ICD-10-CM

## 2021-01-01 DIAGNOSIS — Z71.89 GOALS OF CARE, COUNSELING/DISCUSSION: ICD-10-CM

## 2021-01-01 DIAGNOSIS — G89.3 CANCER RELATED PAIN: ICD-10-CM

## 2021-01-01 DIAGNOSIS — J84.10 CALCIFIED GRANULOMA OF LUNG: ICD-10-CM

## 2021-01-01 DIAGNOSIS — R11.0 NAUSEA: ICD-10-CM

## 2021-01-01 DIAGNOSIS — E87.20 METABOLIC ACIDOSIS: ICD-10-CM

## 2021-01-01 DIAGNOSIS — J44.9 CHRONIC OBSTRUCTIVE PULMONARY DISEASE, UNSPECIFIED COPD TYPE: ICD-10-CM

## 2021-01-01 DIAGNOSIS — R06.02 SHORTNESS OF BREATH: ICD-10-CM

## 2021-01-01 DIAGNOSIS — E89.2 POSTSURGICAL HYPOPARATHYROIDISM: ICD-10-CM

## 2021-01-01 DIAGNOSIS — E55.9 VITAMIN D DEFICIENCY: ICD-10-CM

## 2021-01-01 DIAGNOSIS — E87.29 HIGH ANION GAP METABOLIC ACIDOSIS: Primary | ICD-10-CM

## 2021-01-01 DIAGNOSIS — E86.0 DEHYDRATION: Primary | ICD-10-CM

## 2021-01-01 DIAGNOSIS — Z95.2 S/P TAVR (TRANSCATHETER AORTIC VALVE REPLACEMENT): ICD-10-CM

## 2021-01-01 DIAGNOSIS — C73 THYROID CANCER: ICD-10-CM

## 2021-01-01 DIAGNOSIS — E86.0 DEHYDRATION: ICD-10-CM

## 2021-01-01 DIAGNOSIS — R52 PAIN: ICD-10-CM

## 2021-01-01 DIAGNOSIS — J18.9 PNEUMONIA OF LEFT LUNG DUE TO INFECTIOUS ORGANISM, UNSPECIFIED PART OF LUNG: Primary | ICD-10-CM

## 2021-01-01 DIAGNOSIS — C73 MALIGNANT NEOPLASM OF THYROID GLAND: Primary | ICD-10-CM

## 2021-01-01 DIAGNOSIS — C73 THYROID CANCER: Primary | ICD-10-CM

## 2021-01-01 DIAGNOSIS — C73 THYROID CANCER: Primary | Chronic | ICD-10-CM

## 2021-01-01 DIAGNOSIS — N19 RENAL FAILURE, UNSPECIFIED CHRONICITY: ICD-10-CM

## 2021-01-01 DIAGNOSIS — R07.9 CHEST PAIN: ICD-10-CM

## 2021-01-01 DIAGNOSIS — Z85.118 HISTORY OF LUNG CANCER: ICD-10-CM

## 2021-01-01 DIAGNOSIS — C79.51 SECONDARY CANCER OF BONE: ICD-10-CM

## 2021-01-01 DIAGNOSIS — Z71.89 ADVANCED CARE PLANNING/COUNSELING DISCUSSION: ICD-10-CM

## 2021-01-01 DIAGNOSIS — Z95.2 S/P TAVR (TRANSCATHETER AORTIC VALVE REPLACEMENT): Primary | ICD-10-CM

## 2021-01-01 DIAGNOSIS — C73 MALIGNANT NEOPLASM OF THYROID GLAND: ICD-10-CM

## 2021-01-01 DIAGNOSIS — M81.0 SENILE OSTEOPOROSIS: ICD-10-CM

## 2021-01-01 DIAGNOSIS — E87.5 HYPERKALEMIA: ICD-10-CM

## 2021-01-01 DIAGNOSIS — Z86.73 HISTORY OF STROKE: ICD-10-CM

## 2021-01-01 DIAGNOSIS — C34.91 NON-SMALL CELL CANCER OF RIGHT LUNG: ICD-10-CM

## 2021-01-01 DIAGNOSIS — N18.5 STAGE 5 CHRONIC KIDNEY DISEASE NOT ON CHRONIC DIALYSIS: ICD-10-CM

## 2021-01-01 DIAGNOSIS — C78.00 MALIGNANT NEOPLASM METASTATIC TO LUNG, UNSPECIFIED LATERALITY: ICD-10-CM

## 2021-01-01 DIAGNOSIS — R53.81 DEBILITY: ICD-10-CM

## 2021-01-01 DIAGNOSIS — R53.1 WEAKNESS: ICD-10-CM

## 2021-01-01 DIAGNOSIS — R59.0 CERVICAL LYMPHADENOPATHY: ICD-10-CM

## 2021-01-01 DIAGNOSIS — C77.0 MALIGNANT NEOPLASM OF THYROID METASTATIC TO LYMPH NODE OF NECK: ICD-10-CM

## 2021-01-01 DIAGNOSIS — G89.3 CANCER RELATED PAIN: Primary | ICD-10-CM

## 2021-01-01 DIAGNOSIS — C34.91 NON-SMALL CELL CANCER OF RIGHT LUNG: Primary | ICD-10-CM

## 2021-01-01 DIAGNOSIS — I51.89 LEFT VENTRICULAR DIASTOLIC DYSFUNCTION WITH PRESERVED SYSTOLIC FUNCTION: ICD-10-CM

## 2021-01-01 DIAGNOSIS — R63.0 LACK OF APPETITE: ICD-10-CM

## 2021-01-01 DIAGNOSIS — E07.9 THYROID MASS: ICD-10-CM

## 2021-01-01 DIAGNOSIS — N17.9 AKI (ACUTE KIDNEY INJURY): ICD-10-CM

## 2021-01-01 DIAGNOSIS — N18.5 CHRONIC KIDNEY DISEASE, STAGE V: Primary | ICD-10-CM

## 2021-01-01 DIAGNOSIS — E11.22 CONTROLLED TYPE 2 DIABETES MELLITUS WITH CHRONIC KIDNEY DISEASE, UNSPECIFIED CKD STAGE, UNSPECIFIED WHETHER LONG TERM INSULIN USE: Primary | ICD-10-CM

## 2021-01-01 DIAGNOSIS — Z86.73 HISTORY OF STROKE: Primary | ICD-10-CM

## 2021-01-01 DIAGNOSIS — Z23 NEED FOR VACCINATION: Primary | ICD-10-CM

## 2021-01-01 DIAGNOSIS — I95.1 ORTHOSTATIC HYPOTENSION: ICD-10-CM

## 2021-01-01 DIAGNOSIS — Z91.81 AT RISK FOR FALLS: ICD-10-CM

## 2021-01-01 DIAGNOSIS — I77.819 ECTATIC AORTA: ICD-10-CM

## 2021-01-01 LAB
25(OH)D3+25(OH)D2 SERPL-MCNC: 26 NG/ML (ref 30–96)
ABO + RH BLD: NORMAL
ALBUMIN SERPL BCP-MCNC: 1.4 G/DL (ref 3.5–5.2)
ALBUMIN SERPL BCP-MCNC: 1.5 G/DL (ref 3.5–5.2)
ALBUMIN SERPL BCP-MCNC: 1.5 G/DL (ref 3.5–5.2)
ALBUMIN SERPL BCP-MCNC: 1.7 G/DL (ref 3.5–5.2)
ALBUMIN SERPL BCP-MCNC: 1.8 G/DL (ref 3.5–5.2)
ALBUMIN SERPL BCP-MCNC: 1.8 G/DL (ref 3.5–5.2)
ALBUMIN SERPL BCP-MCNC: 1.9 G/DL (ref 3.5–5.2)
ALBUMIN SERPL BCP-MCNC: 2 G/DL (ref 3.5–5.2)
ALBUMIN SERPL BCP-MCNC: 2.4 G/DL (ref 3.5–5.2)
ALBUMIN SERPL BCP-MCNC: 2.8 G/DL (ref 3.5–5.2)
ALBUMIN SERPL BCP-MCNC: 3.1 G/DL (ref 3.5–5.2)
ALBUMIN SERPL BCP-MCNC: 3.6 G/DL (ref 3.5–5.2)
ALBUMIN SERPL BCP-MCNC: 3.9 G/DL (ref 3.5–5.2)
ALLENS TEST: ABNORMAL
ALP SERPL-CCNC: 112 U/L (ref 55–135)
ALP SERPL-CCNC: 62 U/L (ref 55–135)
ALP SERPL-CCNC: 65 U/L (ref 55–135)
ALP SERPL-CCNC: 68 U/L (ref 55–135)
ALP SERPL-CCNC: 71 U/L (ref 55–135)
ALP SERPL-CCNC: 72 U/L (ref 55–135)
ALP SERPL-CCNC: 76 U/L (ref 55–135)
ALP SERPL-CCNC: 83 U/L (ref 55–135)
ALP SERPL-CCNC: 83 U/L (ref 55–135)
ALP SERPL-CCNC: 86 U/L (ref 55–135)
ALP SERPL-CCNC: 87 U/L (ref 55–135)
ALP SERPL-CCNC: 87 U/L (ref 55–135)
ALP SERPL-CCNC: 91 U/L (ref 55–135)
ALP SERPL-CCNC: 94 U/L (ref 55–135)
ALT SERPL W/O P-5'-P-CCNC: 10 U/L (ref 10–44)
ALT SERPL W/O P-5'-P-CCNC: 20 U/L (ref 10–44)
ALT SERPL W/O P-5'-P-CCNC: 22 U/L (ref 10–44)
ALT SERPL W/O P-5'-P-CCNC: 24 U/L (ref 10–44)
ALT SERPL W/O P-5'-P-CCNC: 25 U/L (ref 10–44)
ALT SERPL W/O P-5'-P-CCNC: 25 U/L (ref 10–44)
ALT SERPL W/O P-5'-P-CCNC: 26 U/L (ref 10–44)
ALT SERPL W/O P-5'-P-CCNC: 27 U/L (ref 10–44)
ALT SERPL W/O P-5'-P-CCNC: 31 U/L (ref 10–44)
ALT SERPL W/O P-5'-P-CCNC: 34 U/L (ref 10–44)
ALT SERPL W/O P-5'-P-CCNC: 35 U/L (ref 10–44)
ALT SERPL W/O P-5'-P-CCNC: 37 U/L (ref 10–44)
ALT SERPL W/O P-5'-P-CCNC: 56 U/L (ref 10–44)
ALT SERPL W/O P-5'-P-CCNC: 8 U/L (ref 10–44)
ANION GAP SERPL CALC-SCNC: 10 MMOL/L (ref 8–16)
ANION GAP SERPL CALC-SCNC: 10 MMOL/L (ref 8–16)
ANION GAP SERPL CALC-SCNC: 11 MMOL/L (ref 8–16)
ANION GAP SERPL CALC-SCNC: 11 MMOL/L (ref 8–16)
ANION GAP SERPL CALC-SCNC: 12 MMOL/L (ref 8–16)
ANION GAP SERPL CALC-SCNC: 13 MMOL/L (ref 8–16)
ANION GAP SERPL CALC-SCNC: 14 MMOL/L (ref 8–16)
ANION GAP SERPL CALC-SCNC: 15 MMOL/L (ref 8–16)
ANION GAP SERPL CALC-SCNC: 16 MMOL/L (ref 8–16)
ANION GAP SERPL CALC-SCNC: 19 MMOL/L (ref 8–16)
ANION GAP SERPL CALC-SCNC: 20 MMOL/L (ref 8–16)
ANION GAP SERPL CALC-SCNC: 9 MMOL/L (ref 8–16)
ANISOCYTOSIS BLD QL SMEAR: SLIGHT
APTT BLDCRRT: 98 SEC (ref 21–32)
APTT BLDCRRT: >150 SEC (ref 21–32)
APTT BLDCRRT: >150 SEC (ref 21–32)
ASCENDING AORTA: 2.58 CM
AST SERPL-CCNC: 102 U/L (ref 10–40)
AST SERPL-CCNC: 15 U/L (ref 10–40)
AST SERPL-CCNC: 18 U/L (ref 10–40)
AST SERPL-CCNC: 19 U/L (ref 10–40)
AST SERPL-CCNC: 21 U/L (ref 10–40)
AST SERPL-CCNC: 48 U/L (ref 10–40)
AST SERPL-CCNC: 50 U/L (ref 10–40)
AST SERPL-CCNC: 52 U/L (ref 10–40)
AST SERPL-CCNC: 57 U/L (ref 10–40)
AST SERPL-CCNC: 57 U/L (ref 10–40)
AST SERPL-CCNC: 58 U/L (ref 10–40)
AST SERPL-CCNC: 62 U/L (ref 10–40)
AST SERPL-CCNC: 65 U/L (ref 10–40)
AST SERPL-CCNC: 71 U/L (ref 10–40)
AV INDEX (PROSTH): 0.79
AV MEAN GRADIENT: 4 MMHG
AV PEAK GRADIENT: 10 MMHG
AV VALVE AREA: 4.06 CM2
AV VELOCITY RATIO: 0.7
B-OH-BUTYR BLD STRIP-SCNC: 2.2 MMOL/L (ref 0–0.5)
BACTERIA #/AREA URNS AUTO: ABNORMAL /HPF
BACTERIA #/AREA URNS AUTO: NORMAL /HPF
BACTERIA BLD CULT: NORMAL
BACTERIA BLD CULT: NORMAL
BACTERIA UR CULT: NO GROWTH
BACTERIA UR CULT: NO GROWTH
BASOPHILS # BLD AUTO: 0.02 K/UL (ref 0–0.2)
BASOPHILS # BLD AUTO: 0.02 K/UL (ref 0–0.2)
BASOPHILS # BLD AUTO: 0.04 K/UL (ref 0–0.2)
BASOPHILS # BLD AUTO: 0.05 K/UL (ref 0–0.2)
BASOPHILS # BLD AUTO: 0.06 K/UL (ref 0–0.2)
BASOPHILS # BLD AUTO: 0.07 K/UL (ref 0–0.2)
BASOPHILS # BLD AUTO: 0.08 K/UL (ref 0–0.2)
BASOPHILS # BLD AUTO: ABNORMAL K/UL (ref 0–0.2)
BASOPHILS NFR BLD: 0 % (ref 0–1.9)
BASOPHILS NFR BLD: 0.3 % (ref 0–1.9)
BASOPHILS NFR BLD: 0.3 % (ref 0–1.9)
BASOPHILS NFR BLD: 0.4 % (ref 0–1.9)
BASOPHILS NFR BLD: 0.5 % (ref 0–1.9)
BASOPHILS NFR BLD: 0.6 % (ref 0–1.9)
BASOPHILS NFR BLD: 0.6 % (ref 0–1.9)
BASOPHILS NFR BLD: 0.7 % (ref 0–1.9)
BASOPHILS NFR BLD: 0.8 % (ref 0–1.9)
BILIRUB SERPL-MCNC: 0.4 MG/DL (ref 0.1–1)
BILIRUB SERPL-MCNC: 0.5 MG/DL (ref 0.1–1)
BILIRUB SERPL-MCNC: 0.6 MG/DL (ref 0.1–1)
BILIRUB SERPL-MCNC: 0.6 MG/DL (ref 0.1–1)
BILIRUB SERPL-MCNC: 0.7 MG/DL (ref 0.1–1)
BILIRUB SERPL-MCNC: 0.9 MG/DL (ref 0.1–1)
BILIRUB UR QL STRIP: NEGATIVE
BLD GP AB SCN CELLS X3 SERPL QL: NORMAL
BLD PROD TYP BPU: NORMAL
BLOOD UNIT EXPIRATION DATE: NORMAL
BLOOD UNIT TYPE CODE: 5100
BLOOD UNIT TYPE: NORMAL
BNP SERPL-MCNC: 200 PG/ML (ref 0–99)
BNP SERPL-MCNC: 38 PG/ML (ref 0–99)
BSA FOR ECHO PROCEDURE: 1.34 M2
BUN SERPL-MCNC: 36 MG/DL (ref 8–23)
BUN SERPL-MCNC: 38 MG/DL (ref 8–23)
BUN SERPL-MCNC: 43 MG/DL (ref 8–23)
BUN SERPL-MCNC: 44 MG/DL (ref 8–23)
BUN SERPL-MCNC: 45 MG/DL (ref 8–23)
BUN SERPL-MCNC: 46 MG/DL (ref 8–23)
BUN SERPL-MCNC: 47 MG/DL (ref 6–30)
BUN SERPL-MCNC: 48 MG/DL (ref 8–23)
BUN SERPL-MCNC: 49 MG/DL (ref 8–23)
BUN SERPL-MCNC: 49 MG/DL (ref 8–23)
BUN SERPL-MCNC: 50 MG/DL (ref 8–23)
BUN SERPL-MCNC: 53 MG/DL (ref 8–23)
BUN SERPL-MCNC: 66 MG/DL (ref 8–23)
BUN SERPL-MCNC: 66 MG/DL (ref 8–23)
BUN SERPL-MCNC: 67 MG/DL (ref 8–23)
BUN SERPL-MCNC: 67 MG/DL (ref 8–23)
BUN SERPL-MCNC: 68 MG/DL (ref 8–23)
BUN SERPL-MCNC: 68 MG/DL (ref 8–23)
BUN SERPL-MCNC: 69 MG/DL (ref 8–23)
BUN SERPL-MCNC: 73 MG/DL (ref 8–23)
BUN SERPL-MCNC: 75 MG/DL (ref 8–23)
BUN SERPL-MCNC: 76 MG/DL (ref 8–23)
BUN SERPL-MCNC: 79 MG/DL (ref 8–23)
BUN SERPL-MCNC: 80 MG/DL (ref 8–23)
BURR CELLS BLD QL SMEAR: ABNORMAL
CALCIUM SERPL-MCNC: 6.2 MG/DL (ref 8.7–10.5)
CALCIUM SERPL-MCNC: 6.3 MG/DL (ref 8.7–10.5)
CALCIUM SERPL-MCNC: 6.3 MG/DL (ref 8.7–10.5)
CALCIUM SERPL-MCNC: 6.4 MG/DL (ref 8.7–10.5)
CALCIUM SERPL-MCNC: 6.5 MG/DL (ref 8.7–10.5)
CALCIUM SERPL-MCNC: 6.5 MG/DL (ref 8.7–10.5)
CALCIUM SERPL-MCNC: 6.9 MG/DL (ref 8.7–10.5)
CALCIUM SERPL-MCNC: 7.2 MG/DL (ref 8.7–10.5)
CALCIUM SERPL-MCNC: 7.3 MG/DL (ref 8.7–10.5)
CALCIUM SERPL-MCNC: 7.5 MG/DL (ref 8.7–10.5)
CALCIUM SERPL-MCNC: 7.6 MG/DL (ref 8.7–10.5)
CALCIUM SERPL-MCNC: 7.9 MG/DL (ref 8.7–10.5)
CALCIUM SERPL-MCNC: 7.9 MG/DL (ref 8.7–10.5)
CALCIUM SERPL-MCNC: 8 MG/DL (ref 8.7–10.5)
CALCIUM SERPL-MCNC: 8 MG/DL (ref 8.7–10.5)
CALCIUM SERPL-MCNC: 8.1 MG/DL (ref 8.7–10.5)
CALCIUM SERPL-MCNC: 8.1 MG/DL (ref 8.7–10.5)
CALCIUM SERPL-MCNC: 8.3 MG/DL (ref 8.7–10.5)
CALCIUM SERPL-MCNC: 8.4 MG/DL (ref 8.7–10.5)
CALCIUM SERPL-MCNC: 8.5 MG/DL (ref 8.7–10.5)
CALCIUM SERPL-MCNC: 8.7 MG/DL (ref 8.7–10.5)
CALCIUM SERPL-MCNC: 8.8 MG/DL (ref 8.7–10.5)
CALCIUM SERPL-MCNC: 8.9 MG/DL (ref 8.7–10.5)
CALCIUM SERPL-MCNC: 9.1 MG/DL (ref 8.7–10.5)
CALCIUM SERPL-MCNC: 9.4 MG/DL (ref 8.7–10.5)
CALCIUM SERPL-MCNC: 9.5 MG/DL (ref 8.7–10.5)
CHLORIDE SERPL-SCNC: 100 MMOL/L (ref 95–110)
CHLORIDE SERPL-SCNC: 100 MMOL/L (ref 95–110)
CHLORIDE SERPL-SCNC: 101 MMOL/L (ref 95–110)
CHLORIDE SERPL-SCNC: 101 MMOL/L (ref 95–110)
CHLORIDE SERPL-SCNC: 102 MMOL/L (ref 95–110)
CHLORIDE SERPL-SCNC: 103 MMOL/L (ref 95–110)
CHLORIDE SERPL-SCNC: 104 MMOL/L (ref 95–110)
CHLORIDE SERPL-SCNC: 107 MMOL/L (ref 95–110)
CHLORIDE SERPL-SCNC: 108 MMOL/L (ref 95–110)
CHLORIDE SERPL-SCNC: 109 MMOL/L (ref 95–110)
CHLORIDE SERPL-SCNC: 109 MMOL/L (ref 95–110)
CHLORIDE SERPL-SCNC: 110 MMOL/L (ref 95–110)
CHLORIDE SERPL-SCNC: 114 MMOL/L (ref 95–110)
CK SERPL-CCNC: 206 U/L (ref 20–180)
CLARITY UR REFRACT.AUTO: CLEAR
CLARITY UR: CLEAR
CLARITY UR: CLEAR
CO2 SERPL-SCNC: 15 MMOL/L (ref 23–29)
CO2 SERPL-SCNC: 16 MMOL/L (ref 23–29)
CO2 SERPL-SCNC: 16 MMOL/L (ref 23–29)
CO2 SERPL-SCNC: 17 MMOL/L (ref 23–29)
CO2 SERPL-SCNC: 19 MMOL/L (ref 23–29)
CO2 SERPL-SCNC: 19 MMOL/L (ref 23–29)
CO2 SERPL-SCNC: 20 MMOL/L (ref 23–29)
CO2 SERPL-SCNC: 21 MMOL/L (ref 23–29)
CO2 SERPL-SCNC: 22 MMOL/L (ref 23–29)
CO2 SERPL-SCNC: 23 MMOL/L (ref 23–29)
CO2 SERPL-SCNC: 24 MMOL/L (ref 23–29)
CO2 SERPL-SCNC: 25 MMOL/L (ref 23–29)
CO2 SERPL-SCNC: 25 MMOL/L (ref 23–29)
CO2 SERPL-SCNC: 26 MMOL/L (ref 23–29)
CO2 SERPL-SCNC: 26 MMOL/L (ref 23–29)
CO2 SERPL-SCNC: 7 MMOL/L (ref 23–29)
CO2 SERPL-SCNC: 8 MMOL/L (ref 23–29)
CO2 SERPL-SCNC: 9 MMOL/L (ref 23–29)
CODING SYSTEM: NORMAL
COLOR UR AUTO: ABNORMAL
COLOR UR AUTO: YELLOW
COLOR UR AUTO: YELLOW
COLOR UR: YELLOW
COLOR UR: YELLOW
CREAT SERPL-MCNC: 3.5 MG/DL (ref 0.5–1.4)
CREAT SERPL-MCNC: 3.6 MG/DL (ref 0.5–1.4)
CREAT SERPL-MCNC: 3.7 MG/DL (ref 0.5–1.4)
CREAT SERPL-MCNC: 3.8 MG/DL (ref 0.5–1.4)
CREAT SERPL-MCNC: 4 MG/DL (ref 0.5–1.4)
CREAT SERPL-MCNC: 4.2 MG/DL (ref 0.5–1.4)
CREAT SERPL-MCNC: 4.4 MG/DL (ref 0.5–1.4)
CREAT SERPL-MCNC: 4.5 MG/DL (ref 0.5–1.4)
CREAT SERPL-MCNC: 4.6 MG/DL (ref 0.5–1.4)
CREAT SERPL-MCNC: 4.7 MG/DL (ref 0.5–1.4)
CREAT SERPL-MCNC: 4.8 MG/DL (ref 0.5–1.4)
CREAT SERPL-MCNC: 4.9 MG/DL (ref 0.5–1.4)
CREAT SERPL-MCNC: 5.1 MG/DL (ref 0.5–1.4)
CREAT SERPL-MCNC: 5.3 MG/DL (ref 0.5–1.4)
CREAT SERPL-MCNC: 5.5 MG/DL (ref 0.5–1.4)
CREAT SERPL-MCNC: 5.5 MG/DL (ref 0.5–1.4)
CREAT SERPL-MCNC: 5.6 MG/DL (ref 0.5–1.4)
CREAT SERPL-MCNC: 5.7 MG/DL (ref 0.5–1.4)
CREAT SERPL-MCNC: 6 MG/DL (ref 0.5–1.4)
CTP QC/QA: YES
CV ECHO LV RWT: 0.3 CM
D DIMER PPP IA.FEU-MCNC: 4.87 MG/L FEU
DELSYS: ABNORMAL
DIFFERENTIAL METHOD: ABNORMAL
DISPENSE STATUS: NORMAL
DOHLE BOD BLD QL SMEAR: PRESENT
DOP CALC AO PEAK VEL: 1.59 M/S
DOP CALC AO VTI: 26.81 CM
DOP CALC LVOT AREA: 5.1 CM2
DOP CALC LVOT DIAMETER: 2.56 CM
DOP CALC LVOT PEAK VEL: 1.11 M/S
DOP CALC LVOT STROKE VOLUME: 108.91 CM3
DOP CALCLVOT PEAK VEL VTI: 21.17 CM
E WAVE DECELERATION TIME: 464.14 MSEC
E/A RATIO: 0.62
E/E' RATIO: 27.56 M/S
ECHO LV POSTERIOR WALL: 0.54 CM (ref 0.6–1.1)
EJECTION FRACTION: 60 %
EOSINOPHIL # BLD AUTO: 0 K/UL (ref 0–0.5)
EOSINOPHIL # BLD AUTO: 0.1 K/UL (ref 0–0.5)
EOSINOPHIL # BLD AUTO: 0.1 K/UL (ref 0–0.5)
EOSINOPHIL # BLD AUTO: 0.2 K/UL (ref 0–0.5)
EOSINOPHIL # BLD AUTO: 0.2 K/UL (ref 0–0.5)
EOSINOPHIL # BLD AUTO: 0.3 K/UL (ref 0–0.5)
EOSINOPHIL # BLD AUTO: ABNORMAL K/UL (ref 0–0.5)
EOSINOPHIL NFR BLD: 0 % (ref 0–8)
EOSINOPHIL NFR BLD: 0.3 % (ref 0–8)
EOSINOPHIL NFR BLD: 0.4 % (ref 0–8)
EOSINOPHIL NFR BLD: 0.7 % (ref 0–8)
EOSINOPHIL NFR BLD: 1.7 % (ref 0–8)
EOSINOPHIL NFR BLD: 2 % (ref 0–8)
EOSINOPHIL NFR BLD: 2 % (ref 0–8)
EOSINOPHIL NFR BLD: 2.2 % (ref 0–8)
EOSINOPHIL NFR BLD: 2.2 % (ref 0–8)
EOSINOPHIL NFR BLD: 2.6 % (ref 0–8)
ERYTHROCYTE [DISTWIDTH] IN BLOOD BY AUTOMATED COUNT: 14.4 % (ref 11.5–14.5)
ERYTHROCYTE [DISTWIDTH] IN BLOOD BY AUTOMATED COUNT: 14.5 % (ref 11.5–14.5)
ERYTHROCYTE [DISTWIDTH] IN BLOOD BY AUTOMATED COUNT: 14.6 % (ref 11.5–14.5)
ERYTHROCYTE [DISTWIDTH] IN BLOOD BY AUTOMATED COUNT: 14.8 % (ref 11.5–14.5)
ERYTHROCYTE [DISTWIDTH] IN BLOOD BY AUTOMATED COUNT: 14.9 % (ref 11.5–14.5)
ERYTHROCYTE [DISTWIDTH] IN BLOOD BY AUTOMATED COUNT: 15 % (ref 11.5–14.5)
ERYTHROCYTE [DISTWIDTH] IN BLOOD BY AUTOMATED COUNT: 16.7 % (ref 11.5–14.5)
ERYTHROCYTE [DISTWIDTH] IN BLOOD BY AUTOMATED COUNT: 17 % (ref 11.5–14.5)
ERYTHROCYTE [DISTWIDTH] IN BLOOD BY AUTOMATED COUNT: 17 % (ref 11.5–14.5)
ERYTHROCYTE [DISTWIDTH] IN BLOOD BY AUTOMATED COUNT: 17.8 % (ref 11.5–14.5)
ERYTHROCYTE [DISTWIDTH] IN BLOOD BY AUTOMATED COUNT: 17.9 % (ref 11.5–14.5)
ERYTHROCYTE [DISTWIDTH] IN BLOOD BY AUTOMATED COUNT: 18.1 % (ref 11.5–14.5)
EST. GFR  (AFRICAN AMERICAN): 10 ML/MIN/1.73 M^2
EST. GFR  (AFRICAN AMERICAN): 10 ML/MIN/1.73 M^2
EST. GFR  (AFRICAN AMERICAN): 10.7 ML/MIN/1.73 M^2
EST. GFR  (AFRICAN AMERICAN): 11 ML/MIN/1.73 M^2
EST. GFR  (AFRICAN AMERICAN): 12 ML/MIN/1.73 M^2
EST. GFR  (AFRICAN AMERICAN): 12.4 ML/MIN/1.73 M^2
EST. GFR  (AFRICAN AMERICAN): 12.8 ML/MIN/1.73 M^2
EST. GFR  (AFRICAN AMERICAN): 13.2 ML/MIN/1.73 M^2
EST. GFR  (AFRICAN AMERICAN): 7 ML/MIN/1.73 M^2
EST. GFR  (AFRICAN AMERICAN): 8 ML/MIN/1.73 M^2
EST. GFR  (AFRICAN AMERICAN): 8.8 ML/MIN/1.73 M^2
EST. GFR  (AFRICAN AMERICAN): 9 ML/MIN/1.73 M^2
EST. GFR  (AFRICAN AMERICAN): 9 ML/MIN/1.73 M^2
EST. GFR  (AFRICAN AMERICAN): 9.5 ML/MIN/1.73 M^2
EST. GFR  (NON AFRICAN AMERICAN): 10 ML/MIN/1.73 M^2
EST. GFR  (NON AFRICAN AMERICAN): 10.4 ML/MIN/1.73 M^2
EST. GFR  (NON AFRICAN AMERICAN): 10.7 ML/MIN/1.73 M^2
EST. GFR  (NON AFRICAN AMERICAN): 11.1 ML/MIN/1.73 M^2
EST. GFR  (NON AFRICAN AMERICAN): 11.5 ML/MIN/1.73 M^2
EST. GFR  (NON AFRICAN AMERICAN): 6 ML/MIN/1.73 M^2
EST. GFR  (NON AFRICAN AMERICAN): 6 ML/MIN/1.73 M^2
EST. GFR  (NON AFRICAN AMERICAN): 7 ML/MIN/1.73 M^2
EST. GFR  (NON AFRICAN AMERICAN): 7.6 ML/MIN/1.73 M^2
EST. GFR  (NON AFRICAN AMERICAN): 8 ML/MIN/1.73 M^2
EST. GFR  (NON AFRICAN AMERICAN): 8.3 ML/MIN/1.73 M^2
EST. GFR  (NON AFRICAN AMERICAN): 9 ML/MIN/1.73 M^2
EST. GFR  (NON AFRICAN AMERICAN): 9.3 ML/MIN/1.73 M^2
FINAL PATHOLOGIC DIAGNOSIS: ABNORMAL
FRACTIONAL SHORTENING: 33 % (ref 28–44)
GLUCOSE SERPL-MCNC: 106 MG/DL (ref 70–110)
GLUCOSE SERPL-MCNC: 109 MG/DL (ref 70–110)
GLUCOSE SERPL-MCNC: 112 MG/DL (ref 70–110)
GLUCOSE SERPL-MCNC: 118 MG/DL (ref 70–110)
GLUCOSE SERPL-MCNC: 125 MG/DL (ref 70–110)
GLUCOSE SERPL-MCNC: 127 MG/DL (ref 70–110)
GLUCOSE SERPL-MCNC: 131 MG/DL (ref 70–110)
GLUCOSE SERPL-MCNC: 135 MG/DL (ref 70–110)
GLUCOSE SERPL-MCNC: 144 MG/DL (ref 70–110)
GLUCOSE SERPL-MCNC: 145 MG/DL (ref 70–110)
GLUCOSE SERPL-MCNC: 145 MG/DL (ref 70–110)
GLUCOSE SERPL-MCNC: 155 MG/DL (ref 70–110)
GLUCOSE SERPL-MCNC: 159 MG/DL (ref 70–110)
GLUCOSE SERPL-MCNC: 173 MG/DL (ref 70–110)
GLUCOSE SERPL-MCNC: 188 MG/DL (ref 70–110)
GLUCOSE SERPL-MCNC: 61 MG/DL (ref 70–110)
GLUCOSE SERPL-MCNC: 62 MG/DL (ref 70–110)
GLUCOSE SERPL-MCNC: 71 MG/DL (ref 70–110)
GLUCOSE SERPL-MCNC: 76 MG/DL (ref 70–110)
GLUCOSE SERPL-MCNC: 83 MG/DL (ref 70–110)
GLUCOSE SERPL-MCNC: 83 MG/DL (ref 70–110)
GLUCOSE SERPL-MCNC: 87 MG/DL (ref 70–110)
GLUCOSE SERPL-MCNC: 90 MG/DL (ref 70–110)
GLUCOSE SERPL-MCNC: 92 MG/DL (ref 70–110)
GLUCOSE SERPL-MCNC: 93 MG/DL (ref 70–110)
GLUCOSE SERPL-MCNC: 94 MG/DL (ref 70–110)
GLUCOSE SERPL-MCNC: 96 MG/DL (ref 70–110)
GLUCOSE UR QL STRIP: NEGATIVE
HCO3 UR-SCNC: 13.6 MMOL/L (ref 24–28)
HCO3 UR-SCNC: 26.9 MMOL/L (ref 24–28)
HCO3 UR-SCNC: 27.7 MMOL/L (ref 24–28)
HCO3 UR-SCNC: 27.7 MMOL/L (ref 24–28)
HCT VFR BLD AUTO: 21.8 % (ref 37–48.5)
HCT VFR BLD AUTO: 22.1 % (ref 37–48.5)
HCT VFR BLD AUTO: 22.2 % (ref 37–48.5)
HCT VFR BLD AUTO: 26.2 % (ref 37–48.5)
HCT VFR BLD AUTO: 27.3 % (ref 37–48.5)
HCT VFR BLD AUTO: 27.4 % (ref 37–48.5)
HCT VFR BLD AUTO: 29 % (ref 37–48.5)
HCT VFR BLD AUTO: 30 % (ref 37–48.5)
HCT VFR BLD AUTO: 30.6 % (ref 37–48.5)
HCT VFR BLD AUTO: 30.9 % (ref 37–48.5)
HCT VFR BLD AUTO: 33.4 % (ref 37–48.5)
HCT VFR BLD AUTO: 34.2 % (ref 37–48.5)
HCT VFR BLD CALC: 34 %PCV (ref 36–54)
HGB BLD-MCNC: 10.7 G/DL (ref 12–16)
HGB BLD-MCNC: 10.9 G/DL (ref 12–16)
HGB BLD-MCNC: 7.2 G/DL (ref 12–16)
HGB BLD-MCNC: 7.6 G/DL (ref 12–16)
HGB BLD-MCNC: 7.7 G/DL (ref 12–16)
HGB BLD-MCNC: 8.1 G/DL (ref 12–16)
HGB BLD-MCNC: 8.8 G/DL (ref 12–16)
HGB BLD-MCNC: 8.8 G/DL (ref 12–16)
HGB BLD-MCNC: 8.9 G/DL (ref 12–16)
HGB BLD-MCNC: 9.1 G/DL (ref 12–16)
HGB BLD-MCNC: 9.5 G/DL (ref 12–16)
HGB BLD-MCNC: 9.9 G/DL (ref 12–16)
HGB UR QL STRIP: ABNORMAL
HGB UR QL STRIP: NEGATIVE
HR MV ECHO: 90 BPM
HYPOCHROMIA BLD QL SMEAR: ABNORMAL
HYPOCHROMIA BLD QL SMEAR: ABNORMAL
IMM GRANULOCYTES # BLD AUTO: 0.04 K/UL (ref 0–0.04)
IMM GRANULOCYTES # BLD AUTO: 0.04 K/UL (ref 0–0.04)
IMM GRANULOCYTES # BLD AUTO: 0.05 K/UL (ref 0–0.04)
IMM GRANULOCYTES # BLD AUTO: 0.05 K/UL (ref 0–0.04)
IMM GRANULOCYTES # BLD AUTO: 0.07 K/UL (ref 0–0.04)
IMM GRANULOCYTES # BLD AUTO: 0.13 K/UL (ref 0–0.04)
IMM GRANULOCYTES # BLD AUTO: 0.15 K/UL (ref 0–0.04)
IMM GRANULOCYTES # BLD AUTO: 0.22 K/UL (ref 0–0.04)
IMM GRANULOCYTES # BLD AUTO: 0.23 K/UL (ref 0–0.04)
IMM GRANULOCYTES # BLD AUTO: 0.32 K/UL (ref 0–0.04)
IMM GRANULOCYTES # BLD AUTO: 0.35 K/UL (ref 0–0.04)
IMM GRANULOCYTES # BLD AUTO: ABNORMAL K/UL (ref 0–0.04)
IMM GRANULOCYTES NFR BLD AUTO: 0.5 % (ref 0–0.5)
IMM GRANULOCYTES NFR BLD AUTO: 0.6 % (ref 0–0.5)
IMM GRANULOCYTES NFR BLD AUTO: 0.7 % (ref 0–0.5)
IMM GRANULOCYTES NFR BLD AUTO: 0.9 % (ref 0–0.5)
IMM GRANULOCYTES NFR BLD AUTO: 1 % (ref 0–0.5)
IMM GRANULOCYTES NFR BLD AUTO: 1.2 % (ref 0–0.5)
IMM GRANULOCYTES NFR BLD AUTO: 1.5 % (ref 0–0.5)
IMM GRANULOCYTES NFR BLD AUTO: 1.7 % (ref 0–0.5)
IMM GRANULOCYTES NFR BLD AUTO: 2 % (ref 0–0.5)
IMM GRANULOCYTES NFR BLD AUTO: 2.2 % (ref 0–0.5)
IMM GRANULOCYTES NFR BLD AUTO: 2.2 % (ref 0–0.5)
IMM GRANULOCYTES NFR BLD AUTO: ABNORMAL % (ref 0–0.5)
INTERVENTRICULAR SEPTUM: 0.7 CM (ref 0.6–1.1)
IVRT: 182.68 MSEC
KETONES UR QL STRIP: ABNORMAL
KETONES UR QL STRIP: NEGATIVE
KETONES UR QL STRIP: NEGATIVE
LA MAJOR: 4.98 CM
LA MINOR: 5.01 CM
LA WIDTH: 3.66 CM
LACTATE SERPL-SCNC: 0.7 MMOL/L (ref 0.5–2.2)
LACTATE SERPL-SCNC: 1.6 MMOL/L (ref 0.5–2.2)
LACTATE SERPL-SCNC: 1.9 MMOL/L (ref 0.5–2.2)
LACTATE SERPL-SCNC: 2.1 MMOL/L (ref 0.5–2.2)
LACTATE SERPL-SCNC: 2.8 MMOL/L (ref 0.5–2.2)
LEFT ATRIUM SIZE: 3.06 CM
LEFT ATRIUM VOLUME INDEX MOD: 31 ML/M2
LEFT ATRIUM VOLUME INDEX: 35.2 ML/M2
LEFT ATRIUM VOLUME MOD: 41.89 CM3
LEFT ATRIUM VOLUME: 47.55 CM3
LEFT INTERNAL DIMENSION IN SYSTOLE: 2.4 CM (ref 2.1–4)
LEFT VENTRICLE DIASTOLIC VOLUME INDEX: 39.68 ML/M2
LEFT VENTRICLE DIASTOLIC VOLUME: 53.57 ML
LEFT VENTRICLE MASS INDEX: 41 G/M2
LEFT VENTRICLE SYSTOLIC VOLUME INDEX: 15 ML/M2
LEFT VENTRICLE SYSTOLIC VOLUME: 20.25 ML
LEFT VENTRICULAR INTERNAL DIMENSION IN DIASTOLE: 3.58 CM (ref 3.5–6)
LEFT VENTRICULAR MASS: 55.59 G
LEUKOCYTE ESTERASE UR QL STRIP: ABNORMAL
LEUKOCYTE ESTERASE UR QL STRIP: NEGATIVE
LEUKOCYTE ESTERASE UR QL STRIP: NEGATIVE
LV LATERAL E/E' RATIO: 31 M/S
LV SEPTAL E/E' RATIO: 24.8 M/S
LYMPHOCYTES # BLD AUTO: 0.6 K/UL (ref 1–4.8)
LYMPHOCYTES # BLD AUTO: 0.8 K/UL (ref 1–4.8)
LYMPHOCYTES # BLD AUTO: 0.9 K/UL (ref 1–4.8)
LYMPHOCYTES # BLD AUTO: 0.9 K/UL (ref 1–4.8)
LYMPHOCYTES # BLD AUTO: 1 K/UL (ref 1–4.8)
LYMPHOCYTES # BLD AUTO: 1 K/UL (ref 1–4.8)
LYMPHOCYTES # BLD AUTO: 1.2 K/UL (ref 1–4.8)
LYMPHOCYTES # BLD AUTO: 1.3 K/UL (ref 1–4.8)
LYMPHOCYTES # BLD AUTO: 1.3 K/UL (ref 1–4.8)
LYMPHOCYTES # BLD AUTO: 1.7 K/UL (ref 1–4.8)
LYMPHOCYTES # BLD AUTO: 2.3 K/UL (ref 1–4.8)
LYMPHOCYTES # BLD AUTO: ABNORMAL K/UL (ref 1–4.8)
LYMPHOCYTES NFR BLD: 10.8 % (ref 18–48)
LYMPHOCYTES NFR BLD: 14.7 % (ref 18–48)
LYMPHOCYTES NFR BLD: 14.9 % (ref 18–48)
LYMPHOCYTES NFR BLD: 17.2 % (ref 18–48)
LYMPHOCYTES NFR BLD: 17.4 % (ref 18–48)
LYMPHOCYTES NFR BLD: 18 % (ref 18–48)
LYMPHOCYTES NFR BLD: 18.2 % (ref 18–48)
LYMPHOCYTES NFR BLD: 24.1 % (ref 18–48)
LYMPHOCYTES NFR BLD: 6 % (ref 18–48)
LYMPHOCYTES NFR BLD: 6.3 % (ref 18–48)
LYMPHOCYTES NFR BLD: 6.6 % (ref 18–48)
LYMPHOCYTES NFR BLD: 9.5 % (ref 18–48)
MAGNESIUM SERPL-MCNC: 1.6 MG/DL (ref 1.6–2.6)
MAGNESIUM SERPL-MCNC: 1.8 MG/DL (ref 1.6–2.6)
MAGNESIUM SERPL-MCNC: 2 MG/DL (ref 1.6–2.6)
MAGNESIUM SERPL-MCNC: 2.1 MG/DL (ref 1.6–2.6)
MAGNESIUM SERPL-MCNC: 2.2 MG/DL (ref 1.6–2.6)
MCH RBC QN AUTO: 28.6 PG (ref 27–31)
MCH RBC QN AUTO: 28.6 PG (ref 27–31)
MCH RBC QN AUTO: 28.9 PG (ref 27–31)
MCH RBC QN AUTO: 29.1 PG (ref 27–31)
MCH RBC QN AUTO: 29.2 PG (ref 27–31)
MCH RBC QN AUTO: 29.4 PG (ref 27–31)
MCH RBC QN AUTO: 29.5 PG (ref 27–31)
MCH RBC QN AUTO: 29.6 PG (ref 27–31)
MCH RBC QN AUTO: 29.9 PG (ref 27–31)
MCH RBC QN AUTO: 31.1 PG (ref 27–31)
MCH RBC QN AUTO: 31.2 PG (ref 27–31)
MCH RBC QN AUTO: 31.5 PG (ref 27–31)
MCHC RBC AUTO-ENTMCNC: 30.3 G/DL (ref 32–36)
MCHC RBC AUTO-ENTMCNC: 30.7 G/DL (ref 32–36)
MCHC RBC AUTO-ENTMCNC: 30.7 G/DL (ref 32–36)
MCHC RBC AUTO-ENTMCNC: 30.9 G/DL (ref 32–36)
MCHC RBC AUTO-ENTMCNC: 31.9 G/DL (ref 32–36)
MCHC RBC AUTO-ENTMCNC: 32 G/DL (ref 32–36)
MCHC RBC AUTO-ENTMCNC: 32.1 G/DL (ref 32–36)
MCHC RBC AUTO-ENTMCNC: 32.2 G/DL (ref 32–36)
MCHC RBC AUTO-ENTMCNC: 32.4 G/DL (ref 32–36)
MCHC RBC AUTO-ENTMCNC: 32.4 G/DL (ref 32–36)
MCHC RBC AUTO-ENTMCNC: 34.8 G/DL (ref 32–36)
MCHC RBC AUTO-ENTMCNC: 34.9 G/DL (ref 32–36)
MCV RBC AUTO: 89 FL (ref 82–98)
MCV RBC AUTO: 90 FL (ref 82–98)
MCV RBC AUTO: 91 FL (ref 82–98)
MCV RBC AUTO: 91 FL (ref 82–98)
MCV RBC AUTO: 92 FL (ref 82–98)
MCV RBC AUTO: 93 FL (ref 82–98)
MCV RBC AUTO: 94 FL (ref 82–98)
MCV RBC AUTO: 94 FL (ref 82–98)
MCV RBC AUTO: 97 FL (ref 82–98)
MCV RBC AUTO: 98 FL (ref 82–98)
METAMYELOCYTES NFR BLD MANUAL: 2 %
MICROSCOPIC COMMENT: ABNORMAL
MICROSCOPIC COMMENT: NORMAL
MONOCYTES # BLD AUTO: 0.3 K/UL (ref 0.3–1)
MONOCYTES # BLD AUTO: 0.4 K/UL (ref 0.3–1)
MONOCYTES # BLD AUTO: 0.4 K/UL (ref 0.3–1)
MONOCYTES # BLD AUTO: 0.5 K/UL (ref 0.3–1)
MONOCYTES # BLD AUTO: 0.6 K/UL (ref 0.3–1)
MONOCYTES # BLD AUTO: 0.7 K/UL (ref 0.3–1)
MONOCYTES # BLD AUTO: 0.8 K/UL (ref 0.3–1)
MONOCYTES # BLD AUTO: 0.9 K/UL (ref 0.3–1)
MONOCYTES # BLD AUTO: 1 K/UL (ref 0.3–1)
MONOCYTES # BLD AUTO: 1.1 K/UL (ref 0.3–1)
MONOCYTES # BLD AUTO: 1.4 K/UL (ref 0.3–1)
MONOCYTES # BLD AUTO: ABNORMAL K/UL (ref 0.3–1)
MONOCYTES NFR BLD: 3 % (ref 4–15)
MONOCYTES NFR BLD: 5.3 % (ref 4–15)
MONOCYTES NFR BLD: 5.8 % (ref 4–15)
MONOCYTES NFR BLD: 5.9 % (ref 4–15)
MONOCYTES NFR BLD: 6.6 % (ref 4–15)
MONOCYTES NFR BLD: 6.6 % (ref 4–15)
MONOCYTES NFR BLD: 6.8 % (ref 4–15)
MONOCYTES NFR BLD: 7.1 % (ref 4–15)
MONOCYTES NFR BLD: 7.7 % (ref 4–15)
MONOCYTES NFR BLD: 8.2 % (ref 4–15)
MONOCYTES NFR BLD: 8.8 % (ref 4–15)
MONOCYTES NFR BLD: 8.9 % (ref 4–15)
MV A" WAVE DURATION": 8.28 MSEC
MV MEAN GRADIENT: 10 MMHG
MV PEAK A VEL: 2.01 M/S
MV PEAK E VEL: 1.24 M/S
MV PEAK GRADIENT: 26 MMHG
MV STENOSIS PRESSURE HALF TIME: 101.11 MS
MV VALVE AREA P 1/2 METHOD: 2.18 CM2
MYELOCYTES NFR BLD MANUAL: 2 %
NEUTROPHILS # BLD AUTO: 10.4 K/UL (ref 1.8–7.7)
NEUTROPHILS # BLD AUTO: 11 K/UL (ref 1.8–7.7)
NEUTROPHILS # BLD AUTO: 11.2 K/UL (ref 1.8–7.7)
NEUTROPHILS # BLD AUTO: 13.1 K/UL (ref 1.8–7.7)
NEUTROPHILS # BLD AUTO: 13.2 K/UL (ref 1.8–7.7)
NEUTROPHILS # BLD AUTO: 4 K/UL (ref 1.8–7.7)
NEUTROPHILS # BLD AUTO: 4.2 K/UL (ref 1.8–7.7)
NEUTROPHILS # BLD AUTO: 4.5 K/UL (ref 1.8–7.7)
NEUTROPHILS # BLD AUTO: 4.6 K/UL (ref 1.8–7.7)
NEUTROPHILS # BLD AUTO: 5.3 K/UL (ref 1.8–7.7)
NEUTROPHILS # BLD AUTO: 6.5 K/UL (ref 1.8–7.7)
NEUTROPHILS NFR BLD: 62 % (ref 38–73)
NEUTROPHILS NFR BLD: 66.8 % (ref 38–73)
NEUTROPHILS NFR BLD: 71.4 % (ref 38–73)
NEUTROPHILS NFR BLD: 71.5 % (ref 38–73)
NEUTROPHILS NFR BLD: 72.2 % (ref 38–73)
NEUTROPHILS NFR BLD: 72.3 % (ref 38–73)
NEUTROPHILS NFR BLD: 75.3 % (ref 38–73)
NEUTROPHILS NFR BLD: 79.4 % (ref 38–73)
NEUTROPHILS NFR BLD: 81.6 % (ref 38–73)
NEUTROPHILS NFR BLD: 83.2 % (ref 38–73)
NEUTROPHILS NFR BLD: 83.7 % (ref 38–73)
NEUTROPHILS NFR BLD: 84.6 % (ref 38–73)
NEUTS BAND NFR BLD MANUAL: 13 %
NITRITE UR QL STRIP: NEGATIVE
NON-SQ EPI CELLS #/AREA URNS AUTO: <1 /HPF
NRBC BLD-RTO: 0 /100 WBC
OB PNL STL: POSITIVE
OVALOCYTES BLD QL SMEAR: ABNORMAL
OVALOCYTES BLD QL SMEAR: ABNORMAL
PCO2 BLDA: 33.1 MMHG (ref 35–45)
PCO2 BLDA: 56.1 MMHG (ref 35–45)
PH SMN: 7.22 [PH] (ref 7.35–7.45)
PH SMN: 7.29 [PH] (ref 7.35–7.45)
PH SMN: 7.53 [PH] (ref 7.35–7.45)
PH SMN: 7.53 [PH] (ref 7.35–7.45)
PH UR STRIP: 5 [PH] (ref 5–8)
PH UR STRIP: 6 [PH] (ref 5–8)
PH UR STRIP: 6 [PH] (ref 5–8)
PH UR STRIP: 7 [PH] (ref 5–8)
PH UR STRIP: 8 [PH] (ref 5–8)
PHOSPHATE SERPL-MCNC: 2.5 MG/DL (ref 2.7–4.5)
PHOSPHATE SERPL-MCNC: 3.9 MG/DL (ref 2.7–4.5)
PHOSPHATE SERPL-MCNC: 4.6 MG/DL (ref 2.7–4.5)
PHOSPHATE SERPL-MCNC: 5.3 MG/DL (ref 2.7–4.5)
PHOSPHATE SERPL-MCNC: 6.5 MG/DL (ref 2.7–4.5)
PISA TR MAX VEL: 2.63 M/S
PLATELET # BLD AUTO: 103 K/UL (ref 150–450)
PLATELET # BLD AUTO: 160 K/UL (ref 150–450)
PLATELET # BLD AUTO: 179 K/UL (ref 150–450)
PLATELET # BLD AUTO: 184 K/UL (ref 150–450)
PLATELET # BLD AUTO: 188 K/UL (ref 150–450)
PLATELET # BLD AUTO: 208 K/UL (ref 150–450)
PLATELET # BLD AUTO: 210 K/UL (ref 150–450)
PLATELET # BLD AUTO: 55 K/UL (ref 150–450)
PLATELET # BLD AUTO: 62 K/UL (ref 150–450)
PLATELET # BLD AUTO: 69 K/UL (ref 150–450)
PLATELET # BLD AUTO: 77 K/UL (ref 150–450)
PLATELET # BLD AUTO: 97 K/UL (ref 150–450)
PLATELET BLD QL SMEAR: ABNORMAL
PMV BLD AUTO: 10.7 FL (ref 9.2–12.9)
PMV BLD AUTO: 11.2 FL (ref 9.2–12.9)
PMV BLD AUTO: 11.3 FL (ref 9.2–12.9)
PMV BLD AUTO: 11.5 FL (ref 9.2–12.9)
PMV BLD AUTO: 11.5 FL (ref 9.2–12.9)
PMV BLD AUTO: 11.7 FL (ref 9.2–12.9)
PMV BLD AUTO: 11.7 FL (ref 9.2–12.9)
PMV BLD AUTO: 11.8 FL (ref 9.2–12.9)
PMV BLD AUTO: 12.2 FL (ref 9.2–12.9)
PO2 BLDA: 15 MMHG (ref 40–60)
PO2 BLDA: 37 MMHG (ref 80–100)
PO2 BLDA: 37 MMHG (ref 80–100)
PO2 BLDA: 87 MMHG (ref 80–100)
POC BE: -14 MMOL/L
POC BE: 0 MMOL/L
POC BE: 5 MMOL/L
POC BE: 5 MMOL/L
POC IONIZED CALCIUM: 1.14 MMOL/L (ref 1.06–1.42)
POC SATURATED O2: 15 % (ref 95–100)
POC SATURATED O2: 77 % (ref 95–100)
POC SATURATED O2: 77 % (ref 95–100)
POC SATURATED O2: 95 % (ref 95–100)
POC TCO2 (MEASURED): 24 MMOL/L (ref 23–29)
POC TCO2: 15 MMOL/L (ref 24–29)
POC TCO2: 29 MMOL/L (ref 23–27)
POCT GLUCOSE: 100 MG/DL (ref 70–110)
POCT GLUCOSE: 105 MG/DL (ref 70–110)
POCT GLUCOSE: 109 MG/DL (ref 70–110)
POCT GLUCOSE: 111 MG/DL (ref 70–110)
POCT GLUCOSE: 114 MG/DL (ref 70–110)
POCT GLUCOSE: 122 MG/DL (ref 70–110)
POCT GLUCOSE: 142 MG/DL (ref 70–110)
POCT GLUCOSE: 147 MG/DL (ref 70–110)
POCT GLUCOSE: 264 MG/DL (ref 70–110)
POCT GLUCOSE: 83 MG/DL (ref 70–110)
POCT GLUCOSE: 89 MG/DL (ref 70–110)
POCT GLUCOSE: 93 MG/DL (ref 70–110)
POCT GLUCOSE: 97 MG/DL (ref 70–110)
POIKILOCYTOSIS BLD QL SMEAR: ABNORMAL
POIKILOCYTOSIS BLD QL SMEAR: ABNORMAL
POIKILOCYTOSIS BLD QL SMEAR: SLIGHT
POIKILOCYTOSIS BLD QL SMEAR: SLIGHT
POLYCHROMASIA BLD QL SMEAR: ABNORMAL
POLYCHROMASIA BLD QL SMEAR: ABNORMAL
POTASSIUM BLD-SCNC: 4.9 MMOL/L (ref 3.5–5.1)
POTASSIUM SERPL-SCNC: 2.9 MMOL/L (ref 3.5–5.1)
POTASSIUM SERPL-SCNC: 3.1 MMOL/L (ref 3.5–5.1)
POTASSIUM SERPL-SCNC: 3.2 MMOL/L (ref 3.5–5.1)
POTASSIUM SERPL-SCNC: 3.3 MMOL/L (ref 3.5–5.1)
POTASSIUM SERPL-SCNC: 3.5 MMOL/L (ref 3.5–5.1)
POTASSIUM SERPL-SCNC: 3.5 MMOL/L (ref 3.5–5.1)
POTASSIUM SERPL-SCNC: 3.8 MMOL/L (ref 3.5–5.1)
POTASSIUM SERPL-SCNC: 3.9 MMOL/L (ref 3.5–5.1)
POTASSIUM SERPL-SCNC: 3.9 MMOL/L (ref 3.5–5.1)
POTASSIUM SERPL-SCNC: 4 MMOL/L (ref 3.5–5.1)
POTASSIUM SERPL-SCNC: 4.5 MMOL/L (ref 3.5–5.1)
POTASSIUM SERPL-SCNC: 4.5 MMOL/L (ref 3.5–5.1)
POTASSIUM SERPL-SCNC: 4.6 MMOL/L (ref 3.5–5.1)
POTASSIUM SERPL-SCNC: 4.8 MMOL/L (ref 3.5–5.1)
POTASSIUM SERPL-SCNC: 4.9 MMOL/L (ref 3.5–5.1)
POTASSIUM SERPL-SCNC: 4.9 MMOL/L (ref 3.5–5.1)
POTASSIUM SERPL-SCNC: 5 MMOL/L (ref 3.5–5.1)
POTASSIUM SERPL-SCNC: 5.1 MMOL/L (ref 3.5–5.1)
POTASSIUM SERPL-SCNC: 5.2 MMOL/L (ref 3.5–5.1)
POTASSIUM SERPL-SCNC: 5.4 MMOL/L (ref 3.5–5.1)
POTASSIUM SERPL-SCNC: 5.4 MMOL/L (ref 3.5–5.1)
POTASSIUM SERPL-SCNC: 5.8 MMOL/L (ref 3.5–5.1)
PROT SERPL-MCNC: 4 G/DL (ref 6–8.4)
PROT SERPL-MCNC: 4 G/DL (ref 6–8.4)
PROT SERPL-MCNC: 4.2 G/DL (ref 6–8.4)
PROT SERPL-MCNC: 4.3 G/DL (ref 6–8.4)
PROT SERPL-MCNC: 4.9 G/DL (ref 6–8.4)
PROT SERPL-MCNC: 5 G/DL (ref 6–8.4)
PROT SERPL-MCNC: 5.1 G/DL (ref 6–8.4)
PROT SERPL-MCNC: 5.4 G/DL (ref 6–8.4)
PROT SERPL-MCNC: 5.7 G/DL (ref 6–8.4)
PROT SERPL-MCNC: 6.2 G/DL (ref 6–8.4)
PROT SERPL-MCNC: 6.3 G/DL (ref 6–8.4)
PROT SERPL-MCNC: 6.8 G/DL (ref 6–8.4)
PROT SERPL-MCNC: 7.4 G/DL (ref 6–8.4)
PROT SERPL-MCNC: 7.8 G/DL (ref 6–8.4)
PROT UR QL STRIP: ABNORMAL
PROT UR QL STRIP: NEGATIVE
PULM VEIN S/D RATIO: 1.39
PV PEAK D VEL: 0.36 M/S
PV PEAK S VEL: 0.5 M/S
RA MAJOR: 3.54 CM
RA PRESSURE: 3 MMHG
RA WIDTH: 3.08 CM
RBC # BLD AUTO: 2.44 M/UL (ref 4–5.4)
RBC # BLD AUTO: 2.45 M/UL (ref 4–5.4)
RBC # BLD AUTO: 2.47 M/UL (ref 4–5.4)
RBC # BLD AUTO: 2.83 M/UL (ref 4–5.4)
RBC # BLD AUTO: 2.97 M/UL (ref 4–5.4)
RBC # BLD AUTO: 3.02 M/UL (ref 4–5.4)
RBC # BLD AUTO: 3.08 M/UL (ref 4–5.4)
RBC # BLD AUTO: 3.14 M/UL (ref 4–5.4)
RBC # BLD AUTO: 3.18 M/UL (ref 4–5.4)
RBC # BLD AUTO: 3.18 M/UL (ref 4–5.4)
RBC # BLD AUTO: 3.63 M/UL (ref 4–5.4)
RBC # BLD AUTO: 3.73 M/UL (ref 4–5.4)
RBC #/AREA URNS AUTO: 1 /HPF (ref 0–4)
RBC #/AREA URNS AUTO: 12 /HPF (ref 0–4)
RBC #/AREA URNS AUTO: 30 /HPF (ref 0–4)
RBC #/AREA URNS HPF: 29 /HPF (ref 0–4)
RIGHT VENTRICULAR END-DIASTOLIC DIMENSION: 1.66 CM
RV TISSUE DOPPLER FREE WALL SYSTOLIC VELOCITY 1 (APICAL 4 CHAMBER VIEW): 11.53 CM/S
SAMPLE: ABNORMAL
SARS-COV-2 RDRP RESP QL NAA+PROBE: NEGATIVE
SCHISTOCYTES BLD QL SMEAR: ABNORMAL
SCHISTOCYTES BLD QL SMEAR: ABNORMAL
SCHISTOCYTES BLD QL SMEAR: PRESENT
SINUS: 2.34 CM
SITE: ABNORMAL
SODIUM BLD-SCNC: 140 MMOL/L (ref 136–145)
SODIUM SERPL-SCNC: 135 MMOL/L (ref 136–145)
SODIUM SERPL-SCNC: 136 MMOL/L (ref 136–145)
SODIUM SERPL-SCNC: 137 MMOL/L (ref 136–145)
SODIUM SERPL-SCNC: 138 MMOL/L (ref 136–145)
SODIUM SERPL-SCNC: 139 MMOL/L (ref 136–145)
SODIUM SERPL-SCNC: 140 MMOL/L (ref 136–145)
SODIUM SERPL-SCNC: 140 MMOL/L (ref 136–145)
SODIUM SERPL-SCNC: 143 MMOL/L (ref 136–145)
SP GR UR STRIP: 1.01 (ref 1–1.03)
SQUAMOUS #/AREA URNS AUTO: 1 /HPF
SQUAMOUS #/AREA URNS AUTO: 2 /HPF
SQUAMOUS #/AREA URNS HPF: 6 /HPF
STJ: 2.03 CM
T4 FREE SERPL-MCNC: 1.5 NG/DL (ref 0.71–1.51)
TDI LATERAL: 0.04 M/S
TDI SEPTAL: 0.05 M/S
TDI: 0.05 M/S
THRYOGLOBULIN INTERPRETATION: ABNORMAL
THRYOGLOBULIN INTERPRETATION: ABNORMAL
THYROGLOB AB SERPL-ACNC: <1.8 IU/ML
THYROGLOB AB SERPL-ACNC: <1.8 IU/ML
THYROGLOB SERPL-MCNC: 189 NG/ML
THYROGLOB SERPL-MCNC: 289 NG/ML
TOXIC GRANULES BLD QL SMEAR: PRESENT
TOXIC GRANULES BLD QL SMEAR: PRESENT
TR MAX PG: 28 MMHG
TRANS ERYTHROCYTES VOL PATIENT: NORMAL ML
TRICUSPID ANNULAR PLANE SYSTOLIC EXCURSION: 1.17 CM
TROPONIN I SERPL DL<=0.01 NG/ML-MCNC: 0.05 NG/ML (ref 0–0.03)
TROPONIN I SERPL DL<=0.01 NG/ML-MCNC: 0.06 NG/ML (ref 0–0.03)
TROPONIN I SERPL DL<=0.01 NG/ML-MCNC: 0.09 NG/ML (ref 0–0.03)
TROPONIN I SERPL DL<=0.01 NG/ML-MCNC: 0.11 NG/ML (ref 0–0.03)
TSH SERPL DL<=0.005 MIU/L-ACNC: 0.03 UIU/ML (ref 0.4–4)
TSH SERPL DL<=0.005 MIU/L-ACNC: 0.49 UIU/ML (ref 0.4–4)
TSH SERPL DL<=0.005 MIU/L-ACNC: 0.9 UIU/ML (ref 0.4–4)
TSH SERPL DL<=0.005 MIU/L-ACNC: 1.33 UIU/ML (ref 0.4–4)
TV REST PULMONARY ARTERY PRESSURE: 31 MMHG
URN SPEC COLLECT METH UR: ABNORMAL
UROBILINOGEN UR STRIP-ACNC: NEGATIVE EU/DL
UROBILINOGEN UR STRIP-ACNC: NEGATIVE EU/DL
WBC # BLD AUTO: 13.03 K/UL (ref 3.9–12.7)
WBC # BLD AUTO: 13.19 K/UL (ref 3.9–12.7)
WBC # BLD AUTO: 15.57 K/UL (ref 3.9–12.7)
WBC # BLD AUTO: 15.66 K/UL (ref 3.9–12.7)
WBC # BLD AUTO: 15.66 K/UL (ref 3.9–12.7)
WBC # BLD AUTO: 5.48 K/UL (ref 3.9–12.7)
WBC # BLD AUTO: 5.56 K/UL (ref 3.9–12.7)
WBC # BLD AUTO: 5.75 K/UL (ref 3.9–12.7)
WBC # BLD AUTO: 6.92 K/UL (ref 3.9–12.7)
WBC # BLD AUTO: 7.38 K/UL (ref 3.9–12.7)
WBC # BLD AUTO: 7.98 K/UL (ref 3.9–12.7)
WBC # BLD AUTO: 8.66 K/UL (ref 3.9–12.7)
WBC #/AREA URNS AUTO: 0 /HPF (ref 0–5)
WBC #/AREA URNS AUTO: 1 /HPF (ref 0–5)
WBC #/AREA URNS AUTO: 11 /HPF (ref 0–5)
WBC #/AREA URNS HPF: 23 /HPF (ref 0–5)
WBC TOXIC VACUOLES BLD QL SMEAR: PRESENT

## 2021-01-01 PROCEDURE — 81000 URINALYSIS NONAUTO W/SCOPE: CPT | Performed by: NURSE PRACTITIONER

## 2021-01-01 PROCEDURE — 1159F MED LIST DOCD IN RCRD: CPT | Mod: HCNC,S$GLB,, | Performed by: INTERNAL MEDICINE

## 2021-01-01 PROCEDURE — 3288F PR FALLS RISK ASSESSMENT DOCUMENTED: ICD-10-PCS | Mod: CPTII,S$GLB,, | Performed by: INTERNAL MEDICINE

## 2021-01-01 PROCEDURE — 36430 TRANSFUSION BLD/BLD COMPNT: CPT

## 2021-01-01 PROCEDURE — 93306 ECHO (CUPID ONLY): ICD-10-PCS | Mod: 26,,, | Performed by: INTERNAL MEDICINE

## 2021-01-01 PROCEDURE — 3288F FALL RISK ASSESSMENT DOCD: CPT | Mod: CPTII,S$GLB,, | Performed by: INTERNAL MEDICINE

## 2021-01-01 PROCEDURE — G0180 MD CERTIFICATION HHA PATIENT: HCPCS | Mod: ,,, | Performed by: FAMILY MEDICINE

## 2021-01-01 PROCEDURE — 93010 EKG 12-LEAD: ICD-10-PCS | Mod: S$GLB,,, | Performed by: INTERNAL MEDICINE

## 2021-01-01 PROCEDURE — 36415 COLL VENOUS BLD VENIPUNCTURE: CPT | Performed by: NURSE PRACTITIONER

## 2021-01-01 PROCEDURE — 99215 OFFICE O/P EST HI 40 MIN: CPT | Mod: S$GLB,,, | Performed by: NURSE PRACTITIONER

## 2021-01-01 PROCEDURE — 63600175 PHARM REV CODE 636 W HCPCS: Performed by: NURSE PRACTITIONER

## 2021-01-01 PROCEDURE — 99499 UNLISTED E&M SERVICE: CPT | Mod: S$GLB,,, | Performed by: INTERNAL MEDICINE

## 2021-01-01 PROCEDURE — 1126F AMNT PAIN NOTED NONE PRSNT: CPT | Mod: S$GLB,,, | Performed by: NURSE PRACTITIONER

## 2021-01-01 PROCEDURE — 63600175 PHARM REV CODE 636 W HCPCS: Performed by: PHYSICIAN ASSISTANT

## 2021-01-01 PROCEDURE — 92610 EVALUATE SWALLOWING FUNCTION: CPT

## 2021-01-01 PROCEDURE — 96376 TX/PRO/DX INJ SAME DRUG ADON: CPT

## 2021-01-01 PROCEDURE — 86920 COMPATIBILITY TEST SPIN: CPT

## 2021-01-01 PROCEDURE — 63600175 PHARM REV CODE 636 W HCPCS

## 2021-01-01 PROCEDURE — 63600175 PHARM REV CODE 636 W HCPCS: Performed by: STUDENT IN AN ORGANIZED HEALTH CARE EDUCATION/TRAINING PROGRAM

## 2021-01-01 PROCEDURE — 27000221 HC OXYGEN, UP TO 24 HOURS

## 2021-01-01 PROCEDURE — 36415 COLL VENOUS BLD VENIPUNCTURE: CPT

## 2021-01-01 PROCEDURE — 25000003 PHARM REV CODE 250: Performed by: INTERNAL MEDICINE

## 2021-01-01 PROCEDURE — 99233 PR SUBSEQUENT HOSPITAL CARE,LEVL III: ICD-10-PCS | Mod: GC,,, | Performed by: INTERNAL MEDICINE

## 2021-01-01 PROCEDURE — 99999 PR PBB SHADOW E&M-EST. PATIENT-LVL IV: ICD-10-PCS | Mod: PBBFAC,,, | Performed by: INTERNAL MEDICINE

## 2021-01-01 PROCEDURE — 1126F PR PAIN SEVERITY QUANTIFIED, NO PAIN PRESENT: ICD-10-PCS | Mod: S$GLB,,, | Performed by: NURSE PRACTITIONER

## 2021-01-01 PROCEDURE — 93010 EKG 12-LEAD: ICD-10-PCS | Mod: ,,, | Performed by: INTERNAL MEDICINE

## 2021-01-01 PROCEDURE — 99223 PR INITIAL HOSPITAL CARE,LEVL III: ICD-10-PCS | Mod: ,,, | Performed by: NURSE PRACTITIONER

## 2021-01-01 PROCEDURE — 83605 ASSAY OF LACTIC ACID: CPT | Performed by: EMERGENCY MEDICINE

## 2021-01-01 PROCEDURE — 99223 1ST HOSP IP/OBS HIGH 75: CPT | Mod: ,,, | Performed by: INTERNAL MEDICINE

## 2021-01-01 PROCEDURE — 96365 THER/PROPH/DIAG IV INF INIT: CPT

## 2021-01-01 PROCEDURE — 25000003 PHARM REV CODE 250: Performed by: NURSE PRACTITIONER

## 2021-01-01 PROCEDURE — 3074F SYST BP LT 130 MM HG: CPT | Mod: HCNC,CPTII,S$GLB, | Performed by: INTERNAL MEDICINE

## 2021-01-01 PROCEDURE — 85025 COMPLETE CBC W/AUTO DIFF WBC: CPT | Performed by: STUDENT IN AN ORGANIZED HEALTH CARE EDUCATION/TRAINING PROGRAM

## 2021-01-01 PROCEDURE — 1101F PT FALLS ASSESS-DOCD LE1/YR: CPT | Mod: CPTII,S$GLB,, | Performed by: NURSE PRACTITIONER

## 2021-01-01 PROCEDURE — 3288F FALL RISK ASSESSMENT DOCD: CPT | Mod: CPTII,S$GLB,, | Performed by: NURSE PRACTITIONER

## 2021-01-01 PROCEDURE — 80053 COMPREHEN METABOLIC PANEL: CPT | Mod: HCNC

## 2021-01-01 PROCEDURE — 99999 PR PBB SHADOW E&M-EST. PATIENT-LVL IV: CPT | Mod: PBBFAC,HCNC,, | Performed by: INTERNAL MEDICINE

## 2021-01-01 PROCEDURE — 10005 US FINE NEEDLE ASPIRATION THYROID, FIRST LESION: ICD-10-PCS | Mod: S$GLB,,, | Performed by: INTERNAL MEDICINE

## 2021-01-01 PROCEDURE — 97535 SELF CARE MNGMENT TRAINING: CPT

## 2021-01-01 PROCEDURE — P9021 RED BLOOD CELLS UNIT: HCPCS

## 2021-01-01 PROCEDURE — 83735 ASSAY OF MAGNESIUM: CPT | Performed by: PHYSICIAN ASSISTANT

## 2021-01-01 PROCEDURE — 96361 HYDRATE IV INFUSION ADD-ON: CPT

## 2021-01-01 PROCEDURE — 93005 ELECTROCARDIOGRAM TRACING: CPT

## 2021-01-01 PROCEDURE — 82803 BLOOD GASES ANY COMBINATION: CPT

## 2021-01-01 PROCEDURE — G0180 PR HOME HEALTH MD CERTIFICATION: ICD-10-PCS | Mod: ,,, | Performed by: FAMILY MEDICINE

## 2021-01-01 PROCEDURE — 3074F PR MOST RECENT SYSTOLIC BLOOD PRESSURE < 130 MM HG: ICD-10-PCS | Mod: HCNC,CPTII,S$GLB, | Performed by: INTERNAL MEDICINE

## 2021-01-01 PROCEDURE — 80048 BASIC METABOLIC PNL TOTAL CA: CPT | Performed by: NURSE PRACTITIONER

## 2021-01-01 PROCEDURE — 99499 RISK ADDL DX/OHS AUDIT: ICD-10-PCS | Mod: S$GLB,,, | Performed by: INTERNAL MEDICINE

## 2021-01-01 PROCEDURE — 85025 COMPLETE CBC W/AUTO DIFF WBC: CPT | Performed by: NURSE PRACTITIONER

## 2021-01-01 PROCEDURE — G0378 HOSPITAL OBSERVATION PER HR: HCPCS

## 2021-01-01 PROCEDURE — 1111F DSCHRG MED/CURRENT MED MERGE: CPT | Mod: CPTII,S$GLB,, | Performed by: INTERNAL MEDICINE

## 2021-01-01 PROCEDURE — 99233 SBSQ HOSP IP/OBS HIGH 50: CPT | Mod: GC,,, | Performed by: INTERNAL MEDICINE

## 2021-01-01 PROCEDURE — 99223 PR INITIAL HOSPITAL CARE,LEVL III: ICD-10-PCS | Mod: ,,, | Performed by: INTERNAL MEDICINE

## 2021-01-01 PROCEDURE — 1101F PR PT FALLS ASSESS DOC 0-1 FALLS W/OUT INJ PAST YR: ICD-10-PCS | Mod: CPTII,S$GLB,, | Performed by: INTERNAL MEDICINE

## 2021-01-01 PROCEDURE — 1111F PR DISCHARGE MEDS RECONCILED W/ CURRENT OUTPATIENT MED LIST: ICD-10-PCS | Mod: CPTII,S$GLB,, | Performed by: INTERNAL MEDICINE

## 2021-01-01 PROCEDURE — 99291 CRITICAL CARE FIRST HOUR: CPT | Mod: 25

## 2021-01-01 PROCEDURE — 85730 THROMBOPLASTIN TIME PARTIAL: CPT | Mod: 91 | Performed by: INTERNAL MEDICINE

## 2021-01-01 PROCEDURE — 80053 COMPREHEN METABOLIC PANEL: CPT | Mod: 91

## 2021-01-01 PROCEDURE — 1159F PR MEDICATION LIST DOCUMENTED IN MEDICAL RECORD: ICD-10-PCS | Mod: S$GLB,,, | Performed by: INTERNAL MEDICINE

## 2021-01-01 PROCEDURE — 78815 NM PET CT ROUTINE: ICD-10-PCS | Mod: 26,PS,, | Performed by: RADIOLOGY

## 2021-01-01 PROCEDURE — 84432 ASSAY OF THYROGLOBULIN: CPT | Performed by: INTERNAL MEDICINE

## 2021-01-01 PROCEDURE — 96367 TX/PROPH/DG ADDL SEQ IV INF: CPT

## 2021-01-01 PROCEDURE — 94640 AIRWAY INHALATION TREATMENT: CPT

## 2021-01-01 PROCEDURE — 80053 COMPREHEN METABOLIC PANEL: CPT | Performed by: INTERNAL MEDICINE

## 2021-01-01 PROCEDURE — 1159F PR MEDICATION LIST DOCUMENTED IN MEDICAL RECORD: ICD-10-PCS | Mod: 95,,, | Performed by: NURSE PRACTITIONER

## 2021-01-01 PROCEDURE — 25000003 PHARM REV CODE 250

## 2021-01-01 PROCEDURE — 93010 ELECTROCARDIOGRAM REPORT: CPT | Mod: S$GLB,,, | Performed by: INTERNAL MEDICINE

## 2021-01-01 PROCEDURE — 99232 PR SUBSEQUENT HOSPITAL CARE,LEVL II: ICD-10-PCS | Mod: ,,, | Performed by: INTERNAL MEDICINE

## 2021-01-01 PROCEDURE — 1101F PT FALLS ASSESS-DOCD LE1/YR: CPT | Mod: HCNC,CPTII,S$GLB, | Performed by: INTERNAL MEDICINE

## 2021-01-01 PROCEDURE — 85025 COMPLETE CBC W/AUTO DIFF WBC: CPT | Performed by: PHYSICIAN ASSISTANT

## 2021-01-01 PROCEDURE — 1159F PR MEDICATION LIST DOCUMENTED IN MEDICAL RECORD: ICD-10-PCS | Mod: S$GLB,,, | Performed by: NURSE PRACTITIONER

## 2021-01-01 PROCEDURE — 85025 COMPLETE CBC W/AUTO DIFF WBC: CPT | Performed by: INTERNAL MEDICINE

## 2021-01-01 PROCEDURE — 36415 COLL VENOUS BLD VENIPUNCTURE: CPT | Performed by: STUDENT IN AN ORGANIZED HEALTH CARE EDUCATION/TRAINING PROGRAM

## 2021-01-01 PROCEDURE — 1125F PR PAIN SEVERITY QUANTIFIED, PAIN PRESENT: ICD-10-PCS | Mod: HCNC,S$GLB,, | Performed by: INTERNAL MEDICINE

## 2021-01-01 PROCEDURE — 80053 COMPREHEN METABOLIC PANEL: CPT

## 2021-01-01 PROCEDURE — 99999 PR PBB SHADOW E&M-EST. PATIENT-LVL IV: CPT | Mod: PBBFAC,,, | Performed by: INTERNAL MEDICINE

## 2021-01-01 PROCEDURE — 93010 ELECTROCARDIOGRAM REPORT: CPT | Mod: ,,, | Performed by: INTERNAL MEDICINE

## 2021-01-01 PROCEDURE — 1111F DSCHRG MED/CURRENT MED MERGE: CPT | Mod: CPTII,GC,, | Performed by: INTERNAL MEDICINE

## 2021-01-01 PROCEDURE — 84484 ASSAY OF TROPONIN QUANT: CPT | Mod: 91 | Performed by: NURSE PRACTITIONER

## 2021-01-01 PROCEDURE — 84443 ASSAY THYROID STIM HORMONE: CPT | Performed by: PHYSICIAN ASSISTANT

## 2021-01-01 PROCEDURE — 1125F AMNT PAIN NOTED PAIN PRSNT: CPT | Mod: HCNC,S$GLB,, | Performed by: INTERNAL MEDICINE

## 2021-01-01 PROCEDURE — 83605 ASSAY OF LACTIC ACID: CPT | Performed by: NURSE PRACTITIONER

## 2021-01-01 PROCEDURE — 99214 PR OFFICE/OUTPT VISIT, EST, LEVL IV, 30-39 MIN: ICD-10-PCS | Mod: 95,,, | Performed by: NURSE PRACTITIONER

## 2021-01-01 PROCEDURE — 25000003 PHARM REV CODE 250: Performed by: EMERGENCY MEDICINE

## 2021-01-01 PROCEDURE — 83605 ASSAY OF LACTIC ACID: CPT | Performed by: PHYSICIAN ASSISTANT

## 2021-01-01 PROCEDURE — 1159F MED LIST DOCD IN RCRD: CPT | Mod: S$GLB,,, | Performed by: INTERNAL MEDICINE

## 2021-01-01 PROCEDURE — 97530 THERAPEUTIC ACTIVITIES: CPT

## 2021-01-01 PROCEDURE — 1101F PT FALLS ASSESS-DOCD LE1/YR: CPT | Mod: CPTII,S$GLB,, | Performed by: INTERNAL MEDICINE

## 2021-01-01 PROCEDURE — 3288F PR FALLS RISK ASSESSMENT DOCUMENTED: ICD-10-PCS | Mod: HCNC,CPTII,S$GLB, | Performed by: INTERNAL MEDICINE

## 2021-01-01 PROCEDURE — 36415 COLL VENOUS BLD VENIPUNCTURE: CPT | Performed by: INTERNAL MEDICINE

## 2021-01-01 PROCEDURE — 82010 KETONE BODYS QUAN: CPT

## 2021-01-01 PROCEDURE — 84100 ASSAY OF PHOSPHORUS: CPT | Performed by: STUDENT IN AN ORGANIZED HEALTH CARE EDUCATION/TRAINING PROGRAM

## 2021-01-01 PROCEDURE — 88305 TISSUE EXAM BY PATHOLOGIST: CPT | Performed by: PATHOLOGY

## 2021-01-01 PROCEDURE — 99214 OFFICE O/P EST MOD 30 MIN: CPT | Mod: 95,,, | Performed by: NURSE PRACTITIONER

## 2021-01-01 PROCEDURE — 83735 ASSAY OF MAGNESIUM: CPT | Performed by: NURSE PRACTITIONER

## 2021-01-01 PROCEDURE — 80053 COMPREHEN METABOLIC PANEL: CPT | Performed by: EMERGENCY MEDICINE

## 2021-01-01 PROCEDURE — 99285 EMERGENCY DEPT VISIT HI MDM: CPT | Mod: 25

## 2021-01-01 PROCEDURE — 36600 WITHDRAWAL OF ARTERIAL BLOOD: CPT

## 2021-01-01 PROCEDURE — C9113 INJ PANTOPRAZOLE SODIUM, VIA: HCPCS | Performed by: STUDENT IN AN ORGANIZED HEALTH CARE EDUCATION/TRAINING PROGRAM

## 2021-01-01 PROCEDURE — 1159F PR MEDICATION LIST DOCUMENTED IN MEDICAL RECORD: ICD-10-PCS | Mod: HCNC,S$GLB,, | Performed by: INTERNAL MEDICINE

## 2021-01-01 PROCEDURE — 99497 PR ADVNCD CARE PLAN 30 MIN: ICD-10-PCS | Mod: 25,,, | Performed by: CLINICAL NURSE SPECIALIST

## 2021-01-01 PROCEDURE — 93306 TTE W/DOPPLER COMPLETE: CPT | Mod: 26,,, | Performed by: INTERNAL MEDICINE

## 2021-01-01 PROCEDURE — 99232 SBSQ HOSP IP/OBS MODERATE 35: CPT | Mod: ,,, | Performed by: INTERNAL MEDICINE

## 2021-01-01 PROCEDURE — 1111F PR DISCHARGE MEDS RECONCILED W/ CURRENT OUTPATIENT MED LIST: ICD-10-PCS | Mod: CPTII,GC,, | Performed by: INTERNAL MEDICINE

## 2021-01-01 PROCEDURE — 1111F PR DISCHARGE MEDS RECONCILED W/ CURRENT OUTPATIENT MED LIST: ICD-10-PCS | Mod: CPTII,95,, | Performed by: NURSE PRACTITIONER

## 2021-01-01 PROCEDURE — 25000242 PHARM REV CODE 250 ALT 637 W/ HCPCS: Performed by: NURSE PRACTITIONER

## 2021-01-01 PROCEDURE — 97161 PT EVAL LOW COMPLEX 20 MIN: CPT

## 2021-01-01 PROCEDURE — 84100 ASSAY OF PHOSPHORUS: CPT | Performed by: NURSE PRACTITIONER

## 2021-01-01 PROCEDURE — 82272 OCCULT BLD FECES 1-3 TESTS: CPT

## 2021-01-01 PROCEDURE — 99285 EMERGENCY DEPT VISIT HI MDM: CPT | Mod: CS,,, | Performed by: EMERGENCY MEDICINE

## 2021-01-01 PROCEDURE — 96360 HYDRATION IV INFUSION INIT: CPT

## 2021-01-01 PROCEDURE — 3078F PR MOST RECENT DIASTOLIC BLOOD PRESSURE < 80 MM HG: ICD-10-PCS | Mod: CPTII,S$GLB,, | Performed by: INTERNAL MEDICINE

## 2021-01-01 PROCEDURE — 88173 CYTOPATH EVAL FNA REPORT: CPT | Mod: 26,,, | Performed by: PATHOLOGY

## 2021-01-01 PROCEDURE — 1126F PR PAIN SEVERITY QUANTIFIED, NO PAIN PRESENT: ICD-10-PCS | Mod: S$GLB,,, | Performed by: INTERNAL MEDICINE

## 2021-01-01 PROCEDURE — 1126F AMNT PAIN NOTED NONE PRSNT: CPT | Mod: S$GLB,,, | Performed by: INTERNAL MEDICINE

## 2021-01-01 PROCEDURE — 94761 N-INVAS EAR/PLS OXIMETRY MLT: CPT

## 2021-01-01 PROCEDURE — 85730 THROMBOPLASTIN TIME PARTIAL: CPT

## 2021-01-01 PROCEDURE — 3074F SYST BP LT 130 MM HG: CPT | Mod: CPTII,S$GLB,, | Performed by: INTERNAL MEDICINE

## 2021-01-01 PROCEDURE — 1111F DSCHRG MED/CURRENT MED MERGE: CPT | Mod: CPTII,95,, | Performed by: NURSE PRACTITIONER

## 2021-01-01 PROCEDURE — 99497 ADVNCD CARE PLAN 30 MIN: CPT | Mod: 25,,, | Performed by: NURSE PRACTITIONER

## 2021-01-01 PROCEDURE — 93306 TTE W/DOPPLER COMPLETE: CPT

## 2021-01-01 PROCEDURE — 84443 ASSAY THYROID STIM HORMONE: CPT | Performed by: INTERNAL MEDICINE

## 2021-01-01 PROCEDURE — 88173 CYTOPATH EVAL FNA REPORT: CPT | Performed by: PATHOLOGY

## 2021-01-01 PROCEDURE — 25000003 PHARM REV CODE 250: Performed by: HOSPITALIST

## 2021-01-01 PROCEDURE — 78815 PET IMAGE W/CT SKULL-THIGH: CPT | Mod: TC

## 2021-01-01 PROCEDURE — 99233 SBSQ HOSP IP/OBS HIGH 50: CPT | Mod: ,,, | Performed by: INTERNAL MEDICINE

## 2021-01-01 PROCEDURE — 84443 ASSAY THYROID STIM HORMONE: CPT | Performed by: EMERGENCY MEDICINE

## 2021-01-01 PROCEDURE — 80048 BASIC METABOLIC PNL TOTAL CA: CPT | Mod: 91 | Performed by: NURSE PRACTITIONER

## 2021-01-01 PROCEDURE — 85379 FIBRIN DEGRADATION QUANT: CPT

## 2021-01-01 PROCEDURE — 10005 FNA BX W/US GDN 1ST LES: CPT | Mod: S$GLB,,, | Performed by: INTERNAL MEDICINE

## 2021-01-01 PROCEDURE — 99999 PR PBB SHADOW E&M-EST. PATIENT-LVL IV: ICD-10-PCS | Mod: PBBFAC,HCNC,, | Performed by: INTERNAL MEDICINE

## 2021-01-01 PROCEDURE — 3078F DIAST BP <80 MM HG: CPT | Mod: CPTII,S$GLB,, | Performed by: INTERNAL MEDICINE

## 2021-01-01 PROCEDURE — 99292 CRITICAL CARE ADDL 30 MIN: CPT

## 2021-01-01 PROCEDURE — 99214 PR OFFICE/OUTPT VISIT, EST, LEVL IV, 30-39 MIN: ICD-10-PCS | Mod: HCNC,S$GLB,, | Performed by: INTERNAL MEDICINE

## 2021-01-01 PROCEDURE — 11000001 HC ACUTE MED/SURG PRIVATE ROOM

## 2021-01-01 PROCEDURE — 99215 PR OFFICE/OUTPT VISIT, EST, LEVL V, 40-54 MIN: ICD-10-PCS | Mod: S$GLB,,, | Performed by: INTERNAL MEDICINE

## 2021-01-01 PROCEDURE — 87086 URINE CULTURE/COLONY COUNT: CPT | Performed by: EMERGENCY MEDICINE

## 2021-01-01 PROCEDURE — 81001 URINALYSIS AUTO W/SCOPE: CPT | Performed by: STUDENT IN AN ORGANIZED HEALTH CARE EDUCATION/TRAINING PROGRAM

## 2021-01-01 PROCEDURE — 99999 PR PBB SHADOW E&M-EST. PATIENT-LVL V: CPT | Mod: PBBFAC,,, | Performed by: NURSE PRACTITIONER

## 2021-01-01 PROCEDURE — 84439 ASSAY OF FREE THYROXINE: CPT | Performed by: INTERNAL MEDICINE

## 2021-01-01 PROCEDURE — 83735 ASSAY OF MAGNESIUM: CPT | Performed by: EMERGENCY MEDICINE

## 2021-01-01 PROCEDURE — 20600001 HC STEP DOWN PRIVATE ROOM

## 2021-01-01 PROCEDURE — 99233 SBSQ HOSP IP/OBS HIGH 50: CPT | Mod: ,,, | Performed by: NURSE PRACTITIONER

## 2021-01-01 PROCEDURE — 3074F PR MOST RECENT SYSTOLIC BLOOD PRESSURE < 130 MM HG: ICD-10-PCS | Mod: CPTII,S$GLB,, | Performed by: INTERNAL MEDICINE

## 2021-01-01 PROCEDURE — 78815 PET IMAGE W/CT SKULL-THIGH: CPT | Mod: 26,PS,, | Performed by: RADIOLOGY

## 2021-01-01 PROCEDURE — 96372 THER/PROPH/DIAG INJ SC/IM: CPT | Mod: 59

## 2021-01-01 PROCEDURE — 99215 OFFICE O/P EST HI 40 MIN: CPT | Mod: S$GLB,,, | Performed by: INTERNAL MEDICINE

## 2021-01-01 PROCEDURE — 1159F MED LIST DOCD IN RCRD: CPT | Mod: S$GLB,,, | Performed by: NURSE PRACTITIONER

## 2021-01-01 PROCEDURE — 99226 PR SUBSEQUENT OBSERVATION CARE,LEVEL III: ICD-10-PCS | Mod: GC,,, | Performed by: STUDENT IN AN ORGANIZED HEALTH CARE EDUCATION/TRAINING PROGRAM

## 2021-01-01 PROCEDURE — 81001 URINALYSIS AUTO W/SCOPE: CPT | Performed by: PHYSICIAN ASSISTANT

## 2021-01-01 PROCEDURE — 88305 TISSUE EXAM BY PATHOLOGIST: CPT | Mod: 26,,, | Performed by: PATHOLOGY

## 2021-01-01 PROCEDURE — 99214 OFFICE O/P EST MOD 30 MIN: CPT | Mod: HCNC,S$GLB,, | Performed by: INTERNAL MEDICINE

## 2021-01-01 PROCEDURE — 63600175 PHARM REV CODE 636 W HCPCS: Performed by: INTERNAL MEDICINE

## 2021-01-01 PROCEDURE — 99223 1ST HOSP IP/OBS HIGH 75: CPT | Mod: ,,, | Performed by: NURSE PRACTITIONER

## 2021-01-01 PROCEDURE — 99223 1ST HOSP IP/OBS HIGH 75: CPT | Mod: ,,, | Performed by: CLINICAL NURSE SPECIALIST

## 2021-01-01 PROCEDURE — 83605 ASSAY OF LACTIC ACID: CPT | Performed by: INTERNAL MEDICINE

## 2021-01-01 PROCEDURE — 80047 BASIC METABLC PNL IONIZED CA: CPT

## 2021-01-01 PROCEDURE — 88173 PR  INTERPRETATION OF FNA SMEAR: ICD-10-PCS | Mod: 26,,, | Performed by: PATHOLOGY

## 2021-01-01 PROCEDURE — 99497 ADVNCD CARE PLAN 30 MIN: CPT | Mod: 25,,, | Performed by: CLINICAL NURSE SPECIALIST

## 2021-01-01 PROCEDURE — 63600175 PHARM REV CODE 636 W HCPCS: Performed by: EMERGENCY MEDICINE

## 2021-01-01 PROCEDURE — 3078F DIAST BP <80 MM HG: CPT | Mod: HCNC,CPTII,S$GLB, | Performed by: INTERNAL MEDICINE

## 2021-01-01 PROCEDURE — 99215 PR OFFICE/OUTPT VISIT, EST, LEVL V, 40-54 MIN: ICD-10-PCS | Mod: S$GLB,,, | Performed by: NURSE PRACTITIONER

## 2021-01-01 PROCEDURE — 25000242 PHARM REV CODE 250 ALT 637 W/ HCPCS: Performed by: EMERGENCY MEDICINE

## 2021-01-01 PROCEDURE — 99285 PR EMERGENCY DEPT VISIT,LEVEL V: ICD-10-PCS | Mod: ,,, | Performed by: EMERGENCY MEDICINE

## 2021-01-01 PROCEDURE — 1159F MED LIST DOCD IN RCRD: CPT | Mod: 95,,, | Performed by: NURSE PRACTITIONER

## 2021-01-01 PROCEDURE — 99226 PR SUBSEQUENT OBSERVATION CARE,LEVEL III: CPT | Mod: GC,,, | Performed by: STUDENT IN AN ORGANIZED HEALTH CARE EDUCATION/TRAINING PROGRAM

## 2021-01-01 PROCEDURE — 25000003 PHARM REV CODE 250: Performed by: STUDENT IN AN ORGANIZED HEALTH CARE EDUCATION/TRAINING PROGRAM

## 2021-01-01 PROCEDURE — 81003 URINALYSIS AUTO W/O SCOPE: CPT | Performed by: EMERGENCY MEDICINE

## 2021-01-01 PROCEDURE — 82330 ASSAY OF CALCIUM: CPT

## 2021-01-01 PROCEDURE — 84484 ASSAY OF TROPONIN QUANT: CPT | Performed by: PHYSICIAN ASSISTANT

## 2021-01-01 PROCEDURE — 84484 ASSAY OF TROPONIN QUANT: CPT | Performed by: EMERGENCY MEDICINE

## 2021-01-01 PROCEDURE — 78815 PET IMAGE W/CT SKULL-THIGH: CPT | Mod: TC,PS

## 2021-01-01 PROCEDURE — 93005 EKG 12-LEAD: ICD-10-PCS | Mod: S$GLB,,, | Performed by: INTERNAL MEDICINE

## 2021-01-01 PROCEDURE — 63600175 PHARM REV CODE 636 W HCPCS: Performed by: HOSPITALIST

## 2021-01-01 PROCEDURE — 93005 ELECTROCARDIOGRAM TRACING: CPT | Mod: S$GLB,,, | Performed by: INTERNAL MEDICINE

## 2021-01-01 PROCEDURE — 82550 ASSAY OF CK (CPK): CPT | Performed by: EMERGENCY MEDICINE

## 2021-01-01 PROCEDURE — 99223 PR INITIAL HOSPITAL CARE,LEVL III: ICD-10-PCS | Mod: ,,, | Performed by: CLINICAL NURSE SPECIALIST

## 2021-01-01 PROCEDURE — 99220 PR INITIAL OBSERVATION CARE,LEVL III: ICD-10-PCS | Mod: GC,,, | Performed by: STUDENT IN AN ORGANIZED HEALTH CARE EDUCATION/TRAINING PROGRAM

## 2021-01-01 PROCEDURE — 80053 COMPREHEN METABOLIC PANEL: CPT | Mod: 91 | Performed by: PHYSICIAN ASSISTANT

## 2021-01-01 PROCEDURE — 99233 PR SUBSEQUENT HOSPITAL CARE,LEVL III: ICD-10-PCS | Mod: ,,, | Performed by: NURSE PRACTITIONER

## 2021-01-01 PROCEDURE — 96375 TX/PRO/DX INJ NEW DRUG ADDON: CPT

## 2021-01-01 PROCEDURE — 84443 ASSAY THYROID STIM HORMONE: CPT | Mod: HCNC

## 2021-01-01 PROCEDURE — 3288F PR FALLS RISK ASSESSMENT DOCUMENTED: ICD-10-PCS | Mod: CPTII,S$GLB,, | Performed by: NURSE PRACTITIONER

## 2021-01-01 PROCEDURE — 80053 COMPREHEN METABOLIC PANEL: CPT | Performed by: STUDENT IN AN ORGANIZED HEALTH CARE EDUCATION/TRAINING PROGRAM

## 2021-01-01 PROCEDURE — 36415 COLL VENOUS BLD VENIPUNCTURE: CPT | Mod: HCNC

## 2021-01-01 PROCEDURE — 99497 PR ADVNCD CARE PLAN 30 MIN: ICD-10-PCS | Mod: 25,,, | Performed by: NURSE PRACTITIONER

## 2021-01-01 PROCEDURE — 99900035 HC TECH TIME PER 15 MIN (STAT)

## 2021-01-01 PROCEDURE — 99214 PR OFFICE/OUTPT VISIT, EST, LEVL IV, 30-39 MIN: ICD-10-PCS | Mod: S$GLB,,, | Performed by: INTERNAL MEDICINE

## 2021-01-01 PROCEDURE — 86900 BLOOD TYPING SEROLOGIC ABO: CPT

## 2021-01-01 PROCEDURE — 3078F PR MOST RECENT DIASTOLIC BLOOD PRESSURE < 80 MM HG: ICD-10-PCS | Mod: HCNC,CPTII,S$GLB, | Performed by: INTERNAL MEDICINE

## 2021-01-01 PROCEDURE — 97116 GAIT TRAINING THERAPY: CPT | Mod: CQ

## 2021-01-01 PROCEDURE — 81001 URINALYSIS AUTO W/SCOPE: CPT | Performed by: EMERGENCY MEDICINE

## 2021-01-01 PROCEDURE — 99238 PR HOSPITAL DISCHARGE DAY,<30 MIN: ICD-10-PCS | Mod: GC,,, | Performed by: INTERNAL MEDICINE

## 2021-01-01 PROCEDURE — 25000003 PHARM REV CODE 250: Performed by: PHYSICIAN ASSISTANT

## 2021-01-01 PROCEDURE — 83880 ASSAY OF NATRIURETIC PEPTIDE: CPT | Performed by: EMERGENCY MEDICINE

## 2021-01-01 PROCEDURE — 97530 THERAPEUTIC ACTIVITIES: CPT | Mod: CQ

## 2021-01-01 PROCEDURE — 3288F FALL RISK ASSESSMENT DOCD: CPT | Mod: HCNC,CPTII,S$GLB, | Performed by: INTERNAL MEDICINE

## 2021-01-01 PROCEDURE — U0002 COVID-19 LAB TEST NON-CDC: HCPCS | Performed by: INTERNAL MEDICINE

## 2021-01-01 PROCEDURE — 99214 OFFICE O/P EST MOD 30 MIN: CPT | Mod: S$GLB,,, | Performed by: INTERNAL MEDICINE

## 2021-01-01 PROCEDURE — 85007 BL SMEAR W/DIFF WBC COUNT: CPT | Performed by: STUDENT IN AN ORGANIZED HEALTH CARE EDUCATION/TRAINING PROGRAM

## 2021-01-01 PROCEDURE — 93010 ELECTROCARDIOGRAM REPORT: CPT | Mod: 76,,, | Performed by: INTERNAL MEDICINE

## 2021-01-01 PROCEDURE — 97165 OT EVAL LOW COMPLEX 30 MIN: CPT

## 2021-01-01 PROCEDURE — 87040 BLOOD CULTURE FOR BACTERIA: CPT | Mod: 59 | Performed by: EMERGENCY MEDICINE

## 2021-01-01 PROCEDURE — 99999 PR PBB SHADOW E&M-EST. PATIENT-LVL V: ICD-10-PCS | Mod: PBBFAC,,, | Performed by: NURSE PRACTITIONER

## 2021-01-01 PROCEDURE — 82306 VITAMIN D 25 HYDROXY: CPT | Mod: HCNC

## 2021-01-01 PROCEDURE — 99220 PR INITIAL OBSERVATION CARE,LEVL III: CPT | Mod: GC,,, | Performed by: STUDENT IN AN ORGANIZED HEALTH CARE EDUCATION/TRAINING PROGRAM

## 2021-01-01 PROCEDURE — 83735 ASSAY OF MAGNESIUM: CPT | Performed by: STUDENT IN AN ORGANIZED HEALTH CARE EDUCATION/TRAINING PROGRAM

## 2021-01-01 PROCEDURE — 85025 COMPLETE CBC W/AUTO DIFF WBC: CPT | Performed by: EMERGENCY MEDICINE

## 2021-01-01 PROCEDURE — 85027 COMPLETE CBC AUTOMATED: CPT | Performed by: STUDENT IN AN ORGANIZED HEALTH CARE EDUCATION/TRAINING PROGRAM

## 2021-01-01 PROCEDURE — 1101F PR PT FALLS ASSESS DOC 0-1 FALLS W/OUT INJ PAST YR: ICD-10-PCS | Mod: CPTII,S$GLB,, | Performed by: NURSE PRACTITIONER

## 2021-01-01 PROCEDURE — 88305 TISSUE EXAM BY PATHOLOGIST: ICD-10-PCS | Mod: 26,,, | Performed by: PATHOLOGY

## 2021-01-01 PROCEDURE — 83880 ASSAY OF NATRIURETIC PEPTIDE: CPT | Performed by: PHYSICIAN ASSISTANT

## 2021-01-01 PROCEDURE — 99285 EMERGENCY DEPT VISIT HI MDM: CPT | Mod: ,,, | Performed by: EMERGENCY MEDICINE

## 2021-01-01 PROCEDURE — 82962 GLUCOSE BLOOD TEST: CPT

## 2021-01-01 PROCEDURE — 87086 URINE CULTURE/COLONY COUNT: CPT | Performed by: NURSE PRACTITIONER

## 2021-01-01 PROCEDURE — 99238 HOSP IP/OBS DSCHRG MGMT 30/<: CPT | Mod: GC,,, | Performed by: INTERNAL MEDICINE

## 2021-01-01 PROCEDURE — 86800 THYROGLOBULIN ANTIBODY: CPT | Mod: HCNC

## 2021-01-01 PROCEDURE — 1101F PR PT FALLS ASSESS DOC 0-1 FALLS W/OUT INJ PAST YR: ICD-10-PCS | Mod: HCNC,CPTII,S$GLB, | Performed by: INTERNAL MEDICINE

## 2021-01-01 PROCEDURE — 99233 PR SUBSEQUENT HOSPITAL CARE,LEVL III: ICD-10-PCS | Mod: ,,, | Performed by: INTERNAL MEDICINE

## 2021-01-01 PROCEDURE — 84100 ASSAY OF PHOSPHORUS: CPT | Performed by: EMERGENCY MEDICINE

## 2021-01-01 PROCEDURE — 99285 PR EMERGENCY DEPT VISIT,LEVEL V: ICD-10-PCS | Mod: CS,,, | Performed by: EMERGENCY MEDICINE

## 2021-01-01 PROCEDURE — 97166 OT EVAL MOD COMPLEX 45 MIN: CPT

## 2021-01-01 RX ORDER — SODIUM BICARBONATE 650 MG/1
1300 TABLET ORAL 3 TIMES DAILY
Status: DISCONTINUED | OUTPATIENT
Start: 2021-01-01 | End: 2021-01-01

## 2021-01-01 RX ORDER — CALCITRIOL 0.25 UG/1
0.25 CAPSULE ORAL DAILY
Status: DISCONTINUED | OUTPATIENT
Start: 2021-01-01 | End: 2021-01-01 | Stop reason: HOSPADM

## 2021-01-01 RX ORDER — OXYCODONE HYDROCHLORIDE 5 MG/1
5 TABLET ORAL EVERY 4 HOURS PRN
Qty: 18 TABLET | Refills: 0 | Status: SHIPPED | OUTPATIENT
Start: 2021-01-01 | End: 2021-01-01 | Stop reason: SDUPTHER

## 2021-01-01 RX ORDER — ONDANSETRON 8 MG/1
8 TABLET, ORALLY DISINTEGRATING ORAL EVERY 8 HOURS PRN
Status: DISCONTINUED | OUTPATIENT
Start: 2021-01-01 | End: 2021-01-01

## 2021-01-01 RX ORDER — AMOXICILLIN AND CLAVULANATE POTASSIUM 875; 125 MG/1; MG/1
1 TABLET, FILM COATED ORAL EVERY 12 HOURS
Status: DISCONTINUED | OUTPATIENT
Start: 2021-01-01 | End: 2021-01-01

## 2021-01-01 RX ORDER — ONDANSETRON 4 MG/1
8 TABLET, FILM COATED ORAL EVERY 8 HOURS
Status: DISCONTINUED | OUTPATIENT
Start: 2021-01-01 | End: 2021-01-01

## 2021-01-01 RX ORDER — LEVOTHYROXINE SODIUM 25 UG/1
25 TABLET ORAL
Status: CANCELLED | OUTPATIENT
Start: 2021-01-01

## 2021-01-01 RX ORDER — GLUCAGON 1 MG
1 KIT INJECTION
Status: DISCONTINUED | OUTPATIENT
Start: 2021-01-01 | End: 2021-01-01 | Stop reason: HOSPADM

## 2021-01-01 RX ORDER — POTASSIUM CHLORIDE 20 MEQ/1
40 TABLET, EXTENDED RELEASE ORAL ONCE
Status: DISCONTINUED | OUTPATIENT
Start: 2021-01-01 | End: 2021-01-01

## 2021-01-01 RX ORDER — INDOMETHACIN 25 MG/1
50 CAPSULE ORAL
Status: COMPLETED | OUTPATIENT
Start: 2021-01-01 | End: 2021-01-01

## 2021-01-01 RX ORDER — POTASSIUM CHLORIDE 7.45 MG/ML
10 INJECTION INTRAVENOUS ONCE
Status: DISCONTINUED | OUTPATIENT
Start: 2021-01-01 | End: 2021-01-01

## 2021-01-01 RX ORDER — AMOXICILLIN AND CLAVULANATE POTASSIUM 500; 125 MG/1; MG/1
1 TABLET, FILM COATED ORAL EVERY 24 HOURS
Status: DISCONTINUED | OUTPATIENT
Start: 2021-01-01 | End: 2021-01-01 | Stop reason: HOSPADM

## 2021-01-01 RX ORDER — ONDANSETRON 4 MG/1
4 TABLET, ORALLY DISINTEGRATING ORAL
Status: COMPLETED | OUTPATIENT
Start: 2021-01-01 | End: 2021-01-01

## 2021-01-01 RX ORDER — SODIUM BICARBONATE 650 MG/1
650 TABLET ORAL ONCE
Status: COMPLETED | OUTPATIENT
Start: 2021-01-01 | End: 2021-01-01

## 2021-01-01 RX ORDER — ALBUTEROL SULFATE 2.5 MG/.5ML
2.5 SOLUTION RESPIRATORY (INHALATION) EVERY 4 HOURS PRN
Status: DISCONTINUED | OUTPATIENT
Start: 2021-01-01 | End: 2021-01-01 | Stop reason: HOSPADM

## 2021-01-01 RX ORDER — LEVOTHYROXINE SODIUM 75 UG/1
75 TABLET ORAL
Status: DISCONTINUED | OUTPATIENT
Start: 2021-01-01 | End: 2021-01-01

## 2021-01-01 RX ORDER — PANTOPRAZOLE SODIUM 40 MG/1
40 TABLET, DELAYED RELEASE ORAL DAILY
Status: DISCONTINUED | OUTPATIENT
Start: 2021-01-01 | End: 2021-01-01

## 2021-01-01 RX ORDER — ASPIRIN 81 MG/1
81 TABLET ORAL DAILY
Status: DISCONTINUED | OUTPATIENT
Start: 2021-01-01 | End: 2021-01-01 | Stop reason: HOSPADM

## 2021-01-01 RX ORDER — CYPROHEPTADINE HYDROCHLORIDE 4 MG/1
4 TABLET ORAL 3 TIMES DAILY
Qty: 90 TABLET | Refills: 0 | Status: SHIPPED | OUTPATIENT
Start: 2021-01-01

## 2021-01-01 RX ORDER — CLOPIDOGREL BISULFATE 75 MG/1
75 TABLET ORAL DAILY
Status: DISCONTINUED | OUTPATIENT
Start: 2021-01-01 | End: 2021-01-01 | Stop reason: HOSPADM

## 2021-01-01 RX ORDER — LIDOCAINE 50 MG/G
1 PATCH TOPICAL DAILY
Qty: 30 PATCH | Refills: 1 | Status: SHIPPED | OUTPATIENT
Start: 2021-01-01

## 2021-01-01 RX ORDER — AMOXICILLIN 250 MG
1 CAPSULE ORAL DAILY PRN
Status: DISCONTINUED | OUTPATIENT
Start: 2021-01-01 | End: 2021-01-01 | Stop reason: HOSPADM

## 2021-01-01 RX ORDER — SODIUM CHLORIDE 9 MG/ML
INJECTION, SOLUTION INTRAVENOUS
Status: DISCONTINUED | OUTPATIENT
Start: 2021-01-01 | End: 2021-01-01 | Stop reason: HOSPADM

## 2021-01-01 RX ORDER — SODIUM CHLORIDE, SODIUM LACTATE, POTASSIUM CHLORIDE, CALCIUM CHLORIDE 600; 310; 30; 20 MG/100ML; MG/100ML; MG/100ML; MG/100ML
INJECTION, SOLUTION INTRAVENOUS CONTINUOUS
Status: ACTIVE | OUTPATIENT
Start: 2021-01-01 | End: 2021-01-01

## 2021-01-01 RX ORDER — ACETAMINOPHEN 325 MG/1
650 TABLET ORAL EVERY 6 HOURS PRN
Status: DISCONTINUED | OUTPATIENT
Start: 2021-01-01 | End: 2021-01-01 | Stop reason: HOSPADM

## 2021-01-01 RX ORDER — POTASSIUM CHLORIDE 7.45 MG/ML
40 INJECTION INTRAVENOUS ONCE
Status: DISCONTINUED | OUTPATIENT
Start: 2021-01-01 | End: 2021-01-01

## 2021-01-01 RX ORDER — HEPARIN SODIUM 5000 [USP'U]/ML
5000 INJECTION, SOLUTION INTRAVENOUS; SUBCUTANEOUS EVERY 8 HOURS
Status: DISCONTINUED | OUTPATIENT
Start: 2021-01-01 | End: 2021-01-01

## 2021-01-01 RX ORDER — ALBUTEROL SULFATE 2.5 MG/.5ML
10 SOLUTION RESPIRATORY (INHALATION) ONCE
Status: COMPLETED | OUTPATIENT
Start: 2021-01-01 | End: 2021-01-01

## 2021-01-01 RX ORDER — ONDANSETRON 8 MG/1
8 TABLET, ORALLY DISINTEGRATING ORAL EVERY 6 HOURS PRN
Qty: 30 TABLET | Refills: 1 | Status: SHIPPED | OUTPATIENT
Start: 2021-01-01 | End: 2022-06-11

## 2021-01-01 RX ORDER — CEPHALEXIN 500 MG/1
500 CAPSULE ORAL EVERY 12 HOURS
Qty: 10 CAPSULE | Refills: 0 | Status: SHIPPED | OUTPATIENT
Start: 2021-01-01 | End: 2021-01-01

## 2021-01-01 RX ORDER — SODIUM CHLORIDE 0.9 % (FLUSH) 0.9 %
10 SYRINGE (ML) INJECTION
Status: DISCONTINUED | OUTPATIENT
Start: 2021-01-01 | End: 2021-01-01 | Stop reason: HOSPADM

## 2021-01-01 RX ORDER — OXYCODONE HYDROCHLORIDE 5 MG/1
5 TABLET ORAL EVERY 4 HOURS PRN
Qty: 30 TABLET | Refills: 0 | Status: SHIPPED | OUTPATIENT
Start: 2021-01-01 | End: 2021-01-01

## 2021-01-01 RX ORDER — ROCURONIUM BROMIDE 10 MG/ML
INJECTION, SOLUTION INTRAVENOUS
Status: DISCONTINUED
Start: 2021-01-01 | End: 2021-01-01 | Stop reason: WASHOUT

## 2021-01-01 RX ORDER — OXYCODONE HYDROCHLORIDE 5 MG/1
5 TABLET ORAL EVERY 4 HOURS PRN
Status: DISCONTINUED | OUTPATIENT
Start: 2021-01-01 | End: 2021-01-01 | Stop reason: HOSPADM

## 2021-01-01 RX ORDER — PANTOPRAZOLE SODIUM 40 MG/10ML
40 INJECTION, POWDER, LYOPHILIZED, FOR SOLUTION INTRAVENOUS 2 TIMES DAILY
Status: DISCONTINUED | OUTPATIENT
Start: 2021-01-01 | End: 2021-01-01

## 2021-01-01 RX ORDER — AMLODIPINE BESYLATE 2.5 MG/1
2.5 TABLET ORAL DAILY
Status: DISCONTINUED | OUTPATIENT
Start: 2021-01-01 | End: 2021-01-01 | Stop reason: HOSPADM

## 2021-01-01 RX ORDER — MUPIROCIN 20 MG/G
OINTMENT TOPICAL 2 TIMES DAILY
Status: DISCONTINUED | OUTPATIENT
Start: 2021-01-01 | End: 2021-01-01 | Stop reason: HOSPADM

## 2021-01-01 RX ORDER — PROCHLORPERAZINE MALEATE 5 MG
5 TABLET ORAL 3 TIMES DAILY PRN
Status: DISCONTINUED | OUTPATIENT
Start: 2021-01-01 | End: 2021-01-01

## 2021-01-01 RX ORDER — GABAPENTIN 100 MG/1
100 CAPSULE ORAL 3 TIMES DAILY
Qty: 90 CAPSULE | Refills: 0 | Status: ON HOLD | OUTPATIENT
Start: 2021-01-01 | End: 2021-01-01 | Stop reason: HOSPADM

## 2021-01-01 RX ORDER — OXYCODONE HYDROCHLORIDE 5 MG/1
5 TABLET ORAL
Status: DISCONTINUED | OUTPATIENT
Start: 2021-01-01 | End: 2021-01-01

## 2021-01-01 RX ORDER — SODIUM CHLORIDE, SODIUM LACTATE, POTASSIUM CHLORIDE, CALCIUM CHLORIDE 600; 310; 30; 20 MG/100ML; MG/100ML; MG/100ML; MG/100ML
INJECTION, SOLUTION INTRAVENOUS CONTINUOUS
Status: DISCONTINUED | OUTPATIENT
Start: 2021-01-01 | End: 2021-01-01 | Stop reason: HOSPADM

## 2021-01-01 RX ORDER — CYPROHEPTADINE HYDROCHLORIDE 4 MG/1
4 TABLET ORAL 3 TIMES DAILY
Status: DISCONTINUED | OUTPATIENT
Start: 2021-01-01 | End: 2021-01-01

## 2021-01-01 RX ORDER — DRONABINOL 2.5 MG/1
2.5 CAPSULE ORAL 2 TIMES DAILY
Status: DISCONTINUED | OUTPATIENT
Start: 2021-01-01 | End: 2021-01-01 | Stop reason: HOSPADM

## 2021-01-01 RX ORDER — LEVOTHYROXINE SODIUM 25 UG/1
25 TABLET ORAL
Status: DISCONTINUED | OUTPATIENT
Start: 2021-01-01 | End: 2021-01-01

## 2021-01-01 RX ORDER — TRAMADOL HYDROCHLORIDE 50 MG/1
50 TABLET ORAL EVERY 6 HOURS PRN
Qty: 30 TABLET | Refills: 1 | Status: ON HOLD | OUTPATIENT
Start: 2021-01-01 | End: 2021-01-01 | Stop reason: HOSPADM

## 2021-01-01 RX ORDER — AMOXICILLIN AND CLAVULANATE POTASSIUM 500; 125 MG/1; MG/1
1 TABLET, FILM COATED ORAL 2 TIMES DAILY
Qty: 10 TABLET | Refills: 0 | Status: SHIPPED | OUTPATIENT
Start: 2021-01-01 | End: 2021-01-01

## 2021-01-01 RX ORDER — LEVOTHYROXINE SODIUM 75 UG/1
75 TABLET ORAL
Status: COMPLETED | OUTPATIENT
Start: 2021-01-01 | End: 2021-01-01

## 2021-01-01 RX ORDER — SUCCINYLCHOLINE CHLORIDE 20 MG/ML
INJECTION INTRAMUSCULAR; INTRAVENOUS
Status: DISCONTINUED
Start: 2021-01-01 | End: 2021-01-01 | Stop reason: WASHOUT

## 2021-01-01 RX ORDER — POTASSIUM CHLORIDE 750 MG/1
30 CAPSULE, EXTENDED RELEASE ORAL ONCE
Status: DISCONTINUED | OUTPATIENT
Start: 2021-01-01 | End: 2021-01-01

## 2021-01-01 RX ORDER — HYDROCODONE BITARTRATE AND ACETAMINOPHEN 500; 5 MG/1; MG/1
TABLET ORAL
Status: DISCONTINUED | OUTPATIENT
Start: 2021-01-01 | End: 2021-01-01 | Stop reason: HOSPADM

## 2021-01-01 RX ORDER — PANTOPRAZOLE SODIUM 40 MG/10ML
80 INJECTION, POWDER, LYOPHILIZED, FOR SOLUTION INTRAVENOUS ONCE
Status: COMPLETED | OUTPATIENT
Start: 2021-01-01 | End: 2021-01-01

## 2021-01-01 RX ORDER — OXYCODONE HYDROCHLORIDE 5 MG/1
5 TABLET ORAL ONCE
Status: COMPLETED | OUTPATIENT
Start: 2021-01-01 | End: 2021-01-01

## 2021-01-01 RX ORDER — ASPIRIN 81 MG/1
81 TABLET ORAL DAILY
Status: DISCONTINUED | OUTPATIENT
Start: 2021-01-01 | End: 2021-01-01

## 2021-01-01 RX ORDER — POTASSIUM CHLORIDE 7.45 MG/ML
10 INJECTION INTRAVENOUS
Status: COMPLETED | OUTPATIENT
Start: 2021-01-01 | End: 2021-01-01

## 2021-01-01 RX ORDER — ATORVASTATIN CALCIUM 40 MG/1
40 TABLET, FILM COATED ORAL DAILY
Status: DISCONTINUED | OUTPATIENT
Start: 2021-01-01 | End: 2021-01-01 | Stop reason: HOSPADM

## 2021-01-01 RX ORDER — ONDANSETRON 4 MG/1
8 TABLET, FILM COATED ORAL EVERY 8 HOURS PRN
Status: DISCONTINUED | OUTPATIENT
Start: 2021-01-01 | End: 2021-01-01 | Stop reason: HOSPADM

## 2021-01-01 RX ORDER — HEPARIN SODIUM,PORCINE/D5W 25000/250
0-40 INTRAVENOUS SOLUTION INTRAVENOUS CONTINUOUS
Status: DISCONTINUED | OUTPATIENT
Start: 2021-01-01 | End: 2021-01-01

## 2021-01-01 RX ORDER — ALBUTEROL SULFATE 2.5 MG/.5ML
15 SOLUTION RESPIRATORY (INHALATION)
Status: COMPLETED | OUTPATIENT
Start: 2021-01-01 | End: 2021-01-01

## 2021-01-01 RX ORDER — POLYETHYLENE GLYCOL 3350 17 G/17G
17 POWDER, FOR SOLUTION ORAL DAILY
Status: DISCONTINUED | OUTPATIENT
Start: 2021-01-01 | End: 2021-01-01 | Stop reason: HOSPADM

## 2021-01-01 RX ORDER — IBUPROFEN 200 MG
16 TABLET ORAL
Status: DISCONTINUED | OUTPATIENT
Start: 2021-01-01 | End: 2021-01-01 | Stop reason: HOSPADM

## 2021-01-01 RX ORDER — BENZONATATE 100 MG/1
100 CAPSULE ORAL 3 TIMES DAILY PRN
Status: DISCONTINUED | OUTPATIENT
Start: 2021-01-01 | End: 2021-01-01 | Stop reason: HOSPADM

## 2021-01-01 RX ORDER — PROMETHAZINE HYDROCHLORIDE 25 MG/1
25 TABLET ORAL EVERY 6 HOURS PRN
Qty: 20 TABLET | Refills: 0 | Status: SHIPPED | OUTPATIENT
Start: 2021-01-01 | End: 2021-01-01

## 2021-01-01 RX ORDER — LEVOTHYROXINE SODIUM 75 UG/1
75 TABLET ORAL
Status: DISCONTINUED | OUTPATIENT
Start: 2021-01-01 | End: 2021-01-01 | Stop reason: HOSPADM

## 2021-01-01 RX ORDER — MORPHINE SULFATE 15 MG/1
15 TABLET, FILM COATED, EXTENDED RELEASE ORAL 2 TIMES DAILY
Qty: 20 TABLET | Refills: 0 | Status: SHIPPED | OUTPATIENT
Start: 2021-01-01 | End: 2021-01-01

## 2021-01-01 RX ORDER — ACETAMINOPHEN 325 MG/1
650 TABLET ORAL EVERY 4 HOURS PRN
Status: DISCONTINUED | OUTPATIENT
Start: 2021-01-01 | End: 2021-01-01 | Stop reason: HOSPADM

## 2021-01-01 RX ORDER — PROPOFOL 10 MG/ML
INJECTION, EMULSION INTRAVENOUS
Status: DISCONTINUED
Start: 2021-01-01 | End: 2021-01-01 | Stop reason: WASHOUT

## 2021-01-01 RX ORDER — IBUPROFEN 200 MG
24 TABLET ORAL
Status: DISCONTINUED | OUTPATIENT
Start: 2021-01-01 | End: 2021-01-01 | Stop reason: HOSPADM

## 2021-01-01 RX ORDER — OXYCODONE HYDROCHLORIDE 5 MG/1
5 TABLET ORAL
Status: COMPLETED | OUTPATIENT
Start: 2021-01-01 | End: 2021-01-01

## 2021-01-01 RX ORDER — PROCHLORPERAZINE MALEATE 5 MG
10 TABLET ORAL 4 TIMES DAILY PRN
Status: CANCELLED | OUTPATIENT
Start: 2021-01-01

## 2021-01-01 RX ORDER — POTASSIUM CHLORIDE 7.45 MG/ML
10 INJECTION INTRAVENOUS
Status: DISCONTINUED | OUTPATIENT
Start: 2021-01-01 | End: 2021-01-01

## 2021-01-01 RX ORDER — PANTOPRAZOLE SODIUM 40 MG/1
40 TABLET, DELAYED RELEASE ORAL DAILY
Status: DISCONTINUED | OUTPATIENT
Start: 2021-01-01 | End: 2021-01-01 | Stop reason: HOSPADM

## 2021-01-01 RX ORDER — ETOMIDATE 2 MG/ML
INJECTION INTRAVENOUS
Status: DISCONTINUED
Start: 2021-01-01 | End: 2021-01-01 | Stop reason: WASHOUT

## 2021-01-01 RX ORDER — ATORVASTATIN CALCIUM 20 MG/1
40 TABLET, FILM COATED ORAL DAILY
Status: DISCONTINUED | OUTPATIENT
Start: 2021-01-01 | End: 2021-01-01 | Stop reason: HOSPADM

## 2021-01-01 RX ORDER — CEFTRIAXONE 1 G/1
1 INJECTION, POWDER, FOR SOLUTION INTRAMUSCULAR; INTRAVENOUS
Status: COMPLETED | OUTPATIENT
Start: 2021-01-01 | End: 2021-01-01

## 2021-01-01 RX ORDER — AMLODIPINE BESYLATE 2.5 MG/1
2.5 TABLET ORAL DAILY
Status: DISCONTINUED | OUTPATIENT
Start: 2021-01-01 | End: 2021-01-01

## 2021-01-01 RX ORDER — ONDANSETRON 2 MG/ML
4 INJECTION INTRAMUSCULAR; INTRAVENOUS EVERY 8 HOURS PRN
Status: DISCONTINUED | OUTPATIENT
Start: 2021-01-01 | End: 2021-01-01 | Stop reason: HOSPADM

## 2021-01-01 RX ADMIN — CALCITRIOL CAPSULES 0.25 MCG 0.25 MCG: 0.25 CAPSULE ORAL at 08:07

## 2021-01-01 RX ADMIN — LEVOTHYROXINE SODIUM 75 MCG: 75 TABLET ORAL at 06:07

## 2021-01-01 RX ADMIN — ASPIRIN 81 MG: 81 TABLET, COATED ORAL at 03:07

## 2021-01-01 RX ADMIN — DRONABINOL 2.5 MG: 2.5 CAPSULE ORAL at 08:07

## 2021-01-01 RX ADMIN — MUPIROCIN: 20 OINTMENT TOPICAL at 08:07

## 2021-01-01 RX ADMIN — SODIUM CHLORIDE, SODIUM LACTATE, POTASSIUM CHLORIDE, AND CALCIUM CHLORIDE: .6; .31; .03; .02 INJECTION, SOLUTION INTRAVENOUS at 01:07

## 2021-01-01 RX ADMIN — AMLODIPINE BESYLATE 2.5 MG: 2.5 TABLET ORAL at 08:07

## 2021-01-01 RX ADMIN — PANTOPRAZOLE SODIUM 40 MG: 40 TABLET, DELAYED RELEASE ORAL at 03:07

## 2021-01-01 RX ADMIN — CLOPIDOGREL 75 MG: 75 TABLET, FILM COATED ORAL at 09:06

## 2021-01-01 RX ADMIN — SODIUM CHLORIDE 500 ML: 0.9 INJECTION, SOLUTION INTRAVENOUS at 07:06

## 2021-01-01 RX ADMIN — PANTOPRAZOLE SODIUM 40 MG: 40 TABLET, DELAYED RELEASE ORAL at 08:07

## 2021-01-01 RX ADMIN — SODIUM CHLORIDE, SODIUM LACTATE, POTASSIUM CHLORIDE, AND CALCIUM CHLORIDE: .6; .31; .03; .02 INJECTION, SOLUTION INTRAVENOUS at 08:06

## 2021-01-01 RX ADMIN — ASPIRIN 81 MG: 81 TABLET, COATED ORAL at 08:07

## 2021-01-01 RX ADMIN — LEVOTHYROXINE SODIUM 75 MCG: 75 TABLET ORAL at 08:06

## 2021-01-01 RX ADMIN — CYPROHEPTADINE HYDROCHLORIDE 4 MG: 4 TABLET ORAL at 09:07

## 2021-01-01 RX ADMIN — POTASSIUM CHLORIDE 10 MEQ: 7.46 INJECTION, SOLUTION INTRAVENOUS at 10:07

## 2021-01-01 RX ADMIN — POTASSIUM CHLORIDE 10 MEQ: 10 INJECTION, SOLUTION INTRAVENOUS at 04:07

## 2021-01-01 RX ADMIN — CALCITRIOL CAPSULES 0.25 MCG 0.25 MCG: 0.25 CAPSULE ORAL at 09:07

## 2021-01-01 RX ADMIN — ACETAMINOPHEN 650 MG: 325 TABLET ORAL at 08:06

## 2021-01-01 RX ADMIN — MUPIROCIN: 20 OINTMENT TOPICAL at 09:07

## 2021-01-01 RX ADMIN — ACETAMINOPHEN 650 MG: 325 TABLET ORAL at 10:06

## 2021-01-01 RX ADMIN — CALCITRIOL CAPSULES 0.25 MCG 0.25 MCG: 0.25 CAPSULE ORAL at 09:06

## 2021-01-01 RX ADMIN — SODIUM CHLORIDE 1000 ML: 0.9 INJECTION, SOLUTION INTRAVENOUS at 11:07

## 2021-01-01 RX ADMIN — CALCITRIOL CAPSULES 0.25 MCG 0.25 MCG: 0.25 CAPSULE ORAL at 08:06

## 2021-01-01 RX ADMIN — POTASSIUM CHLORIDE 10 MEQ: 10 INJECTION, SOLUTION INTRAVENOUS at 07:07

## 2021-01-01 RX ADMIN — ATORVASTATIN CALCIUM 40 MG: 40 TABLET, FILM COATED ORAL at 08:07

## 2021-01-01 RX ADMIN — ATORVASTATIN CALCIUM 40 MG: 40 TABLET, FILM COATED ORAL at 09:07

## 2021-01-01 RX ADMIN — DRONABINOL 2.5 MG: 2.5 CAPSULE ORAL at 09:07

## 2021-01-01 RX ADMIN — SODIUM BICARBONATE 650 MG TABLET 1300 MG: at 04:07

## 2021-01-01 RX ADMIN — LEVOTHYROXINE SODIUM 75 MCG: 75 TABLET ORAL at 05:06

## 2021-01-01 RX ADMIN — PANTOPRAZOLE SODIUM 40 MG: 40 INJECTION, POWDER, FOR SOLUTION INTRAVENOUS at 09:07

## 2021-01-01 RX ADMIN — AMLODIPINE BESYLATE 2.5 MG: 2.5 TABLET ORAL at 06:07

## 2021-01-01 RX ADMIN — PANTOPRAZOLE SODIUM 40 MG: 40 INJECTION, POWDER, FOR SOLUTION INTRAVENOUS at 08:07

## 2021-01-01 RX ADMIN — SODIUM BICARBONATE 650 MG TABLET 1300 MG: at 09:07

## 2021-01-01 RX ADMIN — SODIUM CHLORIDE, SODIUM LACTATE, POTASSIUM CHLORIDE, AND CALCIUM CHLORIDE: .6; .31; .03; .02 INJECTION, SOLUTION INTRAVENOUS at 05:06

## 2021-01-01 RX ADMIN — OXYCODONE 5 MG: 5 TABLET ORAL at 08:06

## 2021-01-01 RX ADMIN — CLOPIDOGREL 75 MG: 75 TABLET, FILM COATED ORAL at 08:06

## 2021-01-01 RX ADMIN — ATORVASTATIN CALCIUM 40 MG: 40 TABLET, FILM COATED ORAL at 03:07

## 2021-01-01 RX ADMIN — HEPARIN SODIUM 5000 UNITS: 5000 INJECTION INTRAVENOUS; SUBCUTANEOUS at 06:07

## 2021-01-01 RX ADMIN — SODIUM BICARBONATE: 84 INJECTION, SOLUTION INTRAVENOUS at 07:07

## 2021-01-01 RX ADMIN — POTASSIUM CHLORIDE 10 MEQ: 10 INJECTION, SOLUTION INTRAVENOUS at 06:07

## 2021-01-01 RX ADMIN — AMLODIPINE BESYLATE 2.5 MG: 2.5 TABLET ORAL at 09:06

## 2021-01-01 RX ADMIN — PANTOPRAZOLE SODIUM 40 MG: 40 TABLET, DELAYED RELEASE ORAL at 10:06

## 2021-01-01 RX ADMIN — PROMETHAZINE HYDROCHLORIDE 25 MG: 25 INJECTION INTRAMUSCULAR; INTRAVENOUS at 08:06

## 2021-01-01 RX ADMIN — SODIUM CHLORIDE 1000 ML: 0.9 INJECTION, SOLUTION INTRAVENOUS at 10:07

## 2021-01-01 RX ADMIN — PIPERACILLIN AND TAZOBACTAM 4.5 G: 4; .5 INJECTION, POWDER, LYOPHILIZED, FOR SOLUTION INTRAVENOUS; PARENTERAL at 10:06

## 2021-01-01 RX ADMIN — ASPIRIN 81 MG: 81 TABLET, COATED ORAL at 08:06

## 2021-01-01 RX ADMIN — CALCITRIOL CAPSULES 0.25 MCG 0.25 MCG: 0.25 CAPSULE ORAL at 10:06

## 2021-01-01 RX ADMIN — INSULIN HUMAN 10 UNITS: 100 INJECTION, SOLUTION PARENTERAL at 04:06

## 2021-01-01 RX ADMIN — CYPROHEPTADINE HYDROCHLORIDE 4 MG: 4 TABLET ORAL at 08:07

## 2021-01-01 RX ADMIN — AMLODIPINE BESYLATE 2.5 MG: 2.5 TABLET ORAL at 08:06

## 2021-01-01 RX ADMIN — SODIUM CHLORIDE, SODIUM LACTATE, POTASSIUM CHLORIDE, AND CALCIUM CHLORIDE 500 ML: .6; .31; .03; .02 INJECTION, SOLUTION INTRAVENOUS at 04:07

## 2021-01-01 RX ADMIN — ACETAMINOPHEN 650 MG: 325 TABLET ORAL at 08:07

## 2021-01-01 RX ADMIN — SODIUM CHLORIDE, SODIUM LACTATE, POTASSIUM CHLORIDE, AND CALCIUM CHLORIDE: .6; .31; .03; .02 INJECTION, SOLUTION INTRAVENOUS at 04:06

## 2021-01-01 RX ADMIN — AMOXICILLIN AND CLAVULANATE POTASSIUM 500 MG: 500; 125 TABLET, FILM COATED ORAL at 02:06

## 2021-01-01 RX ADMIN — LEVOTHYROXINE SODIUM 75 MCG: 75 TABLET ORAL at 05:07

## 2021-01-01 RX ADMIN — PANTOPRAZOLE SODIUM 40 MG: 40 TABLET, DELAYED RELEASE ORAL at 09:06

## 2021-01-01 RX ADMIN — SODIUM BICARBONATE 650 MG TABLET 650 MG: at 08:07

## 2021-01-01 RX ADMIN — CEFTRIAXONE 1 G: 1 INJECTION, POWDER, FOR SOLUTION INTRAMUSCULAR; INTRAVENOUS at 08:06

## 2021-01-01 RX ADMIN — SODIUM CHLORIDE, SODIUM LACTATE, POTASSIUM CHLORIDE, AND CALCIUM CHLORIDE: .6; .31; .03; .02 INJECTION, SOLUTION INTRAVENOUS at 03:07

## 2021-01-01 RX ADMIN — SODIUM BICARBONATE 50 MEQ: 84 INJECTION, SOLUTION INTRAVENOUS at 04:06

## 2021-01-01 RX ADMIN — DOCUSATE SODIUM 50 MG AND SENNOSIDES 8.6 MG 1 TABLET: 8.6; 5 TABLET, FILM COATED ORAL at 08:06

## 2021-01-01 RX ADMIN — SODIUM CHLORIDE 2000 ML: 0.9 INJECTION, SOLUTION INTRAVENOUS at 11:07

## 2021-01-01 RX ADMIN — CLOPIDOGREL 75 MG: 75 TABLET, FILM COATED ORAL at 10:06

## 2021-01-01 RX ADMIN — MUPIROCIN: 20 OINTMENT TOPICAL at 09:06

## 2021-01-01 RX ADMIN — SODIUM BICARBONATE 650 MG TABLET 1300 MG: at 08:07

## 2021-01-01 RX ADMIN — LEVOTHYROXINE SODIUM 37.5 MCG: 25 TABLET ORAL at 05:07

## 2021-01-01 RX ADMIN — PIPERACILLIN AND TAZOBACTAM 4.5 G: 4; .5 INJECTION, POWDER, LYOPHILIZED, FOR SOLUTION INTRAVENOUS; PARENTERAL at 12:06

## 2021-01-01 RX ADMIN — SODIUM BICARBONATE 650 MG TABLET 1300 MG: at 02:07

## 2021-01-01 RX ADMIN — ATORVASTATIN CALCIUM 40 MG: 40 TABLET, FILM COATED ORAL at 10:06

## 2021-01-01 RX ADMIN — ONDANSETRON 4 MG: 2 INJECTION INTRAMUSCULAR; INTRAVENOUS at 09:06

## 2021-01-01 RX ADMIN — PIPERACILLIN AND TAZOBACTAM 4.5 G: 4; .5 INJECTION, POWDER, LYOPHILIZED, FOR SOLUTION INTRAVENOUS; PARENTERAL at 09:06

## 2021-01-01 RX ADMIN — MUPIROCIN: 20 OINTMENT TOPICAL at 08:06

## 2021-01-01 RX ADMIN — SODIUM CHLORIDE, SODIUM LACTATE, POTASSIUM CHLORIDE, AND CALCIUM CHLORIDE 1000 ML: .6; .31; .03; .02 INJECTION, SOLUTION INTRAVENOUS at 01:07

## 2021-01-01 RX ADMIN — ONDANSETRON 4 MG: 2 INJECTION INTRAMUSCULAR; INTRAVENOUS at 05:06

## 2021-01-01 RX ADMIN — SODIUM BICARBONATE 650 MG TABLET 1300 MG: at 03:07

## 2021-01-01 RX ADMIN — SODIUM BICARBONATE: 84 INJECTION, SOLUTION INTRAVENOUS at 10:07

## 2021-01-01 RX ADMIN — ONDANSETRON HYDROCHLORIDE 8 MG: 4 TABLET, FILM COATED ORAL at 06:07

## 2021-01-01 RX ADMIN — ATORVASTATIN CALCIUM 40 MG: 40 TABLET, FILM COATED ORAL at 09:06

## 2021-01-01 RX ADMIN — PANTOPRAZOLE SODIUM 40 MG: 40 TABLET, DELAYED RELEASE ORAL at 08:06

## 2021-01-01 RX ADMIN — SODIUM CHLORIDE: 0.9 INJECTION, SOLUTION INTRAVENOUS at 04:07

## 2021-01-01 RX ADMIN — SODIUM CHLORIDE 1000 ML: 0.9 INJECTION, SOLUTION INTRAVENOUS at 03:06

## 2021-01-01 RX ADMIN — ONDANSETRON HYDROCHLORIDE 8 MG: 4 TABLET, FILM COATED ORAL at 09:07

## 2021-01-01 RX ADMIN — ALBUTEROL SULFATE 15 MG: 2.5 SOLUTION RESPIRATORY (INHALATION) at 04:06

## 2021-01-01 RX ADMIN — PANTOPRAZOLE SODIUM 80 MG: 40 INJECTION, POWDER, FOR SOLUTION INTRAVENOUS at 12:07

## 2021-01-01 RX ADMIN — OXYCODONE 5 MG: 5 TABLET ORAL at 06:06

## 2021-01-01 RX ADMIN — PANTOPRAZOLE SODIUM 40 MG: 40 INJECTION, POWDER, FOR SOLUTION INTRAVENOUS at 12:07

## 2021-01-01 RX ADMIN — PIPERACILLIN AND TAZOBACTAM 4.5 G: 4; .5 INJECTION, POWDER, LYOPHILIZED, FOR SOLUTION INTRAVENOUS; PARENTERAL at 08:06

## 2021-01-01 RX ADMIN — ONDANSETRON HYDROCHLORIDE 8 MG: 4 TABLET, FILM COATED ORAL at 10:07

## 2021-01-01 RX ADMIN — POTASSIUM CHLORIDE 10 MEQ: 7.46 INJECTION, SOLUTION INTRAVENOUS at 01:07

## 2021-01-01 RX ADMIN — ONDANSETRON 4 MG: 4 TABLET, ORALLY DISINTEGRATING ORAL at 06:06

## 2021-01-01 RX ADMIN — CYPROHEPTADINE HYDROCHLORIDE 4 MG: 4 TABLET ORAL at 04:07

## 2021-01-01 RX ADMIN — ATORVASTATIN CALCIUM 40 MG: 40 TABLET, FILM COATED ORAL at 08:06

## 2021-01-01 RX ADMIN — HEPARIN SODIUM 5000 UNITS: 5000 INJECTION INTRAVENOUS; SUBCUTANEOUS at 02:07

## 2021-01-01 RX ADMIN — ASPIRIN 81 MG: 81 TABLET, COATED ORAL at 10:06

## 2021-01-01 RX ADMIN — POLYETHYLENE GLYCOL 3350 17 G: 17 POWDER, FOR SOLUTION ORAL at 08:06

## 2021-01-01 RX ADMIN — HEPARIN SODIUM AND DEXTROSE 18 UNITS/KG/HR: 10000; 5 INJECTION INTRAVENOUS at 06:07

## 2021-01-01 RX ADMIN — SODIUM BICARBONATE: 84 INJECTION, SOLUTION INTRAVENOUS at 06:07

## 2021-01-01 RX ADMIN — ALBUTEROL SULFATE 10 MG: 2.5 SOLUTION RESPIRATORY (INHALATION) at 07:06

## 2021-01-01 RX ADMIN — CALCIUM GLUCONATE 1 G: 98 INJECTION, SOLUTION INTRAVENOUS at 04:06

## 2021-01-01 RX ADMIN — PIPERACILLIN AND TAZOBACTAM 4.5 G: 4; .5 INJECTION, POWDER, LYOPHILIZED, FOR SOLUTION INTRAVENOUS; PARENTERAL at 04:06

## 2021-01-01 RX ADMIN — DOCUSATE SODIUM 50 MG AND SENNOSIDES 8.6 MG 1 TABLET: 8.6; 5 TABLET, FILM COATED ORAL at 06:06

## 2021-01-01 RX ADMIN — DEXTROSE MONOHYDRATE 25 G: 500 INJECTION PARENTERAL at 04:06

## 2021-01-01 RX ADMIN — POTASSIUM CHLORIDE 10 MEQ: 7.46 INJECTION, SOLUTION INTRAVENOUS at 09:07

## 2021-01-01 RX ADMIN — OXYCODONE 5 MG: 5 TABLET ORAL at 03:06

## 2021-01-01 RX ADMIN — SODIUM CHLORIDE 500 ML: 0.9 INJECTION, SOLUTION INTRAVENOUS at 08:06

## 2021-01-01 RX ADMIN — ASPIRIN 81 MG: 81 TABLET, COATED ORAL at 09:06

## 2021-01-01 RX ADMIN — POTASSIUM CHLORIDE 10 MEQ: 7.46 INJECTION, SOLUTION INTRAVENOUS at 12:07

## 2021-05-19 NOTE — TELEPHONE ENCOUNTER
PA for Tafinlar submitted to patient's insurance via CoverMyMeds - Key: [id number]    PA for Mekinist submitted to patient's insurance via CoverMyMeds - Key: [id number]

## 2021-05-19 NOTE — TELEPHONE ENCOUNTER
PA for Mekinist approved by patient's insurance. Case ID# [id number]. Approval dates 05/19/2021 through 11/15/2021. Qty #90/30.    PA for Tafinlar approved by patient's insurance. Case ID# [id number]. Approval dates 05/19/2021 through 11/15/2021. Qty #12/30.     Patient will likely need assistance. Will forward to BI to research cost and proceed with FA.

## 2021-05-19 NOTE — TELEPHONE ENCOUNTER
BENEFIT INVESTIGATION:   [employer]    [facility] Medicare plan.   [facility] in network  Patient is in catastrophic stage of coverage.   Estimated co pay: $2671.29  Co pay assistance required.   Forwarded to FA team for review.   [place]

## 2021-05-26 NOTE — TELEPHONE ENCOUNTER
Inbound, patient's daughter Linda calling in regards to FA options for Mekinist/Tafinlar and would like to apply for 's assistance and processes as quickly as possible for patient is in pain. Confirmed email address on file and will follow up as quickly as possible.

## 2021-05-27 NOTE — TELEPHONE ENCOUNTER
Good morning Dr. Prescott and staff,    Action Required:     I have faxed over  assistance forms to your office at fax number [phone]. The forms need to be completed by the physician for the patient's Mekinist and Tafinlar prescription assistance. Please complete the prescription sections of the forms.     Once completed, you can fax them to [facility]. Any questions or concerns regarding the program or completion of the forms, please reach out to Tia MARION at [facility].     Thank you,    Tia King  [facility] [phone] (B)

## 2021-05-31 NOTE — TELEPHONE ENCOUNTER
Provider portion received for Mekinist/Tafinlar PAP. Complete assistance application sent to [facility] PAP for review as URGENT.

## 2021-06-08 NOTE — TELEPHONE ENCOUNTER
Provider portion received and complete assistance application faxed to [facility] for review. Reached helpdesk representative Eliseo and he confirmed application was received and is being processed. He said he was able to request Expedite on the application but cannot guarantee that it will be processed any more quickly. Patient's daughter, Linda, updated on latest status. Will follow up.

## 2021-06-08 NOTE — TELEPHONE ENCOUNTER
Faxed provider portion of PANO assistance forms with urgent note since he will return to office today

## 2021-06-10 NOTE — TELEPHONE ENCOUNTER
Reached BURTON doran for status update. Per rep Aly, program should be reaching out to patient and provider today to confirm BI results and is in final stages of application process. Patient's daughter Linda updated on latest status and will continue to follow up.

## 2021-06-10 NOTE — TELEPHONE ENCOUNTER
Inbound, BURTON Patel calling to confirm Benefits Investigation for patient and to confirm 24-48 hr TAT for application decision. Patient's daughter, Linda, updated on status.

## 2021-06-13 PROBLEM — N17.9 ACUTE RENAL FAILURE SUPERIMPOSED ON STAGE 5 CHRONIC KIDNEY DISEASE, NOT ON CHRONIC DIALYSIS: Status: ACTIVE | Noted: 2021-01-01

## 2021-06-13 PROBLEM — G93.41 ENCEPHALOPATHY, METABOLIC: Status: ACTIVE | Noted: 2021-01-01

## 2021-06-13 PROBLEM — J18.9 PNEUMONIA OF LEFT LUNG DUE TO INFECTIOUS ORGANISM: Status: ACTIVE | Noted: 2021-01-01

## 2021-06-13 PROBLEM — N18.5 ACUTE RENAL FAILURE SUPERIMPOSED ON STAGE 5 CHRONIC KIDNEY DISEASE, NOT ON CHRONIC DIALYSIS: Status: ACTIVE | Noted: 2021-01-01

## 2021-06-14 PROBLEM — Z51.5 PALLIATIVE CARE ENCOUNTER: Status: ACTIVE | Noted: 2021-01-01

## 2021-06-14 PROBLEM — Z71.89 GOALS OF CARE, COUNSELING/DISCUSSION: Status: ACTIVE | Noted: 2021-01-01

## 2021-06-14 PROBLEM — Z71.89 COUNSELING REGARDING ADVANCE CARE PLANNING AND GOALS OF CARE: Status: ACTIVE | Noted: 2021-01-01

## 2021-06-14 NOTE — TELEPHONE ENCOUNTER
Reached BURTON doran. Per rep Gilda, patient has been approved for assistance for Mekinist and Tafinlar through 12/31/21. Patient must call [phone] to schedule first shipment. Rep also confirmed that patient's daughter, Linda, was listed as primary contact.  ___________________________________________    Reached patient's daughter, Linda, in regards to patient's approval and she confirmed that the program did reach out to her to schedule shipment of both medications. Her shipment should be arriving Tuesday, 6/15/21.

## 2021-06-16 NOTE — TELEPHONE ENCOUNTER
Reached BURTON doran. Rep Sandra confirms that both of patient's medication have been delivered or are in route. The Tafinlar was delivered 06/15/21 and the Mekinist is schedule to deliver today 06/16/21. Rep will refax both approval letters to Tia MARION At [phone]

## 2021-06-21 NOTE — TELEPHONE ENCOUNTER
Reached BURTON doran. Per rep Nayak, approval letters are being refaxed to [phone] for previously not received. Will follow up.

## 2021-06-23 PROBLEM — R63.0 LACK OF APPETITE: Status: ACTIVE | Noted: 2021-01-01

## 2021-07-07 PROBLEM — E87.29 HIGH ANION GAP METABOLIC ACIDOSIS: Status: ACTIVE | Noted: 2021-01-01

## 2021-07-09 PROBLEM — R33.9 URINARY RETENTION: Status: ACTIVE | Noted: 2021-01-01

## 2021-07-10 PROBLEM — I26.99 ACUTE PULMONARY EMBOLISM: Status: ACTIVE | Noted: 2021-01-01

## 2021-07-10 PROBLEM — D64.9 SYMPTOMATIC ANEMIA: Status: ACTIVE | Noted: 2021-01-01

## 2021-09-29 ENCOUNTER — TELEPHONE (OUTPATIENT)
Dept: INTERNAL MEDICINE | Facility: CLINIC | Age: 81
End: 2021-09-29

## 2022-08-03 NOTE — PATIENT INSTRUCTIONS
Counseling and Referral of Other Preventative  (Italic type indicates deductible and co-insurance are waived)    Patient Name: Marga Guerra  Today's Date: 11/21/2018    Health Maintenance       Date Due Completion Date    Hemoglobin A1c 12/12/2017 6/12/2017    Foot Exam 04/17/2018 4/17/2017 (Done)    Override on 4/17/2017: Done    Override on 6/27/2016: Done    Lipid Panel 06/12/2018 6/12/2017    Influenza Vaccine 08/01/2018 11/8/2017    Eye Exam 12/14/2018 (Originally 3/13/2018) 3/13/2017    Urine Microalbumin 05/28/2019 5/28/2018    DEXA SCAN 12/20/2019 12/20/2017    TETANUS VACCINE 06/27/2026 6/27/2016 (N/S)    Override on 6/27/2016: (N/S) (ordered and scheduled)        No orders of the defined types were placed in this encounter.    The following information is provided to all patients.  This information is to help you find resources for any of the problems found today that may be affecting your health:                Living healthy guide: www.[facility].[place].gov      Understanding Diabetes: www.diabetes.org      Eating healthy: www.cdc.gov/healthyweight      CDC home safety checklist: www.cdc.gov/steadi/patient.html      Agency on Aging: www.goea.[place].gov      Alcoholics anonymous (AA): www.aa.org      Physical Activity: www.mike.nih.gov/dm4wosz      Tobacco use: www.quitwithusla.org     
82

## 2024-04-26 NOTE — PROGRESS NOTES
Digital Medicine: Health  Follow-Up    The history is provided by caregiver. The patient has granted authorization to speak to this person.             Reason for review: Blood pressure at goal      Additional Follow-up details: Patient's daughter reports Mrs. Mckeon's blood pressure goes up and down. There doesn't seem to a rhyme or reason for it. We talked about how to self treat a low if the patient notices low BP.             Diet-no change to diet    No change to diet.        Physical Activity-no change to routine  No change to exercise routine.     Medication Adherence-Medication adherence was assessed.        Substance, Sleep, Stress-No change  stress-  Details:  Intervention(s):    Sleep-  Details:  Intervention(s):    Alcohol -  Details:  Intervention(s):    Tobacco-  Details:  Intervention(s):          Continue current diet/physical activity routine.       Addressed patient questions and patient has my contact information if needed prior to next outreach.        There are no preventive care reminders to display for this patient.      Last 5 Patient Entered Readings                                      Current 30 Day Average: 123/59     Recent Readings 11/20/2020 11/20/2020 11/16/2020 11/13/2020 11/9/2020    SBP (mmHg) 137 145 128 119 113    DBP (mmHg) 70 65 78 67 81    Pulse 84 87 88 92 97               
26-Apr-2023

## (undated) DEVICE — GUIDEWIRE STF .035X180CM ANG

## (undated) DEVICE — SEE MEDLINE ITEM 107746

## (undated) DEVICE — CATH DXTERITY AL-I 100CM 6FR

## (undated) DEVICE — GUIDEWIRE EMERALD 150CM PTFE

## (undated) DEVICE — CATH DXTERITY PG145 110CM 6FR

## (undated) DEVICE — OMNIPAQUE 350 200ML

## (undated) DEVICE — CATH DXTERITY AR10 100CM 6FR

## (undated) DEVICE — FLEXSHEATH DRYSEAL 14FR 33CM

## (undated) DEVICE — PROTECTION STATION PLUS

## (undated) DEVICE — SYS EVOLUT PRO+ DELIVERY 23-29
Type: IMPLANTABLE DEVICE | Site: HEART | Status: NON-FUNCTIONAL
Removed: 2020-07-30

## (undated) DEVICE — SEE MEDLINE ITEM 156894

## (undated) DEVICE — GUIDEWIRE SUPRA CORE 035 190CM

## (undated) DEVICE — CABLE PACER

## (undated) DEVICE — GUIDEWIRE X SPORT .014IN 190CM

## (undated) DEVICE — KIT CUSTOM MANIFOLD

## (undated) DEVICE — CATH MPA2 INFINITI 4FR 100CM

## (undated) DEVICE — CATH 5FR BALLOON PT HEART PACE

## (undated) DEVICE — GUIDEWIRE SAFARI2 XS CRV 275CM

## (undated) DEVICE — GUIDEWIRE STF .035X260CM STR

## (undated) DEVICE — SHEATH INTRODUCER 6FR 11CM

## (undated) DEVICE — BLLN CATH TRUE DILATION 18MM

## (undated) DEVICE — DEVICE PERCLOSE SUT CLSR 6FR

## (undated) DEVICE — GUIDEWIRE ROADRUNNER .018 270

## (undated) DEVICE — STOPCOCK 3-WAY

## (undated) DEVICE — SYS EVOLUT PRO+ LOADING 23-29
Type: IMPLANTABLE DEVICE | Site: HEART | Status: NON-FUNCTIONAL
Removed: 2020-07-30

## (undated) DEVICE — COVER BAND BAG 40 X 40

## (undated) DEVICE — SHEATH INTRODUCER 8FR 11CM

## (undated) DEVICE — KIT CO-PILOT

## (undated) DEVICE — LINE 60IN PRESSURE MON.

## (undated) DEVICE — KIT MICROINTRO 4F .018X40X7CM

## (undated) DEVICE — TUBING HPCIL ROT M/F ADPT 10IN

## (undated) DEVICE — CATH DXTERITY JL40 100CM 6FR

## (undated) DEVICE — CATH IMA INFINITI 4FRX100CM

## (undated) DEVICE — CATH AL1 4FR

## (undated) DEVICE — CATH PV .018

## (undated) DEVICE — SPIKE CONTRAST CONTROLLER

## (undated) DEVICE — CATH DXTERITY JR40 100CM 6FR

## (undated) DEVICE — GUIDEWIRE CONFIDA BECKER CURVE